# Patient Record
Sex: FEMALE | Race: WHITE | NOT HISPANIC OR LATINO | Employment: OTHER | URBAN - METROPOLITAN AREA
[De-identification: names, ages, dates, MRNs, and addresses within clinical notes are randomized per-mention and may not be internally consistent; named-entity substitution may affect disease eponyms.]

---

## 2017-01-12 ENCOUNTER — ALLSCRIPTS OFFICE VISIT (OUTPATIENT)
Dept: OTHER | Facility: OTHER | Age: 82
End: 2017-01-12

## 2017-01-26 ENCOUNTER — APPOINTMENT (OUTPATIENT)
Dept: PHYSICAL THERAPY | Facility: CLINIC | Age: 82
End: 2017-01-26
Payer: MEDICARE

## 2017-01-26 PROCEDURE — 97161 PT EVAL LOW COMPLEX 20 MIN: CPT

## 2017-01-26 PROCEDURE — G8979 MOBILITY GOAL STATUS: HCPCS

## 2017-01-26 PROCEDURE — G8978 MOBILITY CURRENT STATUS: HCPCS

## 2017-01-27 DIAGNOSIS — M25.562 PAIN IN LEFT KNEE: ICD-10-CM

## 2017-01-30 ENCOUNTER — APPOINTMENT (OUTPATIENT)
Dept: LAB | Facility: HOSPITAL | Age: 82
End: 2017-01-30
Attending: INTERNAL MEDICINE
Payer: MEDICARE

## 2017-01-30 ENCOUNTER — GENERIC CONVERSION - ENCOUNTER (OUTPATIENT)
Dept: OTHER | Facility: OTHER | Age: 82
End: 2017-01-30

## 2017-01-30 ENCOUNTER — TRANSCRIBE ORDERS (OUTPATIENT)
Dept: ADMINISTRATIVE | Facility: HOSPITAL | Age: 82
End: 2017-01-30

## 2017-01-30 ENCOUNTER — APPOINTMENT (OUTPATIENT)
Dept: PHYSICAL THERAPY | Facility: CLINIC | Age: 82
End: 2017-01-30
Payer: MEDICARE

## 2017-01-30 DIAGNOSIS — E03.9 HYPOTHYROIDISM: ICD-10-CM

## 2017-01-30 LAB — TSH SERPL DL<=0.05 MIU/L-ACNC: 3.04 UIU/ML (ref 0.36–3.74)

## 2017-01-30 PROCEDURE — 36415 COLL VENOUS BLD VENIPUNCTURE: CPT

## 2017-01-30 PROCEDURE — 84443 ASSAY THYROID STIM HORMONE: CPT

## 2017-02-01 ENCOUNTER — APPOINTMENT (OUTPATIENT)
Dept: PHYSICAL THERAPY | Facility: CLINIC | Age: 82
End: 2017-02-01
Payer: MEDICARE

## 2017-02-01 PROCEDURE — 97140 MANUAL THERAPY 1/> REGIONS: CPT

## 2017-02-01 PROCEDURE — 97110 THERAPEUTIC EXERCISES: CPT

## 2017-02-06 ENCOUNTER — APPOINTMENT (OUTPATIENT)
Dept: PHYSICAL THERAPY | Facility: CLINIC | Age: 82
End: 2017-02-06
Payer: MEDICARE

## 2017-02-06 PROCEDURE — 97110 THERAPEUTIC EXERCISES: CPT

## 2017-02-06 PROCEDURE — 97140 MANUAL THERAPY 1/> REGIONS: CPT

## 2017-02-08 ENCOUNTER — APPOINTMENT (OUTPATIENT)
Dept: PHYSICAL THERAPY | Facility: CLINIC | Age: 82
End: 2017-02-08
Payer: MEDICARE

## 2017-02-08 PROCEDURE — 97110 THERAPEUTIC EXERCISES: CPT

## 2017-02-13 ENCOUNTER — APPOINTMENT (OUTPATIENT)
Dept: PHYSICAL THERAPY | Facility: CLINIC | Age: 82
End: 2017-02-13
Payer: MEDICARE

## 2017-02-15 ENCOUNTER — APPOINTMENT (OUTPATIENT)
Dept: PHYSICAL THERAPY | Facility: CLINIC | Age: 82
End: 2017-02-15
Payer: MEDICARE

## 2017-02-15 PROCEDURE — 97110 THERAPEUTIC EXERCISES: CPT

## 2017-02-20 ENCOUNTER — APPOINTMENT (OUTPATIENT)
Dept: PHYSICAL THERAPY | Facility: CLINIC | Age: 82
End: 2017-02-20
Payer: MEDICARE

## 2017-02-20 PROCEDURE — 97110 THERAPEUTIC EXERCISES: CPT

## 2017-02-20 PROCEDURE — 97140 MANUAL THERAPY 1/> REGIONS: CPT

## 2017-02-22 ENCOUNTER — APPOINTMENT (OUTPATIENT)
Dept: PHYSICAL THERAPY | Facility: CLINIC | Age: 82
End: 2017-02-22
Payer: MEDICARE

## 2017-02-27 ENCOUNTER — APPOINTMENT (OUTPATIENT)
Dept: PHYSICAL THERAPY | Facility: CLINIC | Age: 82
End: 2017-02-27
Payer: MEDICARE

## 2017-03-01 ENCOUNTER — APPOINTMENT (OUTPATIENT)
Dept: PHYSICAL THERAPY | Facility: CLINIC | Age: 82
End: 2017-03-01
Payer: MEDICARE

## 2017-03-06 ENCOUNTER — GENERIC CONVERSION - ENCOUNTER (OUTPATIENT)
Dept: OTHER | Facility: OTHER | Age: 82
End: 2017-03-06

## 2017-03-06 ENCOUNTER — APPOINTMENT (OUTPATIENT)
Dept: PHYSICAL THERAPY | Facility: CLINIC | Age: 82
End: 2017-03-06
Payer: MEDICARE

## 2017-03-06 PROCEDURE — G8980 MOBILITY D/C STATUS: HCPCS

## 2017-03-06 PROCEDURE — 97110 THERAPEUTIC EXERCISES: CPT

## 2017-03-06 PROCEDURE — G8979 MOBILITY GOAL STATUS: HCPCS

## 2017-03-08 ENCOUNTER — APPOINTMENT (OUTPATIENT)
Dept: PHYSICAL THERAPY | Facility: CLINIC | Age: 82
End: 2017-03-08
Payer: MEDICARE

## 2017-04-08 ENCOUNTER — ANESTHESIA EVENT (OUTPATIENT)
Dept: PERIOP | Facility: AMBULARY SURGERY CENTER | Age: 82
End: 2017-04-08
Payer: MEDICARE

## 2017-04-08 RX ORDER — SODIUM CHLORIDE, SODIUM LACTATE, POTASSIUM CHLORIDE, CALCIUM CHLORIDE 600; 310; 30; 20 MG/100ML; MG/100ML; MG/100ML; MG/100ML
125 INJECTION, SOLUTION INTRAVENOUS CONTINUOUS
Status: CANCELLED | OUTPATIENT
Start: 2017-04-10

## 2017-04-10 ENCOUNTER — HOSPITAL ENCOUNTER (OUTPATIENT)
Facility: AMBULARY SURGERY CENTER | Age: 82
Setting detail: OUTPATIENT SURGERY
Discharge: HOME | End: 2017-04-10
Attending: OPHTHALMOLOGY | Admitting: OPHTHALMOLOGY
Payer: MEDICARE

## 2017-04-10 ENCOUNTER — ANESTHESIA (OUTPATIENT)
Dept: PERIOP | Facility: AMBULARY SURGERY CENTER | Age: 82
End: 2017-04-10
Payer: MEDICARE

## 2017-04-10 VITALS
HEIGHT: 63 IN | TEMPERATURE: 97 F | BODY MASS INDEX: 28.35 KG/M2 | OXYGEN SATURATION: 95 % | RESPIRATION RATE: 20 BRPM | HEART RATE: 80 BPM | SYSTOLIC BLOOD PRESSURE: 172 MMHG | WEIGHT: 160 LBS | DIASTOLIC BLOOD PRESSURE: 77 MMHG

## 2017-04-10 PROCEDURE — V2632 POST CHMBR INTRAOCULAR LENS: HCPCS | Performed by: OPHTHALMOLOGY

## 2017-04-10 DEVICE — IMPLANTABLE DEVICE: Type: IMPLANTABLE DEVICE | Site: EYE | Status: FUNCTIONAL

## 2017-04-10 RX ORDER — SODIUM CHLORIDE, SODIUM LACTATE, POTASSIUM CHLORIDE, CALCIUM CHLORIDE 600; 310; 30; 20 MG/100ML; MG/100ML; MG/100ML; MG/100ML
INJECTION, SOLUTION INTRAVENOUS CONTINUOUS PRN
Status: DISCONTINUED | OUTPATIENT
Start: 2017-04-10 | End: 2017-04-10

## 2017-04-10 RX ORDER — TOBRAMYCIN 3 MG/ML
1 SOLUTION/ DROPS OPHTHALMIC
Qty: 7.5 ML | Refills: 0
Start: 2017-04-10 | End: 2017-05-10

## 2017-04-10 RX ORDER — PHENYLEPHRINE HCL 2.5 %
1 DROPS OPHTHALMIC (EYE)
Status: COMPLETED | OUTPATIENT
Start: 2017-04-10 | End: 2017-04-10

## 2017-04-10 RX ORDER — TOBRAMYCIN 3 MG/ML
SOLUTION/ DROPS OPHTHALMIC AS NEEDED
Status: DISCONTINUED | OUTPATIENT
Start: 2017-04-10 | End: 2017-04-10 | Stop reason: HOSPADM

## 2017-04-10 RX ORDER — PROPOFOL 10 MG/ML
INJECTION, EMULSION INTRAVENOUS AS NEEDED
Status: DISCONTINUED | OUTPATIENT
Start: 2017-04-10 | End: 2017-04-10 | Stop reason: SURG

## 2017-04-10 RX ORDER — LIDOCAINE HYDROCHLORIDE 20 MG/ML
1 JELLY TOPICAL
Status: COMPLETED | OUTPATIENT
Start: 2017-04-10 | End: 2017-04-10

## 2017-04-10 RX ORDER — LIDOCAINE HYDROCHLORIDE 10 MG/ML
INJECTION, SOLUTION EPIDURAL; INFILTRATION; INTRACAUDAL; PERINEURAL AS NEEDED
Status: DISCONTINUED | OUTPATIENT
Start: 2017-04-10 | End: 2017-04-10 | Stop reason: HOSPADM

## 2017-04-10 RX ORDER — TETRACAINE HYDROCHLORIDE 5 MG/ML
SOLUTION OPHTHALMIC AS NEEDED
Status: DISCONTINUED | OUTPATIENT
Start: 2017-04-10 | End: 2017-04-10 | Stop reason: HOSPADM

## 2017-04-10 RX ORDER — KETOROLAC TROMETHAMINE 5 MG/ML
1 SOLUTION OPHTHALMIC 4 TIMES DAILY
Qty: 6 ML | Refills: 0
Start: 2017-04-10 | End: 2018-02-24

## 2017-04-10 RX ORDER — KETOROLAC TROMETHAMINE 5 MG/ML
1 SOLUTION OPHTHALMIC
Status: DISCONTINUED | OUTPATIENT
Start: 2017-04-10 | End: 2017-04-10 | Stop reason: HOSPADM

## 2017-04-10 RX ORDER — CYCLOPENTOLATE HYDROCHLORIDE 10 MG/ML
1 SOLUTION/ DROPS OPHTHALMIC
Status: COMPLETED | OUTPATIENT
Start: 2017-04-10 | End: 2017-04-10

## 2017-04-10 RX ORDER — TETRACAINE HYDROCHLORIDE 5 MG/ML
1 SOLUTION OPHTHALMIC ONCE
Status: COMPLETED | OUTPATIENT
Start: 2017-04-10 | End: 2017-04-10

## 2017-04-10 RX ADMIN — TETRACAINE HYDROCHLORIDE 1 DROP: 5 SOLUTION OPHTHALMIC at 07:48

## 2017-04-10 RX ADMIN — KETOROLAC TROMETHAMINE 1 DROP: 5 SOLUTION OPHTHALMIC at 08:18

## 2017-04-10 RX ADMIN — PHENYLEPHRINE HYDROCHLORIDE 1 DROP: 25 SOLUTION/ DROPS OPHTHALMIC at 08:18

## 2017-04-10 RX ADMIN — CYCLOPENTOLATE HYDROCHLORIDE 1 DROP: 10 SOLUTION/ DROPS OPHTHALMIC at 08:32

## 2017-04-10 RX ADMIN — PHENYLEPHRINE HYDROCHLORIDE 1 DROP: 25 SOLUTION/ DROPS OPHTHALMIC at 07:48

## 2017-04-10 RX ADMIN — KETOROLAC TROMETHAMINE 1 DROP: 5 SOLUTION OPHTHALMIC at 08:32

## 2017-04-10 RX ADMIN — CYCLOPENTOLATE HYDROCHLORIDE 1 DROP: 10 SOLUTION/ DROPS OPHTHALMIC at 08:18

## 2017-04-10 RX ADMIN — PHENYLEPHRINE HYDROCHLORIDE 1 DROP: 25 SOLUTION/ DROPS OPHTHALMIC at 08:03

## 2017-04-10 RX ADMIN — CYCLOPENTOLATE HYDROCHLORIDE 1 DROP: 10 SOLUTION/ DROPS OPHTHALMIC at 07:48

## 2017-04-10 RX ADMIN — LIDOCAINE HYDROCHLORIDE 1 APPLICATION: 20 JELLY TOPICAL at 08:32

## 2017-04-10 RX ADMIN — LIDOCAINE HYDROCHLORIDE 1 APPLICATION: 20 JELLY TOPICAL at 08:18

## 2017-04-10 RX ADMIN — KETOROLAC TROMETHAMINE 1 DROP: 5 SOLUTION OPHTHALMIC at 08:03

## 2017-04-10 RX ADMIN — LIDOCAINE HYDROCHLORIDE 1 APPLICATION: 20 JELLY TOPICAL at 07:49

## 2017-04-10 RX ADMIN — KETOROLAC TROMETHAMINE 1 DROP: 5 SOLUTION OPHTHALMIC at 07:49

## 2017-04-10 RX ADMIN — LIDOCAINE HYDROCHLORIDE 20 MG: 20 INJECTION, SOLUTION INTRAVENOUS at 08:53

## 2017-04-10 RX ADMIN — CYCLOPENTOLATE HYDROCHLORIDE 1 DROP: 10 SOLUTION/ DROPS OPHTHALMIC at 08:03

## 2017-04-10 RX ADMIN — PROPOFOL 30 MG: 10 INJECTION, EMULSION INTRAVENOUS at 08:53

## 2017-04-10 RX ADMIN — LIDOCAINE HYDROCHLORIDE 1 APPLICATION: 20 JELLY TOPICAL at 08:03

## 2017-04-10 RX ADMIN — PHENYLEPHRINE HYDROCHLORIDE 1 DROP: 25 SOLUTION/ DROPS OPHTHALMIC at 08:32

## 2017-05-26 ENCOUNTER — GENERIC CONVERSION - ENCOUNTER (OUTPATIENT)
Dept: OTHER | Facility: OTHER | Age: 82
End: 2017-05-26

## 2017-05-30 ENCOUNTER — LAB REQUISITION (OUTPATIENT)
Dept: LAB | Facility: HOSPITAL | Age: 82
End: 2017-05-30
Payer: MEDICARE

## 2017-05-30 ENCOUNTER — ALLSCRIPTS OFFICE VISIT (OUTPATIENT)
Dept: OTHER | Facility: OTHER | Age: 82
End: 2017-05-30

## 2017-05-30 DIAGNOSIS — R30.0 DYSURIA: ICD-10-CM

## 2017-05-30 LAB
BILIRUB UR QL STRIP: NEGATIVE
CLARITY UR: NORMAL
COLOR UR: YELLOW
GLUCOSE (HISTORICAL): NORMAL
HGB UR QL STRIP.AUTO: NEGATIVE
KETONES UR STRIP-MCNC: NEGATIVE MG/DL
LEUKOCYTE ESTERASE UR QL STRIP: NEGATIVE
NITRITE UR QL STRIP: NEGATIVE
PH UR STRIP.AUTO: 6 [PH]
PROT UR STRIP-MCNC: NEGATIVE MG/DL
SP GR UR STRIP.AUTO: 1.02
UROBILINOGEN UR QL STRIP.AUTO: NORMAL

## 2017-05-30 PROCEDURE — 87086 URINE CULTURE/COLONY COUNT: CPT | Performed by: NURSE PRACTITIONER

## 2017-05-31 LAB — BACTERIA UR CULT: NORMAL

## 2017-06-01 ENCOUNTER — GENERIC CONVERSION - ENCOUNTER (OUTPATIENT)
Dept: OTHER | Facility: OTHER | Age: 82
End: 2017-06-01

## 2017-06-26 ENCOUNTER — TRANSCRIBE ORDERS (OUTPATIENT)
Dept: LAB | Facility: CLINIC | Age: 82
End: 2017-06-26

## 2017-06-26 ENCOUNTER — APPOINTMENT (OUTPATIENT)
Dept: LAB | Facility: CLINIC | Age: 82
End: 2017-06-26
Payer: MEDICARE

## 2017-06-26 DIAGNOSIS — Z92.3 PERSONAL HISTORY OF IRRADIATION, PRESENTING HAZARDS TO HEALTH: ICD-10-CM

## 2017-06-26 DIAGNOSIS — Z79.811 USE OF AROMATASE INHIBITORS: ICD-10-CM

## 2017-06-26 DIAGNOSIS — C50.919 MALIGNANT NEOPLASM OF FEMALE BREAST, UNSPECIFIED LATERALITY, UNSPECIFIED SITE OF BREAST: ICD-10-CM

## 2017-06-26 DIAGNOSIS — Z79.899 ENCOUNTER FOR LONG-TERM (CURRENT) USE OF OTHER MEDICATIONS: Primary | ICD-10-CM

## 2017-06-26 LAB
ALBUMIN SERPL BCP-MCNC: 3.9 G/DL (ref 3.5–5)
ALP SERPL-CCNC: 90 U/L (ref 46–116)
ALT SERPL W P-5'-P-CCNC: 29 U/L (ref 12–78)
ANION GAP SERPL CALCULATED.3IONS-SCNC: 7 MMOL/L (ref 4–13)
AST SERPL W P-5'-P-CCNC: 16 U/L (ref 5–45)
BASOPHILS # BLD AUTO: 0.04 THOUSANDS/ΜL (ref 0–0.1)
BASOPHILS NFR BLD AUTO: 1 % (ref 0–1)
BILIRUB SERPL-MCNC: 0.3 MG/DL (ref 0.2–1)
BUN SERPL-MCNC: 18 MG/DL (ref 5–25)
CALCIUM SERPL-MCNC: 9.2 MG/DL (ref 8.3–10.1)
CHLORIDE SERPL-SCNC: 104 MMOL/L (ref 100–108)
CO2 SERPL-SCNC: 29 MMOL/L (ref 21–32)
CREAT SERPL-MCNC: 1.05 MG/DL (ref 0.6–1.3)
EOSINOPHIL # BLD AUTO: 0.33 THOUSAND/ΜL (ref 0–0.61)
EOSINOPHIL NFR BLD AUTO: 4 % (ref 0–6)
ERYTHROCYTE [DISTWIDTH] IN BLOOD BY AUTOMATED COUNT: 16.2 % (ref 11.6–15.1)
GFR SERPL CREATININE-BSD FRML MDRD: 49.3 ML/MIN/1.73SQ M
GGT SERPL-CCNC: 50 U/L (ref 5–85)
GLUCOSE SERPL-MCNC: 134 MG/DL (ref 65–140)
HCT VFR BLD AUTO: 42 % (ref 34.8–46.1)
HGB BLD-MCNC: 14 G/DL (ref 11.5–15.4)
LYMPHOCYTES # BLD AUTO: 2.14 THOUSANDS/ΜL (ref 0.6–4.47)
LYMPHOCYTES NFR BLD AUTO: 27 % (ref 14–44)
MCH RBC QN AUTO: 29.3 PG (ref 26.8–34.3)
MCHC RBC AUTO-ENTMCNC: 33.3 G/DL (ref 31.4–37.4)
MCV RBC AUTO: 88 FL (ref 82–98)
MONOCYTES # BLD AUTO: 0.44 THOUSAND/ΜL (ref 0.17–1.22)
MONOCYTES NFR BLD AUTO: 6 % (ref 4–12)
NEUTROPHILS # BLD AUTO: 4.93 THOUSANDS/ΜL (ref 1.85–7.62)
NEUTS SEG NFR BLD AUTO: 62 % (ref 43–75)
NRBC BLD AUTO-RTO: 0 /100 WBCS
PLATELET # BLD AUTO: 388 THOUSANDS/UL (ref 149–390)
PMV BLD AUTO: 11.1 FL (ref 8.9–12.7)
POTASSIUM SERPL-SCNC: 4.5 MMOL/L (ref 3.5–5.3)
PROT SERPL-MCNC: 7 G/DL (ref 6.4–8.2)
RBC # BLD AUTO: 4.78 MILLION/UL (ref 3.81–5.12)
SODIUM SERPL-SCNC: 140 MMOL/L (ref 136–145)
WBC # BLD AUTO: 7.9 THOUSAND/UL (ref 4.31–10.16)

## 2017-06-26 PROCEDURE — 85025 COMPLETE CBC W/AUTO DIFF WBC: CPT

## 2017-06-26 PROCEDURE — 82977 ASSAY OF GGT: CPT

## 2017-06-26 PROCEDURE — 86300 IMMUNOASSAY TUMOR CA 15-3: CPT

## 2017-06-26 PROCEDURE — 36415 COLL VENOUS BLD VENIPUNCTURE: CPT

## 2017-06-26 PROCEDURE — 80053 COMPREHEN METABOLIC PANEL: CPT

## 2017-06-27 LAB — CANCER AG27-29 SERPL-ACNC: 49.2 U/ML (ref 0–42.3)

## 2017-07-26 ENCOUNTER — ALLSCRIPTS OFFICE VISIT (OUTPATIENT)
Dept: OTHER | Facility: OTHER | Age: 82
End: 2017-07-26

## 2017-08-26 ENCOUNTER — ALLSCRIPTS OFFICE VISIT (OUTPATIENT)
Dept: OTHER | Facility: OTHER | Age: 82
End: 2017-08-26

## 2017-08-26 LAB
BILIRUB UR QL STRIP: NORMAL
CLARITY UR: NORMAL
COLOR UR: YELLOW
GLUCOSE (HISTORICAL): NORMAL
HGB UR QL STRIP.AUTO: NORMAL
KETONES UR STRIP-MCNC: NORMAL MG/DL
LEUKOCYTE ESTERASE UR QL STRIP: NORMAL
NITRITE UR QL STRIP: NORMAL
PH UR STRIP.AUTO: 5 [PH]
PROT UR STRIP-MCNC: NORMAL MG/DL
SP GR UR STRIP.AUTO: 1.02
UROBILINOGEN UR QL STRIP.AUTO: NORMAL

## 2017-08-30 ENCOUNTER — GENERIC CONVERSION - ENCOUNTER (OUTPATIENT)
Dept: OTHER | Facility: OTHER | Age: 82
End: 2017-08-30

## 2017-08-30 LAB
CULTURE RESULT (HISTORICAL): ABNORMAL
MISCELLANEOUS LAB TEST RESULT (HISTORICAL): ABNORMAL
SUSCEP. REFLEX (HISTORICAL): ABNORMAL

## 2017-12-29 ENCOUNTER — GENERIC CONVERSION - ENCOUNTER (OUTPATIENT)
Dept: FAMILY MEDICINE CLINIC | Facility: CLINIC | Age: 82
End: 2017-12-29

## 2018-01-10 NOTE — MISCELLANEOUS
Message  Talked to patient and asymptomatic and stated that never picked up the cipro last night and advised not to take any and order voided  Signatures   Electronically signed by : GLORIA Encarnacion;  Aug 31 2017  8:59AM EST                       (Author)

## 2018-01-10 NOTE — RESULT NOTES
Verified Results  (1) LIPID PANEL, FASTING [id number] 07:04AM [facility] Order Number: [id number]     Test Name Result Flag Reference   CHOLESTEROL 132 mg/dL     HDL,DIRECT 88 mg/dL H 40-60   Specimen collection should occur prior to Metamizole administration due to the potential for falsely depressed results  LDL CHOLESTEROL CALCULATED 23 mg/dL  0-100   - Patient Instructions: This is a fasting blood test  Water,black tea or black  coffee only after 9:00pm the night before test   Drink 2 glasses of water the morning of test     - Patient Instructions: This is a fasting blood test  Water,black tea or black  coffee only after 9:00pm the night before test Drink 2 glasses of water the morning of test - Patient Instructions: This bloodwork is non-fasting  Please drink two glasses of   water morning of bloodwork  Triglyceride:         Normal              <150 mg/dl       Borderline High    150-199 mg/dl       High               200-499 mg/dl       Very High          >499 mg/dl  Cholesterol:         Desirable        <200 mg/dl      Borderline High  200-239 mg/dl      High             >239 mg/dl  HDL Cholesterol:        High    >59 mg/dL      Low     <41 mg/dL  LDL CALCULATED:    This screening LDL is a calculated result  It does not have the accuracy of the Direct Measured LDL in the monitoring of patients with hyperlipidemia and/or statin therapy  Direct Measure LDL (AHM667) must be ordered separately in these patients  TRIGLYCERIDES 106 mg/dL  <=150   Specimen collection should occur prior to N-Acetylcysteine or Metamizole administration due to the potential for falsely depressed results  (1) [facility] [id number] 07:04[facility] Number: [id number]     Test Name Result Flag Reference   TSH 4 690 uIU/mL H 0 358-3 740   - Patient Instructions: This bloodwork is non-fasting  Please drink two glasses of water morning of bloodwork  - Patient Instructions:  This is a fasting blood test  Water,black tea or black  coffee only after 9:00pm the night before test Drink 2 glasses of water the morning of test - Patient Instructions: This bloodwork is non-fasting  Please drink two glasses of   water morning of bloodwork  Patients undergoing fluorescein dye angiography may retain small amounts of fluorescein in the body for 48-72 hours post procedure  Samples containing fluorescein can produce falsely depressed TSH values  If the patient had this procedure,a specimen should be resubmitted post fluorescein clearance  The recommended reference ranges for TSH during pregnancy are as follows:  First trimester 0 1 to 2 5 uIU/mL  Second trimester  0 2 to 3 0 uIU/mL  Third trimester 0 3 to 3 0 uIU/m       Plan  Hypothyroidism    · From  Levothyroxine Sodium 50 MCG Oral Tablet Take 1 tablet daily To  Levothyroxine Sodium 75 MCG Oral Tablet (Synthroid) TAKE 1 TABLET DAILY AS  DIRECTED   · (1) TSH WITH FT4 REFLEX; Status:Active;  Requested for:53Cef2386;

## 2018-01-12 NOTE — RESULT NOTES
Verified Results  (1) URINE CULTURE [id number] 02:21PM [facility] Order Number: [id number]     Test Name Result Flag Reference   CLINICAL REPORT (Report)     Test:        Urine culture  Specimen Type:   Urine  Specimen Date:   5/30/2017 2:21 PM  Result Date:    5/31/2017 7:43 PM  Result Status:   Final result  Resulting Lab:   [place] [address]            Tel: [phone]      CULTURE                                       ------------------                                   10,000-19,000 cfu/ml Mixed Contaminants X4

## 2018-01-12 NOTE — RESULT NOTES
Verified Results  (1) URINE CULTURE [id number] 12:00AM [facility]     Test Name Result Flag Reference   Urine Culture,Comprehensive Final report A    Result 1 Enterococcus species A    10,000-25,000 colony forming units per mL   Antimicrobial Susceptibility Comment     ** S = Susceptible; I = Intermediate; R = Resistant **                     P = Positive; N = Negative              MICS are expressed in micrograms per mL     Antibiotic                 RSLT#1    RSLT#2    RSLT#3    RSLT#4  Ciprofloxacin                  S  Levofloxacin                   S  Nitrofurantoin                 S  Penicillin                     S  Tetracycline                   R  Vancomycin                     S

## 2018-01-13 VITALS
WEIGHT: 175 LBS | HEART RATE: 68 BPM | HEIGHT: 64 IN | DIASTOLIC BLOOD PRESSURE: 74 MMHG | TEMPERATURE: 97.3 F | SYSTOLIC BLOOD PRESSURE: 136 MMHG | RESPIRATION RATE: 16 BRPM | BODY MASS INDEX: 29.88 KG/M2

## 2018-01-13 NOTE — RESULT NOTES
Verified Results  ECHO COMPLETE WITH CONTRAST IF INDICATED [id number] 01:19PM Priyanka Gan     Test Name Result Flag Reference   ECHO COMPLETE WITH CONTRAST IF INDICATED (Report)     [address]   [address] [facility]   [place] [address]   [phone]     Transthoracic Echocardiogram   2D, M-mode, Doppler, and Color Doppler     Study date: 21-Dec-2016     Patient: Carmel Shane   MR number: [id number]   Account number: [de-identified]   : 07-Mar-1928   Age: 80 years   Gender: Female   Status: Routine   Location: Echo lab   Height: 63 in   Weight: 159 7 lb   BP: 140/ 90 mmHg     Indications: Dizziness     Diagnoses: 780 4 - DIZZINESS AND GIDDINESS     Sonographer: London Alcantar   Primary Physician: Keren Perez MD   Referring Physician: Keren Perez MD   Group: David Thornton   Interpreting Physician: Michele Yen MD     SUMMARY     LEFT VENTRICLE:   Systolic function was vigorous by visual assessment  Ejection fraction was   estimated in the range of 60 % to 65 %  Although no diagnostic regional wall   motion abnormality was identified, this possibility cannot be completely   excluded on the basis of this study  There was mild concentric hypertrophy  Doppler parameters were consistent with abnormal left ventricular relaxation   (grade 1 diastolic dysfunction)  LEFT ATRIUM:   The atrium was moderately dilated  MITRAL VALVE:   There was mild regurgitation  TRICUSPID VALVE:   There was mild regurgitation  Estimated peak PA pressure was 40 to 45 mmHg  PULMONIC VALVE:   There was trace regurgitation  HISTORY: PRIOR HISTORY: HTN, GERD, HLD, Breast Cancer, Hypothyroidism     PROCEDURE: The procedure was performed in the echo lab  This was a routine   study  The transthoracic approach was used  The study included complete 2D   imaging, M-mode, complete spectral Doppler, and color Doppler  The heart rate   was 84 bpm, at the start of the study   Image quality was good  LEFT VENTRICLE: Size was normal  Systolic function was vigorous by visual   assessment  Ejection fraction was estimated in the range of 60 % to 65 %  Although no diagnostic regional wall motion abnormality was identified, this   possibility cannot be completely excluded on the basis of this study  There was   mild concentric hypertrophy  DOPPLER: Doppler parameters were consistent with   abnormal left ventricular relaxation (grade 1 diastolic dysfunction)  RIGHT VENTRICLE: The size was normal  Systolic function was normal      LEFT ATRIUM: The atrium was moderately dilated  RIGHT ATRIUM: Size was normal      MITRAL VALVE: Valve structure was normal  There was normal leaflet separation  DOPPLER: The transmitral velocity was within the normal range  There was no   evidence for stenosis  There was mild regurgitation  AORTIC VALVE: The valve was trileaflet  Leaflets exhibited normal thickness  DOPPLER: There was no evidence for stenosis  There was no regurgitation  TRICUSPID VALVE: DOPPLER: There was mild regurgitation  Estimated peak PA   pressure was 40 to 45 mmHg  PULMONIC VALVE: DOPPLER: There was trace regurgitation  PERICARDIUM: There was no thickening or calcification  There was no pericardial   effusion  AORTA: The root exhibited normal size  SYSTEMIC VEINS: IVC: The inferior vena cava was normal in size  Respirophasic   changes were normal      SYSTEM MEASUREMENT TABLES     2D mode   AoR Diam 2D: 3 3 cm   LA Diam (2D): 4 6 cm   LA/Ao (2D): 1 39   FS (2D Teich): 33 7 %   IVSd (2D): 1 3 cm   LVDEV: 111 cm³   LVESV: 41 6 cm³   LVIDd(2D): 4 86 cm   LVISd (2D): 3 22 cm   LVPWd (2D): 1 23 cm   SV (Teich): 69 4 cm³     Apical four chamber   LVEF A4C: 67 %     Unspecified Scan Mode   MV Peak A Luis: 1160 mm/s   MV Peak E Luis   Mean: 644 mm/s   MVA (PHT): 5 64 cm squared   PHT: 39 ms   Max P mm[Hg]   V Max: 3040 mm/s   Vmax: 2920 mm/s   RA Area: 15 5 cm squared   RA Volume: 39 4 cm³   TAPSE: 2 1 cm     [facility] Accredited [facility]ch[facility]ography [facility]     Prepared and electronically signed by     Ronnie Velez MD   Signed [id number] 09:11:02

## 2018-01-14 VITALS
DIASTOLIC BLOOD PRESSURE: 72 MMHG | HEART RATE: 76 BPM | BODY MASS INDEX: 30.22 KG/M2 | TEMPERATURE: 96.4 F | HEIGHT: 64 IN | SYSTOLIC BLOOD PRESSURE: 132 MMHG | RESPIRATION RATE: 16 BRPM | WEIGHT: 177 LBS

## 2018-01-14 VITALS
HEIGHT: 64 IN | BODY MASS INDEX: 29.53 KG/M2 | SYSTOLIC BLOOD PRESSURE: 150 MMHG | WEIGHT: 173 LBS | HEART RATE: 96 BPM | DIASTOLIC BLOOD PRESSURE: 87 MMHG

## 2018-01-14 VITALS
TEMPERATURE: 98.2 F | HEART RATE: 62 BPM | WEIGHT: 175 LBS | DIASTOLIC BLOOD PRESSURE: 80 MMHG | HEIGHT: 64 IN | SYSTOLIC BLOOD PRESSURE: 126 MMHG | BODY MASS INDEX: 29.88 KG/M2 | RESPIRATION RATE: 18 BRPM

## 2018-01-14 NOTE — PROGRESS NOTES
Assessment    1  Asymptomatic varicose veins of bilateral lower extremities (454 9) (I83 93)   2  Chronic pain of left knee (780 80,254 22) (M25 562,G92 29)    Plan    1  Follow-up PRN Evaluation and Treatment  Follow-up  Status: Complete  Done:   57HQZ9007   2  Apply moisturizing cream or lotion several times a day ; Status:Complete;   Done:   36XWY4744    Discussion/Summary  Discussion Summary:   Mrs Wilmer Lebron has bilateral asymptomatic varicose veins  History of bilateral vein stripping in 2004  She denies any edema, pain, heaviness or fatigue  Her pain is confined to the left knee  I have encouraged her to follow up with her orthopaedist  Her varicose veins pose no contraindication to injection therapy of the left knee  If in the future she becomes symptomatic due to her varicose veins she should notify the office, otherwise we will be happy to see her on an ass needed basis  Chief Complaint  Chief Complaint Free Text Note Form: "I am here to get my legs checked "         History of Present Illness  Varicose Veins [facility] Vascular: The patient is being seen for a consultation regarding varicose veins  Symptoms:  bilateral bulging veins, bilateral dilated veins, bilateral spider veins, bilateral hyperpigmentation and left leg pain, but no leg swelling, no muscle cramps, no stasis dermatitis, no cellulitis, no venous ulcers, no dry skin, no itching and no bleeding  The patient describes the pain as Left knee arthritic pain  This patient has no history of DVT, pulmonary embolism, superficial venous thrombosis, or a hypercoagulable state  Evaluation and Treatment History: This patient has had previous surgical treatment which has included bilateral vein stripping and ligation  This patient is not currently utilizing any conservative management strategies to manage the current symptoms  Free Text HPI: Ms Wilmer Lebron is new to our practice  She is here for evaluation of her varicose veins   She reports history of bilateral vein stripping and ligation by Dr Liya Brown in 2004  She complains of pain confined to the left knee  She states that she has arthritis and was told she could possibly have steroid injections to the need, but she should have her varicose veins evaluated first  Patient denies any aching, heaviness, pain or fatigue  She denies edema  Pain is confined only to the left knee  It is constantly present and made worse with weight bearing/walking  Pain is relieved with Advil  She does not wear compression stockings  She does not elevate  She reports that she has never had pain or been symptomatic, stating that she had stripping done in 2004 because of cosmetic reasons  Review of Systems  Complete Female - Vasc:   Constitutional: No fever or chills, feels well, no tiredness, no recent weight gain or weight loss  Eyes: no sudden vision loss, no blurred vision and no double vision, but no eye pain, no eyesight problems, no dryness of the eyes, eyes not red, no purulent discharge from the eyes, no itching of the eyes and wears glasses  ENT: nasal discharge, but no earache, no nosebleeds, no sore throat, no hearing loss and no hoarseness  Cardiovascular: irregular heart rate, no painful veins and no bleeding veins, but no chest pain, no intermittent leg claudication, no palpitations and leg pain with walking  Respiratory: No sob, no wheezing, no cough, no sob with exertion, no orthopnea  Gastrointestinal: No nausea, No vomiting, no diarrhea, no blood in stool  Genitourinary: no dysuria, no Hematuria,no urinary incontinence  Musculoskeletal: no limb pain, no limb swelling  Integumentary: dry skin and no ulcers, but no rashes, no itching, no skin lesions and no skin wound  Neurological: numbness, no dementia and difficulty walking, but no headache, no confusion, no dizziness, no limb weakness, no convulsions and no fainting  Psychiatric: no depression, no mood disorders, no anxiety  Hematologic/Lymphatic: a tendency for easy bleeding and a tendency for easy bruising, but no swollen glands and no swollen glands in the neck  ROS Reviewed:   ROS reviewed  Active Problems    1  Abnormal neutrophil count (790 99) (R79 9)   2  Adenocarcinoma of breast, unspecified laterality (174 9) (C50 919)   3  Benign colon polyp (211 3) (K63 5)   4  Benign essential hypertension (401 1) (I10)   5  BMI 29 0-29 9,adult (V85 25) (Z68 29)   6  Chronic rhinitis (472 0) (J31 0)   7  Complete uterovaginal prolapse (618 3) (N81 3)   8  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)   9  Coronary arteriosclerosis (414 00) (I25 10)   10  Encounter for pessary maintenance (V53 99) (Z46 89)   11  Encounter for special screening examination for genitourinary disorder (V81 6) (Z13 89)   12  Esophageal reflux (530 81) (K21 9)   13  Flu vaccine need (V04 81) (Z23)   14  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)   15  Hyperlipidemia (272 4) (E78 5)   16  Hypothyroidism (244 9) (E03 9)   17  Irritable bowel syndrome (564 1) (K58 9)   18  Need for pneumococcal vaccination (V03 82) (Z23)   19  Nephrolithiasis (592 0) (N20 0)   20  Peripheral vertigo (386 10) (H81 399)   21  Pre-op evaluation (V72 84) (Z01 818)   22  Venous insufficiency (chronic) (peripheral) (459 81) (I87 2)   23  Visit for screening mammogram (V76 12) (Z12 31)    Past Medical History    1  History of Acute Cystitis (595 0)   2  History of Acute upper respiratory infection (465 9) (J06 9)   3  History of Acute UTI (599 0) (N39 0)   4  History of Allergic Reaction (995 3)   5  History of Atypical chest pain (786 59) (R07 89)   6  Cellulitis of toe, unspecified laterality (681 10) (L03 039)   7  History of Chest pain (786 50) (R07 9)   8  Cough (786 2) (R05)   9  History of Dysuria (788 1) (R30 0)   10  History of Fainting (780 2) (R55)   11  History of  1 para 1 (V49 89) (Z78 9)   12  History of Herpes zoster (053 9) (B02 9)   13   History of abdominal pain (V13 89) (Z87 898)   14  History of acute bronchitis (V12 69) (Z87 09)   15  History of acute bronchitis (V12 69) (Z87 09)   16  History of acute sinusitis (V12 69) (Z87 09)   17  History of acute sinusitis (V12 69) (Z87 09)   18  History of diarrhea (V12 79) (Z87 898)   19  History of neoplasm of uncertain behavior of skin (V13 3) (Z87 2)   20  Impacted cerumen, unspecified laterality (380 4) (H61 20)   21  Personal history of urinary tract infection (V13 02) (Z87 440)   22  Personal history of urinary tract infection (V13 02) (Z87 440)   23  History of Pityriasis rosea (696 3) (L42)   24  History of Squamous cell carcinoma of skin of left lower extremity (173 72) (C44 729)   25  History of Tinnitus, unspecified laterality (388 30) (H93 19)  Active Problems And Past Medical History Reviewed: The active problems and past medical history were reviewed and updated today  Surgical History  Surgical History Reviewed: The surgical history was reviewed and updated today  Family History  Mother    1  Family history of    2  Family history of hypertension (V17 49) (Z82 49)   3  Family history of myocardial infarction (V17 3) (Z82 49)  Father    4  Family history of    5  Family history of hypertension (V17 49) (Z82 49)   6  Family history of myocardial infarction (V17 3) (Z82 49)  Sister    7  Family history of cardiac disorder (V17 49) (Z82 49)  Family History Reviewed: The family history was reviewed and updated today  Social History    · Cultural background   · Former smoker (V15 82) (P46 264)   · No drug use   · Primary spoken language English   · Racial background   · Retired   · Social alcohol use (Z78 9)  Social History Reviewed: The social history was reviewed and updated today  Current Meds   1  Aspir-81 81 MG Oral Tablet Delayed Release; 1 Every Day; Therapy: 29OTN5339 to  Requested for: 80HWX5103 Recorded   2   Diltiazem HCl ER Coated Beads 180 MG Oral Capsule Extended Release 24 Hour;   TAKE 1 CAPSULE DAILY (NEED APPOINTMENT PRIOR TO FURTHER REFILLS); Therapy: 90DCO6812 to (Last Rx:53Yap3239)  Requested for: 57Kmz3589 Ordered   3  Esomeprazole Magnesium 40 MG Oral Capsule Delayed Release; take 1 capsule daily; Therapy: 31Rdf1799 to (Last Rx:77Xil9989)  Requested for: 82Zbd4257 Ordered   4  Exemestane 25 MG Oral Tablet; Take 1 tablet daily; Therapy: 87BZN2283 to (Last Rx:68Zvd0176)  Requested for: 39MVC0740 Ordered   5  Levothyroxine Sodium 50 MCG Oral Tablet; Take 1 tablet daily; Therapy: 50OAQ0553 to (Last Rx:04Pql8751)  Requested for: 87Duu3493 Ordered   6  Nasonex 50 MCG/ACT Nasal Suspension; USE 2 SPRAYS IN EACH NOSTRIL DAILY; Therapy: 86Zwr0318 to (Last Rx:28Dac5718)  Requested for: 96Ums6731 Ordered   7  Vytorin 10-40 MG Oral Tablet; take half tablet nightly  Requested for: 91Qvq5876; Last   Rx:04Jan2016 Ordered  Medication List Reviewed: The medication list was reviewed and updated today  Allergies    1  Nitrofurantoin Monohyd Macro CAPS   2  Cipro TABS   3  Clindamycin   4  Levaquin TABS   5  Penicillins   6  Sulfa Drugs    7  IV Dye    Vitals  Vital Signs    Recorded: 55KEQ8861 81:89OE   Systolic 997, LUE, Sitting   Diastolic 78, LUE, Sitting   Heart Rate 76   Respiration 18   Height 5 ft 3 5 in   Weight 172 lb    BMI Calculated 29 99   BSA Calculated 1 82     Physical Exam    Dorsalis pedis: right 2+ and left 2+  Distal Pulse Exam: Normal Capillary Refill  Extremities: No upper or lower extremity edema  LE Varicose Veins: right leg truncal veins, left leg truncal veins, right leg reticular veins, left leg reticular veins, right leg spider veins and left leg spider veins  The heart rate was normal  The rhythm was regular  Heart sounds: normal S1 and normal S2    Murmurs: No murmurs were heard  Pulmonary   Respiratory effort: No increased work of breathing or signs of respiratory distress     Psychiatric   Orientation to person, place and time: Normal    Eyes   Conjunctiva and lids: No swelling, erythema, or discharge  Ears, Nose, Mouth, and Throat   Hearing: Normal    Neck   Neck: No jugular venous distention, normal ROM and extension  No cervical adenopathy, trachea midline, neck supple  Neurologic Sensory exam normal   Motor skills intact  Musculoskeletal   Gait and station: Normal    Muscle strength/tone: Normal    Skin   Skin and subcutaneous tissue: Normal without rashes or lesions        Signatures   Electronically signed by : Bernardino Cartagena; Oct 14 2016 11:42AM EST                       (Author)    Electronically signed by : Oxana Faria MD; Oct 18 2016  9:54AM EST

## 2018-01-15 NOTE — RESULT NOTES
Verified Results  (1) CBC/PLT/DIFF 07CRI2397 11:10AM [facility] Order Number: [id number]     Test Name Result Flag Reference   WBC COUNT 12 20 Thousand/uL H 4 80-10 80   WBC ADJUSTED 12 20 Thousand/uL H 4 80-10 80   RBC COUNT 5 12 Million/uL  4 20-5 40   HEMOGLOBIN 14 7 g/dL  12 0-16 0   HEMATOCRIT 44 8 %  37 0-47 0   MCV 88 fL  82-98   MCH 28 8 pg  27 0-31 0   MCHC 32 9 g/dL  31 4-37 4   RDW 16 0 % H 11 6-15 1   MPV 8 5 fL L 8 9-12 7   PLATELET COUNT 728 Thousands/uL  130-400   nRBC AUTOMATED 0 /100 WBCs     NEUTROPHILS RELATIVE PERCENT 70 %  43-75   LYMPHOCYTES RELATIVE PERCENT 21 %  14-44   MONOCYTES RELATIVE PERCENT 5 %  4-12   EOSINOPHILS RELATIVE PERCENT 4 %  0-6   BASOPHILS RELATIVE PERCENT 0 %  0-1   NEUTROPHILS ABSOLUTE COUNT 8 60 Thousands/µL H 1 85-7 62   LYMPHOCYTES ABSOLUTE COUNT 2 60 Thousands/µL  0 60-4 47   MONOCYTES ABSOLUTE COUNT 0 60 Thousand/µL  0 17-1 22   EOSINOPHILS ABSOLUTE COUNT 0 50 Thousand/µL  0 00-0 61   BASOPHILS ABSOLUTE COUNT 0 00 Thousands/µL  0 00-0 10       Discussion/Summary   Your white count is improved from the hospital, the rest of the blood count is normal  - Dr Stewart Labor

## 2018-01-18 NOTE — RESULT NOTES
Verified Results  (1) TSH WITH FT4 REFLEX 30Jan2017 01:08PM [facility] Order Number: [id number]     Test Name Result Flag Reference   TSH 3 045 uIU/mL  0 358-3 740   Patients undergoing fluorescein dye angiography may retain small amounts of fluorescein in the body for 48-72 hours post procedure  Samples containing fluorescein can produce falsely depressed TSH values  If the patient had this procedure,a specimen should be resubmitted post fluorescein clearance            The recommended reference ranges for TSH during pregnancy are as follows:  First trimester 0 1 to 2 5 uIU/mL  Second trimester  0 2 to 3 0 uIU/mL  Third trimester 0 3 to 3 0 uIU/m       Discussion/Summary   Your thyroid testing is normal - Dr Gordon Sanabria

## 2018-01-23 NOTE — MISCELLANEOUS
Assessment   1  Acute UTI (599 0) (N39 0)1   2  Nephrolithiasis (592 0) (N20 0)1   3  Abnormal neutrophil count (790 99) (R79 9)1      1 Amended By: Clementine Valadez ; Mar 09 2016 10:41 AM EST    Discussion/Summary  Discussion Summary:   Will recheck CBC due to elevated WBC while in the hospital, although this was likely due to infection  She is feeling much better from her UTI and is done with antibiotics  No further allergic type symptoms  Follow up with urogynecology tomorrow as scheduled  1        1 Amended By: Clementine Valadez ; Mar 09 2016 10:43 AM EST    Chief Complaint  I spoke with Dudley Kim on 2/29 16 after her hospital discharge to home on 2/27/16  She had an allergic reaction to the Cipro that Dr Vega Haddad had put her on so she stopped that yesterday  She had gone to the ER and they advised Benadryl PRN and put her on Prednisone 20 mg twice daily  Dr Vega Haddad now put her on Cephalexin which she plans to start today as long as Dr Derinda Cushing reassures her that it is ok for her to take this  (see separate phone message about this) I reviewed her medications and updated her chart  She is still having some lower abdominal discomfort since she has a "dropped bladder" and she is pushing fluids now due to her infection  (Pyelonephritis) and ureteral calculus with stent placement for which she will be following up with Dr Vega Haddad for  She will start her antibiotic as soon as she gets the approval from Dr Derinda Cushing  She denies fever  She is aware to call back at any time with any questions or concerns and she is also aware that if she develops any worsening pain or any chest pain, dizziness, weakness, dyspnea, fevers, etc she should go back to the ER [facility]   Chief Complaint Free Text Note Form: [address] [facility] UTI [email or web address]        1 Amended By: Zachary Maurice; Mar 09 2016 10:22 AM EST    History of Present Illness  TCM Communication [facility]: The patient is being contacted for follow-up after hospitalization  Hospital records were reviewed  She was hospitalized at [place] [address]  The date of discharge: 2/29/16  Diagnosis: Left Hip Pain/ Pyelonephritis, Left Ureteral Calculus with stent insertion  She was discharged to home  Medications reviewed and updated today  She scheduled a follow up appointment  Follow-up appointments with other specialists: Dr Priya Pritchard  Counseling was provided to the patient  Topics counseled included instructions for management, risk factor reductions, patient and family education and importance of compliance with treatment  Communication performed and completed by 2/29/16 Shannen   HPI: As per TCM note  She was in the hospital for stone and UTI, was initially put on cipro but had an allergic reaction  Was changed to keflex and has been taking this with prednisone  No further allergic symptoms  Has appointment with urogynecologist in [place] for her stones tomorrow  1  Denies fever, dysuria, rash, or discomfort  Her WBC was high in the hospital but came down prior to discharge  2        1 Amended By: Cee Keating ; Mar 09 2016 10:26 AM EST   2 Amended By: Cee Keating ; Mar 09 2016 10:40 AM EST    Review of Systems  Complete-Female:   Constitutional:1  No fever, no chills, feels well, no tiredness, no recent weight gain or weight loss1   Cardiovascular:1  No complaints of slow heart rate, no fast heart rate, no chest pain, no palpitations, no leg claudication, no lower extremity edema1   Respiratory:1  No complaints of shortness of breath, no wheezing, no cough, no SOB on exertion, no orthopnea, no PND1   Genitourinary:1  as noted in [facility]   1 Amended By: Cee Keating ; Mar 09 2016 10:26 AM EST    Active Problems   1  Abnormal blood sugar (790 29) (R73 09)  2  Acute bronchitis (466 0) (J20 9)  3  Adenocarcinoma of breast, unspecified laterality (174 9) (C50 919)  4  Benign colon polyp (211 3) (K63 5)  5  Benign essential hypertension (401 1) (I10)  6  Cervicalgia (723 1) (M54 2)  7  Chronic rhinitis (472 0) (J31 0)  8  Complete uterovaginal prolapse (618 3) (N81 3)  9  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)  10  Coronary arteriosclerosis (414 00) (I25 10)  11  Dermatitis (692 9) (L30 9)  12  Diarrhea (787 91) (R19 7)  13  Dry mouth (527 7) (R68 2)  14  Encounter for pessary maintenance (V53 99) (Z46 89)  15  Esophageal reflux (530 81) (K21 9)  16  Gastritis (535 50) (K29 70)  17  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)  18  Hyperlipidemia (272 4) (E78 5)  19  Hypothyroidism (244 9) (E03 9)  20  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)  21  Irritable bowel syndrome (564 1) (K58 9)  22  Need for pneumococcal vaccination (V03 82) (Z23)  23  Peripheral vertigo (386 10) (H81 399)  24  Tinea cruris (110 3) (B35 6)  25  Venous insufficiency (chronic) (peripheral) (459 81) (I87 2)  26  Visit for screening mammogram (V76 12) (Z12 31)    Past Medical History   1  History of Acute Cystitis (595 0)  2  History of Acute upper respiratory infection (465 9) (J06 9)  3  History of Allergic Reaction (995 3)  4  History of Atypical chest pain (786 59) (R07 89)  5  Cellulitis of toe, unspecified laterality (681 10) (L03 039)  6  History of Chest pain (786 50) (R07 9)  7  Cough (786 2) (R05)  8  History of Dysuria (788 1) (R30 0)  9  History of Fainting (780 2) (R55)  10  History of  1 para 1 (V49 89) (Z78 9)  11  History of Herpes zoster (053 9) (B02 9)  12  History of abdominal pain (V13 89) (Z87 898)  13  History of acute bronchitis (V12 69) (Z87 09)  14  History of acute sinusitis (V12 69) (Z87 09)  15  History of acute sinusitis (V12 69) (Z87 09)  16  History of neoplasm of uncertain behavior of skin (V13 3) (Z87 2)  17  Impacted cerumen, unspecified laterality (380 4) (H61 20)  18  Personal history of urinary tract infection (V13 02) (Z87 440)  19  Personal history of urinary tract infection (V13 02) (Z87 440)  20  History of Pityriasis rosea (696 3) (L42)  21   History of Squamous cell carcinoma of skin of left lower extremity (173 72) (C41 729)  22  History of Tinnitus, unspecified laterality (388 30) (H93 19)    Surgical History   1  History of Biopsy Skin  2  History of Breast Surgery Partial Mastectomy  3  History of Knee Arthroscopy  4  History of Total Abdominal Hysterectomy With Removal Of Both Ovaries  Surgical History Reviewed: The surgical history was reviewed and updated today  1        1 Amended By: Mariea Cogan ; Mar 09 2016 10:26 AM EST    Family History   1  Family history of   2  Family history of hypertension (V17 49) (Z82 49)  3  Family history of myocardial infarction (V17 3) (Z82 49)   4  Family history of   5  Family history of hypertension (V17 49) (Z82 49)  6  Family history of myocardial infarction (V17 3) (Z82 49)   7  Family history of cardiac disorder (V17 49) (Z82 49)  Family History Reviewed: The family history was reviewed and updated today  1        1 Amended By: Mariea Cogan ; Mar 09 2016 10:26 AM EST    Social History    · Cultural background   · Former smoker (V15 82) (W81 996)   · No drug use   · Primary spoken language English   · Racial background   · Retired   · Social alcohol use (Z78 9)    Social History Reviewed: The social history was reviewed and updated today  1  The social history was reviewed and is unchanged  1        1 Amended By: Mariea Cogan ; Mar 09 2016 10:26 AM EST    Current Meds  1  Aspir-81 81 MG Oral Tablet Delayed Release; 1 Every Day; Therapy: 80GQD9136 to  Requested for: 80GDA4715 Recorded  2  Clobetasol Propionate 0 05 % External Cream; APPLY SPARINGLY TO AFFECTED   AREA(S) TWICE DAILY; Therapy: 66SXM8835 to (Last Rx:48Pug5388)  Requested for: 09MFA0939 Ordered  3  Diltiazem HCl ER Coated Beads 180 MG Oral Capsule Extended Release 24 Hour;   TAKE 1 CAPSULE DAILY (NEED APPOINTMENT PRIOR TO FURTHER REFILLS); Therapy: 36JXO8146 to (Last Rx:10Qik3739)  Requested for: 72Vrh1690 Ordered  4  Esomeprazole Magnesium 40 MG Oral Capsule Delayed Release; take 1 capsule daily; Therapy: 12Are9650 to (Last Rx:25Oct2015)  Requested for: 09Uoo2496 Ordered  5  Exemestane 25 MG Oral Tablet; Take 1 tablet daily; Therapy: 47CMI8715 to (Last Rx:11Jan2016)  Requested for: 71VFR8987 Ordered  6  Levothyroxine Sodium 50 MCG Oral Tablet; Take 1 tablet daily; Therapy: 97EHM4888 to (Last Rx:03Nov2015)  Requested for: 71SUU2709 Ordered  7  Nasonex 50 MCG/ACT Nasal Suspension; USE 2 SPRAYS IN EACH NOSTRIL DAILY; Therapy: 83Hoi4871 to (Last Rx:71Iqs2061)  Requested for: 72Zyg0480 Ordered  8  Vytorin 10-40 MG Oral Tablet; take half tablet nightly  Requested for: 65JAN9256; Last   Rx:04Jan2016 Ordered  Medication List Reviewed: The medication list was reviewed and updated today  Allergies   1  Nitrofurantoin Monohyd Macro CAPS  2  Cipro TABS  3  Clindamycin  4  Levaquin TABS  5  Penicillins  6  Sulfa Drugs    Physical Exam    Constitutional1    General appearance: No acute distress, well appearing and well nourished  1    Ears, Nose, Mouth, and Throat1          1,2      1,2  2  2   2  2          1,2  2  2  2   Oropharynx: Normal with no erythema, edema, exudate or lesions  1    Pulmonary1    Respiratory effort: No increased work of breathing or signs of respiratory distress  2    Auscultation of lungs: Clear to auscultation  1    Cardiovascular1    Auscultation of heart: Normal rate and rhythm, normal S1 and S2, without murmurs  1    Examination of extremities for edema and/or varicosities: Normal 2    Abdomen2    Abdomen: Non-tender, no masses  2  No suprapubic tenderness2   Liver and spleen: No hepatomegaly or splenomegaly  2    Lymphatic1    Palpation of lymph nodes in neck: No lymphadenopathy  1          1 Amended By: Raford Habermann ; Mar 09 2016 10:27 AM EST   2 Amended By: Raford Habermann ; Mar 09 2016 10:40 AM EST    Future Appointments    Date/Time Provider Specialty Site   03/09/2016 10:00 AM Mahsa LIZABETH Cid   [id number] [facility]     Signatures   Electronically signed by : Lucretia Prater, ; Feb 29 2016  2:21PM EST                       (Co-author)    Electronically signed by : LIZABETH Rodriguez ; Feb 29 2016  4:09PM EST                       (Author)    Electronically signed by : LIZABETH Rodriguez ; Mar  9 2016 10:43AM EST                       (Author)

## 2018-02-24 ENCOUNTER — OFFICE VISIT (OUTPATIENT)
Dept: FAMILY MEDICINE CLINIC | Facility: CLINIC | Age: 83
End: 2018-02-24
Payer: MEDICARE

## 2018-02-24 VITALS
RESPIRATION RATE: 16 BRPM | HEIGHT: 63 IN | TEMPERATURE: 97.6 F | WEIGHT: 176 LBS | HEART RATE: 76 BPM | SYSTOLIC BLOOD PRESSURE: 154 MMHG | BODY MASS INDEX: 31.18 KG/M2 | DIASTOLIC BLOOD PRESSURE: 78 MMHG

## 2018-02-24 DIAGNOSIS — J01.00 ACUTE NON-RECURRENT MAXILLARY SINUSITIS: Primary | ICD-10-CM

## 2018-02-24 PROBLEM — S80.02XA CONTUSION OF LEFT KNEE AND LOWER LEG: Status: ACTIVE | Noted: 2017-01-12

## 2018-02-24 PROBLEM — S80.12XA CONTUSION OF LEFT KNEE AND LOWER LEG: Status: ACTIVE | Noted: 2017-01-12

## 2018-02-24 PROBLEM — M25.562 LEFT KNEE PAIN: Status: ACTIVE | Noted: 2017-01-12

## 2018-02-24 PROCEDURE — 99213 OFFICE O/P EST LOW 20 MIN: CPT | Performed by: NURSE PRACTITIONER

## 2018-02-24 RX ORDER — CEPHALEXIN 500 MG/1
500 CAPSULE ORAL EVERY 12 HOURS SCHEDULED
Qty: 14 CAPSULE | Refills: 0 | Status: SHIPPED | OUTPATIENT
Start: 2018-02-24 | End: 2018-03-03

## 2018-02-24 RX ORDER — DEXTROMETHORPHAN HYDROBROMIDE AND PROMETHAZINE HYDROCHLORIDE 15; 6.25 MG/5ML; MG/5ML
5 SYRUP ORAL 4 TIMES DAILY PRN
Qty: 240 ML | Refills: 0 | Status: SHIPPED | OUTPATIENT
Start: 2018-02-24 | End: 2018-03-03

## 2018-02-24 RX ORDER — OMEPRAZOLE 40 MG/1
1 CAPSULE, DELAYED RELEASE ORAL DAILY
COMMUNITY
Start: 2017-11-07 | End: 2018-02-24

## 2018-02-24 RX ORDER — DILTIAZEM HYDROCHLORIDE 180 MG/1
CAPSULE, COATED, EXTENDED RELEASE ORAL
COMMUNITY
Start: 2013-09-30 | End: 2018-06-28 | Stop reason: SDUPTHER

## 2018-02-24 NOTE — PROGRESS NOTES
Assessment/Plan:       Diagnoses and all orders for this visit:    Acute non-recurrent maxillary sinusitis  -     cephalexin (KEFLEX) 500 mg capsule; Take 1 capsule (500 mg total) by mouth every 12 (twelve) hours for 7 days  -     promethazine-dextromethorphan (PHENERGAN-DM) 6 25-15 mg/5 mL oral syrup; Take 5 mL by mouth 4 (four) times a day as needed for cough for up to 7 days    Other orders  -     Discontinue: omeprazole (PriLOSEC) 40 MG capsule; Take 1 capsule by mouth daily  -     diltiazem (CARDIZEM CD) 180 mg 24 hr capsule; Take by mouth          Patient Instructions   Drinking plenty of fluids, saline nasal rinses or nasal spray, and steam or cool air humidification are all effective ways of managing symptoms  May take Mucinex D for sinus congestion, or Coricidin HBP if you have a heart condition or high blood pressure  Mucinex or Robitussin DM are used to control cough symptoms and include an expectorant  May take Ibuprofen or Tylenol as needed for pain or fevers  Recommend recheck if symptoms do not resolve or improve within 1 week  Take antibiotics with food  It is important that you take the entire course of antibiotics prescribed  May also take a probiotic of your choice to maintain healthy GI miles  Return if symptoms worsen or fail to improve  Subjective:      Patient ID: Vidhi Dunn is a 80 y o  female  Chief Complaint   Patient presents with    Nasal Congestion     2/21/18    Fever       She has had sinus congestion and pressure for the past 4-5 days  She has a runny nose, sore throat, and a dry cough  Fever of 101 at onset of symptoms  Denies abdominal discomfort, n/v/d  She is using saline nasal rinses  Not taking anything OTC for symptoms  She did have a flu shot this season           The following portions of the patient's history were reviewed and updated as appropriate: allergies, current medications, past family history, past medical history, past social history, past surgical history and problem list     Review of Systems   Constitutional: Positive for fatigue and fever  Negative for chills  HENT: Positive for congestion and rhinorrhea  Negative for ear pain, postnasal drip, sinus pressure and sore throat  Respiratory: Positive for cough and shortness of breath  Negative for wheezing  Cardiovascular: Negative for chest pain  Gastrointestinal: Negative for abdominal pain, diarrhea, nausea and vomiting  Musculoskeletal: Negative for arthralgias  Skin: Negative for rash  Neurological: Negative for headaches  Current Outpatient Prescriptions   Medication Sig Dispense Refill    diltiazem (CARDIZEM CD) 180 mg 24 hr capsule Take by mouth      aspirin 81 MG tablet Take 81 mg by mouth every morning        cephalexin (KEFLEX) 500 mg capsule Take 1 capsule (500 mg total) by mouth every 12 (twelve) hours for 7 days 14 capsule 0    esomeprazole (NexIUM) 40 MG capsule Take 40 mg by mouth every morning before breakfast       exemestane (AROMASIN) 25 MG tablet Take 25 mg by mouth daily   ezetimibe-simvastatin (VYTORIN) 10-40 mg per tablet Take 1 tablet by mouth daily at bedtime   levothyroxine 50 mcg tablet Take 50 mcg by mouth every morning        promethazine-dextromethorphan (PHENERGAN-DM) 6 25-15 mg/5 mL oral syrup Take 5 mL by mouth 4 (four) times a day as needed for cough for up to 7 days 240 mL 0     No current facility-administered medications for this visit  Objective:    /78   Pulse 76   Temp 97 6 °F (36 4 °C)   Resp 16   Ht 5' 3" (1 6 m)   Wt 79 8 kg (176 lb)   BMI 31 18 kg/m²        Physical Exam   Constitutional: She appears well-developed and well-nourished  HENT:   Right Ear: External ear and ear canal normal  Tympanic membrane is retracted  Left Ear: External ear and ear canal normal  Tympanic membrane is retracted  Nose: Mucosal edema and rhinorrhea (mucoid) present     Mouth/Throat: Oropharynx is clear and moist and mucous membranes are normal    Eyes: Conjunctivae are normal    Cardiovascular: Normal rate, regular rhythm and normal heart sounds  Pulmonary/Chest: Effort normal and breath sounds normal    Abdominal: Bowel sounds are normal  She exhibits no distension  There is no splenomegaly or hepatomegaly  There is no tenderness  Lymphadenopathy:        Right cervical: No superficial cervical adenopathy present  Left cervical: No superficial cervical adenopathy present  Skin: No rash noted  Psychiatric: She has a normal mood and affect                [facility]

## 2018-02-24 NOTE — PATIENT INSTRUCTIONS
Drinking plenty of fluids, saline nasal rinses or nasal spray, and steam or cool air humidification are all effective ways of managing symptoms  May take Mucinex D for sinus congestion, or Coricidin HBP if you have a heart condition or high blood pressure  Mucinex or Robitussin DM are used to control cough symptoms and include an expectorant  May take Ibuprofen or Tylenol as needed for pain or fevers  Recommend recheck if symptoms do not resolve or improve within 1 week  Take antibiotics with food  It is important that you take the entire course of antibiotics prescribed  May also take a probiotic of your choice to maintain healthy GI miles

## 2018-02-28 ENCOUNTER — OFFICE VISIT (OUTPATIENT)
Dept: FAMILY MEDICINE CLINIC | Facility: CLINIC | Age: 83
End: 2018-02-28
Payer: MEDICARE

## 2018-02-28 VITALS
HEART RATE: 84 BPM | SYSTOLIC BLOOD PRESSURE: 130 MMHG | TEMPERATURE: 97.5 F | RESPIRATION RATE: 16 BRPM | BODY MASS INDEX: 31.01 KG/M2 | HEIGHT: 63 IN | DIASTOLIC BLOOD PRESSURE: 76 MMHG | WEIGHT: 175 LBS

## 2018-02-28 DIAGNOSIS — F41.9 ANXIETY: Primary | ICD-10-CM

## 2018-02-28 PROCEDURE — 99213 OFFICE O/P EST LOW 20 MIN: CPT | Performed by: INTERNAL MEDICINE

## 2018-02-28 RX ORDER — SERTRALINE HYDROCHLORIDE 25 MG/1
25 TABLET, FILM COATED ORAL DAILY
Qty: 30 TABLET | Refills: 5 | Status: SHIPPED | OUTPATIENT
Start: 2018-02-28 | End: 2019-06-12 | Stop reason: ALTCHOICE

## 2018-02-28 NOTE — PROGRESS NOTES
Subjective:      Patient ID: Sunny Diaz is a 80 y o  female  Chief Complaint   Patient presents with    Follow-up     meication use    Hypertension       She is here with her daughter to talk about anxiety and some ruminating thoughts  Her daughter had told me at her own appointment that patient was repeating herself a lot lately, and is having problems with the woman who lives above her in the apartment building  Patient believes she is running a brothel out of the apartment and has 2 men there a night with increased noise in the apartment  Has called the police several times and they have told her that the woman can do what she wants in her apartment as she does not appear to be committing a crime  Patient has called the rental agency several times as well  She will not move her apartment  She is nervous and anxious at nighttime because she cannot take the noise and what she believes is going on upstairs  She is having trouble sleeping and is out in her living room most of the time instead of in the bed at night  Her daughter thinks she is having delusions  The following portions of the patient's history were reviewed and updated as appropriate: allergies, current medications, past family history, past medical history, past social history, past surgical history and problem list     Review of Systems   Constitutional: Negative  Respiratory: Negative  Cardiovascular: Negative  Current Outpatient Prescriptions   Medication Sig Dispense Refill    aspirin 81 MG tablet Take 81 mg by mouth every morning        cephalexin (KEFLEX) 500 mg capsule Take 1 capsule (500 mg total) by mouth every 12 (twelve) hours for 7 days 14 capsule 0    diltiazem (CARDIZEM CD) 180 mg 24 hr capsule Take by mouth      esomeprazole (NexIUM) 40 MG capsule Take 40 mg by mouth every morning before breakfast       exemestane (AROMASIN) 25 MG tablet Take 25 mg by mouth daily        ezetimibe-simvastatin (VYTORIN) 10-40 mg per tablet Take 1 tablet by mouth daily at bedtime   levothyroxine 50 mcg tablet Take 50 mcg by mouth every morning        promethazine-dextromethorphan (PHENERGAN-DM) 6 25-15 mg/5 mL oral syrup Take 5 mL by mouth 4 (four) times a day as needed for cough for up to 7 days 240 mL 0    sertraline (ZOLOFT) 25 mg tablet Take 1 tablet (25 mg total) by mouth daily 30 tablet 5     No current facility-administered medications for this visit  Objective:    /76   Pulse 84   Temp 97 5 °F (36 4 °C)   Resp 16   Ht 5' 3" (1 6 m)   Wt 79 4 kg (175 lb)   BMI 31 00 kg/m²        Physical Exam   Constitutional: She is oriented to person, place, and time  She appears well-developed and well-nourished  HENT:   Head: Normocephalic and atraumatic  Eyes: Conjunctivae are normal    Neck: Neck supple  No JVD present  No thyromegaly present  Cardiovascular: Normal rate, regular rhythm and intact distal pulses  Musculoskeletal: She exhibits no edema  Neurological: She is alert and oriented to person, place, and time  MMSE was done and patient scored 27/30, losing points on recall of 3 objects and following commands  Psychiatric: Her mood appears anxious  Assessment/Plan:    Anxiety  I had a lengthy discussion with patient and her daughter about the anxiety this is causing her  She refused counselling or other intervention, but did agree to a small dose of SSRI to help with the anxiety associated with her ruminating thoughts  Advised on possible side effects  She will return in one month to reassess  Diagnoses and all orders for this visit:    Anxiety  -     sertraline (ZOLOFT) 25 mg tablet; Take 1 tablet (25 mg total) by mouth daily          Return in about 1 month (around 3/28/2018)         Phillip Handy MD

## 2018-02-28 NOTE — ASSESSMENT & PLAN NOTE
I had a lengthy discussion with patient and her daughter about the anxiety this is causing her  She refused counselling or other intervention, but did agree to a small dose of SSRI to help with the anxiety associated with her ruminating thoughts  Advised on possible side effects  She will return in one month to reassess

## 2018-05-22 ENCOUNTER — OFFICE VISIT (OUTPATIENT)
Dept: VASCULAR SURGERY | Facility: CLINIC | Age: 83
End: 2018-05-22
Payer: MEDICARE

## 2018-05-22 VITALS
HEART RATE: 80 BPM | HEIGHT: 63 IN | RESPIRATION RATE: 18 BRPM | DIASTOLIC BLOOD PRESSURE: 78 MMHG | WEIGHT: 174.2 LBS | BODY MASS INDEX: 30.87 KG/M2 | SYSTOLIC BLOOD PRESSURE: 132 MMHG

## 2018-05-22 DIAGNOSIS — I87.2 CHRONIC VENOUS INSUFFICIENCY: Primary | ICD-10-CM

## 2018-05-22 PROCEDURE — 99203 OFFICE O/P NEW LOW 30 MIN: CPT | Performed by: SURGERY

## 2018-05-22 NOTE — ASSESSMENT & PLAN NOTE
Chronic venous insufficiency longstanding, hyperpigmentation changes with stasis dermatitis which is mild  Skin lesions possibly indicative of local skin cancer  No intervention is either indicator necessary from a vascular standpoint, I am recommending evaluation by primary care physician or dermatology consultation  She has a perfectly normal peripheral arterial exam but has obvious chronic venous changes

## 2018-05-22 NOTE — PROGRESS NOTES
Assessment/Plan:    Chronic venous insufficiency  Chronic venous insufficiency longstanding, hyperpigmentation changes with stasis dermatitis which is mild  Skin lesions possibly indicative of local skin cancer  No intervention is either indicator necessary from a vascular standpoint, I am recommending evaluation by primary care physician or dermatology consultation  She has a perfectly normal peripheral arterial exam but has obvious chronic venous changes  Diagnoses and all orders for this visit:    Chronic venous insufficiency        Subjective:      Patient ID: Curtis Montilla is a 80 y o  female  HPI concerned about changes in her skin tone and color bilateral lower extremities, appearance of moderate chronic venous insufficiency with a possible dish in of skin lesion from a dermatological standpoint  The following portions of the patient's history were reviewed and updated as appropriate: allergies, current medications, past family history, past medical history, past social history, past surgical history and problem list     Review of Systems  bilateral knee discomfort, bilateral leg hyperpigmentation otherwise all systems negative    Objective:      /78 (BP Location: Right arm, Patient Position: Sitting, Cuff Size: Standard)   Pulse 80   Resp 18   Ht 5' 3" (1 6 m)   Wt 79 kg (174 lb 3 2 oz)   BMI 30 86 kg/m²          Physical Exam       Physical Exam      Oriented x3 no evidence of clinical depression  Neck is supple carotid pulses equal bilaterally no bruits heard    Chest lungs clear, heart regular rhythm  Abdomen soft nontender no evidence of pulsatile masses  Pulses are palpable bilateral femoral, popliteal, dorsalis pedis bilaterally        Neurological exam intact cranial nerves 2-12 grossly intact no gross motor sensory deficits detected      Vitals:    05/22/18 0909   BP: 132/78   BP Location: Right arm   Patient Position: Sitting   Cuff Size: Standard   Pulse: 80 Resp: 18   Weight: 79 kg (174 lb 3 2 oz)   Height: 5' 3" (1 6 m)       Patient Active Problem List   Diagnosis    Nephrolithiasis    Abnormal neutrophil count    Adenocarcinoma of breast ([address] )    Asymptomatic varicose veins of bilateral lower extremities    Atrophic vaginitis    Benign colon polyp    Benign essential hypertension    Pain of left calf    Chronic pain of left knee    Chronic rhinitis    Chronic ulcer of left leg, limited to breakdown of skin ([address] )    Complete uterovaginal prolapse    Congenital anomaly of coronary artery    Contusion of left knee and lower leg    Coronary arteriosclerosis    Dizziness    Difficult or painful urination    Esophageal reflux    Gait disturbance    GERD (gastroesophageal reflux disease)    Healed myocardial infarct    Hyperlipidemia    Hypothyroidism    Irritable bowel syndrome    Left knee pain    Osteoarthritis of left knee    Peripheral vertigo    Psoriasis    Rectocele    Stress incontinence in female    Venous insufficiency (chronic) (peripheral)    Anxiety    Chronic venous insufficiency       Past Surgical History:   Procedure Laterality Date    APPENDECTOMY      during BREN    BLADDER SURGERY      suspension surgery no sling, used fiberglass suspension technique    BREAST SURGERY Right     lumpectomy    CHEST WALL TUMOR EXCISION  2005    COLONOSCOPY      CYSTOSCOPY      HYSTERECTOMY Bilateral     brne w/removal of both ovaries    KNEE ARTHROSCOPY      Last assessed: 7/30/15    MASTECTOMY, PARTIAL  07/1998    UT CYSTOURETHROSCOPY,URETER CATHETER Left 2/26/2016    Procedure: CYSTOSCOPY RETROGRADE PYELOGRAM WITH INSERTION STENT URETERAL;  Surgeon: Mona Friday, MD;  Location: [facility] MAIN [facility];  Service: Urology    [address] Left 4/10/2017    Procedure: EXTRACTION EXTRACAPSULAR CATARACT PHACO INTRAOCULAR LENS (IOL);   Surgeon: Saud Castillo MD;  Location: [facility] MAIN OR;  Service: Ophthalmology   [facility] SKIN BIOPSY Left     Leg for SCC       Family History   Problem Relation Age of Onset    Angina Mother     Hypertension Mother     Heart attack Mother      Myocardial Infarction    Early death Father      age 36 MI    Heart disease Father     Hypertension Father     Heart attack Father      Myocardial Infarction    Heart disease Sister     Heart disease Sister      Cardiac Disorder       Social History     Social History    Marital status:      Spouse name: N/A    Number of children: N/A    Years of education: N/A     Occupational History    Not on file  Social History Main Topics    Smoking status: Former Smoker     Packs/day: 0 10     Quit date: 1970    Smokeless tobacco: Never Used    Alcohol use No      Comment: Per [employer]: Social alcohol use    Drug use: No    Sexual activity: No     Other Topics Concern    Not on file     Social History Narrative    Cultural background: Non-    Primary spoken-language English    Racial background: white    Retired           Allergies   Allergen Reactions    Other Anaphylaxis     Blue Dye during ultrasound  Blue Dye during ultrasound    Ciprofloxacin Hives and Itching     Redness  Annotation - [id number]: rash and itching    Clindamycin Hives and Itching     Redness  Reaction Date: 06Aug2012;     Clindamycin/Lincomycin     Iv Contrast  [Iodinated Diagnostic Agents]      Other reaction(s): Anaphylaxis    Levaquin [Levofloxacin] Hives and Itching     Reaction Date: [id number]; Annotation - [id number]: "FACIAL ITCHING"  Redness    Lincomycin     Nitrofuran Derivatives     Penicillins Hives and Itching     Reaction Date: 07Dec2005; Redness    Sulfa Antibiotics Hives and Itching     Redness  Reaction Date: 07Dec2005;     Sulfacetamide     Sulfasalazine     Ceftriaxone Rash     Tolerates Cephalexin     Linezolid Rash     Rash,flushed face after 3rd day of taking this ABX, started benadryl subsided by the end of the nite      Nitrofurantoin Hives, Itching, Nausea Only and Rash     Reaction Date: [id number];   Redness         Current Outpatient Prescriptions:     aspirin 81 MG tablet, Take 81 mg by mouth every morning  , Disp: , Rfl:     diltiazem (CARDIZEM CD) 180 mg 24 hr capsule, Take by mouth, Disp: , Rfl:     esomeprazole (NexIUM) 40 MG capsule, Take 40 mg by mouth every morning before breakfast , Disp: , Rfl:     exemestane (AROMASIN) 25 MG tablet, Take 25 mg by mouth daily  , Disp: , Rfl:     ezetimibe-simvastatin (VYTORIN) 10-40 mg per tablet, Take 1 tablet by mouth daily at bedtime  , Disp: , Rfl:     levothyroxine 50 mcg tablet, Take 50 mcg by mouth every morning  , Disp: , Rfl:     sertraline (ZOLOFT) 25 mg tablet, Take 1 tablet (25 mg total) by mouth daily, Disp: 30 tablet, Rfl: 5

## 2018-05-22 NOTE — PATIENT INSTRUCTIONS
Bilateral skin changes bilateral lower extremities acute due to a combination of venous insufficiency and heavy sun exposure for many years  There is no indication for any vascular intervention at this time, am recommending evaluation by her primary care physician or a dermatology consult to review the possibility that there may be a need for a biopsy of a skin lesion  She can be seen in the office on an as-needed basis

## 2018-06-28 DIAGNOSIS — I10 BENIGN ESSENTIAL HYPERTENSION: Primary | ICD-10-CM

## 2018-06-28 RX ORDER — DILTIAZEM HYDROCHLORIDE 180 MG/1
180 CAPSULE, COATED, EXTENDED RELEASE ORAL DAILY
Qty: 90 CAPSULE | Refills: 3 | Status: SHIPPED | OUTPATIENT
Start: 2018-06-28 | End: 2019-06-25 | Stop reason: SDUPTHER

## 2018-07-23 ENCOUNTER — OFFICE VISIT (OUTPATIENT)
Dept: FAMILY MEDICINE CLINIC | Facility: CLINIC | Age: 83
End: 2018-07-23
Payer: MEDICARE

## 2018-07-23 VITALS
BODY MASS INDEX: 31.05 KG/M2 | DIASTOLIC BLOOD PRESSURE: 66 MMHG | SYSTOLIC BLOOD PRESSURE: 130 MMHG | WEIGHT: 175.22 LBS | HEART RATE: 82 BPM | HEIGHT: 63 IN | RESPIRATION RATE: 18 BRPM | TEMPERATURE: 96.7 F
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
## 2018-07-23 PROCEDURE — 99213 OFFICE O/P EST LOW 20 MIN: CPT | Performed by: NURSE PRACTITIONER

## 2018-07-23 RX ORDER — OMEPRAZOLE 40 MG/1
CAPSULE, DELAYED RELEASE ORAL
COMMUNITY
Start: 2018-07-18 | End: 2018-10-16 | Stop reason: SDUPTHER

## 2018-07-23 RX ORDER — FLUTICASONE PROPIONATE 50 MCG
SPRAY, SUSPENSION (ML) NASAL
COMMUNITY
Start: 2018-06-30 | End: 2020-03-04 | Stop reason: SDUPTHER

## 2018-07-23 NOTE — PROGRESS NOTES
Assessment/Plan:    Instructed to apply cortisone cream topically  Problem List Items Addressed This Visit     None      Visit Diagnoses     Bug bite, initial encounter    -  Primary          Patient Instructions   Cortisone cream over the counter and cold compresses  Return for Next scheduled follow up  Subjective:      Patient ID: Derrick Grey is a 80 y o  female  Chief Complaint   Patient presents with    insect bite      face and right arm   af/rma        Yesterday she noticed a bug bite on her right cheek  She did not feel getting bit  This morning she noticed another bite on her right arm  She has been applying antibiotic ointment topically  Denies pain, but has mild itching  No other skin rashes or lesions  The following portions of the patient's history were reviewed and updated as appropriate: allergies, current medications, past family history, past medical history, past social history, past surgical history and problem list     Review of Systems   Constitutional: Negative  Skin: Positive for wound (see HPI)  All other systems reviewed and are negative  Current Outpatient Prescriptions   Medication Sig Dispense Refill    aspirin 81 MG tablet Take 81 mg by mouth every morning        diltiazem (CARDIZEM CD) 180 mg 24 hr capsule Take 1 capsule (180 mg total) by mouth daily 90 capsule 3    esomeprazole (NexIUM) 40 MG capsule Take 40 mg by mouth every morning before breakfast       exemestane (AROMASIN) 25 MG tablet Take 25 mg by mouth daily   ezetimibe-simvastatin (VYTORIN) 10-40 mg per tablet Take 1 tablet by mouth daily at bedtime   fluticasone (FLONASE) 50 mcg/act nasal spray       levothyroxine 50 mcg tablet Take 50 mcg by mouth every morning        omeprazole (PriLOSEC) 40 MG capsule       sertraline (ZOLOFT) 25 mg tablet Take 1 tablet (25 mg total) by mouth daily 30 tablet 5     No current facility-administered medications for this visit  Objective:    /66   Pulse 82   Temp (!) 96 7 °F (35 9 °C)   Resp 18   Ht 5' 3" (1 6 m)   Wt 79 5 kg (175 lb 3 5 oz)   BMI 31 04 kg/m²        Physical Exam   Constitutional: She appears well-developed and well-nourished  Cardiovascular: Normal rate, regular rhythm and normal heart sounds  No murmur heard  Pulmonary/Chest: Effort normal and breath sounds normal    Neurological: She is alert  Skin: Skin is warm and dry  But bite over right zygomatic arch and right arm antecubital fossa area   Psychiatric: She has a normal mood and affect  Nursing note and vitals reviewed               Abigail Guerra

## 2018-08-23 ENCOUNTER — TELEPHONE (OUTPATIENT)
Dept: FAMILY MEDICINE CLINIC | Facility: CLINIC | Age: 83
End: 2018-08-23

## 2018-08-23 NOTE — TELEPHONE ENCOUNTER
I spoke with patient's daughter  She has been hallucinating for several months that the apartment above her has a homeless man that will bang on the floor with a pipe  She has called the police several times  This is not a new or acute problem  Has been having memory issues as well  Daughter is asking if this could be a UTI  I told her that if this was an acute problem I would be more suspicious of infection  But as this is nothing new it seems more psychiatric  Daughter was referred to Dr Jason Barnes and will try to make appointment for her mother  Was offered appointment here as well but will see Dr Jason Barnes and call if she needs anything

## 2018-10-16 DIAGNOSIS — K21.9 GASTROESOPHAGEAL REFLUX DISEASE WITHOUT ESOPHAGITIS: Primary | ICD-10-CM

## 2018-10-16 RX ORDER — OMEPRAZOLE 40 MG/1
40 CAPSULE, DELAYED RELEASE ORAL DAILY
Qty: 90 CAPSULE | Refills: 3 | Status: SHIPPED | OUTPATIENT
Start: 2018-10-16 | End: 2019-10-11 | Stop reason: SDUPTHER

## 2018-10-21 NOTE — PROGRESS NOTES
Subjective:      Patient ID: Elaine Lopez is a 80 y o  female  No chief complaint on file  Here for follow up of hypertension and other chronic conditions  Feels well  Now taking sertraline and doing much better  Denies complaints today  Would like shingles shot  The following portions of the patient's history were reviewed and updated as appropriate: allergies, current medications, past family history, past medical history, past social history, past surgical history and problem list     Review of Systems   Constitutional: Negative  Respiratory: Negative  Cardiovascular: Negative  Current Outpatient Prescriptions   Medication Sig Dispense Refill    aspirin 81 MG tablet Take 81 mg by mouth every morning        diltiazem (CARDIZEM CD) 180 mg 24 hr capsule Take 1 capsule (180 mg total) by mouth daily 90 capsule 3    esomeprazole (NexIUM) 40 MG capsule Take 40 mg by mouth every morning before breakfast       exemestane (AROMASIN) 25 MG tablet Take 25 mg by mouth daily   ezetimibe-simvastatin (VYTORIN) 10-40 mg per tablet Take 1 tablet by mouth daily at bedtime   fluticasone (FLONASE) 50 mcg/act nasal spray       levothyroxine 50 mcg tablet Take 50 mcg by mouth every morning        omeprazole (PriLOSEC) 40 MG capsule Take 1 capsule (40 mg total) by mouth daily 90 capsule 3    sertraline (ZOLOFT) 25 mg tablet Take 1 tablet (25 mg total) by mouth daily 30 tablet 5     No current facility-administered medications for this visit  Objective: There were no vitals taken for this visit  Physical Exam   Constitutional: She appears well-developed and well-nourished  Eyes: Conjunctivae are normal    Neck: Neck supple  No JVD present  No thyromegaly present  Cardiovascular: Normal rate, regular rhythm, normal heart sounds and intact distal pulses  Exam reveals no gallop and no friction rub  No murmur heard    Pulmonary/Chest: Effort normal and breath sounds normal  She has no wheezes  She has no rales  Abdominal: Soft  Bowel sounds are normal  She exhibits no distension  There is no tenderness  Musculoskeletal: She exhibits no edema  Assessment/Plan:    No problem-specific Assessment & Plan notes found for this encounter  There are no diagnoses linked to this encounter  No Follow-up on file         Lavina Gosselin, MD

## 2018-10-24 ENCOUNTER — OFFICE VISIT (OUTPATIENT)
Dept: FAMILY MEDICINE CLINIC | Facility: CLINIC | Age: 83
End: 2018-10-24
Payer: MEDICARE

## 2018-10-24 VITALS
HEIGHT: 63 IN | DIASTOLIC BLOOD PRESSURE: 84 MMHG | TEMPERATURE: 96.7 F | WEIGHT: 174 LBS | HEART RATE: 80 BPM | BODY MASS INDEX: 30.83 KG/M2 | SYSTOLIC BLOOD PRESSURE: 122 MMHG | RESPIRATION RATE: 16 BRPM

## 2018-10-24 DIAGNOSIS — E78.5 HYPERLIPIDEMIA, UNSPECIFIED HYPERLIPIDEMIA TYPE: ICD-10-CM

## 2018-10-24 DIAGNOSIS — E03.9 HYPOTHYROIDISM, UNSPECIFIED TYPE: ICD-10-CM

## 2018-10-24 DIAGNOSIS — F41.9 ANXIETY: ICD-10-CM

## 2018-10-24 DIAGNOSIS — Z23 NEED FOR SHINGLES VACCINE: ICD-10-CM

## 2018-10-24 DIAGNOSIS — I10 BENIGN ESSENTIAL HYPERTENSION: ICD-10-CM

## 2018-10-24 DIAGNOSIS — Z00.00 MEDICARE ANNUAL WELLNESS VISIT, SUBSEQUENT: Primary | ICD-10-CM

## 2018-10-24 PROCEDURE — G0439 PPPS, SUBSEQ VISIT: HCPCS | Performed by: INTERNAL MEDICINE

## 2018-10-24 PROCEDURE — 99214 OFFICE O/P EST MOD 30 MIN: CPT | Performed by: INTERNAL MEDICINE

## 2018-10-24 NOTE — PROGRESS NOTES
Assessment and Plan:    Problem List Items Addressed This Visit        Endocrine    Hypothyroidism     Clinically euthyroid and stable  Continue levothyroxine and will check TSH  Relevant Orders    CBC and differential    Comprehensive metabolic panel    Lipid panel    TSH, 3rd generation with Free T4 reflex       Cardiovascular and Mediastinum    Benign essential hypertension     Stable on diltiazem and will continue  Relevant Orders    CBC and differential    Comprehensive metabolic panel    Lipid panel    TSH, 3rd generation with Free T4 reflex       Other    Hyperlipidemia     Continue generic zetia  Will check labs  No side effects noted  Relevant Orders    CBC and differential    Comprehensive metabolic panel    Lipid panel    TSH, 3rd generation with Free T4 reflex    Anxiety     Doing much better on sertraline per daughter  Patient denies symptoms  Other Visit Diagnoses     Medicare annual wellness visit, subsequent    -  Primary    Need for shingles vaccine        Relevant Medications    Zoster Vac Recomb Adjuvanted 50 [address] Maintenance Due   Topic Date Due    INFLUENZA VACCINE  07/01/2018         HPI:  Bogdan Gilliland is a 80 y o  female here for her Subsequent Wellness Visit      Patient Active Problem List   Diagnosis    Nephrolithiasis    Abnormal neutrophil count    Adenocarcinoma of breast ([address] )    Asymptomatic varicose veins of bilateral lower extremities    Atrophic vaginitis    Benign colon polyp    Benign essential hypertension    Pain of left calf    Chronic pain of left knee    Chronic rhinitis    Chronic ulcer of left leg, limited to breakdown of skin ([address] )    Complete uterovaginal prolapse    Congenital anomaly of coronary artery    Contusion of left knee and lower leg    Coronary arteriosclerosis    Dizziness    Difficult or painful urination    Esophageal reflux    Gait disturbance    GERD (gastroesophageal reflux disease)    Healed myocardial infarct    Hyperlipidemia    Hypothyroidism    Irritable bowel syndrome    Left knee pain    Osteoarthritis of left knee    Peripheral vertigo    Psoriasis    Rectocele    Stress incontinence in female    Venous insufficiency (chronic) (peripheral)    Anxiety    Chronic venous insufficiency     Past Medical History:   Diagnosis Date    Arthritis     joints    Cancer [address]     breast ca, skin ca, currently in remission, tumors in chest wall    Cardiac disease     hx MI x 2    Chronic pain disorder     knees    Coronary artery disease     Disease of thyroid gland     hypothyroidism    GERD (gastroesophageal reflux disease)     History of prolapse of bladder     Hypertension     Kidney stone     2016    Myocardial infarction ([facility] )     1990's x2     Past Surgical History:   Procedure Laterality Date    APPENDECTOMY      during BREN    BLADDER SURGERY      suspension surgery no sling, used fiberglass suspension technique    BREAST SURGERY Right     lumpectomy    CHEST WALL TUMOR EXCISION  2005    COLONOSCOPY      CYSTOSCOPY      HYSTERECTOMY Bilateral     bren w/removal of both ovaries    KNEE ARTHROSCOPY      Last assessed: 7/30/15    MASTECTOMY, PARTIAL  07/1998    UT CYSTOURETHROSCOPY,URETER CATHETER Left 2/26/2016    Procedure: CYSTOSCOPY RETROGRADE PYELOGRAM WITH INSERTION STENT URETERAL;  Surgeon: Kin Gongora MD;  Location: [facility] MAIN [facility];  Service: Urology     [facility] CATARACT EXTRACAP,INSERT LENS Left 4/10/2017    Procedure: EXTRACTION EXTRACAPSULAR CATARACT PHACO INTRAOCULAR LENS (IOL);   Surgeon: Koffi Clemens MD;  Location[facility] MAIN OR;  Service: Ophthalmology    SKIN BIOPSY Left     Leg for [facility]     Family History   Problem Relation Age of Onset    Angina Mother     Hypertension Mother     Heart attack Mother         Myocardial Infarction    Early death Father         age 36 MI    Heart disease Father     Hypertension Father     Heart attack Father         Myocardial Infarction    Heart disease Sister     Heart disease Sister         Cardiac Disorder     History   Smoking Status    Former Smoker    Packs/day: 0 10    Quit date: 1970   Smokeless Tobacco    Never Used     History   Alcohol Use No     Comment: Per Allscripts: Social alcohol use      History   Drug Use No       Current Outpatient Prescriptions   Medication Sig Dispense Refill    aspirin 81 MG tablet Take 81 mg by mouth every morning        diltiazem (CARDIZEM CD) 180 mg 24 hr capsule Take 1 capsule (180 mg total) by mouth daily 90 capsule 3    esomeprazole (NexIUM) 40 MG capsule Take 40 mg by mouth every morning before breakfast       exemestane (AROMASIN) 25 MG tablet Take 25 mg by mouth daily   ezetimibe-simvastatin (VYTORIN) 10-40 mg per tablet Take 1 tablet by mouth daily at bedtime   fluticasone (FLONASE) 50 mcg/act nasal spray       levothyroxine 50 mcg tablet Take 50 mcg by mouth every morning        omeprazole (PriLOSEC) 40 MG capsule Take 1 capsule (40 mg total) by mouth daily 90 capsule 3    sertraline (ZOLOFT) 25 mg tablet Take 1 tablet (25 mg total) by mouth daily 30 tablet 5    Zoster Vac Recomb Adjuvanted 50 MCG SUSR Inject 0 5 mL into a muscle once for 1 dose 1 each 1     No current facility-administered medications for this visit  Allergies   Allergen Reactions    Other Anaphylaxis     Blue Dye during ultrasound  Blue Dye during ultrasound  Blue Dye during ultrasound    Ciprofloxacin Hives and Itching     Redness  Redness  Annotation - [id number]: rash and itching    Clindamycin Hives and Itching     Redness  Redness  Reaction Date: 06Aug2012;     Clindamycin/Lincomycin     Levaquin [Levofloxacin] Hives and Itching     Redness  Reaction Date: [id number]; Annotation - [id number]: "FACIAL ITCHING"  Redness    Lincomycin     Nitrofuran Derivatives     Penicillins Hives and Itching     Redness  Reaction Date: 07Dec2005;    Redness    Sulfa Antibiotics Hives and Itching     Redness  Redness  Reaction Date: 07Dec2005;     Sulfacetamide     Sulfasalazine     Ceftriaxone Rash     Tolerates Cephalexin     Iv Contrast  [Iodinated Diagnostic Agents] Itching and Rash     Other reaction(s): Anaphylaxis    Linezolid Rash     Rash,flushed face after 3rd day of taking this ABX, started benadryl subsided by the end of the nite   Nitrofurantoin Hives, Itching, Nausea Only and Rash     Redness  Reaction Date: [id number];   Redness     Immunization History   Administered Date(s) Administered    Influenza 10/17/2018    Influenza Split High Dose Preservative Free IM 10/19/2012, 10/09/2013, 09/15/2016, 10/20/2017    Influenza TIV (IM) 10/19/2006, 10/25/2007, 10/20/2008, 09/25/2009, 11/18/2010, 10/03/2011, 10/12/2015    Pneumococcal Conjugate 13-Valent 01/04/2015    Pneumococcal Polysaccharide PPV23 10/21/2013    Td (adult), adsorbed 04/19/2006    Tdap 12/08/2016, 12/08/2016    Zoster 08/06/2012       Patient Care Team:  Adriana Feliciano MD as PCP - MD Anamika Rodríguez MD Theda Nab, MD    Medicare Screening Tests and Risk Assessments:      Health Risk Assessment:  Patient rates overall health as good  Patient feels that their physical health rating is Same  Eyesight was rated as Same  Hearing was rated as Same  Patient feels that their emotional and mental health rating is Same  Pain experienced by patient in the last 7 days has been Some  Patient's pain rating has been 7/10  Patient states that she has experienced no weight loss or gain in last 6 months  (Additional comments: Knee pain)    Emotional/Mental Health:  Patient has been feeling nervous/anxious  PHQ-9 Depression Screening:    Frequency of the following problems over the past two weeks:      1  Little interest or pleasure in doing things: 0 - not at all      2   Feeling down, depressed, or hopeless: 0 - not at all  PHQ-2 Score: 0          Broken Bones/Falls: Fall Risk Assessment:    In the past year, patient has experienced: No history of falling in past year          Bladder/Bowel:  Patient has not leaked urine accidently in the last six months  Patient reports no loss of bowel control  Immunizations:  Patient has had a flu vaccination within the last year  Patient has received a pneumonia shot  Patient has received a shingles shot  Patient has received tetanus/diphtheria shot  Home Safety:  Patient does not have trouble with stairs inside or outside of their home  Patient currently reports that there are no safety hazards present in home, working smoke alarms, working carbon monoxide detectors  Preventative Screenings:   Breast cancer screening performed, no colon cancer screen completed, no cholesterol screen completed, no glaucoma eye exam completed    Nutrition:  Current diet: Regular and Limited junk food with servings of the following:    Medications:  Patient is currently taking over-the-counter supplements  List of OTC medications includes: for arthritis  Patient is able to manage medications  Lifestyle Choices:  Patient reports no tobacco use  Patient has not smoked or used tobacco in the past   Patient reports no alcohol use  Patient drives a vehicle  Patient wears seat belt  Current level of exercise of physical activity described by patient as: Limited walking  Activities of Daily Living:  Can get out of bed by his or her self, able to dress self, able to make own meals, able to do own shopping, able to bathe self, can do own laundry/housekeeping, can manage own money, pay bills and track expenses    Previous Hospitalizations:  No hospitalization or ED visit in past 12 months        Advanced Directives:  Patient has decided on a power of   Patient has spoken to designated power of   Patient has completed advanced directive          Preventative Screening/Counseling: Cardiovascular:      General: Screening Current and Risks and Benefits Discussed      Counseling: Healthy Diet, Healthy Weight and Improve Exercise Tolerance     Due for Labs/Analytes/Optional EKG: Lipid Panel          Diabetes:      General: Screening Current and Risks and Benefits Discussed      Due for labs: Blood Glucose          Colorectal Cancer:      General: Screening Not Indicated          Breast Cancer:      General: Screening Not Indicated          Cervical Cancer:      General: Screening Not Indicated          Osteoporosis:      General: Patient Declines          AAA:      General: Screening Not Indicated          Glaucoma:      General: Screening Current          HIV:      General: Screening Not Indicated          Hepatitis C:      General: Patient Declines        Advanced Directives:   Patient has living will for healthcare, has durable POA for healthcare,     Immunizations:      Influenza: Influenza UTD This Year      Pneumococcal: Lifetime Vaccine Completed      Shingrix: Risks & Benefits Discussed      Hepatitis B (Low risk patients): Series Not Indicated      Zostavax: Zostavax Vaccine UTD      TD: Td Vaccine UTD      Other Preventative Counseling (Non-Medicare):   Fall Prevention, Increase physical activity and Nutrition Counseling

## 2018-10-24 NOTE — PATIENT INSTRUCTIONS

## 2018-11-02 ENCOUNTER — APPOINTMENT (OUTPATIENT)
Dept: LAB | Facility: CLINIC | Age: 83
End: 2018-11-02
Payer: MEDICARE

## 2018-11-02 ENCOUNTER — TRANSCRIBE ORDERS (OUTPATIENT)
Dept: LAB | Facility: CLINIC | Age: 83
End: 2018-11-02

## 2018-11-02 DIAGNOSIS — E03.9 HYPOTHYROIDISM, UNSPECIFIED TYPE: ICD-10-CM

## 2018-11-02 DIAGNOSIS — I10 BENIGN ESSENTIAL HYPERTENSION: ICD-10-CM

## 2018-11-02 DIAGNOSIS — E78.5 HYPERLIPIDEMIA, UNSPECIFIED HYPERLIPIDEMIA TYPE: ICD-10-CM

## 2018-11-02 LAB
ALBUMIN SERPL BCP-MCNC: 3.5 G/DL (ref 3.5–5)
ALP SERPL-CCNC: 86 U/L (ref 46–116)
ALT SERPL W P-5'-P-CCNC: 25 U/L (ref 12–78)
ANION GAP SERPL CALCULATED.3IONS-SCNC: 3 MMOL/L (ref 4–13)
AST SERPL W P-5'-P-CCNC: 19 U/L (ref 5–45)
BASOPHILS # BLD AUTO: 0.1 THOUSANDS/ΜL (ref 0–0.1)
BASOPHILS NFR BLD AUTO: 1 % (ref 0–1)
BILIRUB SERPL-MCNC: 0.47 MG/DL (ref 0.2–1)
BUN SERPL-MCNC: 16 MG/DL (ref 5–25)
CALCIUM SERPL-MCNC: 8.8 MG/DL (ref 8.3–10.1)
CHLORIDE SERPL-SCNC: 102 MMOL/L (ref 100–108)
CHOLEST SERPL-MCNC: 219 MG/DL (ref 50–200)
CO2 SERPL-SCNC: 29 MMOL/L (ref 21–32)
CREAT SERPL-MCNC: 1.16 MG/DL (ref 0.6–1.3)
EOSINOPHIL # BLD AUTO: 0.38 THOUSAND/ΜL (ref 0–0.61)
EOSINOPHIL NFR BLD AUTO: 4 % (ref 0–6)
ERYTHROCYTE [DISTWIDTH] IN BLOOD BY AUTOMATED COUNT: 17 % (ref 11.6–15.1)
GFR SERPL CREATININE-BSD FRML MDRD: 42 ML/MIN/1.73SQ M
GLUCOSE P FAST SERPL-MCNC: 98 MG/DL (ref 65–99)
HCT VFR BLD AUTO: 46.8 % (ref 34.8–46.1)
HDLC SERPL-MCNC: 52 MG/DL (ref 40–60)
HGB BLD-MCNC: 15.4 G/DL (ref 11.5–15.4)
IMM GRANULOCYTES # BLD AUTO: 0.02 THOUSAND/UL (ref 0–0.2)
IMM GRANULOCYTES NFR BLD AUTO: 0 % (ref 0–2)
LDLC SERPL CALC-MCNC: 133 MG/DL (ref 0–100)
LYMPHOCYTES # BLD AUTO: 2.15 THOUSANDS/ΜL (ref 0.6–4.47)
LYMPHOCYTES NFR BLD AUTO: 21 % (ref 14–44)
MCH RBC QN AUTO: 27.9 PG (ref 26.8–34.3)
MCHC RBC AUTO-ENTMCNC: 32.9 G/DL (ref 31.4–37.4)
MCV RBC AUTO: 85 FL (ref 82–98)
MONOCYTES # BLD AUTO: 0.49 THOUSAND/ΜL (ref 0.17–1.22)
MONOCYTES NFR BLD AUTO: 5 % (ref 4–12)
NEUTROPHILS # BLD AUTO: 7.07 THOUSANDS/ΜL (ref 1.85–7.62)
NEUTS SEG NFR BLD AUTO: 69 % (ref 43–75)
NONHDLC SERPL-MCNC: 167 MG/DL
NRBC BLD AUTO-RTO: 0 /100 WBCS
PLATELET # BLD AUTO: 540 THOUSANDS/UL (ref 149–390)
PMV BLD AUTO: 11.1 FL (ref 8.9–12.7)
POTASSIUM SERPL-SCNC: 4.2 MMOL/L (ref 3.5–5.3)
PROT SERPL-MCNC: 6.7 G/DL (ref 6.4–8.2)
RBC # BLD AUTO: 5.51 MILLION/UL (ref 3.81–5.12)
SODIUM SERPL-SCNC: 134 MMOL/L (ref 136–145)
T4 FREE SERPL-MCNC: 1.21 NG/DL (ref 0.76–1.46)
TRIGL SERPL-MCNC: 172 MG/DL
TSH SERPL DL<=0.05 MIU/L-ACNC: 6.01 UIU/ML (ref 0.36–3.74)
WBC # BLD AUTO: 10.21 THOUSAND/UL (ref 4.31–10.16)

## 2018-11-02 PROCEDURE — 84439 ASSAY OF FREE THYROXINE: CPT

## 2018-11-02 PROCEDURE — 85025 COMPLETE CBC W/AUTO DIFF WBC: CPT

## 2018-11-02 PROCEDURE — 80061 LIPID PANEL: CPT

## 2018-11-02 PROCEDURE — 80053 COMPREHEN METABOLIC PANEL: CPT

## 2018-11-02 PROCEDURE — 84443 ASSAY THYROID STIM HORMONE: CPT

## 2018-11-02 PROCEDURE — 36415 COLL VENOUS BLD VENIPUNCTURE: CPT

## 2018-11-13 ENCOUNTER — OFFICE VISIT (OUTPATIENT)
Dept: FAMILY MEDICINE CLINIC | Facility: CLINIC | Age: 83
End: 2018-11-13
Payer: MEDICARE

## 2018-11-13 VITALS
HEIGHT: 63 IN | WEIGHT: 175 LBS | TEMPERATURE: 97.1 F | BODY MASS INDEX: 31.01 KG/M2 | SYSTOLIC BLOOD PRESSURE: 130 MMHG | HEART RATE: 82 BPM | RESPIRATION RATE: 16 BRPM | DIASTOLIC BLOOD PRESSURE: 70 MMHG

## 2018-11-13 DIAGNOSIS — N39.0 ACUTE UTI: Primary | ICD-10-CM

## 2018-11-13 LAB
SL AMB  POCT GLUCOSE, UA: ABNORMAL
SL AMB LEUKOCYTE ESTERASE,UA: ABNORMAL
SL AMB POCT BILIRUBIN,UA: ABNORMAL
SL AMB POCT BLOOD,UA: ABNORMAL
SL AMB POCT CLARITY,UA: CLEAR
SL AMB POCT COLOR,UA: ABNORMAL
SL AMB POCT KETONES,UA: ABNORMAL
SL AMB POCT NITRITE,UA: ABNORMAL
SL AMB POCT PH,UA: 5
SL AMB POCT SPECIFIC GRAVITY,UA: 1.01
SL AMB POCT URINE PROTEIN: + 30
SL AMB POCT UROBILINOGEN: ABNORMAL

## 2018-11-13 PROCEDURE — 99213 OFFICE O/P EST LOW 20 MIN: CPT | Performed by: NURSE PRACTITIONER

## 2018-11-13 PROCEDURE — 81003 URINALYSIS AUTO W/O SCOPE: CPT | Performed by: NURSE PRACTITIONER

## 2018-11-13 PROCEDURE — 87086 URINE CULTURE/COLONY COUNT: CPT | Performed by: NURSE PRACTITIONER

## 2018-11-13 RX ORDER — CEPHALEXIN 500 MG/1
500 CAPSULE ORAL EVERY 12 HOURS SCHEDULED
Qty: 14 CAPSULE | Refills: 0 | Status: SHIPPED | OUTPATIENT
Start: 2018-11-13 | End: 2018-11-20

## 2018-11-13 NOTE — PATIENT INSTRUCTIONS
Take antibiotics until finished  Drink plenty of fluids  Follow up advised for persistent urinary symptoms or if you develop flank pain, fevers, nausea, or vomiting  Please call the office if symptoms do not improve after completion of the antibiotics

## 2018-11-13 NOTE — PROGRESS NOTES
Assessment/Plan:    Problem List Items Addressed This Visit     None      Visit Diagnoses     Acute UTI    -  Primary    Relevant Medications    cephalexin (KEFLEX) 500 mg capsule    Other Relevant Orders    POCT urine dip auto non-scope (Completed)    Urine culture          Patient Instructions   Take antibiotics until finished  Drink plenty of fluids  Follow up advised for persistent urinary symptoms or if you develop flank pain, fevers, nausea, or vomiting  Please call the office if symptoms do not improve after completion of the antibiotics  Return if symptoms worsen or fail to improve  Subjective:      Patient ID: Britt Danielson is a 80 y o  female  Chief Complaint   Patient presents with    Possible UTI     prcma       Here today with UTI symptoms  She has dysuria and urinary frequency  Symptoms started this morning  Denies fevers, nausea, flank pain, or hematuria  She is trying to increase her fluid intake  The following portions of the patient's history were reviewed and updated as appropriate: allergies, current medications, past family history, past medical history, past social history, past surgical history and problem list     Review of Systems   Constitutional: Negative for chills, fatigue and fever  Respiratory: Negative for cough and shortness of breath  Cardiovascular: Negative for chest pain  Gastrointestinal: Negative for abdominal pain, diarrhea, nausea and vomiting  Genitourinary: Positive for dysuria and urgency  Negative for flank pain, frequency, hematuria, pelvic pain and vaginal discharge  Musculoskeletal: Negative for arthralgias and back pain           Current Outpatient Prescriptions   Medication Sig Dispense Refill    aspirin 81 MG tablet Take 81 mg by mouth every morning        diltiazem (CARDIZEM CD) 180 mg 24 hr capsule Take 1 capsule (180 mg total) by mouth daily 90 capsule 3    esomeprazole (NexIUM) 40 MG capsule Take 40 mg by mouth every morning before breakfast       exemestane (AROMASIN) 25 MG tablet Take 25 mg by mouth daily   ezetimibe-simvastatin (VYTORIN) 10-40 mg per tablet Take 1 tablet by mouth daily at bedtime   fluticasone (FLONASE) 50 mcg/act nasal spray       levothyroxine 50 mcg tablet Take 50 mcg by mouth every morning        omeprazole (PriLOSEC) 40 MG capsule Take 1 capsule (40 mg total) by mouth daily 90 capsule 3    sertraline (ZOLOFT) 25 mg tablet Take 1 tablet (25 mg total) by mouth daily 30 tablet 5    cephalexin (KEFLEX) 500 mg capsule Take 1 capsule (500 mg total) by mouth every 12 (twelve) hours for 7 days 14 capsule 0     No current facility-administered medications for this visit  Objective:    /70   Pulse 82   Temp (!) 97 1 °F (36 2 °C)   Resp 16   Ht 5' 3" (1 6 m)   Wt 79 4 kg (175 lb)   BMI 31 00 kg/m²        Physical Exam   Constitutional: She appears well-developed and well-nourished  Cardiovascular: Normal rate, regular rhythm, S1 normal and S2 normal     Pulmonary/Chest: Effort normal and breath sounds normal    Abdominal: Bowel sounds are normal  She exhibits no distension  There is no hepatosplenomegaly  There is no tenderness  There is no CVA tenderness  Skin: Skin is warm, dry and intact  Psychiatric: She has a normal mood and affect  Nursing note and vitals reviewed               Brandie Mittal

## 2018-11-14 LAB — BACTERIA UR CULT: NORMAL

## 2018-11-15 ENCOUNTER — TRANSCRIBE ORDERS (OUTPATIENT)
Dept: ADMINISTRATIVE | Facility: HOSPITAL | Age: 83
End: 2018-11-15

## 2018-11-15 DIAGNOSIS — M85.80 OSTEOPENIA, UNSPECIFIED LOCATION: ICD-10-CM

## 2018-11-15 DIAGNOSIS — Z12.39 SCREENING BREAST EXAMINATION: Primary | ICD-10-CM

## 2018-11-16 ENCOUNTER — TELEPHONE (OUTPATIENT)
Dept: FAMILY MEDICINE CLINIC | Facility: CLINIC | Age: 83
End: 2018-11-16

## 2018-11-16 NOTE — TELEPHONE ENCOUNTER
Spoke w/ pt, urine culture was negative  She is feeling well with no residual symptoms  Advised to DC antibiotics  RTO prn if symptoms recur    Brandie Mittal

## 2018-12-05 ENCOUNTER — HOSPITAL ENCOUNTER (OUTPATIENT)
Dept: RADIOLOGY | Facility: HOSPITAL | Age: 83
Discharge: HOME | End: 2018-12-05
Attending: INTERNAL MEDICINE
Payer: MEDICARE

## 2018-12-05 VITALS — BODY MASS INDEX: 30.36 KG/M2 | HEIGHT: 62 IN | WEIGHT: 165 LBS

## 2018-12-05 DIAGNOSIS — M85.80 OSTEOPENIA, UNSPECIFIED LOCATION: ICD-10-CM

## 2018-12-05 DIAGNOSIS — Z12.39 SCREENING BREAST EXAMINATION: ICD-10-CM

## 2018-12-05 PROCEDURE — 77080 DXA BONE DENSITY AXIAL: CPT

## 2018-12-05 PROCEDURE — 77066 DX MAMMO INCL CAD BI: CPT

## 2019-01-25 DIAGNOSIS — E03.9 HYPOTHYROIDISM, UNSPECIFIED TYPE: Primary | ICD-10-CM

## 2019-01-26 RX ORDER — LEVOTHYROXINE SODIUM 0.05 MG/1
50 TABLET ORAL EVERY MORNING
Qty: 90 TABLET | Refills: 3 | Status: SHIPPED | OUTPATIENT
Start: 2019-01-26 | End: 2019-11-25 | Stop reason: SDUPTHER

## 2019-03-19 ENCOUNTER — APPOINTMENT (OUTPATIENT)
Dept: LAB | Facility: HOSPITAL | Age: 84
End: 2019-03-19
Attending: INTERNAL MEDICINE
Payer: MEDICARE

## 2019-03-19 ENCOUNTER — TRANSCRIBE ORDERS (OUTPATIENT)
Dept: ADMINISTRATIVE | Facility: HOSPITAL | Age: 84
End: 2019-03-19

## 2019-03-19 DIAGNOSIS — Z92.3 PERSONAL HISTORY OF IRRADIATION, PRESENTING HAZARDS TO HEALTH: ICD-10-CM

## 2019-03-19 DIAGNOSIS — C50.512 BILATERAL MALIGNANT NEOPLASM OF LOWER OUTER QUADRANT OF BREAST IN FEMALE, UNSPECIFIED ESTROGEN RECEPTOR STATUS (HCC): ICD-10-CM

## 2019-03-19 DIAGNOSIS — C50.512 BILATERAL MALIGNANT NEOPLASM OF LOWER OUTER QUADRANT OF BREAST IN FEMALE, UNSPECIFIED ESTROGEN RECEPTOR STATUS (HCC): Primary | ICD-10-CM

## 2019-03-19 DIAGNOSIS — D69.1 THROMBOCYTASTHENIA (HCC): ICD-10-CM

## 2019-03-19 DIAGNOSIS — C50.511 BILATERAL MALIGNANT NEOPLASM OF LOWER OUTER QUADRANT OF BREAST IN FEMALE, UNSPECIFIED ESTROGEN RECEPTOR STATUS (HCC): ICD-10-CM

## 2019-03-19 DIAGNOSIS — C50.511 BILATERAL MALIGNANT NEOPLASM OF LOWER OUTER QUADRANT OF BREAST IN FEMALE, UNSPECIFIED ESTROGEN RECEPTOR STATUS (HCC): Primary | ICD-10-CM

## 2019-03-19 DIAGNOSIS — Z79.811 USE OF AROMATASE INHIBITORS: ICD-10-CM

## 2019-03-19 LAB
ERYTHROCYTE [DISTWIDTH] IN BLOOD BY AUTOMATED COUNT: 18.6 % (ref 11.6–15.1)
HCT VFR BLD AUTO: 50.3 % (ref 34.8–46.1)
HGB BLD-MCNC: 15.9 G/DL (ref 11.5–15.4)
MCH RBC QN AUTO: 25.9 PG (ref 26.8–34.3)
MCHC RBC AUTO-ENTMCNC: 31.6 G/DL (ref 31.4–37.4)
MCV RBC AUTO: 82 FL (ref 82–98)
PLATELET # BLD AUTO: 650 THOUSANDS/UL (ref 149–390)
PMV BLD AUTO: 11 FL (ref 8.9–12.7)
RBC # BLD AUTO: 6.13 MILLION/UL (ref 3.81–5.12)
WBC # BLD AUTO: 10.38 THOUSAND/UL (ref 4.31–10.16)

## 2019-03-19 PROCEDURE — 85027 COMPLETE CBC AUTOMATED: CPT

## 2019-03-19 PROCEDURE — 36415 COLL VENOUS BLD VENIPUNCTURE: CPT

## 2019-04-08 ENCOUNTER — APPOINTMENT (OUTPATIENT)
Dept: LAB | Facility: HOSPITAL | Age: 84
End: 2019-04-08
Attending: INTERNAL MEDICINE
Payer: MEDICARE

## 2019-04-08 LAB
ALBUMIN SERPL BCP-MCNC: 3.7 G/DL (ref 3.5–5)
ALP SERPL-CCNC: 103 U/L (ref 46–116)
ALT SERPL W P-5'-P-CCNC: 17 U/L (ref 12–78)
ANION GAP SERPL CALCULATED.3IONS-SCNC: 11 MMOL/L (ref 4–13)
AST SERPL W P-5'-P-CCNC: 17 U/L (ref 5–45)
BASOPHILS # BLD AUTO: 0.09 THOUSANDS/ΜL (ref 0–0.1)
BASOPHILS NFR BLD AUTO: 1 % (ref 0–1)
BILIRUB SERPL-MCNC: 0.3 MG/DL (ref 0.2–1)
BUN SERPL-MCNC: 30 MG/DL (ref 5–25)
CALCIUM SERPL-MCNC: 9.1 MG/DL (ref 8.3–10.1)
CHLORIDE SERPL-SCNC: 106 MMOL/L (ref 100–108)
CO2 SERPL-SCNC: 26 MMOL/L (ref 21–32)
CREAT SERPL-MCNC: 1.03 MG/DL (ref 0.6–1.3)
EOSINOPHIL # BLD AUTO: 0.26 THOUSAND/ΜL (ref 0–0.61)
EOSINOPHIL NFR BLD AUTO: 3 % (ref 0–6)
ERYTHROCYTE [DISTWIDTH] IN BLOOD BY AUTOMATED COUNT: 18.9 % (ref 11.6–15.1)
GFR SERPL CREATININE-BSD FRML MDRD: 48 ML/MIN/1.73SQ M
GLUCOSE P FAST SERPL-MCNC: 132 MG/DL (ref 65–99)
HCT VFR BLD AUTO: 46.7 % (ref 34.8–46.1)
HGB BLD-MCNC: 14.9 G/DL (ref 11.5–15.4)
IMM GRANULOCYTES # BLD AUTO: 0.03 THOUSAND/UL (ref 0–0.2)
IMM GRANULOCYTES NFR BLD AUTO: 0 % (ref 0–2)
LYMPHOCYTES # BLD AUTO: 2.36 THOUSANDS/ΜL (ref 0.6–4.47)
LYMPHOCYTES NFR BLD AUTO: 27 % (ref 14–44)
MCH RBC QN AUTO: 25.8 PG (ref 26.8–34.3)
MCHC RBC AUTO-ENTMCNC: 31.9 G/DL (ref 31.4–37.4)
MCV RBC AUTO: 81 FL (ref 82–98)
MONOCYTES # BLD AUTO: 0.39 THOUSAND/ΜL (ref 0.17–1.22)
MONOCYTES NFR BLD AUTO: 5 % (ref 4–12)
NEUTROPHILS # BLD AUTO: 5.6 THOUSANDS/ΜL (ref 1.85–7.62)
NEUTS SEG NFR BLD AUTO: 64 % (ref 43–75)
NRBC BLD AUTO-RTO: 0 /100 WBCS
PLATELET # BLD AUTO: 632 THOUSANDS/UL (ref 149–390)
PMV BLD AUTO: 11.1 FL (ref 8.9–12.7)
POTASSIUM SERPL-SCNC: 4.8 MMOL/L (ref 3.5–5.3)
PROT SERPL-MCNC: 6.9 G/DL (ref 6.4–8.2)
RBC # BLD AUTO: 5.78 MILLION/UL (ref 3.81–5.12)
SODIUM SERPL-SCNC: 143 MMOL/L (ref 136–145)
WBC # BLD AUTO: 8.73 THOUSAND/UL (ref 4.31–10.16)

## 2019-04-08 PROCEDURE — 36415 COLL VENOUS BLD VENIPUNCTURE: CPT | Performed by: INTERNAL MEDICINE

## 2019-04-08 PROCEDURE — 80053 COMPREHEN METABOLIC PANEL: CPT | Performed by: INTERNAL MEDICINE

## 2019-04-08 PROCEDURE — 85025 COMPLETE CBC W/AUTO DIFF WBC: CPT | Performed by: INTERNAL MEDICINE

## 2019-04-15 ENCOUNTER — TRANSCRIBE ORDERS (OUTPATIENT)
Dept: ADMINISTRATIVE | Facility: HOSPITAL | Age: 84
End: 2019-04-15

## 2019-04-15 ENCOUNTER — APPOINTMENT (OUTPATIENT)
Dept: LAB | Facility: HOSPITAL | Age: 84
End: 2019-04-15
Attending: INTERNAL MEDICINE
Payer: MEDICARE

## 2019-04-15 DIAGNOSIS — D75.839 THROMBOCYTHEMIA: Primary | ICD-10-CM

## 2019-04-15 LAB
ALBUMIN SERPL BCP-MCNC: 4 G/DL (ref 3.5–5)
ALP SERPL-CCNC: 93 U/L (ref 46–116)
ALT SERPL W P-5'-P-CCNC: 17 U/L (ref 12–78)
ANION GAP SERPL CALCULATED.3IONS-SCNC: 8 MMOL/L (ref 4–13)
AST SERPL W P-5'-P-CCNC: 13 U/L (ref 5–45)
BILIRUB SERPL-MCNC: 0.5 MG/DL (ref 0.2–1)
BUN SERPL-MCNC: 26 MG/DL (ref 5–25)
CALCIUM SERPL-MCNC: 9.8 MG/DL (ref 8.3–10.1)
CHLORIDE SERPL-SCNC: 101 MMOL/L (ref 100–108)
CO2 SERPL-SCNC: 30 MMOL/L (ref 21–32)
CREAT SERPL-MCNC: 1.16 MG/DL (ref 0.6–1.3)
ERYTHROCYTE [DISTWIDTH] IN BLOOD BY AUTOMATED COUNT: 20.9 % (ref 11.6–15.1)
GFR SERPL CREATININE-BSD FRML MDRD: 41 ML/MIN/1.73SQ M
GLUCOSE SERPL-MCNC: 87 MG/DL (ref 65–140)
HCT VFR BLD AUTO: 49.4 % (ref 34.8–46.1)
HGB BLD-MCNC: 15.7 G/DL (ref 11.5–15.4)
MCH RBC QN AUTO: 26.3 PG (ref 26.8–34.3)
MCHC RBC AUTO-ENTMCNC: 31.8 G/DL (ref 31.4–37.4)
MCV RBC AUTO: 83 FL (ref 82–98)
PLATELET # BLD AUTO: 483 THOUSANDS/UL (ref 149–390)
PMV BLD AUTO: 10.9 FL (ref 8.9–12.7)
POTASSIUM SERPL-SCNC: 4.7 MMOL/L (ref 3.5–5.3)
PROT SERPL-MCNC: 7.2 G/DL (ref 6.4–8.2)
RBC # BLD AUTO: 5.96 MILLION/UL (ref 3.81–5.12)
SODIUM SERPL-SCNC: 139 MMOL/L (ref 136–145)
WBC # BLD AUTO: 8.06 THOUSAND/UL (ref 4.31–10.16)

## 2019-04-15 PROCEDURE — 80053 COMPREHEN METABOLIC PANEL: CPT | Performed by: INTERNAL MEDICINE

## 2019-04-15 PROCEDURE — 36415 COLL VENOUS BLD VENIPUNCTURE: CPT | Performed by: INTERNAL MEDICINE

## 2019-04-15 PROCEDURE — 85027 COMPLETE CBC AUTOMATED: CPT | Performed by: INTERNAL MEDICINE

## 2019-04-22 ENCOUNTER — APPOINTMENT (OUTPATIENT)
Dept: LAB | Facility: HOSPITAL | Age: 84
End: 2019-04-22
Attending: INTERNAL MEDICINE
Payer: MEDICARE

## 2019-04-22 ENCOUNTER — TRANSCRIBE ORDERS (OUTPATIENT)
Dept: ADMINISTRATIVE | Facility: HOSPITAL | Age: 84
End: 2019-04-22

## 2019-04-22 DIAGNOSIS — D69.3 ESSENTIAL THROMBOCYTOPENIA (HCC): ICD-10-CM

## 2019-04-22 DIAGNOSIS — D47.3 THROMBOCYTHEMIA, ESSENTIAL (HCC): Primary | ICD-10-CM

## 2019-04-22 LAB
ALBUMIN SERPL BCP-MCNC: 3.9 G/DL (ref 3.5–5)
ALP SERPL-CCNC: 93 U/L (ref 46–116)
ALT SERPL W P-5'-P-CCNC: 16 U/L (ref 12–78)
ANION GAP SERPL CALCULATED.3IONS-SCNC: 6 MMOL/L (ref 4–13)
AST SERPL W P-5'-P-CCNC: 11 U/L (ref 5–45)
BASOPHILS # BLD AUTO: 0.1 THOUSANDS/ΜL (ref 0–0.1)
BASOPHILS NFR BLD AUTO: 1 % (ref 0–1)
BILIRUB SERPL-MCNC: 0.7 MG/DL (ref 0.2–1)
BUN SERPL-MCNC: 28 MG/DL (ref 5–25)
CALCIUM SERPL-MCNC: 9.7 MG/DL (ref 8.3–10.1)
CHLORIDE SERPL-SCNC: 101 MMOL/L (ref 100–108)
CO2 SERPL-SCNC: 30 MMOL/L (ref 21–32)
CREAT SERPL-MCNC: 1.23 MG/DL (ref 0.6–1.3)
EOSINOPHIL # BLD AUTO: 0.21 THOUSAND/ΜL (ref 0–0.61)
EOSINOPHIL NFR BLD AUTO: 3 % (ref 0–6)
ERYTHROCYTE [DISTWIDTH] IN BLOOD BY AUTOMATED COUNT: 21.9 % (ref 11.6–15.1)
GFR SERPL CREATININE-BSD FRML MDRD: 38 ML/MIN/1.73SQ M
GLUCOSE P FAST SERPL-MCNC: 103 MG/DL (ref 65–99)
HCT VFR BLD AUTO: 49 % (ref 34.8–46.1)
HGB BLD-MCNC: 15.3 G/DL (ref 11.5–15.4)
IMM GRANULOCYTES # BLD AUTO: 0.02 THOUSAND/UL (ref 0–0.2)
IMM GRANULOCYTES NFR BLD AUTO: 0 % (ref 0–2)
LYMPHOCYTES # BLD AUTO: 2.14 THOUSANDS/ΜL (ref 0.6–4.47)
LYMPHOCYTES NFR BLD AUTO: 30 % (ref 14–44)
MCH RBC QN AUTO: 26.3 PG (ref 26.8–34.3)
MCHC RBC AUTO-ENTMCNC: 31.2 G/DL (ref 31.4–37.4)
MCV RBC AUTO: 84 FL (ref 82–98)
MONOCYTES # BLD AUTO: 0.4 THOUSAND/ΜL (ref 0.17–1.22)
MONOCYTES NFR BLD AUTO: 6 % (ref 4–12)
NEUTROPHILS # BLD AUTO: 4.17 THOUSANDS/ΜL (ref 1.85–7.62)
NEUTS SEG NFR BLD AUTO: 60 % (ref 43–75)
NRBC BLD AUTO-RTO: 0 /100 WBCS
PLATELET # BLD AUTO: 356 THOUSANDS/UL (ref 149–390)
PMV BLD AUTO: 11.1 FL (ref 8.9–12.7)
POTASSIUM SERPL-SCNC: 5.1 MMOL/L (ref 3.5–5.3)
PROT SERPL-MCNC: 6.9 G/DL (ref 6.4–8.2)
RBC # BLD AUTO: 5.82 MILLION/UL (ref 3.81–5.12)
SODIUM SERPL-SCNC: 137 MMOL/L (ref 136–145)
WBC # BLD AUTO: 7.04 THOUSAND/UL (ref 4.31–10.16)

## 2019-04-22 PROCEDURE — 36415 COLL VENOUS BLD VENIPUNCTURE: CPT | Performed by: INTERNAL MEDICINE

## 2019-04-22 PROCEDURE — 80053 COMPREHEN METABOLIC PANEL: CPT | Performed by: INTERNAL MEDICINE

## 2019-04-22 PROCEDURE — 85025 COMPLETE CBC W/AUTO DIFF WBC: CPT | Performed by: INTERNAL MEDICINE

## 2019-04-29 ENCOUNTER — APPOINTMENT (OUTPATIENT)
Dept: LAB | Facility: HOSPITAL | Age: 84
End: 2019-04-29
Attending: INTERNAL MEDICINE
Payer: MEDICARE

## 2019-04-29 LAB
ALBUMIN SERPL BCP-MCNC: 3.8 G/DL (ref 3.5–5)
ALP SERPL-CCNC: 89 U/L (ref 46–116)
ALT SERPL W P-5'-P-CCNC: 17 U/L (ref 12–78)
ANION GAP SERPL CALCULATED.3IONS-SCNC: 6 MMOL/L (ref 4–13)
AST SERPL W P-5'-P-CCNC: 12 U/L (ref 5–45)
BASOPHILS # BLD AUTO: 0.08 THOUSANDS/ΜL (ref 0–0.1)
BASOPHILS NFR BLD AUTO: 1 % (ref 0–1)
BILIRUB SERPL-MCNC: 0.4 MG/DL (ref 0.2–1)
BUN SERPL-MCNC: 23 MG/DL (ref 5–25)
CALCIUM SERPL-MCNC: 9.4 MG/DL (ref 8.3–10.1)
CHLORIDE SERPL-SCNC: 103 MMOL/L (ref 100–108)
CO2 SERPL-SCNC: 31 MMOL/L (ref 21–32)
CREAT SERPL-MCNC: 1.1 MG/DL (ref 0.6–1.3)
EOSINOPHIL # BLD AUTO: 0.21 THOUSAND/ΜL (ref 0–0.61)
EOSINOPHIL NFR BLD AUTO: 3 % (ref 0–6)
ERYTHROCYTE [DISTWIDTH] IN BLOOD BY AUTOMATED COUNT: 22.6 % (ref 11.6–15.1)
GFR SERPL CREATININE-BSD FRML MDRD: 44 ML/MIN/1.73SQ M
GLUCOSE SERPL-MCNC: 89 MG/DL (ref 65–140)
HCT VFR BLD AUTO: 46 % (ref 34.8–46.1)
HGB BLD-MCNC: 14.8 G/DL (ref 11.5–15.4)
IMM GRANULOCYTES # BLD AUTO: 0.01 THOUSAND/UL (ref 0–0.2)
IMM GRANULOCYTES NFR BLD AUTO: 0 % (ref 0–2)
LYMPHOCYTES # BLD AUTO: 1.94 THOUSANDS/ΜL (ref 0.6–4.47)
LYMPHOCYTES NFR BLD AUTO: 32 % (ref 14–44)
MCH RBC QN AUTO: 27.5 PG (ref 26.8–34.3)
MCHC RBC AUTO-ENTMCNC: 32.2 G/DL (ref 31.4–37.4)
MCV RBC AUTO: 85 FL (ref 82–98)
MONOCYTES # BLD AUTO: 0.34 THOUSAND/ΜL (ref 0.17–1.22)
MONOCYTES NFR BLD AUTO: 6 % (ref 4–12)
NEUTROPHILS # BLD AUTO: 3.58 THOUSANDS/ΜL (ref 1.85–7.62)
NEUTS SEG NFR BLD AUTO: 58 % (ref 43–75)
NRBC BLD AUTO-RTO: 0 /100 WBCS
PLATELET # BLD AUTO: 427 THOUSANDS/UL (ref 149–390)
PMV BLD AUTO: 10.4 FL (ref 8.9–12.7)
POTASSIUM SERPL-SCNC: 4.7 MMOL/L (ref 3.5–5.3)
PROT SERPL-MCNC: 7.1 G/DL (ref 6.4–8.2)
RBC # BLD AUTO: 5.39 MILLION/UL (ref 3.81–5.12)
SODIUM SERPL-SCNC: 140 MMOL/L (ref 136–145)
WBC # BLD AUTO: 6.16 THOUSAND/UL (ref 4.31–10.16)

## 2019-04-29 PROCEDURE — 36415 COLL VENOUS BLD VENIPUNCTURE: CPT | Performed by: INTERNAL MEDICINE

## 2019-04-29 PROCEDURE — 80053 COMPREHEN METABOLIC PANEL: CPT | Performed by: INTERNAL MEDICINE

## 2019-04-29 PROCEDURE — 85025 COMPLETE CBC W/AUTO DIFF WBC: CPT | Performed by: INTERNAL MEDICINE

## 2019-05-06 ENCOUNTER — APPOINTMENT (OUTPATIENT)
Dept: LAB | Facility: HOSPITAL | Age: 84
DRG: 329 | End: 2019-05-06
Attending: INTERNAL MEDICINE
Payer: MEDICARE

## 2019-05-06 DIAGNOSIS — D47.3 THROMBOCYTHEMIA, ESSENTIAL (HCC): ICD-10-CM

## 2019-05-06 DIAGNOSIS — D69.3 ESSENTIAL THROMBOCYTOPENIA (HCC): ICD-10-CM

## 2019-05-06 LAB
ALBUMIN SERPL BCP-MCNC: 3.8 G/DL (ref 3.5–5)
ALP SERPL-CCNC: 83 U/L (ref 46–116)
ALT SERPL W P-5'-P-CCNC: 19 U/L (ref 12–78)
ANION GAP SERPL CALCULATED.3IONS-SCNC: 6 MMOL/L (ref 4–13)
AST SERPL W P-5'-P-CCNC: 14 U/L (ref 5–45)
BILIRUB SERPL-MCNC: 0.6 MG/DL (ref 0.2–1)
BUN SERPL-MCNC: 19 MG/DL (ref 5–25)
CALCIUM SERPL-MCNC: 9.6 MG/DL (ref 8.3–10.1)
CHLORIDE SERPL-SCNC: 102 MMOL/L (ref 100–108)
CO2 SERPL-SCNC: 29 MMOL/L (ref 21–32)
CREAT SERPL-MCNC: 1.27 MG/DL (ref 0.6–1.3)
ERYTHROCYTE [DISTWIDTH] IN BLOOD BY AUTOMATED COUNT: 22.8 % (ref 11.6–15.1)
GFR SERPL CREATININE-BSD FRML MDRD: 37 ML/MIN/1.73SQ M
GLUCOSE SERPL-MCNC: 166 MG/DL (ref 65–140)
HCT VFR BLD AUTO: 45 % (ref 34.8–46.1)
HGB BLD-MCNC: 14.5 G/DL (ref 11.5–15.4)
MCH RBC QN AUTO: 27.4 PG (ref 26.8–34.3)
MCHC RBC AUTO-ENTMCNC: 32.2 G/DL (ref 31.4–37.4)
MCV RBC AUTO: 85 FL (ref 82–98)
PLATELET # BLD AUTO: 376 THOUSANDS/UL (ref 149–390)
PMV BLD AUTO: 10.5 FL (ref 8.9–12.7)
POTASSIUM SERPL-SCNC: 4.2 MMOL/L (ref 3.5–5.3)
PROT SERPL-MCNC: 6.8 G/DL (ref 6.4–8.2)
RBC # BLD AUTO: 5.3 MILLION/UL (ref 3.81–5.12)
SODIUM SERPL-SCNC: 137 MMOL/L (ref 136–145)
WBC # BLD AUTO: 7.72 THOUSAND/UL (ref 4.31–10.16)

## 2019-05-06 PROCEDURE — 85027 COMPLETE CBC AUTOMATED: CPT

## 2019-05-06 PROCEDURE — 36415 COLL VENOUS BLD VENIPUNCTURE: CPT

## 2019-05-06 PROCEDURE — 80053 COMPREHEN METABOLIC PANEL: CPT

## 2019-05-09 ENCOUNTER — ANESTHESIA EVENT (INPATIENT)
Dept: PERIOP | Facility: HOSPITAL | Age: 84
DRG: 329 | End: 2019-05-09
Payer: MEDICARE

## 2019-05-09 ENCOUNTER — APPOINTMENT (EMERGENCY)
Dept: RADIOLOGY | Facility: HOSPITAL | Age: 84
DRG: 329 | End: 2019-05-09
Payer: MEDICARE

## 2019-05-09 ENCOUNTER — HOSPITAL ENCOUNTER (INPATIENT)
Facility: HOSPITAL | Age: 84
LOS: 6 days | Discharge: NURSING, REHAB OR LONG-TERM CARE | DRG: 329 | End: 2019-05-15
Attending: EMERGENCY MEDICINE | Admitting: FAMILY MEDICINE
Payer: MEDICARE

## 2019-05-09 ENCOUNTER — ANESTHESIA (INPATIENT)
Dept: PERIOP | Facility: HOSPITAL | Age: 84
DRG: 329 | End: 2019-05-09
Payer: MEDICARE

## 2019-05-09 DIAGNOSIS — K63.1 PERFORATED BOWEL (HCC): Primary | ICD-10-CM

## 2019-05-09 DIAGNOSIS — K57.20 PERFORATION OF SIGMOID COLON DUE TO DIVERTICULITIS: ICD-10-CM

## 2019-05-09 PROBLEM — N17.9 AKI (ACUTE KIDNEY INJURY) (HCC): Status: ACTIVE | Noted: 2019-05-09

## 2019-05-09 LAB
ANION GAP SERPL CALCULATED.3IONS-SCNC: 6 MMOL/L (ref 4–13)
ANION GAP SERPL CALCULATED.3IONS-SCNC: 7 MMOL/L (ref 4–13)
ANION GAP SERPL CALCULATED.3IONS-SCNC: 9 MMOL/L (ref 4–13)
ATRIAL RATE: 97 BPM
BACTERIA UR QL AUTO: ABNORMAL /HPF
BASOPHILS # BLD AUTO: 0.05 THOUSANDS/ΜL (ref 0–0.1)
BASOPHILS NFR BLD AUTO: 1 % (ref 0–1)
BILIRUB UR QL STRIP: NEGATIVE
BUN SERPL-MCNC: 23 MG/DL (ref 5–25)
BUN SERPL-MCNC: 25 MG/DL (ref 5–25)
BUN SERPL-MCNC: 25 MG/DL (ref 5–25)
CALCIUM SERPL-MCNC: 9.4 MG/DL (ref 8.3–10.1)
CALCIUM SERPL-MCNC: 9.5 MG/DL (ref 8.3–10.1)
CALCIUM SERPL-MCNC: 9.7 MG/DL (ref 8.3–10.1)
CHLORIDE SERPL-SCNC: 103 MMOL/L (ref 100–108)
CHLORIDE SERPL-SCNC: 106 MMOL/L (ref 100–108)
CHLORIDE SERPL-SCNC: 107 MMOL/L (ref 100–108)
CLARITY UR: CLEAR
CO2 SERPL-SCNC: 25 MMOL/L (ref 21–32)
CO2 SERPL-SCNC: 31 MMOL/L (ref 21–32)
CO2 SERPL-SCNC: 31 MMOL/L (ref 21–32)
COLOR UR: YELLOW
CREAT SERPL-MCNC: 1.63 MG/DL (ref 0.6–1.3)
CREAT SERPL-MCNC: 2.01 MG/DL (ref 0.6–1.3)
CREAT SERPL-MCNC: 2.02 MG/DL (ref 0.6–1.3)
EOSINOPHIL # BLD AUTO: 0.23 THOUSAND/ΜL (ref 0–0.61)
EOSINOPHIL NFR BLD AUTO: 2 % (ref 0–6)
ERYTHROCYTE [DISTWIDTH] IN BLOOD BY AUTOMATED COUNT: 23.9 % (ref 11.6–15.1)
GFR SERPL CREATININE-BSD FRML MDRD: 21 ML/MIN/1.73SQ M
GFR SERPL CREATININE-BSD FRML MDRD: 21 ML/MIN/1.73SQ M
GFR SERPL CREATININE-BSD FRML MDRD: 27 ML/MIN/1.73SQ M
GLUCOSE SERPL-MCNC: 113 MG/DL (ref 65–140)
GLUCOSE SERPL-MCNC: 120 MG/DL (ref 65–140)
GLUCOSE SERPL-MCNC: 165 MG/DL (ref 65–140)
GLUCOSE SERPL-MCNC: 168 MG/DL (ref 65–140)
GLUCOSE UR STRIP-MCNC: NEGATIVE MG/DL
HCT VFR BLD AUTO: 49.2 % (ref 34.8–46.1)
HGB BLD-MCNC: 15.9 G/DL (ref 11.5–15.4)
HGB UR QL STRIP.AUTO: NEGATIVE
IMM GRANULOCYTES # BLD AUTO: 0.03 THOUSAND/UL (ref 0–0.2)
IMM GRANULOCYTES NFR BLD AUTO: 0 % (ref 0–2)
KETONES UR STRIP-MCNC: ABNORMAL MG/DL
LEUKOCYTE ESTERASE UR QL STRIP: ABNORMAL
LIPASE SERPL-CCNC: 97 U/L (ref 73–393)
LYMPHOCYTES # BLD AUTO: 1.86 THOUSANDS/ΜL (ref 0.6–4.47)
LYMPHOCYTES NFR BLD AUTO: 18 % (ref 14–44)
MCH RBC QN AUTO: 28.1 PG (ref 26.8–34.3)
MCHC RBC AUTO-ENTMCNC: 32.3 G/DL (ref 31.4–37.4)
MCV RBC AUTO: 87 FL (ref 82–98)
MONOCYTES # BLD AUTO: 0.52 THOUSAND/ΜL (ref 0.17–1.22)
MONOCYTES NFR BLD AUTO: 5 % (ref 4–12)
NEUTROPHILS # BLD AUTO: 7.65 THOUSANDS/ΜL (ref 1.85–7.62)
NEUTS SEG NFR BLD AUTO: 74 % (ref 43–75)
NITRITE UR QL STRIP: NEGATIVE
NON-SQ EPI CELLS URNS QL MICRO: ABNORMAL /HPF
NRBC BLD AUTO-RTO: 0 /100 WBCS
P AXIS: 31 DEGREES
PH UR STRIP.AUTO: 5.5 [PH]
PLATELET # BLD AUTO: 311 THOUSANDS/UL (ref 149–390)
PLATELET # BLD AUTO: 388 THOUSANDS/UL (ref 149–390)
PMV BLD AUTO: 10.6 FL (ref 8.9–12.7)
PMV BLD AUTO: 11 FL (ref 8.9–12.7)
POTASSIUM SERPL-SCNC: 4.8 MMOL/L (ref 3.5–5.3)
POTASSIUM SERPL-SCNC: 4.9 MMOL/L (ref 3.5–5.3)
POTASSIUM SERPL-SCNC: 6.7 MMOL/L (ref 3.5–5.3)
PR INTERVAL: 220 MS
PROT UR STRIP-MCNC: NEGATIVE MG/DL
QRS AXIS: 39 DEGREES
QRSD INTERVAL: 76 MS
QT INTERVAL: 310 MS
QTC INTERVAL: 393 MS
RBC # BLD AUTO: 5.66 MILLION/UL (ref 3.81–5.12)
RBC #/AREA URNS AUTO: ABNORMAL /HPF
SODIUM SERPL-SCNC: 140 MMOL/L (ref 136–145)
SODIUM SERPL-SCNC: 141 MMOL/L (ref 136–145)
SODIUM SERPL-SCNC: 144 MMOL/L (ref 136–145)
SP GR UR STRIP.AUTO: 1.02 (ref 1–1.03)
T WAVE AXIS: 30 DEGREES
UROBILINOGEN UR QL STRIP.AUTO: 0.2 E.U./DL
VENTRICULAR RATE: 97 BPM
WBC # BLD AUTO: 10.34 THOUSAND/UL (ref 4.31–10.16)
WBC #/AREA URNS AUTO: ABNORMAL /HPF

## 2019-05-09 PROCEDURE — 74176 CT ABD & PELVIS W/O CONTRAST: CPT

## 2019-05-09 PROCEDURE — 88307 TISSUE EXAM BY PATHOLOGIST: CPT | Performed by: PATHOLOGY

## 2019-05-09 PROCEDURE — 99285 EMERGENCY DEPT VISIT HI MDM: CPT

## 2019-05-09 PROCEDURE — 93010 ELECTROCARDIOGRAM REPORT: CPT | Performed by: INTERNAL MEDICINE

## 2019-05-09 PROCEDURE — 96361 HYDRATE IV INFUSION ADD-ON: CPT

## 2019-05-09 PROCEDURE — 80048 BASIC METABOLIC PNL TOTAL CA: CPT | Performed by: PHYSICIAN ASSISTANT

## 2019-05-09 PROCEDURE — ND001 PR NO DOCUMENTATION: Performed by: SPECIALIST

## 2019-05-09 PROCEDURE — 1123F ACP DISCUSS/DSCN MKR DOCD: CPT | Performed by: PHYSICIAN ASSISTANT

## 2019-05-09 PROCEDURE — 82948 REAGENT STRIP/BLOOD GLUCOSE: CPT

## 2019-05-09 PROCEDURE — 44143 PARTIAL REMOVAL OF COLON: CPT | Performed by: PHYSICIAN ASSISTANT

## 2019-05-09 PROCEDURE — 0D1M0Z4 BYPASS DESCENDING COLON TO CUTANEOUS, OPEN APPROACH: ICD-10-PCS | Performed by: SPECIALIST

## 2019-05-09 PROCEDURE — 0DTN0ZZ RESECTION OF SIGMOID COLON, OPEN APPROACH: ICD-10-PCS | Performed by: SPECIALIST

## 2019-05-09 PROCEDURE — 99222 1ST HOSP IP/OBS MODERATE 55: CPT | Performed by: SPECIALIST

## 2019-05-09 PROCEDURE — 93005 ELECTROCARDIOGRAM TRACING: CPT

## 2019-05-09 PROCEDURE — 36415 COLL VENOUS BLD VENIPUNCTURE: CPT | Performed by: PHYSICIAN ASSISTANT

## 2019-05-09 PROCEDURE — 85049 AUTOMATED PLATELET COUNT: CPT | Performed by: PHYSICIAN ASSISTANT

## 2019-05-09 PROCEDURE — 81001 URINALYSIS AUTO W/SCOPE: CPT | Performed by: PHYSICIAN ASSISTANT

## 2019-05-09 PROCEDURE — 44143 PARTIAL REMOVAL OF COLON: CPT | Performed by: SPECIALIST

## 2019-05-09 PROCEDURE — 96374 THER/PROPH/DIAG INJ IV PUSH: CPT

## 2019-05-09 PROCEDURE — 83690 ASSAY OF LIPASE: CPT | Performed by: PHYSICIAN ASSISTANT

## 2019-05-09 PROCEDURE — 99223 1ST HOSP IP/OBS HIGH 75: CPT | Performed by: FAMILY MEDICINE

## 2019-05-09 PROCEDURE — 85025 COMPLETE CBC W/AUTO DIFF WBC: CPT | Performed by: PHYSICIAN ASSISTANT

## 2019-05-09 RX ORDER — LIDOCAINE HYDROCHLORIDE 10 MG/ML
INJECTION, SOLUTION INFILTRATION; PERINEURAL AS NEEDED
Status: DISCONTINUED | OUTPATIENT
Start: 2019-05-09 | End: 2019-05-09 | Stop reason: SURG

## 2019-05-09 RX ORDER — PROMETHAZINE HYDROCHLORIDE 25 MG/ML
12.5 INJECTION, SOLUTION INTRAMUSCULAR; INTRAVENOUS ONCE AS NEEDED
Status: DISCONTINUED | OUTPATIENT
Start: 2019-05-09 | End: 2019-05-09 | Stop reason: HOSPADM

## 2019-05-09 RX ORDER — FENTANYL CITRATE/PF 50 MCG/ML
50 SYRINGE (ML) INJECTION
Status: DISCONTINUED | OUTPATIENT
Start: 2019-05-09 | End: 2019-05-09 | Stop reason: HOSPADM

## 2019-05-09 RX ORDER — SODIUM CHLORIDE 9 MG/ML
100 INJECTION, SOLUTION INTRAVENOUS CONTINUOUS
Status: DISCONTINUED | OUTPATIENT
Start: 2019-05-10 | End: 2019-05-09 | Stop reason: SDUPTHER

## 2019-05-09 RX ORDER — FLUTICASONE PROPIONATE 50 MCG
1 SPRAY, SUSPENSION (ML) NASAL DAILY
Status: DISCONTINUED | OUTPATIENT
Start: 2019-05-10 | End: 2019-05-15 | Stop reason: HOSPADM

## 2019-05-09 RX ORDER — PANTOPRAZOLE SODIUM 40 MG/1
40 INJECTION, POWDER, FOR SOLUTION INTRAVENOUS
Status: DISCONTINUED | OUTPATIENT
Start: 2019-05-09 | End: 2019-05-15 | Stop reason: HOSPADM

## 2019-05-09 RX ORDER — ONDANSETRON 2 MG/ML
4 INJECTION INTRAMUSCULAR; INTRAVENOUS EVERY 6 HOURS PRN
Status: DISCONTINUED | OUTPATIENT
Start: 2019-05-09 | End: 2019-05-09 | Stop reason: SDUPTHER

## 2019-05-09 RX ORDER — MEPERIDINE HYDROCHLORIDE 25 MG/ML
12.5 INJECTION INTRAMUSCULAR; INTRAVENOUS; SUBCUTANEOUS
Status: DISCONTINUED | OUTPATIENT
Start: 2019-05-09 | End: 2019-05-09 | Stop reason: HOSPADM

## 2019-05-09 RX ORDER — HEPARIN SODIUM 5000 [USP'U]/ML
5000 INJECTION, SOLUTION INTRAVENOUS; SUBCUTANEOUS EVERY 8 HOURS SCHEDULED
Status: DISCONTINUED | OUTPATIENT
Start: 2019-05-10 | End: 2019-05-15 | Stop reason: HOSPADM

## 2019-05-09 RX ORDER — HYDROMORPHONE HCL/PF 1 MG/ML
0.5 SYRINGE (ML) INJECTION EVERY 4 HOURS PRN
Status: DISCONTINUED | OUTPATIENT
Start: 2019-05-09 | End: 2019-05-09 | Stop reason: SDUPTHER

## 2019-05-09 RX ORDER — MAGNESIUM HYDROXIDE 1200 MG/15ML
LIQUID ORAL AS NEEDED
Status: DISCONTINUED | OUTPATIENT
Start: 2019-05-09 | End: 2019-05-09 | Stop reason: HOSPADM

## 2019-05-09 RX ORDER — ONDANSETRON 2 MG/ML
4 INJECTION INTRAMUSCULAR; INTRAVENOUS EVERY 6 HOURS PRN
Status: DISCONTINUED | OUTPATIENT
Start: 2019-05-09 | End: 2019-05-15 | Stop reason: HOSPADM

## 2019-05-09 RX ORDER — LEVOTHYROXINE SODIUM 0.05 MG/1
50 TABLET ORAL
Status: DISCONTINUED | OUTPATIENT
Start: 2019-05-10 | End: 2019-05-15 | Stop reason: HOSPADM

## 2019-05-09 RX ORDER — SERTRALINE HYDROCHLORIDE 25 MG/1
25 TABLET, FILM COATED ORAL DAILY
Status: DISCONTINUED | OUTPATIENT
Start: 2019-05-10 | End: 2019-05-15 | Stop reason: HOSPADM

## 2019-05-09 RX ORDER — HYDROMORPHONE HCL/PF 1 MG/ML
0.2 SYRINGE (ML) INJECTION
Status: DISCONTINUED | OUTPATIENT
Start: 2019-05-09 | End: 2019-05-09

## 2019-05-09 RX ORDER — CHLORHEXIDINE GLUCONATE 4 G/100ML
SOLUTION TOPICAL DAILY PRN
Status: DISCONTINUED | OUTPATIENT
Start: 2019-05-09 | End: 2019-05-15 | Stop reason: HOSPADM

## 2019-05-09 RX ORDER — LABETALOL 20 MG/4 ML (5 MG/ML) INTRAVENOUS SYRINGE
10 AS NEEDED
Status: DISCONTINUED | OUTPATIENT
Start: 2019-05-09 | End: 2019-05-09 | Stop reason: HOSPADM

## 2019-05-09 RX ORDER — HYDROMORPHONE HCL/PF 1 MG/ML
0.4 SYRINGE (ML) INJECTION
Status: DISCONTINUED | OUTPATIENT
Start: 2019-05-09 | End: 2019-05-09 | Stop reason: HOSPADM

## 2019-05-09 RX ORDER — ONDANSETRON 2 MG/ML
4 INJECTION INTRAMUSCULAR; INTRAVENOUS ONCE AS NEEDED
Status: DISCONTINUED | OUTPATIENT
Start: 2019-05-09 | End: 2019-05-09 | Stop reason: HOSPADM

## 2019-05-09 RX ORDER — FENTANYL CITRATE 50 UG/ML
INJECTION, SOLUTION INTRAMUSCULAR; INTRAVENOUS AS NEEDED
Status: DISCONTINUED | OUTPATIENT
Start: 2019-05-09 | End: 2019-05-09 | Stop reason: SURG

## 2019-05-09 RX ORDER — SODIUM CHLORIDE, SODIUM LACTATE, POTASSIUM CHLORIDE, CALCIUM CHLORIDE 600; 310; 30; 20 MG/100ML; MG/100ML; MG/100ML; MG/100ML
75 INJECTION, SOLUTION INTRAVENOUS CONTINUOUS
Status: DISPENSED | OUTPATIENT
Start: 2019-05-09 | End: 2019-05-14

## 2019-05-09 RX ORDER — HYDROMORPHONE HYDROCHLORIDE 2 MG/ML
INJECTION, SOLUTION INTRAMUSCULAR; INTRAVENOUS; SUBCUTANEOUS AS NEEDED
Status: DISCONTINUED | OUTPATIENT
Start: 2019-05-09 | End: 2019-05-09 | Stop reason: SURG

## 2019-05-09 RX ORDER — ONDANSETRON 2 MG/ML
INJECTION INTRAMUSCULAR; INTRAVENOUS AS NEEDED
Status: DISCONTINUED | OUTPATIENT
Start: 2019-05-09 | End: 2019-05-09 | Stop reason: SURG

## 2019-05-09 RX ORDER — PROPOFOL 10 MG/ML
INJECTION, EMULSION INTRAVENOUS AS NEEDED
Status: DISCONTINUED | OUTPATIENT
Start: 2019-05-09 | End: 2019-05-09 | Stop reason: SURG

## 2019-05-09 RX ORDER — SODIUM CHLORIDE, SODIUM LACTATE, POTASSIUM CHLORIDE, CALCIUM CHLORIDE 600; 310; 30; 20 MG/100ML; MG/100ML; MG/100ML; MG/100ML
INJECTION, SOLUTION INTRAVENOUS CONTINUOUS PRN
Status: DISCONTINUED | OUTPATIENT
Start: 2019-05-09 | End: 2019-05-09 | Stop reason: SURG

## 2019-05-09 RX ORDER — ROCURONIUM BROMIDE 10 MG/ML
INJECTION, SOLUTION INTRAVENOUS AS NEEDED
Status: DISCONTINUED | OUTPATIENT
Start: 2019-05-09 | End: 2019-05-09 | Stop reason: SURG

## 2019-05-09 RX ORDER — ONDANSETRON 2 MG/ML
4 INJECTION INTRAMUSCULAR; INTRAVENOUS ONCE
Status: COMPLETED | OUTPATIENT
Start: 2019-05-09 | End: 2019-05-09

## 2019-05-09 RX ORDER — HYDROMORPHONE HCL/PF 1 MG/ML
0.2 SYRINGE (ML) INJECTION
Status: DISCONTINUED | OUTPATIENT
Start: 2019-05-09 | End: 2019-05-15 | Stop reason: HOSPADM

## 2019-05-09 RX ADMIN — ONDANSETRON 4 MG: 2 INJECTION INTRAMUSCULAR; INTRAVENOUS at 22:22

## 2019-05-09 RX ADMIN — PROPOFOL 100 MG: 10 INJECTION, EMULSION INTRAVENOUS at 20:45

## 2019-05-09 RX ADMIN — FENTANYL CITRATE 50 MCG: 50 INJECTION, SOLUTION INTRAMUSCULAR; INTRAVENOUS at 23:24

## 2019-05-09 RX ADMIN — ROCURONIUM BROMIDE 50 MG: 10 INJECTION, SOLUTION INTRAVENOUS at 20:45

## 2019-05-09 RX ADMIN — SODIUM CHLORIDE 500 ML: 0.9 INJECTION, SOLUTION INTRAVENOUS at 17:16

## 2019-05-09 RX ADMIN — ROCURONIUM BROMIDE 10 MG: 10 INJECTION, SOLUTION INTRAVENOUS at 21:38

## 2019-05-09 RX ADMIN — HYDROMORPHONE HYDROCHLORIDE 0.4 MG: 2 INJECTION, SOLUTION INTRAMUSCULAR; INTRAVENOUS; SUBCUTANEOUS at 21:06

## 2019-05-09 RX ADMIN — HYDROMORPHONE HYDROCHLORIDE 0.4 MG: 2 INJECTION, SOLUTION INTRAMUSCULAR; INTRAVENOUS; SUBCUTANEOUS at 21:38

## 2019-05-09 RX ADMIN — PIPERACILLIN SODIUM,TAZOBACTAM SODIUM 3.38 G: 3; .375 INJECTION, POWDER, FOR SOLUTION INTRAVENOUS at 18:40

## 2019-05-09 RX ADMIN — FENTANYL CITRATE 50 MCG: 50 INJECTION, SOLUTION INTRAMUSCULAR; INTRAVENOUS at 20:45

## 2019-05-09 RX ADMIN — FENTANYL CITRATE 50 MCG: 50 INJECTION, SOLUTION INTRAMUSCULAR; INTRAVENOUS at 21:21

## 2019-05-09 RX ADMIN — LIDOCAINE HYDROCHLORIDE 50 MG: 10 INJECTION, SOLUTION INFILTRATION; PERINEURAL at 20:45

## 2019-05-09 RX ADMIN — HYDROMORPHONE HYDROCHLORIDE 0.4 MG: 2 INJECTION, SOLUTION INTRAMUSCULAR; INTRAVENOUS; SUBCUTANEOUS at 21:15

## 2019-05-09 RX ADMIN — SODIUM CHLORIDE, SODIUM LACTATE, POTASSIUM CHLORIDE, AND CALCIUM CHLORIDE: .6; .31; .03; .02 INJECTION, SOLUTION INTRAVENOUS at 20:44

## 2019-05-09 RX ADMIN — ONDANSETRON 4 MG: 2 INJECTION INTRAMUSCULAR; INTRAVENOUS at 17:16

## 2019-05-09 RX ADMIN — SUGAMMADEX 200 MG: 100 INJECTION, SOLUTION INTRAVENOUS at 22:22

## 2019-05-10 PROBLEM — K57.20 PERFORATION OF SIGMOID COLON DUE TO DIVERTICULITIS: Status: ACTIVE | Noted: 2019-05-09

## 2019-05-10 PROBLEM — K57.80 DIVERTICULITIS OF INTESTINE WITH PERFORATION: Status: ACTIVE | Noted: 2019-05-09

## 2019-05-10 LAB
ANION GAP SERPL CALCULATED.3IONS-SCNC: 7 MMOL/L (ref 4–13)
BUN SERPL-MCNC: 22 MG/DL (ref 5–25)
CALCIUM SERPL-MCNC: 9 MG/DL (ref 8.3–10.1)
CHLORIDE SERPL-SCNC: 107 MMOL/L (ref 100–108)
CO2 SERPL-SCNC: 29 MMOL/L (ref 21–32)
CREAT SERPL-MCNC: 1.22 MG/DL (ref 0.6–1.3)
ERYTHROCYTE [DISTWIDTH] IN BLOOD BY AUTOMATED COUNT: 23.6 % (ref 11.6–15.1)
GFR SERPL CREATININE-BSD FRML MDRD: 39 ML/MIN/1.73SQ M
GLUCOSE SERPL-MCNC: 142 MG/DL (ref 65–140)
HCT VFR BLD AUTO: 42.7 % (ref 34.8–46.1)
HGB BLD-MCNC: 13.9 G/DL (ref 11.5–15.4)
MAGNESIUM SERPL-MCNC: 1.6 MG/DL (ref 1.6–2.6)
MCH RBC QN AUTO: 28 PG (ref 26.8–34.3)
MCHC RBC AUTO-ENTMCNC: 32.6 G/DL (ref 31.4–37.4)
MCV RBC AUTO: 86 FL (ref 82–98)
PHOSPHATE SERPL-MCNC: 3.1 MG/DL (ref 2.3–4.1)
PLATELET # BLD AUTO: 288 THOUSANDS/UL (ref 149–390)
PMV BLD AUTO: 10.8 FL (ref 8.9–12.7)
POTASSIUM SERPL-SCNC: 4.7 MMOL/L (ref 3.5–5.3)
RBC # BLD AUTO: 4.97 MILLION/UL (ref 3.81–5.12)
SODIUM SERPL-SCNC: 143 MMOL/L (ref 136–145)
WBC # BLD AUTO: 12.42 THOUSAND/UL (ref 4.31–10.16)

## 2019-05-10 PROCEDURE — G8979 MOBILITY GOAL STATUS: HCPCS

## 2019-05-10 PROCEDURE — 99024 POSTOP FOLLOW-UP VISIT: CPT | Performed by: SPECIALIST

## 2019-05-10 PROCEDURE — 97163 PT EVAL HIGH COMPLEX 45 MIN: CPT

## 2019-05-10 PROCEDURE — G8978 MOBILITY CURRENT STATUS: HCPCS

## 2019-05-10 PROCEDURE — 80048 BASIC METABOLIC PNL TOTAL CA: CPT | Performed by: PHYSICIAN ASSISTANT

## 2019-05-10 PROCEDURE — 83735 ASSAY OF MAGNESIUM: CPT | Performed by: PHYSICIAN ASSISTANT

## 2019-05-10 PROCEDURE — 97530 THERAPEUTIC ACTIVITIES: CPT

## 2019-05-10 PROCEDURE — G8987 SELF CARE CURRENT STATUS: HCPCS

## 2019-05-10 PROCEDURE — G8988 SELF CARE GOAL STATUS: HCPCS

## 2019-05-10 PROCEDURE — 97110 THERAPEUTIC EXERCISES: CPT

## 2019-05-10 PROCEDURE — 84100 ASSAY OF PHOSPHORUS: CPT | Performed by: PHYSICIAN ASSISTANT

## 2019-05-10 PROCEDURE — 99232 SBSQ HOSP IP/OBS MODERATE 35: CPT | Performed by: FAMILY MEDICINE

## 2019-05-10 PROCEDURE — 85027 COMPLETE CBC AUTOMATED: CPT | Performed by: PHYSICIAN ASSISTANT

## 2019-05-10 PROCEDURE — C9113 INJ PANTOPRAZOLE SODIUM, VIA: HCPCS | Performed by: PHYSICIAN ASSISTANT

## 2019-05-10 PROCEDURE — 97167 OT EVAL HIGH COMPLEX 60 MIN: CPT

## 2019-05-10 RX ORDER — HYDROMORPHONE HCL/PF 1 MG/ML
0.2 SYRINGE (ML) INJECTION
Status: DISCONTINUED | OUTPATIENT
Start: 2019-05-10 | End: 2019-05-15 | Stop reason: HOSPADM

## 2019-05-10 RX ORDER — MAGNESIUM SULFATE HEPTAHYDRATE 40 MG/ML
2 INJECTION, SOLUTION INTRAVENOUS ONCE
Status: COMPLETED | OUTPATIENT
Start: 2019-05-10 | End: 2019-05-10

## 2019-05-10 RX ORDER — LANOLIN ALCOHOL/MO/W.PET/CERES
6 CREAM (GRAM) TOPICAL
Status: DISCONTINUED | OUTPATIENT
Start: 2019-05-10 | End: 2019-05-15 | Stop reason: HOSPADM

## 2019-05-10 RX ORDER — HYDROMORPHONE HCL/PF 1 MG/ML
0.2 SYRINGE (ML) INJECTION ONCE
Status: COMPLETED | OUTPATIENT
Start: 2019-05-10 | End: 2019-05-10

## 2019-05-10 RX ORDER — HYDROXYUREA 500 MG/1
500 CAPSULE ORAL DAILY
COMMUNITY

## 2019-05-10 RX ORDER — DILTIAZEM HYDROCHLORIDE 180 MG/1
180 CAPSULE, COATED, EXTENDED RELEASE ORAL DAILY
Status: DISCONTINUED | OUTPATIENT
Start: 2019-05-10 | End: 2019-05-15 | Stop reason: HOSPADM

## 2019-05-10 RX ORDER — HYDROXYUREA 500 MG/1
500 CAPSULE ORAL 3 TIMES WEEKLY
Status: DISCONTINUED | OUTPATIENT
Start: 2019-05-10 | End: 2019-05-15 | Stop reason: HOSPADM

## 2019-05-10 RX ADMIN — PIPERACILLIN SODIUM,TAZOBACTAM SODIUM 2.25 G: 2; .25 INJECTION, POWDER, FOR SOLUTION INTRAVENOUS at 18:35

## 2019-05-10 RX ADMIN — SODIUM CHLORIDE, SODIUM LACTATE, POTASSIUM CHLORIDE, AND CALCIUM CHLORIDE 75 ML/HR: .6; .31; .03; .02 INJECTION, SOLUTION INTRAVENOUS at 13:16

## 2019-05-10 RX ADMIN — PIPERACILLIN SODIUM,TAZOBACTAM SODIUM 2.25 G: 2; .25 INJECTION, POWDER, FOR SOLUTION INTRAVENOUS at 13:19

## 2019-05-10 RX ADMIN — PIPERACILLIN SODIUM,TAZOBACTAM SODIUM 2.25 G: 2; .25 INJECTION, POWDER, FOR SOLUTION INTRAVENOUS at 05:47

## 2019-05-10 RX ADMIN — MAGNESIUM SULFATE HEPTAHYDRATE 2 G: 40 INJECTION, SOLUTION INTRAVENOUS at 13:10

## 2019-05-10 RX ADMIN — HEPARIN SODIUM 5000 UNITS: 5000 INJECTION INTRAVENOUS; SUBCUTANEOUS at 05:47

## 2019-05-10 RX ADMIN — HYDROXYUREA 500 MG: 500 CAPSULE ORAL at 15:29

## 2019-05-10 RX ADMIN — ONDANSETRON 4 MG: 2 INJECTION INTRAMUSCULAR; INTRAVENOUS at 22:35

## 2019-05-10 RX ADMIN — FLUTICASONE PROPIONATE 1 SPRAY: 50 SPRAY, METERED NASAL at 08:55

## 2019-05-10 RX ADMIN — PANTOPRAZOLE SODIUM 40 MG: 40 INJECTION, POWDER, FOR SOLUTION INTRAVENOUS at 08:57

## 2019-05-10 RX ADMIN — HYDROMORPHONE HYDROCHLORIDE 0.2 MG: 1 INJECTION, SOLUTION INTRAMUSCULAR; INTRAVENOUS; SUBCUTANEOUS at 02:45

## 2019-05-10 RX ADMIN — HYDROMORPHONE HYDROCHLORIDE 0.2 MG: 1 INJECTION, SOLUTION INTRAMUSCULAR; INTRAVENOUS; SUBCUTANEOUS at 08:52

## 2019-05-10 RX ADMIN — HYDROMORPHONE HYDROCHLORIDE 0.2 MG: 1 INJECTION, SOLUTION INTRAMUSCULAR; INTRAVENOUS; SUBCUTANEOUS at 22:35

## 2019-05-10 RX ADMIN — DILTIAZEM HYDROCHLORIDE 180 MG: 180 CAPSULE, COATED, EXTENDED RELEASE ORAL at 15:29

## 2019-05-10 RX ADMIN — PIPERACILLIN SODIUM,TAZOBACTAM SODIUM 2.25 G: 2; .25 INJECTION, POWDER, FOR SOLUTION INTRAVENOUS at 00:23

## 2019-05-10 RX ADMIN — HYDROMORPHONE HYDROCHLORIDE 0.2 MG: 1 INJECTION, SOLUTION INTRAMUSCULAR; INTRAVENOUS; SUBCUTANEOUS at 05:44

## 2019-05-10 RX ADMIN — LEVOTHYROXINE SODIUM 50 MCG: 50 TABLET ORAL at 05:47

## 2019-05-10 RX ADMIN — MELATONIN 6 MG: at 21:04

## 2019-05-10 RX ADMIN — HEPARIN SODIUM 5000 UNITS: 5000 INJECTION INTRAVENOUS; SUBCUTANEOUS at 15:28

## 2019-05-10 RX ADMIN — HYDROMORPHONE HYDROCHLORIDE 0.2 MG: 1 INJECTION, SOLUTION INTRAMUSCULAR; INTRAVENOUS; SUBCUTANEOUS at 12:50

## 2019-05-10 RX ADMIN — SODIUM CHLORIDE, SODIUM LACTATE, POTASSIUM CHLORIDE, AND CALCIUM CHLORIDE 100 ML/HR: .6; .31; .03; .02 INJECTION, SOLUTION INTRAVENOUS at 00:23

## 2019-05-10 RX ADMIN — SERTRALINE 25 MG: 25 TABLET, FILM COATED ORAL at 09:04

## 2019-05-10 RX ADMIN — PIPERACILLIN SODIUM,TAZOBACTAM SODIUM 2.25 G: 2; .25 INJECTION, POWDER, FOR SOLUTION INTRAVENOUS at 23:55

## 2019-05-10 RX ADMIN — HYDROMORPHONE HYDROCHLORIDE 0.2 MG: 1 INJECTION, SOLUTION INTRAMUSCULAR; INTRAVENOUS; SUBCUTANEOUS at 04:35

## 2019-05-10 RX ADMIN — HEPARIN SODIUM 5000 UNITS: 5000 INJECTION INTRAVENOUS; SUBCUTANEOUS at 21:05

## 2019-05-10 RX ADMIN — SODIUM CHLORIDE, SODIUM LACTATE, POTASSIUM CHLORIDE, AND CALCIUM CHLORIDE 75 ML/HR: .6; .31; .03; .02 INJECTION, SOLUTION INTRAVENOUS at 23:55

## 2019-05-11 PROBLEM — R09.02 HYPOXIA: Status: ACTIVE | Noted: 2019-05-11

## 2019-05-11 LAB
ANION GAP SERPL CALCULATED.3IONS-SCNC: 8 MMOL/L (ref 4–13)
BASOPHILS # BLD AUTO: 0.04 THOUSANDS/ΜL (ref 0–0.1)
BASOPHILS NFR BLD AUTO: 0 % (ref 0–1)
BUN SERPL-MCNC: 17 MG/DL (ref 5–25)
CALCIUM SERPL-MCNC: 9.1 MG/DL (ref 8.3–10.1)
CHLORIDE SERPL-SCNC: 105 MMOL/L (ref 100–108)
CO2 SERPL-SCNC: 27 MMOL/L (ref 21–32)
CREAT SERPL-MCNC: 0.88 MG/DL (ref 0.6–1.3)
EOSINOPHIL # BLD AUTO: 0.08 THOUSAND/ΜL (ref 0–0.61)
EOSINOPHIL NFR BLD AUTO: 1 % (ref 0–6)
ERYTHROCYTE [DISTWIDTH] IN BLOOD BY AUTOMATED COUNT: 23.3 % (ref 11.6–15.1)
GFR SERPL CREATININE-BSD FRML MDRD: 58 ML/MIN/1.73SQ M
GLUCOSE SERPL-MCNC: 111 MG/DL (ref 65–140)
HCT VFR BLD AUTO: 39.3 % (ref 34.8–46.1)
HGB BLD-MCNC: 12.8 G/DL (ref 11.5–15.4)
IMM GRANULOCYTES # BLD AUTO: 0.05 THOUSAND/UL (ref 0–0.2)
IMM GRANULOCYTES NFR BLD AUTO: 1 % (ref 0–2)
LYMPHOCYTES # BLD AUTO: 1.22 THOUSANDS/ΜL (ref 0.6–4.47)
LYMPHOCYTES NFR BLD AUTO: 13 % (ref 14–44)
MAGNESIUM SERPL-MCNC: 1.9 MG/DL (ref 1.6–2.6)
MCH RBC QN AUTO: 28 PG (ref 26.8–34.3)
MCHC RBC AUTO-ENTMCNC: 32.6 G/DL (ref 31.4–37.4)
MCV RBC AUTO: 86 FL (ref 82–98)
MONOCYTES # BLD AUTO: 0.49 THOUSAND/ΜL (ref 0.17–1.22)
MONOCYTES NFR BLD AUTO: 5 % (ref 4–12)
NEUTROPHILS # BLD AUTO: 7.61 THOUSANDS/ΜL (ref 1.85–7.62)
NEUTS SEG NFR BLD AUTO: 80 % (ref 43–75)
NRBC BLD AUTO-RTO: 0 /100 WBCS
PHOSPHATE SERPL-MCNC: 2.5 MG/DL (ref 2.3–4.1)
PLATELET # BLD AUTO: 250 THOUSANDS/UL (ref 149–390)
PMV BLD AUTO: 10.3 FL (ref 8.9–12.7)
POTASSIUM SERPL-SCNC: 4.4 MMOL/L (ref 3.5–5.3)
RBC # BLD AUTO: 4.57 MILLION/UL (ref 3.81–5.12)
SODIUM SERPL-SCNC: 140 MMOL/L (ref 136–145)
WBC # BLD AUTO: 9.49 THOUSAND/UL (ref 4.31–10.16)

## 2019-05-11 PROCEDURE — 84100 ASSAY OF PHOSPHORUS: CPT | Performed by: PHYSICIAN ASSISTANT

## 2019-05-11 PROCEDURE — C9113 INJ PANTOPRAZOLE SODIUM, VIA: HCPCS | Performed by: PHYSICIAN ASSISTANT

## 2019-05-11 PROCEDURE — 83735 ASSAY OF MAGNESIUM: CPT | Performed by: PHYSICIAN ASSISTANT

## 2019-05-11 PROCEDURE — 99024 POSTOP FOLLOW-UP VISIT: CPT | Performed by: SPECIALIST

## 2019-05-11 PROCEDURE — 85025 COMPLETE CBC W/AUTO DIFF WBC: CPT | Performed by: PHYSICIAN ASSISTANT

## 2019-05-11 PROCEDURE — 97535 SELF CARE MNGMENT TRAINING: CPT

## 2019-05-11 PROCEDURE — 99232 SBSQ HOSP IP/OBS MODERATE 35: CPT | Performed by: FAMILY MEDICINE

## 2019-05-11 PROCEDURE — 80048 BASIC METABOLIC PNL TOTAL CA: CPT | Performed by: PHYSICIAN ASSISTANT

## 2019-05-11 RX ADMIN — MELATONIN 6 MG: at 21:45

## 2019-05-11 RX ADMIN — SODIUM CHLORIDE, SODIUM LACTATE, POTASSIUM CHLORIDE, AND CALCIUM CHLORIDE 75 ML/HR: .6; .31; .03; .02 INJECTION, SOLUTION INTRAVENOUS at 18:12

## 2019-05-11 RX ADMIN — PIPERACILLIN SODIUM,TAZOBACTAM SODIUM 2.25 G: 2; .25 INJECTION, POWDER, FOR SOLUTION INTRAVENOUS at 18:11

## 2019-05-11 RX ADMIN — PIPERACILLIN SODIUM,TAZOBACTAM SODIUM 2.25 G: 2; .25 INJECTION, POWDER, FOR SOLUTION INTRAVENOUS at 05:40

## 2019-05-11 RX ADMIN — HEPARIN SODIUM 5000 UNITS: 5000 INJECTION INTRAVENOUS; SUBCUTANEOUS at 21:45

## 2019-05-11 RX ADMIN — PIPERACILLIN SODIUM,TAZOBACTAM SODIUM 2.25 G: 2; .25 INJECTION, POWDER, FOR SOLUTION INTRAVENOUS at 12:49

## 2019-05-11 RX ADMIN — LEVOTHYROXINE SODIUM 50 MCG: 50 TABLET ORAL at 05:40

## 2019-05-11 RX ADMIN — FLUTICASONE PROPIONATE 1 SPRAY: 50 SPRAY, METERED NASAL at 08:40

## 2019-05-11 RX ADMIN — PANTOPRAZOLE SODIUM 40 MG: 40 INJECTION, POWDER, FOR SOLUTION INTRAVENOUS at 08:40

## 2019-05-11 RX ADMIN — HEPARIN SODIUM 5000 UNITS: 5000 INJECTION INTRAVENOUS; SUBCUTANEOUS at 15:53

## 2019-05-11 RX ADMIN — SERTRALINE 25 MG: 25 TABLET, FILM COATED ORAL at 08:44

## 2019-05-11 RX ADMIN — HEPARIN SODIUM 5000 UNITS: 5000 INJECTION INTRAVENOUS; SUBCUTANEOUS at 05:39

## 2019-05-12 PROBLEM — N17.9 AKI (ACUTE KIDNEY INJURY) (HCC): Status: RESOLVED | Noted: 2019-05-09 | Resolved: 2019-05-12

## 2019-05-12 LAB
ANION GAP SERPL CALCULATED.3IONS-SCNC: 9 MMOL/L (ref 4–13)
BUN SERPL-MCNC: 13 MG/DL (ref 5–25)
CALCIUM SERPL-MCNC: 9 MG/DL (ref 8.3–10.1)
CHLORIDE SERPL-SCNC: 103 MMOL/L (ref 100–108)
CO2 SERPL-SCNC: 28 MMOL/L (ref 21–32)
CREAT SERPL-MCNC: 0.74 MG/DL (ref 0.6–1.3)
ERYTHROCYTE [DISTWIDTH] IN BLOOD BY AUTOMATED COUNT: 23 % (ref 11.6–15.1)
GFR SERPL CREATININE-BSD FRML MDRD: 71 ML/MIN/1.73SQ M
GLUCOSE SERPL-MCNC: 85 MG/DL (ref 65–140)
HCT VFR BLD AUTO: 41.3 % (ref 34.8–46.1)
HGB BLD-MCNC: 13.5 G/DL (ref 11.5–15.4)
MCH RBC QN AUTO: 28.2 PG (ref 26.8–34.3)
MCHC RBC AUTO-ENTMCNC: 32.7 G/DL (ref 31.4–37.4)
MCV RBC AUTO: 86 FL (ref 82–98)
PLATELET # BLD AUTO: 284 THOUSANDS/UL (ref 149–390)
PMV BLD AUTO: 11.8 FL (ref 8.9–12.7)
POTASSIUM SERPL-SCNC: 4.1 MMOL/L (ref 3.5–5.3)
RBC # BLD AUTO: 4.78 MILLION/UL (ref 3.81–5.12)
SODIUM SERPL-SCNC: 140 MMOL/L (ref 136–145)
WBC # BLD AUTO: 8.57 THOUSAND/UL (ref 4.31–10.16)

## 2019-05-12 PROCEDURE — 99232 SBSQ HOSP IP/OBS MODERATE 35: CPT | Performed by: FAMILY MEDICINE

## 2019-05-12 PROCEDURE — C9113 INJ PANTOPRAZOLE SODIUM, VIA: HCPCS | Performed by: PHYSICIAN ASSISTANT

## 2019-05-12 PROCEDURE — 99024 POSTOP FOLLOW-UP VISIT: CPT | Performed by: SPECIALIST

## 2019-05-12 PROCEDURE — 80048 BASIC METABOLIC PNL TOTAL CA: CPT | Performed by: FAMILY MEDICINE

## 2019-05-12 PROCEDURE — 85027 COMPLETE CBC AUTOMATED: CPT | Performed by: FAMILY MEDICINE

## 2019-05-12 RX ADMIN — PIPERACILLIN SODIUM,TAZOBACTAM SODIUM 2.25 G: 2; .25 INJECTION, POWDER, FOR SOLUTION INTRAVENOUS at 00:40

## 2019-05-12 RX ADMIN — MELATONIN 6 MG: at 21:22

## 2019-05-12 RX ADMIN — SODIUM CHLORIDE, SODIUM LACTATE, POTASSIUM CHLORIDE, AND CALCIUM CHLORIDE 75 ML/HR: .6; .31; .03; .02 INJECTION, SOLUTION INTRAVENOUS at 12:16

## 2019-05-12 RX ADMIN — HEPARIN SODIUM 5000 UNITS: 5000 INJECTION INTRAVENOUS; SUBCUTANEOUS at 21:22

## 2019-05-12 RX ADMIN — DILTIAZEM HYDROCHLORIDE 180 MG: 180 CAPSULE, COATED, EXTENDED RELEASE ORAL at 09:00

## 2019-05-12 RX ADMIN — PIPERACILLIN SODIUM,TAZOBACTAM SODIUM 2.25 G: 2; .25 INJECTION, POWDER, FOR SOLUTION INTRAVENOUS at 17:21

## 2019-05-12 RX ADMIN — PANTOPRAZOLE SODIUM 40 MG: 40 INJECTION, POWDER, FOR SOLUTION INTRAVENOUS at 09:01

## 2019-05-12 RX ADMIN — PIPERACILLIN SODIUM,TAZOBACTAM SODIUM 2.25 G: 2; .25 INJECTION, POWDER, FOR SOLUTION INTRAVENOUS at 05:07

## 2019-05-12 RX ADMIN — HEPARIN SODIUM 5000 UNITS: 5000 INJECTION INTRAVENOUS; SUBCUTANEOUS at 14:29

## 2019-05-12 RX ADMIN — SERTRALINE 25 MG: 25 TABLET, FILM COATED ORAL at 09:01

## 2019-05-12 RX ADMIN — LEVOTHYROXINE SODIUM 50 MCG: 50 TABLET ORAL at 05:07

## 2019-05-12 RX ADMIN — FLUTICASONE PROPIONATE 1 SPRAY: 50 SPRAY, METERED NASAL at 09:01

## 2019-05-12 RX ADMIN — PIPERACILLIN SODIUM,TAZOBACTAM SODIUM 2.25 G: 2; .25 INJECTION, POWDER, FOR SOLUTION INTRAVENOUS at 11:31

## 2019-05-12 RX ADMIN — HEPARIN SODIUM 5000 UNITS: 5000 INJECTION INTRAVENOUS; SUBCUTANEOUS at 05:07

## 2019-05-13 PROBLEM — J95.2 ACUTE POSTOPERATIVE PULMONARY INSUFFICIENCY (HCC): Status: ACTIVE | Noted: 2019-05-13

## 2019-05-13 LAB
ANION GAP SERPL CALCULATED.3IONS-SCNC: 8 MMOL/L (ref 4–13)
BUN SERPL-MCNC: 18 MG/DL (ref 5–25)
CALCIUM SERPL-MCNC: 9.4 MG/DL (ref 8.3–10.1)
CHLORIDE SERPL-SCNC: 101 MMOL/L (ref 100–108)
CO2 SERPL-SCNC: 27 MMOL/L (ref 21–32)
CREAT SERPL-MCNC: 0.73 MG/DL (ref 0.6–1.3)
ERYTHROCYTE [DISTWIDTH] IN BLOOD BY AUTOMATED COUNT: 22.5 % (ref 11.6–15.1)
GFR SERPL CREATININE-BSD FRML MDRD: 72 ML/MIN/1.73SQ M
GLUCOSE SERPL-MCNC: 73 MG/DL (ref 65–140)
HCT VFR BLD AUTO: 42.7 % (ref 34.8–46.1)
HGB BLD-MCNC: 13.6 G/DL (ref 11.5–15.4)
MAGNESIUM SERPL-MCNC: 2 MG/DL (ref 1.6–2.6)
MCH RBC QN AUTO: 27.9 PG (ref 26.8–34.3)
MCHC RBC AUTO-ENTMCNC: 31.9 G/DL (ref 31.4–37.4)
MCV RBC AUTO: 88 FL (ref 82–98)
PHOSPHATE SERPL-MCNC: 3.1 MG/DL (ref 2.3–4.1)
PLATELET # BLD AUTO: 283 THOUSANDS/UL (ref 149–390)
PMV BLD AUTO: 11.5 FL (ref 8.9–12.7)
POTASSIUM SERPL-SCNC: 4.8 MMOL/L (ref 3.5–5.3)
RBC # BLD AUTO: 4.87 MILLION/UL (ref 3.81–5.12)
SODIUM SERPL-SCNC: 136 MMOL/L (ref 136–145)
WBC # BLD AUTO: 8.12 THOUSAND/UL (ref 4.31–10.16)

## 2019-05-13 PROCEDURE — 99024 POSTOP FOLLOW-UP VISIT: CPT | Performed by: SURGERY

## 2019-05-13 PROCEDURE — 99232 SBSQ HOSP IP/OBS MODERATE 35: CPT | Performed by: FAMILY MEDICINE

## 2019-05-13 PROCEDURE — 97116 GAIT TRAINING THERAPY: CPT

## 2019-05-13 PROCEDURE — 83735 ASSAY OF MAGNESIUM: CPT | Performed by: SPECIALIST

## 2019-05-13 PROCEDURE — C9113 INJ PANTOPRAZOLE SODIUM, VIA: HCPCS | Performed by: PHYSICIAN ASSISTANT

## 2019-05-13 PROCEDURE — 97110 THERAPEUTIC EXERCISES: CPT

## 2019-05-13 PROCEDURE — 85027 COMPLETE CBC AUTOMATED: CPT | Performed by: SPECIALIST

## 2019-05-13 PROCEDURE — 80048 BASIC METABOLIC PNL TOTAL CA: CPT | Performed by: SPECIALIST

## 2019-05-13 PROCEDURE — 84100 ASSAY OF PHOSPHORUS: CPT | Performed by: SPECIALIST

## 2019-05-13 RX ORDER — ACETAMINOPHEN 325 MG/1
650 TABLET ORAL EVERY 6 HOURS PRN
Status: DISCONTINUED | OUTPATIENT
Start: 2019-05-13 | End: 2019-05-15 | Stop reason: HOSPADM

## 2019-05-13 RX ADMIN — PIPERACILLIN SODIUM,TAZOBACTAM SODIUM 2.25 G: 2; .25 INJECTION, POWDER, FOR SOLUTION INTRAVENOUS at 12:13

## 2019-05-13 RX ADMIN — PIPERACILLIN SODIUM,TAZOBACTAM SODIUM 2.25 G: 2; .25 INJECTION, POWDER, FOR SOLUTION INTRAVENOUS at 17:38

## 2019-05-13 RX ADMIN — EZETIMIBE: 10 TABLET ORAL at 21:03

## 2019-05-13 RX ADMIN — ACETAMINOPHEN 650 MG: 325 TABLET, FILM COATED ORAL at 20:59

## 2019-05-13 RX ADMIN — DILTIAZEM HYDROCHLORIDE 180 MG: 180 CAPSULE, COATED, EXTENDED RELEASE ORAL at 08:04

## 2019-05-13 RX ADMIN — MELATONIN 6 MG: at 21:00

## 2019-05-13 RX ADMIN — PIPERACILLIN SODIUM,TAZOBACTAM SODIUM 2.25 G: 2; .25 INJECTION, POWDER, FOR SOLUTION INTRAVENOUS at 05:14

## 2019-05-13 RX ADMIN — HEPARIN SODIUM 5000 UNITS: 5000 INJECTION INTRAVENOUS; SUBCUTANEOUS at 21:00

## 2019-05-13 RX ADMIN — ACETAMINOPHEN 650 MG: 325 TABLET, FILM COATED ORAL at 08:03

## 2019-05-13 RX ADMIN — HEPARIN SODIUM 5000 UNITS: 5000 INJECTION INTRAVENOUS; SUBCUTANEOUS at 15:21

## 2019-05-13 RX ADMIN — PIPERACILLIN SODIUM,TAZOBACTAM SODIUM 2.25 G: 2; .25 INJECTION, POWDER, FOR SOLUTION INTRAVENOUS at 00:50

## 2019-05-13 RX ADMIN — HEPARIN SODIUM 5000 UNITS: 5000 INJECTION INTRAVENOUS; SUBCUTANEOUS at 05:14

## 2019-05-13 RX ADMIN — HYDROXYUREA 500 MG: 500 CAPSULE ORAL at 08:13

## 2019-05-13 RX ADMIN — PANTOPRAZOLE SODIUM 40 MG: 40 INJECTION, POWDER, FOR SOLUTION INTRAVENOUS at 08:04

## 2019-05-13 RX ADMIN — LEVOTHYROXINE SODIUM 50 MCG: 50 TABLET ORAL at 05:15

## 2019-05-13 RX ADMIN — FLUTICASONE PROPIONATE 1 SPRAY: 50 SPRAY, METERED NASAL at 08:03

## 2019-05-13 RX ADMIN — SERTRALINE 25 MG: 25 TABLET, FILM COATED ORAL at 08:04

## 2019-05-14 LAB
ANION GAP SERPL CALCULATED.3IONS-SCNC: 10 MMOL/L (ref 4–13)
BUN SERPL-MCNC: 17 MG/DL (ref 5–25)
CALCIUM SERPL-MCNC: 9.2 MG/DL (ref 8.3–10.1)
CHLORIDE SERPL-SCNC: 101 MMOL/L (ref 100–108)
CO2 SERPL-SCNC: 28 MMOL/L (ref 21–32)
CREAT SERPL-MCNC: 0.8 MG/DL (ref 0.6–1.3)
GFR SERPL CREATININE-BSD FRML MDRD: 65 ML/MIN/1.73SQ M
GLUCOSE SERPL-MCNC: 81 MG/DL (ref 65–140)
MAGNESIUM SERPL-MCNC: 1.7 MG/DL (ref 1.6–2.6)
PHOSPHATE SERPL-MCNC: 2.9 MG/DL (ref 2.3–4.1)
POTASSIUM SERPL-SCNC: 3.4 MMOL/L (ref 3.5–5.3)
SODIUM SERPL-SCNC: 139 MMOL/L (ref 136–145)

## 2019-05-14 PROCEDURE — 97110 THERAPEUTIC EXERCISES: CPT

## 2019-05-14 PROCEDURE — 99232 SBSQ HOSP IP/OBS MODERATE 35: CPT | Performed by: INTERNAL MEDICINE

## 2019-05-14 PROCEDURE — 84100 ASSAY OF PHOSPHORUS: CPT | Performed by: PHYSICIAN ASSISTANT

## 2019-05-14 PROCEDURE — 99024 POSTOP FOLLOW-UP VISIT: CPT | Performed by: PHYSICIAN ASSISTANT

## 2019-05-14 PROCEDURE — C9113 INJ PANTOPRAZOLE SODIUM, VIA: HCPCS | Performed by: PHYSICIAN ASSISTANT

## 2019-05-14 PROCEDURE — 83735 ASSAY OF MAGNESIUM: CPT | Performed by: PHYSICIAN ASSISTANT

## 2019-05-14 PROCEDURE — 80048 BASIC METABOLIC PNL TOTAL CA: CPT | Performed by: PHYSICIAN ASSISTANT

## 2019-05-14 RX ORDER — MAGNESIUM SULFATE HEPTAHYDRATE 40 MG/ML
2 INJECTION, SOLUTION INTRAVENOUS ONCE
Status: COMPLETED | OUTPATIENT
Start: 2019-05-14 | End: 2019-05-14

## 2019-05-14 RX ORDER — POTASSIUM CHLORIDE 29.8 MG/ML
40 INJECTION INTRAVENOUS ONCE
Status: DISCONTINUED | OUTPATIENT
Start: 2019-05-14 | End: 2019-05-14 | Stop reason: CLARIF

## 2019-05-14 RX ORDER — POTASSIUM CHLORIDE 14.9 MG/ML
20 INJECTION INTRAVENOUS ONCE
Status: COMPLETED | OUTPATIENT
Start: 2019-05-14 | End: 2019-05-14

## 2019-05-14 RX ORDER — POLYETHYLENE GLYCOL 3350 17 G/17G
17 POWDER, FOR SOLUTION ORAL DAILY
Status: DISCONTINUED | OUTPATIENT
Start: 2019-05-14 | End: 2019-05-15 | Stop reason: HOSPADM

## 2019-05-14 RX ORDER — DOCUSATE SODIUM 100 MG/1
100 CAPSULE, LIQUID FILLED ORAL 2 TIMES DAILY
Status: DISCONTINUED | OUTPATIENT
Start: 2019-05-14 | End: 2019-05-15 | Stop reason: HOSPADM

## 2019-05-14 RX ADMIN — DILTIAZEM HYDROCHLORIDE 180 MG: 180 CAPSULE, COATED, EXTENDED RELEASE ORAL at 08:53

## 2019-05-14 RX ADMIN — HEPARIN SODIUM 5000 UNITS: 5000 INJECTION INTRAVENOUS; SUBCUTANEOUS at 05:48

## 2019-05-14 RX ADMIN — PIPERACILLIN SODIUM,TAZOBACTAM SODIUM 2.25 G: 2; .25 INJECTION, POWDER, FOR SOLUTION INTRAVENOUS at 12:44

## 2019-05-14 RX ADMIN — PIPERACILLIN SODIUM,TAZOBACTAM SODIUM 2.25 G: 2; .25 INJECTION, POWDER, FOR SOLUTION INTRAVENOUS at 05:48

## 2019-05-14 RX ADMIN — POTASSIUM CHLORIDE 20 MEQ: 200 INJECTION, SOLUTION INTRAVENOUS at 18:48

## 2019-05-14 RX ADMIN — PIPERACILLIN SODIUM,TAZOBACTAM SODIUM 2.25 G: 2; .25 INJECTION, POWDER, FOR SOLUTION INTRAVENOUS at 00:25

## 2019-05-14 RX ADMIN — MELATONIN 6 MG: at 22:04

## 2019-05-14 RX ADMIN — MAGNESIUM SULFATE HEPTAHYDRATE 2 G: 40 INJECTION, SOLUTION INTRAVENOUS at 15:05

## 2019-05-14 RX ADMIN — HEPARIN SODIUM 5000 UNITS: 5000 INJECTION INTRAVENOUS; SUBCUTANEOUS at 13:42

## 2019-05-14 RX ADMIN — SERTRALINE 25 MG: 25 TABLET, FILM COATED ORAL at 08:53

## 2019-05-14 RX ADMIN — LEVOTHYROXINE SODIUM 50 MCG: 50 TABLET ORAL at 05:48

## 2019-05-14 RX ADMIN — HEPARIN SODIUM 5000 UNITS: 5000 INJECTION INTRAVENOUS; SUBCUTANEOUS at 22:04

## 2019-05-14 RX ADMIN — DOCUSATE SODIUM 100 MG: 100 CAPSULE, LIQUID FILLED ORAL at 22:04

## 2019-05-14 RX ADMIN — POLYETHYLENE GLYCOL 3350 17 G: 17 POWDER, FOR SOLUTION ORAL at 12:44

## 2019-05-14 RX ADMIN — FLUTICASONE PROPIONATE 1 SPRAY: 50 SPRAY, METERED NASAL at 08:52

## 2019-05-14 RX ADMIN — EZETIMIBE: 10 TABLET ORAL at 22:04

## 2019-05-14 RX ADMIN — HYDROMORPHONE HYDROCHLORIDE 0.2 MG: 1 INJECTION, SOLUTION INTRAMUSCULAR; INTRAVENOUS; SUBCUTANEOUS at 20:19

## 2019-05-14 RX ADMIN — POTASSIUM CHLORIDE 20 MEQ: 200 INJECTION, SOLUTION INTRAVENOUS at 10:39

## 2019-05-14 RX ADMIN — PANTOPRAZOLE SODIUM 40 MG: 40 INJECTION, POWDER, FOR SOLUTION INTRAVENOUS at 08:53

## 2019-05-14 RX ADMIN — PIPERACILLIN SODIUM,TAZOBACTAM SODIUM 2.25 G: 2; .25 INJECTION, POWDER, FOR SOLUTION INTRAVENOUS at 17:38

## 2019-05-15 VITALS
RESPIRATION RATE: 18 BRPM | SYSTOLIC BLOOD PRESSURE: 163 MMHG | BODY MASS INDEX: 34.41 KG/M2 | DIASTOLIC BLOOD PRESSURE: 76 MMHG | OXYGEN SATURATION: 92 % | HEIGHT: 62 IN | TEMPERATURE: 98 F | HEART RATE: 67 BPM | WEIGHT: 187 LBS

## 2019-05-15 PROBLEM — J95.2 ACUTE POSTOPERATIVE PULMONARY INSUFFICIENCY (HCC): Status: RESOLVED | Noted: 2019-05-13 | Resolved: 2019-05-15

## 2019-05-15 LAB
ANION GAP SERPL CALCULATED.3IONS-SCNC: 6 MMOL/L (ref 4–13)
BASOPHILS # BLD AUTO: 0.06 THOUSANDS/ΜL (ref 0–0.1)
BASOPHILS NFR BLD AUTO: 1 % (ref 0–1)
BUN SERPL-MCNC: 13 MG/DL (ref 5–25)
CALCIUM SERPL-MCNC: 9.4 MG/DL (ref 8.3–10.1)
CHLORIDE SERPL-SCNC: 103 MMOL/L (ref 100–108)
CO2 SERPL-SCNC: 30 MMOL/L (ref 21–32)
CREAT SERPL-MCNC: 0.85 MG/DL (ref 0.6–1.3)
EOSINOPHIL # BLD AUTO: 0.37 THOUSAND/ΜL (ref 0–0.61)
EOSINOPHIL NFR BLD AUTO: 5 % (ref 0–6)
ERYTHROCYTE [DISTWIDTH] IN BLOOD BY AUTOMATED COUNT: 22.1 % (ref 11.6–15.1)
GFR SERPL CREATININE-BSD FRML MDRD: 60 ML/MIN/1.73SQ M
GLUCOSE SERPL-MCNC: 106 MG/DL (ref 65–140)
HCT VFR BLD AUTO: 40 % (ref 34.8–46.1)
HGB BLD-MCNC: 13.1 G/DL (ref 11.5–15.4)
IMM GRANULOCYTES # BLD AUTO: 0.04 THOUSAND/UL (ref 0–0.2)
IMM GRANULOCYTES NFR BLD AUTO: 1 % (ref 0–2)
LYMPHOCYTES # BLD AUTO: 1.76 THOUSANDS/ΜL (ref 0.6–4.47)
LYMPHOCYTES NFR BLD AUTO: 22 % (ref 14–44)
MCH RBC QN AUTO: 28.2 PG (ref 26.8–34.3)
MCHC RBC AUTO-ENTMCNC: 32.8 G/DL (ref 31.4–37.4)
MCV RBC AUTO: 86 FL (ref 82–98)
MONOCYTES # BLD AUTO: 0.48 THOUSAND/ΜL (ref 0.17–1.22)
MONOCYTES NFR BLD AUTO: 6 % (ref 4–12)
NEUTROPHILS # BLD AUTO: 5.31 THOUSANDS/ΜL (ref 1.85–7.62)
NEUTS SEG NFR BLD AUTO: 65 % (ref 43–75)
NRBC BLD AUTO-RTO: 0 /100 WBCS
PLATELET # BLD AUTO: 287 THOUSANDS/UL (ref 149–390)
PMV BLD AUTO: 11.5 FL (ref 8.9–12.7)
POTASSIUM SERPL-SCNC: 3.9 MMOL/L (ref 3.5–5.3)
RBC # BLD AUTO: 4.64 MILLION/UL (ref 3.81–5.12)
SODIUM SERPL-SCNC: 139 MMOL/L (ref 136–145)
WBC # BLD AUTO: 8.02 THOUSAND/UL (ref 4.31–10.16)

## 2019-05-15 PROCEDURE — 99239 HOSP IP/OBS DSCHRG MGMT >30: CPT | Performed by: INTERNAL MEDICINE

## 2019-05-15 PROCEDURE — C9113 INJ PANTOPRAZOLE SODIUM, VIA: HCPCS | Performed by: PHYSICIAN ASSISTANT

## 2019-05-15 PROCEDURE — 80048 BASIC METABOLIC PNL TOTAL CA: CPT | Performed by: INTERNAL MEDICINE

## 2019-05-15 PROCEDURE — 99024 POSTOP FOLLOW-UP VISIT: CPT | Performed by: SURGERY

## 2019-05-15 PROCEDURE — 85025 COMPLETE CBC W/AUTO DIFF WBC: CPT | Performed by: INTERNAL MEDICINE

## 2019-05-15 RX ORDER — AMOXICILLIN AND CLAVULANATE POTASSIUM 875; 125 MG/1; MG/1
1 TABLET, FILM COATED ORAL 2 TIMES DAILY
Refills: 0
Start: 2019-05-15 | End: 2019-05-19

## 2019-05-15 RX ORDER — DOCUSATE SODIUM 100 MG/1
100 CAPSULE, LIQUID FILLED ORAL 2 TIMES DAILY
Qty: 10 CAPSULE | Refills: 0 | Status: SHIPPED | OUTPATIENT
Start: 2019-05-15 | End: 2019-06-12 | Stop reason: ALTCHOICE

## 2019-05-15 RX ORDER — POLYETHYLENE GLYCOL 3350 17 G/17G
17 POWDER, FOR SOLUTION ORAL DAILY
Qty: 14 EACH | Refills: 0
Start: 2019-05-16 | End: 2019-11-25 | Stop reason: ALTCHOICE

## 2019-05-15 RX ORDER — LANOLIN ALCOHOL/MO/W.PET/CERES
6 CREAM (GRAM) TOPICAL
Refills: 0
Start: 2019-05-15 | End: 2019-11-25 | Stop reason: ALTCHOICE

## 2019-05-15 RX ORDER — ACETAMINOPHEN 325 MG/1
650 TABLET ORAL EVERY 6 HOURS PRN
Qty: 30 TABLET | Refills: 0 | Status: SHIPPED | OUTPATIENT
Start: 2019-05-15

## 2019-05-15 RX ADMIN — DOCUSATE SODIUM 100 MG: 100 CAPSULE, LIQUID FILLED ORAL at 08:54

## 2019-05-15 RX ADMIN — SERTRALINE 25 MG: 25 TABLET, FILM COATED ORAL at 08:54

## 2019-05-15 RX ADMIN — PIPERACILLIN SODIUM,TAZOBACTAM SODIUM 2.25 G: 2; .25 INJECTION, POWDER, FOR SOLUTION INTRAVENOUS at 00:19

## 2019-05-15 RX ADMIN — PANTOPRAZOLE SODIUM 40 MG: 40 INJECTION, POWDER, FOR SOLUTION INTRAVENOUS at 08:54

## 2019-05-15 RX ADMIN — PIPERACILLIN SODIUM,TAZOBACTAM SODIUM 2.25 G: 2; .25 INJECTION, POWDER, FOR SOLUTION INTRAVENOUS at 11:20

## 2019-05-15 RX ADMIN — HEPARIN SODIUM 5000 UNITS: 5000 INJECTION INTRAVENOUS; SUBCUTANEOUS at 06:04

## 2019-05-15 RX ADMIN — POLYETHYLENE GLYCOL 3350 17 G: 17 POWDER, FOR SOLUTION ORAL at 08:54

## 2019-05-15 RX ADMIN — PIPERACILLIN SODIUM,TAZOBACTAM SODIUM 2.25 G: 2; .25 INJECTION, POWDER, FOR SOLUTION INTRAVENOUS at 06:04

## 2019-05-15 RX ADMIN — LEVOTHYROXINE SODIUM 50 MCG: 50 TABLET ORAL at 06:04

## 2019-05-15 RX ADMIN — FLUTICASONE PROPIONATE 1 SPRAY: 50 SPRAY, METERED NASAL at 08:54

## 2019-05-15 RX ADMIN — HYDROXYUREA 500 MG: 500 CAPSULE ORAL at 08:55

## 2019-05-15 RX ADMIN — DILTIAZEM HYDROCHLORIDE 180 MG: 180 CAPSULE, COATED, EXTENDED RELEASE ORAL at 08:54

## 2019-05-31 ENCOUNTER — TELEPHONE (OUTPATIENT)
Dept: OTHER | Facility: OTHER | Age: 84
End: 2019-05-31

## 2019-06-03 ENCOUNTER — TELEPHONE (OUTPATIENT)
Dept: FAMILY MEDICINE CLINIC | Facility: CLINIC | Age: 84
End: 2019-06-03

## 2019-06-03 DIAGNOSIS — R53.81 PHYSICAL DECONDITIONING: ICD-10-CM

## 2019-06-03 DIAGNOSIS — K57.20 PERFORATION OF SIGMOID COLON DUE TO DIVERTICULITIS: Primary | ICD-10-CM

## 2019-06-03 DIAGNOSIS — Z90.49 S/P SMALL BOWEL RESECTION: ICD-10-CM

## 2019-06-04 ENCOUNTER — OFFICE VISIT (OUTPATIENT)
Dept: SURGERY | Facility: CLINIC | Age: 84
End: 2019-06-04

## 2019-06-04 VITALS
TEMPERATURE: 97.2 F | SYSTOLIC BLOOD PRESSURE: 142 MMHG | BODY MASS INDEX: 33.49 KG/M2 | WEIGHT: 182 LBS | DIASTOLIC BLOOD PRESSURE: 78 MMHG | HEIGHT: 62 IN | HEART RATE: 88 BPM

## 2019-06-04 DIAGNOSIS — K57.31 DIVERTICULOSIS LARGE INTESTINE W/O PERFORATION OR ABSCESS W/BLEEDING: ICD-10-CM

## 2019-06-04 DIAGNOSIS — K57.20 PERFORATION OF SIGMOID COLON DUE TO DIVERTICULITIS: Primary | ICD-10-CM

## 2019-06-04 PROBLEM — Z09 SURGICAL FOLLOW-UP CARE: Status: ACTIVE | Noted: 2019-06-04

## 2019-06-04 PROCEDURE — 99024 POSTOP FOLLOW-UP VISIT: CPT | Performed by: SPECIALIST

## 2019-06-12 ENCOUNTER — APPOINTMENT (OUTPATIENT)
Dept: LAB | Facility: HOSPITAL | Age: 84
End: 2019-06-12
Attending: INTERNAL MEDICINE
Payer: MEDICARE

## 2019-06-12 ENCOUNTER — OFFICE VISIT (OUTPATIENT)
Dept: FAMILY MEDICINE CLINIC | Facility: CLINIC | Age: 84
End: 2019-06-12
Payer: MEDICARE

## 2019-06-12 ENCOUNTER — TRANSCRIBE ORDERS (OUTPATIENT)
Dept: ADMINISTRATIVE | Facility: HOSPITAL | Age: 84
End: 2019-06-12

## 2019-06-12 VITALS
HEIGHT: 62 IN | HEART RATE: 80 BPM | DIASTOLIC BLOOD PRESSURE: 70 MMHG | RESPIRATION RATE: 16 BRPM | SYSTOLIC BLOOD PRESSURE: 120 MMHG | TEMPERATURE: 98.2 F | WEIGHT: 161 LBS | BODY MASS INDEX: 29.63 KG/M2

## 2019-06-12 DIAGNOSIS — I10 BENIGN ESSENTIAL HYPERTENSION: ICD-10-CM

## 2019-06-12 DIAGNOSIS — K57.31 DIVERTICULOSIS LARGE INTESTINE W/O PERFORATION OR ABSCESS W/BLEEDING: ICD-10-CM

## 2019-06-12 DIAGNOSIS — F41.9 ANXIETY: ICD-10-CM

## 2019-06-12 DIAGNOSIS — Z93.3 COLOSTOMY STATUS (HCC): ICD-10-CM

## 2019-06-12 DIAGNOSIS — C50.919 ADENOCARCINOMA OF BREAST, UNSPECIFIED LATERALITY (HCC): ICD-10-CM

## 2019-06-12 DIAGNOSIS — K57.20 PERFORATION OF SIGMOID COLON DUE TO DIVERTICULITIS: Primary | ICD-10-CM

## 2019-06-12 DIAGNOSIS — D47.3 ESSENTIAL THROMBOCYTHEMIA (HCC): Primary | ICD-10-CM

## 2019-06-12 LAB
ALBUMIN SERPL BCP-MCNC: 3.5 G/DL (ref 3.5–5)
ALP SERPL-CCNC: 96 U/L (ref 46–116)
ALT SERPL W P-5'-P-CCNC: 17 U/L (ref 12–78)
ANION GAP SERPL CALCULATED.3IONS-SCNC: 7 MMOL/L (ref 4–13)
AST SERPL W P-5'-P-CCNC: 11 U/L (ref 5–45)
BASOPHILS # BLD AUTO: 0.06 THOUSANDS/ΜL (ref 0–0.1)
BASOPHILS NFR BLD AUTO: 1 % (ref 0–1)
BILIRUB SERPL-MCNC: 0.4 MG/DL (ref 0.2–1)
BUN SERPL-MCNC: 20 MG/DL (ref 5–25)
CALCIUM SERPL-MCNC: 8.7 MG/DL (ref 8.3–10.1)
CHLORIDE SERPL-SCNC: 104 MMOL/L (ref 100–108)
CO2 SERPL-SCNC: 30 MMOL/L (ref 21–32)
CREAT SERPL-MCNC: 0.89 MG/DL (ref 0.6–1.3)
EOSINOPHIL # BLD AUTO: 0.47 THOUSAND/ΜL (ref 0–0.61)
EOSINOPHIL NFR BLD AUTO: 7 % (ref 0–6)
ERYTHROCYTE [DISTWIDTH] IN BLOOD BY AUTOMATED COUNT: 23 % (ref 11.6–15.1)
GFR SERPL CREATININE-BSD FRML MDRD: 57 ML/MIN/1.73SQ M
GLUCOSE SERPL-MCNC: 101 MG/DL (ref 65–140)
HCT VFR BLD AUTO: 42.7 % (ref 34.8–46.1)
HGB BLD-MCNC: 14 G/DL (ref 11.5–15.4)
IMM GRANULOCYTES # BLD AUTO: 0.03 THOUSAND/UL (ref 0–0.2)
IMM GRANULOCYTES NFR BLD AUTO: 0 % (ref 0–2)
LYMPHOCYTES # BLD AUTO: 1.91 THOUSANDS/ΜL (ref 0.6–4.47)
LYMPHOCYTES NFR BLD AUTO: 28 % (ref 14–44)
MCH RBC QN AUTO: 30.3 PG (ref 26.8–34.3)
MCHC RBC AUTO-ENTMCNC: 32.8 G/DL (ref 31.4–37.4)
MCV RBC AUTO: 92 FL (ref 82–98)
MONOCYTES # BLD AUTO: 0.43 THOUSAND/ΜL (ref 0.17–1.22)
MONOCYTES NFR BLD AUTO: 6 % (ref 4–12)
NEUTROPHILS # BLD AUTO: 3.89 THOUSANDS/ΜL (ref 1.85–7.62)
NEUTS SEG NFR BLD AUTO: 58 % (ref 43–75)
NRBC BLD AUTO-RTO: 0 /100 WBCS
PLATELET # BLD AUTO: 330 THOUSANDS/UL (ref 149–390)
PMV BLD AUTO: 10.8 FL (ref 8.9–12.7)
POTASSIUM SERPL-SCNC: 4.4 MMOL/L (ref 3.5–5.3)
PROT SERPL-MCNC: 6.6 G/DL (ref 6.4–8.2)
RBC # BLD AUTO: 4.62 MILLION/UL (ref 3.81–5.12)
SODIUM SERPL-SCNC: 141 MMOL/L (ref 136–145)
WBC # BLD AUTO: 6.79 THOUSAND/UL (ref 4.31–10.16)

## 2019-06-12 PROCEDURE — 36415 COLL VENOUS BLD VENIPUNCTURE: CPT | Performed by: INTERNAL MEDICINE

## 2019-06-12 PROCEDURE — 99214 OFFICE O/P EST MOD 30 MIN: CPT | Performed by: INTERNAL MEDICINE

## 2019-06-12 PROCEDURE — 85025 COMPLETE CBC W/AUTO DIFF WBC: CPT | Performed by: INTERNAL MEDICINE

## 2019-06-12 PROCEDURE — 80053 COMPREHEN METABOLIC PANEL: CPT | Performed by: INTERNAL MEDICINE

## 2019-06-25 DIAGNOSIS — I10 BENIGN ESSENTIAL HYPERTENSION: ICD-10-CM

## 2019-06-25 RX ORDER — DILTIAZEM HYDROCHLORIDE 180 MG/1
CAPSULE, COATED, EXTENDED RELEASE ORAL
Qty: 90 CAPSULE | Refills: 3 | Status: SHIPPED | OUTPATIENT
Start: 2019-06-25 | End: 2020-06-19 | Stop reason: SDUPTHER

## 2019-07-01 ENCOUNTER — APPOINTMENT (OUTPATIENT)
Dept: LAB | Facility: HOSPITAL | Age: 84
End: 2019-07-01
Attending: INTERNAL MEDICINE
Payer: MEDICARE

## 2019-07-01 LAB
ALBUMIN SERPL BCP-MCNC: 3.8 G/DL (ref 3.5–5)
ALP SERPL-CCNC: 94 U/L (ref 46–116)
ALT SERPL W P-5'-P-CCNC: 18 U/L (ref 12–78)
ANION GAP SERPL CALCULATED.3IONS-SCNC: 7 MMOL/L (ref 4–13)
AST SERPL W P-5'-P-CCNC: 10 U/L (ref 5–45)
BASOPHILS # BLD AUTO: 0.05 THOUSANDS/ΜL (ref 0–0.1)
BASOPHILS NFR BLD AUTO: 1 % (ref 0–1)
BILIRUB SERPL-MCNC: 0.4 MG/DL (ref 0.2–1)
BUN SERPL-MCNC: 23 MG/DL (ref 5–25)
CALCIUM SERPL-MCNC: 9 MG/DL (ref 8.3–10.1)
CHLORIDE SERPL-SCNC: 106 MMOL/L (ref 100–108)
CO2 SERPL-SCNC: 28 MMOL/L (ref 21–32)
CREAT SERPL-MCNC: 1.12 MG/DL (ref 0.6–1.3)
EOSINOPHIL # BLD AUTO: 0.31 THOUSAND/ΜL (ref 0–0.61)
EOSINOPHIL NFR BLD AUTO: 5 % (ref 0–6)
ERYTHROCYTE [DISTWIDTH] IN BLOOD BY AUTOMATED COUNT: 21.3 % (ref 11.6–15.1)
GFR SERPL CREATININE-BSD FRML MDRD: 43 ML/MIN/1.73SQ M
GLUCOSE SERPL-MCNC: 152 MG/DL (ref 65–140)
HCT VFR BLD AUTO: 43.3 % (ref 34.8–46.1)
HGB BLD-MCNC: 14 G/DL (ref 11.5–15.4)
IMM GRANULOCYTES # BLD AUTO: 0.03 THOUSAND/UL (ref 0–0.2)
IMM GRANULOCYTES NFR BLD AUTO: 1 % (ref 0–2)
LYMPHOCYTES # BLD AUTO: 1.55 THOUSANDS/ΜL (ref 0.6–4.47)
LYMPHOCYTES NFR BLD AUTO: 23 % (ref 14–44)
MCH RBC QN AUTO: 31 PG (ref 26.8–34.3)
MCHC RBC AUTO-ENTMCNC: 32.3 G/DL (ref 31.4–37.4)
MCV RBC AUTO: 96 FL (ref 82–98)
MONOCYTES # BLD AUTO: 0.43 THOUSAND/ΜL (ref 0.17–1.22)
MONOCYTES NFR BLD AUTO: 7 % (ref 4–12)
NEUTROPHILS # BLD AUTO: 4.24 THOUSANDS/ΜL (ref 1.85–7.62)
NEUTS SEG NFR BLD AUTO: 63 % (ref 43–75)
NRBC BLD AUTO-RTO: 0 /100 WBCS
PLATELET # BLD AUTO: 327 THOUSANDS/UL (ref 149–390)
PMV BLD AUTO: 11 FL (ref 8.9–12.7)
POTASSIUM SERPL-SCNC: 4.3 MMOL/L (ref 3.5–5.3)
PROT SERPL-MCNC: 6.8 G/DL (ref 6.4–8.2)
RBC # BLD AUTO: 4.52 MILLION/UL (ref 3.81–5.12)
SODIUM SERPL-SCNC: 141 MMOL/L (ref 136–145)
WBC # BLD AUTO: 6.61 THOUSAND/UL (ref 4.31–10.16)

## 2019-07-01 PROCEDURE — 80053 COMPREHEN METABOLIC PANEL: CPT | Performed by: INTERNAL MEDICINE

## 2019-07-01 PROCEDURE — 85025 COMPLETE CBC W/AUTO DIFF WBC: CPT | Performed by: INTERNAL MEDICINE

## 2019-07-01 PROCEDURE — 36415 COLL VENOUS BLD VENIPUNCTURE: CPT | Performed by: INTERNAL MEDICINE

## 2019-07-22 ENCOUNTER — APPOINTMENT (OUTPATIENT)
Dept: LAB | Facility: HOSPITAL | Age: 84
End: 2019-07-22
Attending: INTERNAL MEDICINE
Payer: MEDICARE

## 2019-07-22 ENCOUNTER — TRANSCRIBE ORDERS (OUTPATIENT)
Dept: ADMINISTRATIVE | Facility: HOSPITAL | Age: 84
End: 2019-07-22

## 2019-07-22 DIAGNOSIS — D75.839 THROMBOCYTHEMIA: Primary | ICD-10-CM

## 2019-07-22 LAB
ALBUMIN SERPL BCP-MCNC: 3.7 G/DL (ref 3.5–5)
ALP SERPL-CCNC: 97 U/L (ref 46–116)
ALT SERPL W P-5'-P-CCNC: 17 U/L (ref 12–78)
ANION GAP SERPL CALCULATED.3IONS-SCNC: 10 MMOL/L (ref 4–13)
AST SERPL W P-5'-P-CCNC: 10 U/L (ref 5–45)
BILIRUB SERPL-MCNC: 0.4 MG/DL (ref 0.2–1)
BUN SERPL-MCNC: 24 MG/DL (ref 5–25)
CALCIUM SERPL-MCNC: 8.9 MG/DL (ref 8.3–10.1)
CHLORIDE SERPL-SCNC: 101 MMOL/L (ref 100–108)
CO2 SERPL-SCNC: 30 MMOL/L (ref 21–32)
CREAT SERPL-MCNC: 1.1 MG/DL (ref 0.6–1.3)
ERYTHROCYTE [DISTWIDTH] IN BLOOD BY AUTOMATED COUNT: 17.5 % (ref 11.6–15.1)
GFR SERPL CREATININE-BSD FRML MDRD: 44 ML/MIN/1.73SQ M
GLUCOSE SERPL-MCNC: 142 MG/DL (ref 65–140)
HCT VFR BLD AUTO: 41.5 % (ref 34.8–46.1)
HGB BLD-MCNC: 13.6 G/DL (ref 11.5–15.4)
MCH RBC QN AUTO: 32.4 PG (ref 26.8–34.3)
MCHC RBC AUTO-ENTMCNC: 32.8 G/DL (ref 31.4–37.4)
MCV RBC AUTO: 99 FL (ref 82–98)
PLATELET # BLD AUTO: 281 THOUSANDS/UL (ref 149–390)
PMV BLD AUTO: 10.6 FL (ref 8.9–12.7)
POTASSIUM SERPL-SCNC: 4 MMOL/L (ref 3.5–5.3)
PROT SERPL-MCNC: 6.9 G/DL (ref 6.4–8.2)
RBC # BLD AUTO: 4.2 MILLION/UL (ref 3.81–5.12)
SODIUM SERPL-SCNC: 141 MMOL/L (ref 136–145)
WBC # BLD AUTO: 6.55 THOUSAND/UL (ref 4.31–10.16)

## 2019-07-22 PROCEDURE — 85027 COMPLETE CBC AUTOMATED: CPT | Performed by: INTERNAL MEDICINE

## 2019-07-22 PROCEDURE — 80053 COMPREHEN METABOLIC PANEL: CPT | Performed by: INTERNAL MEDICINE

## 2019-07-22 PROCEDURE — 36415 COLL VENOUS BLD VENIPUNCTURE: CPT | Performed by: INTERNAL MEDICINE

## 2019-08-19 ENCOUNTER — TRANSCRIBE ORDERS (OUTPATIENT)
Dept: ADMINISTRATIVE | Facility: HOSPITAL | Age: 84
End: 2019-08-19

## 2019-08-19 ENCOUNTER — APPOINTMENT (OUTPATIENT)
Dept: LAB | Facility: HOSPITAL | Age: 84
End: 2019-08-19
Attending: INTERNAL MEDICINE
Payer: MEDICARE

## 2019-08-19 DIAGNOSIS — D47.3 ESSENTIAL THROMBOCYTHEMIA (HCC): ICD-10-CM

## 2019-08-19 DIAGNOSIS — D47.3 ESSENTIAL THROMBOCYTHEMIA (HCC): Primary | ICD-10-CM

## 2019-08-19 LAB
ALBUMIN SERPL BCP-MCNC: 3.6 G/DL (ref 3.5–5)
ALP SERPL-CCNC: 104 U/L (ref 46–116)
ALT SERPL W P-5'-P-CCNC: 20 U/L (ref 12–78)
ANION GAP SERPL CALCULATED.3IONS-SCNC: 4 MMOL/L (ref 4–13)
AST SERPL W P-5'-P-CCNC: 12 U/L (ref 5–45)
BILIRUB SERPL-MCNC: 0.3 MG/DL (ref 0.2–1)
BUN SERPL-MCNC: 19 MG/DL (ref 5–25)
CALCIUM SERPL-MCNC: 8.9 MG/DL (ref 8.3–10.1)
CHLORIDE SERPL-SCNC: 106 MMOL/L (ref 100–108)
CO2 SERPL-SCNC: 32 MMOL/L (ref 21–32)
CREAT SERPL-MCNC: 1 MG/DL (ref 0.6–1.3)
ERYTHROCYTE [DISTWIDTH] IN BLOOD BY AUTOMATED COUNT: 15.9 % (ref 11.6–15.1)
GFR SERPL CREATININE-BSD FRML MDRD: 49 ML/MIN/1.73SQ M
GLUCOSE P FAST SERPL-MCNC: 125 MG/DL (ref 65–99)
HCT VFR BLD AUTO: 41.8 % (ref 34.8–46.1)
HGB BLD-MCNC: 13.9 G/DL (ref 11.5–15.4)
MCH RBC QN AUTO: 33.1 PG (ref 26.8–34.3)
MCHC RBC AUTO-ENTMCNC: 33.3 G/DL (ref 31.4–37.4)
MCV RBC AUTO: 100 FL (ref 82–98)
PLATELET # BLD AUTO: 299 THOUSANDS/UL (ref 149–390)
PMV BLD AUTO: 11.1 FL (ref 8.9–12.7)
POTASSIUM SERPL-SCNC: 4.4 MMOL/L (ref 3.5–5.3)
PROT SERPL-MCNC: 6.9 G/DL (ref 6.4–8.2)
RBC # BLD AUTO: 4.2 MILLION/UL (ref 3.81–5.12)
SODIUM SERPL-SCNC: 142 MMOL/L (ref 136–145)
WBC # BLD AUTO: 6.82 THOUSAND/UL (ref 4.31–10.16)

## 2019-08-19 PROCEDURE — 36415 COLL VENOUS BLD VENIPUNCTURE: CPT | Performed by: INTERNAL MEDICINE

## 2019-08-19 PROCEDURE — 85027 COMPLETE CBC AUTOMATED: CPT | Performed by: INTERNAL MEDICINE

## 2019-08-19 PROCEDURE — 80053 COMPREHEN METABOLIC PANEL: CPT | Performed by: INTERNAL MEDICINE

## 2019-09-10 ENCOUNTER — OFFICE VISIT (OUTPATIENT)
Dept: SURGERY | Facility: CLINIC | Age: 84
End: 2019-09-10
Payer: MEDICARE

## 2019-09-10 VITALS
BODY MASS INDEX: 29.44 KG/M2 | DIASTOLIC BLOOD PRESSURE: 70 MMHG | TEMPERATURE: 98 F | HEIGHT: 62 IN | WEIGHT: 160 LBS | SYSTOLIC BLOOD PRESSURE: 120 MMHG

## 2019-09-10 DIAGNOSIS — I10 BENIGN ESSENTIAL HYPERTENSION: ICD-10-CM

## 2019-09-10 DIAGNOSIS — I25.10 CAD (CORONARY ARTERY DISEASE): ICD-10-CM

## 2019-09-10 DIAGNOSIS — I83.93 ASYMPTOMATIC VARICOSE VEINS OF BILATERAL LOWER EXTREMITIES: ICD-10-CM

## 2019-09-10 DIAGNOSIS — Z09 SURGICAL FOLLOW-UP CARE: ICD-10-CM

## 2019-09-10 DIAGNOSIS — K57.31 DIVERTICULOSIS LARGE INTESTINE W/O PERFORATION OR ABSCESS W/BLEEDING: Primary | ICD-10-CM

## 2019-09-10 DIAGNOSIS — Z93.3 COLOSTOMY IN PLACE (HCC): ICD-10-CM

## 2019-09-10 DIAGNOSIS — I87.2 CHRONIC VENOUS INSUFFICIENCY: ICD-10-CM

## 2019-09-10 DIAGNOSIS — K57.20 PERFORATION OF SIGMOID COLON DUE TO DIVERTICULITIS: ICD-10-CM

## 2019-09-10 DIAGNOSIS — E03.9 HYPOTHYROIDISM: ICD-10-CM

## 2019-09-10 PROCEDURE — 99213 OFFICE O/P EST LOW 20 MIN: CPT | Performed by: SPECIALIST

## 2019-09-10 PROCEDURE — 1123F ACP DISCUSS/DSCN MKR DOCD: CPT | Performed by: SPECIALIST

## 2019-09-10 NOTE — PROGRESS NOTES
[facility] office follow-up Note:    Mona Betancourt 80 y o  female MRN: [id number]  Encounter: [id number] PCP: Helena Puga MD      Assessment/Plan:    No problem-specific Assessment & Plan notes found for this encounter  Diagnoses and all orders for this visit:    Diverticulosis large intestine w/o perforation or abscess w/bleeding    Perforation of sigmoid colon due to diverticulitis    Hypothyroidism    Asymptomatic varicose veins of bilateral lower extremities    Benign essential hypertension    CAD (coronary artery disease)    Chronic venous insufficiency    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[facility])    Surgical follow-up care        PLan :  Has a lengthy discussion with patient and the her daughter at bedside  Given option for possible colonoscopy cardiac clearance and possible reversal of Mindy's  Of patient the is a well used to of the new system with colostomy and functioning very well with colostomy  Infected or bowel symptoms are much better than before operation  She does not want any reversal of the Mindy's procedure    Will follow-up in 3 months  Subjective:      Patient ID: Mona Betancourt is a 80 y o  female      Patient is 80-years old female patient is here for follow-up  Patient has a perforated diverticulitis the and had the colostomy and sigmoid colectomy in May 2019    Doing very well and denies any fever chills rigors denies any colostomy problem denies any parastomal hernia bowels are regular appetite is good    And able to manage her colostomy very well    Her last colonoscopy many years ago by Dr Kelin Turner MD  Her cardiologist is [facility] a she follows Dr Doroteo Phan      The following portions of the patient's history were reviewed and updated as appropriate: She  has a past medical history of Arthritis, Breast cancer ([facility] [address] ) (2005), Cancer [facility]), Cardiac disease, Chronic pain disorder, Coronary artery disease, Disease of thyroid gland, GERD (gastroesophageal reflux disease), History of prolapse of bladder, History of radiation therapy (2005), Hypertension, Kidney stone, and Myocardial infarction ([facility] )  She   Patient Active Problem List    Diagnosis Date Noted    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[facility]) 09/10/2019    Colostomy status ([facility] ) 06/12/2019    Diverticulosis large intestine w/o perforation or abscess w/bleeding 06/04/2019    Surgical follow-up care 06/04/2019    Perforation of sigmoid colon due to diverticulitis 05/09/2019    Chronic venous insufficiency 05/22/2018    Anxiety 02/28/2018    Left knee pain 01/12/2017    Dizziness 12/19/2016    Gait disturbance 12/19/2016    Psoriasis 12/19/2016    Osteoarthritis of left knee 10/19/2016    Asymptomatic varicose veins of bilateral lower extremities 10/14/2016    Chronic pain of left knee 10/14/2016    GERD (gastroesophageal reflux disease) 07/06/2016    Stress incontinence in female 07/06/2016    Atrophic vaginitis 03/10/2016    Rectocele 03/10/2016    Abnormal neutrophil count 03/09/2016    Nephrolithiasis 02/26/2016    Complete uterovaginal prolapse 11/19/2015    Benign colon polyp 02/17/2014    Chronic rhinitis 09/30/2013    Adenocarcinoma of breast ([facility] ) 09/04/2012    Benign essential hypertension 09/04/2012    Congenital anomaly of coronary artery 09/04/2012    CAD (coronary artery disease) 09/04/2012    Esophageal reflux 09/04/2012    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    Hyperlipidemia 09/04/2012    Hypothyroidism 09/04/2012    Irritable bowel syndrome 09/04/2012    Peripheral vertigo 09/04/2012    Venous insufficiency (chronic) (peripheral) 09/04/2012     She  has a past surgical history that includes Skin biopsy (Left); pr cystourethroscopy,ureter catheter (Left, 2/26/2016); Chest wall tumor excision (2005); Appendectomy; Colonoscopy; Cystoscopy; Bladder surgery; pr remv cataract extracap,insert lens (Left, 4/10/2017); Breast surgery (Right); Knee arthroscopy;  Hysterectomy (Bilateral); Breast lumpectomy (Right, 2005); and HARTMANS PROCEDURE (N/A, 5/9/2019)  Her family history includes Angina in her mother; Early death in her father; Heart attack in her father and mother; Heart disease in her father, sister, and sister; Hypertension in her father and mother  She  reports that she quit smoking about 49 years ago  She smoked 0 10 packs per day  She has never used smokeless tobacco  She reports that she drank alcohol  She reports that she does not use drugs  Current Outpatient Medications   Medication Sig Dispense Refill    aspirin 81 MG tablet Take 81 mg by mouth every morning        diltiazem (CARDIZEM CD) 180 mg 24 hr capsule TAKE 1 CAPSULE DAILY 90 capsule 3    exemestane (AROMASIN) 25 MG tablet Take 25 mg by mouth daily   ezetimibe-simvastatin (VYTORIN) 10-40 mg per tablet Take 1 tablet by mouth daily at bedtime   hydroxyurea (HYDREA) 500 mg capsule Take 500 mg by mouth daily m w f      levothyroxine 50 mcg tablet Take 1 tablet (50 mcg total) by mouth every morning 90 tablet 3    acetaminophen (TYLENOL) 325 mg tablet Take 2 tablets (650 mg total) by mouth every 6 (six) hours as needed for mild pain, headaches or fever (Patient not taking: Reported on 9/10/2019) 30 tablet 0    esomeprazole (NexIUM) 40 MG capsule Take 40 mg by mouth every morning before breakfast       fluticasone (FLONASE) 50 mcg/act nasal spray       melatonin 3 mg Take 2 tablets (6 mg total) by mouth daily at bedtime (Patient not taking: Reported on 9/10/2019)  0    omeprazole (PriLOSEC) 40 MG capsule Take 1 capsule (40 mg total) by mouth daily (Patient not taking: Reported on 9/10/2019) 90 capsule 3    polyethylene glycol (MIRALAX) 17 g packet Take 17 g by mouth daily (Patient not taking: Reported on 9/10/2019) 14 each 0     No current facility-administered medications for this visit        Current Outpatient Medications on File Prior to Visit   Medication Sig    aspirin 81 MG tablet Take 81 mg by mouth every morning      diltiazem (CARDIZEM CD) 180 mg 24 hr capsule TAKE 1 CAPSULE DAILY    exemestane (AROMASIN) 25 MG tablet Take 25 mg by mouth daily   ezetimibe-simvastatin (VYTORIN) 10-40 mg per tablet Take 1 tablet by mouth daily at bedtime   hydroxyurea (HYDREA) 500 mg capsule Take 500 mg by mouth daily m w f    levothyroxine 50 mcg tablet Take 1 tablet (50 mcg total) by mouth every morning    acetaminophen (TYLENOL) 325 mg tablet Take 2 tablets (650 mg total) by mouth every 6 (six) hours as needed for mild pain, headaches or fever (Patient not taking: Reported on 9/10/2019)    esomeprazole (NexIUM) 40 MG capsule Take 40 mg by mouth every morning before breakfast     fluticasone (FLONASE) 50 mcg/act nasal spray     melatonin 3 mg Take 2 tablets (6 mg total) by mouth daily at bedtime (Patient not taking: Reported on 9/10/2019)    omeprazole (PriLOSEC) 40 MG capsule Take 1 capsule (40 mg total) by mouth daily (Patient not taking: Reported on 9/10/2019)    polyethylene glycol (MIRALAX) 17 g packet Take 17 g by mouth daily (Patient not taking: Reported on 9/10/2019)     No current facility-administered medications on file prior to visit  She is allergic to other; ciprofloxacin; clindamycin; clindamycin/lincomycin; levaquin [levofloxacin]; lincomycin; nitrofuran derivatives; penicillins; sulfa antibiotics; sulfacetamide; sulfasalazine; ceftriaxone; iv contrast  [iodinated diagnostic agents]; linezolid; and nitrofurantoin       Review of Systems   Constitutional: Negative for activity change and appetite change  HENT: Negative for congestion and dental problem  Eyes: Negative for discharge  Respiratory: Negative for apnea and chest tightness  Cardiovascular: Negative for chest pain and leg swelling  Gastrointestinal: Negative for abdominal distention and abdominal pain          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functioning well no problem related to colostomy   Endocrine: Negative for cold intolerance and heat intolerance  Genitourinary: Negative for difficulty urinating and dyspareunia  Musculoskeletal: Negative for arthralgias, back pain and gait problem  Skin: Negative for color change and pallor  Neurological: Negative for dizziness and facial asymmetry  Hematological: Negative for adenopathy  Does not bruise/bleed easily  Psychiatric/Behavioral: Negative for agitation  Objective:    /70   Temp 98 °F (36 7 °C)   Ht 5' 2" (1 575 m)   Wt 72 6 kg (160 lb)   BMI 29 26 kg/m²      Physical Exam   Constitutional: She is oriented to person, place, and time  She appears well-developed and well-nourished  HENT:   Head: Normocephalic and atraumatic  Eyes: Pupils are equal, round, and reactive to light  Conjunctivae and EOM are normal    Neck: Normal range of motion  Neck supple  No JVD present  No tracheal deviation present  No thyromegaly present  Cardiovascular: Normal rate and regular rhythm  Pulmonary/Chest: Effort normal and breath sounds normal    Abdominal: Soft  Bowel sounds are normal    Incision from previous surgery is healing well no hernia parastomal or midline incision colostomy is in good color functioning well without any complication   Musculoskeletal: Normal range of motion  Lymphadenopathy:     She has no cervical adenopathy  Neurological: She is alert and oriented to person, place, and time  Skin: Skin is warm and dry  Nursing note and vitals reviewed  Pertinent labs reviewed  Most Recent Labs:   No visits with results within 2 Week(s) from this visit     Latest known visit with results is:   Transcribe Orders on 08/19/2019   Component Date Value Ref Range Status    Sodium 08/19/2019 142  136 - 145 mmol/L Final    Potassium 08/19/2019 4 4  3 5 - 5 3 mmol/L Final    Chloride 08/19/2019 106  100 - 108 mmol/L Final    CO2 08/19/2019 32  21 - 32 mmol/L Final    ANION GAP 08/19/2019 4  4 - 13 mmol/L Final    BUN 08/19/2019 19  5 - 25 mg/dL Final  Creatinine 08/19/2019 1 00  0 60 - 1 30 mg/dL Final    Standardized to IDMS reference method    Glucose, Fasting 08/19/2019 125* 65 - 99 mg/dL Final      Specimen collection should occur prior to Sulfasalazine administration due to the potential for falsely depressed results  Specimen collection should occur prior to Sulfapyridine administration due to the potential for falsely elevated results   Calcium 08/19/2019 8 9  8 3 - 10 1 mg/dL Final    AST 08/19/2019 12  5 - 45 U/L Final      Specimen collection should occur prior to Sulfasalazine administration due to the potential for falsely depressed results   ALT 08/19/2019 20  12 - 78 U/L Final      Specimen collection should occur prior to Sulfasalazine administration due to the potential for falsely depressed results   Alkaline Phosphatase 08/19/2019 104  46 - 116 U/L Final    Total Protein 08/19/2019 6 9  6 4 - 8 2 g/dL Final    Albumin 08/19/2019 3 6  3 5 - 5 0 g/dL Final    Total Bilirubin 08/19/2019 0 30  0 20 - 1 00 mg/dL Final    eGFR 08/19/2019 49  ml/min/1 73sq m Final       Pertinent images and available reads personally reviewed  No results found  Pertinent notes reviewed      ?   Dr Nan Petersen MD  [address] [place] [address] [facility]  [phone]

## 2019-09-16 ENCOUNTER — APPOINTMENT (OUTPATIENT)
Dept: LAB | Facility: HOSPITAL | Age: 84
End: 2019-09-16
Attending: INTERNAL MEDICINE
Payer: MEDICARE

## 2019-09-16 LAB
ALBUMIN SERPL BCP-MCNC: 4.1 G/DL (ref 3.5–5)
ALP SERPL-CCNC: 88 U/L (ref 46–116)
ALT SERPL W P-5'-P-CCNC: 18 U/L (ref 12–78)
ANION GAP SERPL CALCULATED.3IONS-SCNC: 6 MMOL/L (ref 4–13)
AST SERPL W P-5'-P-CCNC: 15 U/L (ref 5–45)
BASOPHILS # BLD AUTO: 0.04 THOUSANDS/ΜL (ref 0–0.1)
BASOPHILS NFR BLD AUTO: 1 % (ref 0–1)
BILIRUB SERPL-MCNC: 0.4 MG/DL (ref 0.2–1)
BUN SERPL-MCNC: 24 MG/DL (ref 5–25)
CALCIUM SERPL-MCNC: 9.4 MG/DL (ref 8.3–10.1)
CHLORIDE SERPL-SCNC: 104 MMOL/L (ref 100–108)
CO2 SERPL-SCNC: 31 MMOL/L (ref 21–32)
CREAT SERPL-MCNC: 0.97 MG/DL (ref 0.6–1.3)
EOSINOPHIL # BLD AUTO: 0.16 THOUSAND/ΜL (ref 0–0.61)
EOSINOPHIL NFR BLD AUTO: 3 % (ref 0–6)
ERYTHROCYTE [DISTWIDTH] IN BLOOD BY AUTOMATED COUNT: 16.7 % (ref 11.6–15.1)
GFR SERPL CREATININE-BSD FRML MDRD: 51 ML/MIN/1.73SQ M
GLUCOSE SERPL-MCNC: 102 MG/DL (ref 65–140)
HCT VFR BLD AUTO: 43 % (ref 34.8–46.1)
HGB BLD-MCNC: 14.1 G/DL (ref 11.5–15.4)
IMM GRANULOCYTES # BLD AUTO: 0.03 THOUSAND/UL (ref 0–0.2)
IMM GRANULOCYTES NFR BLD AUTO: 1 % (ref 0–2)
LYMPHOCYTES # BLD AUTO: 1.75 THOUSANDS/ΜL (ref 0.6–4.47)
LYMPHOCYTES NFR BLD AUTO: 28 % (ref 14–44)
MCH RBC QN AUTO: 32.9 PG (ref 26.8–34.3)
MCHC RBC AUTO-ENTMCNC: 32.8 G/DL (ref 31.4–37.4)
MCV RBC AUTO: 101 FL (ref 82–98)
MONOCYTES # BLD AUTO: 0.35 THOUSAND/ΜL (ref 0.17–1.22)
MONOCYTES NFR BLD AUTO: 6 % (ref 4–12)
NEUTROPHILS # BLD AUTO: 3.98 THOUSANDS/ΜL (ref 1.85–7.62)
NEUTS SEG NFR BLD AUTO: 61 % (ref 43–75)
NRBC BLD AUTO-RTO: 0 /100 WBCS
PLATELET # BLD AUTO: 242 THOUSANDS/UL (ref 149–390)
PMV BLD AUTO: 10.3 FL (ref 8.9–12.7)
POTASSIUM SERPL-SCNC: 4.6 MMOL/L (ref 3.5–5.3)
PROT SERPL-MCNC: 7.5 G/DL (ref 6.4–8.2)
RBC # BLD AUTO: 4.28 MILLION/UL (ref 3.81–5.12)
SODIUM SERPL-SCNC: 141 MMOL/L (ref 136–145)
WBC # BLD AUTO: 6.31 THOUSAND/UL (ref 4.31–10.16)

## 2019-09-16 PROCEDURE — 80053 COMPREHEN METABOLIC PANEL: CPT | Performed by: INTERNAL MEDICINE

## 2019-09-16 PROCEDURE — 85025 COMPLETE CBC W/AUTO DIFF WBC: CPT | Performed by: INTERNAL MEDICINE

## 2019-09-16 PROCEDURE — 36415 COLL VENOUS BLD VENIPUNCTURE: CPT | Performed by: INTERNAL MEDICINE

## 2019-09-27 ENCOUNTER — HOSPITAL ENCOUNTER (EMERGENCY)
Facility: HOSPITAL | Age: 84
Discharge: HOME | End: 2019-09-27
Attending: EMERGENCY MEDICINE
Payer: MEDICARE

## 2019-09-27 VITALS
BODY MASS INDEX: 29.27 KG/M2 | OXYGEN SATURATION: 96 % | SYSTOLIC BLOOD PRESSURE: 166 MMHG | TEMPERATURE: 97.8 F | RESPIRATION RATE: 18 BRPM | HEIGHT: 61 IN | HEART RATE: 96 BPM | WEIGHT: 155 LBS | DIASTOLIC BLOOD PRESSURE: 84 MMHG

## 2019-09-27 DIAGNOSIS — R58 BLEEDING: Primary | ICD-10-CM

## 2019-09-27 PROCEDURE — 99283 EMERGENCY DEPT VISIT LOW MDM: CPT

## 2019-09-27 NOTE — ED PROVIDER NOTES
History  Chief Complaint   Patient presents with    Post-op Problem     Patient had colostomy performed May 2019  Went to change bag today and  ostomy area was bleeding  Patient denies any injury or trauma to area  68-year-old female with complaints of states that she took a shower who early this morning and wiped with a towel afterwards and since that time she has had a little bit of bleeding out of her colostomy bag  No fevers no chills no abdominal pain no other complaints no active bleeding at this point that she can see but she does have a little bit of blood collected in the bag not enough to cause the level it can be measured just enough to discolored the bag  She states this has never happened before  Her colostomy surgery was done earlier in the year approximately May of 2019  History provided by:  Patient   used: No        Prior to Admission Medications   Prescriptions Last Dose Informant Patient Reported? Taking?   acetaminophen (TYLENOL) 325 mg tablet Past Week at Unknown time  No Yes   Sig: Take 2 tablets (650 mg total) by mouth every 6 (six) hours as needed for mild pain, headaches or fever   aspirin 81 MG tablet 9/27/2019 at Unknown time  Yes Yes   Sig: Take 81 mg by mouth every morning     diltiazem (CARDIZEM CD) 180 mg 24 hr capsule 9/27/2019 at Unknown time  No Yes   Sig: TAKE 1 CAPSULE DAILY   esomeprazole (NexIUM) 40 MG capsule 9/27/2019 at Unknown time  Yes Yes   Sig: Take 40 mg by mouth every morning before breakfast    exemestane (AROMASIN) 25 MG tablet 9/27/2019 at Unknown time  Yes Yes   Sig: Take 25 mg by mouth daily  ezetimibe-simvastatin (VYTORIN) 10-40 mg per tablet 9/27/2019 at Unknown time  Yes Yes   Sig: Take 1 tablet by mouth daily at bedtime     fluticasone (FLONASE) 50 mcg/act nasal spray 9/27/2019 at Unknown time  Yes Yes   hydroxyurea (HYDREA) 500 mg capsule 9/27/2019 at Unknown time  Yes Yes   Sig: Take 500 mg by mouth daily m w f levothyroxine 50 mcg tablet 9/27/2019 at Unknown time  No Yes   Sig: Take 1 tablet (50 mcg total) by mouth every morning   melatonin 3 mg 9/26/2019 at Unknown time  No Yes   Sig: Take 2 tablets (6 mg total) by mouth daily at bedtime   omeprazole (PriLOSEC) 40 MG capsule 9/27/2019 at Unknown time  No Yes   Sig: Take 1 capsule (40 mg total) by mouth daily   polyethylene glycol (MIRALAX) 17 g packet 9/27/2019 at Unknown time  No Yes   Sig: Take 17 g by mouth daily      Facility-Administered Medications: None       Past Medical History:   Diagnosis Date    Arthritis     joints    Breast cancer ([facility] [address] ) 2005    right    Cancer ([facility] [address] )     breast ca, skin ca, currently in remission, tumors in chest wall    Cardiac disease     hx MI x 2    Chronic pain disorder     knees    Coronary artery disease     Disease of thyroid gland     hypothyroidism    GERD (gastroesophageal reflux disease)     History of prolapse of bladder     History of radiation therapy 2005    to breast right    Hypertension     Kidney stone     2016    Myocardial infarction ([facility] [address] )     1990's x2       Past Surgical History:   Procedure Laterality Date    APPENDECTOMY      during HAMLET    BLADDER SURGERY      suspension surgery no sling, used fiberglass suspension technique    BREAST LUMPECTOMY Right 2005    BREAST SURGERY Right     lumpectomy    CHEST WALL TUMOR EXCISION  2005    COLONOSCOPY      CYSTOSCOPY      HARTMANS PROCEDURE N/A 5/9/2019    Procedure: Sonja Haagensen PROCEDURE;  Surgeon: Johnathan Gilliam MD;  Location: [facility];  Service: General    HYSTERECTOMY Bilateral     hamlet w/removal of both ovaries    KNEE ARTHROSCOPY      Last assessed: 7/30/15    [place] CYSTOURETHROSCOPY,URETER CATHETER Left 2/26/2016    Procedure: CYSTOSCOPY RETROGRADE PYELOGRAM WITH INSERTION STENT URETERAL;  Surgeon: Selvin Brooke MD;  Location: [address] [place] [address];  Service: Urology     [facility] CATARACT EXTRACAP,INSERT LENS Left 4/10/2017    Procedure: EXTRACTION EXTRACAPSULAR CATARACT PHACO INTRAOCULAR LENS (IOL); Surgeon: Daquan Doll MD;  Location: [facility] MAIN OR;  Service: Ophthalmology    SKIN BIOPSY Left     Leg for [facility]       Family History   Problem Relation Age of Onset    Angina Mother     Hypertension Mother     Heart attack Mother         Myocardial Infarction    Early death Father         age 36 MI    Heart disease Father     Hypertension Father     Heart attack Father         Myocardial Infarction    Heart disease Sister     Heart disease Sister         Cardiac Disorder     I have reviewed and agree with the history as documented  Social History     Tobacco Use    Smoking status: Former Smoker     Packs/day: 0 10     Last attempt to quit: 1970     Years since quittin 7    Smokeless tobacco: Never Used   Substance Use Topics    Alcohol use: Not Currently     Frequency: Never    Drug use: No        Review of Systems   Constitutional: Negative for activity change, chills, diaphoresis and fever  HENT: Negative for congestion, ear pain, nosebleeds, sore throat, trouble swallowing and voice change  Eyes: Negative for pain, discharge and redness  Respiratory: Negative for apnea, cough, choking, shortness of breath, wheezing and stridor  Cardiovascular: Negative for chest pain and palpitations  Gastrointestinal: Negative for abdominal distention, abdominal pain, constipation, diarrhea, nausea and vomiting  Endocrine: Negative for polydipsia  Genitourinary: Negative for difficulty urinating, dysuria, flank pain, frequency, hematuria and urgency  Musculoskeletal: Negative for back pain, gait problem, joint swelling, myalgias, neck pain and neck stiffness  Skin: Negative for pallor and rash  Neurological: Negative for dizziness, tremors, syncope, speech difficulty, weakness, numbness and headaches  Hematological: Negative for adenopathy     Psychiatric/Behavioral: Negative for confusion, hallucinations, self-injury and suicidal ideas  The patient is not nervous/anxious  Physical Exam  Physical Exam   Constitutional: She is oriented to person, place, and time  She appears well-developed and well-nourished  No distress  HENT:   Head: Normocephalic and atraumatic  Right Ear: External ear normal    Left Ear: External ear normal    Nose: Nose normal    Mouth/Throat: Oropharynx is clear and moist    Eyes: Pupils are equal, round, and reactive to light  Conjunctivae are normal    Neck: Normal range of motion  Neck supple  Cardiovascular: Normal rate, regular rhythm, normal heart sounds and intact distal pulses  Pulmonary/Chest: Effort normal and breath sounds normal    Abdominal: Soft  Bowel sounds are normal    Abdomen soft nontender colostomy bag as described in history no active bleeding seen at this time  Musculoskeletal: Normal range of motion  Neurological: She is alert and oriented to person, place, and time  She has normal reflexes  Skin: Skin is warm and dry  She is not diaphoretic  Psychiatric: She has a normal mood and affect  Nursing note and vitals reviewed        Vital Signs  ED Triage Vitals [09/27/19 1539]   Temperature Pulse Respirations Blood Pressure SpO2   97 8 °F (36 6 °C) 96 18 166/84 96 %      Temp Source Heart Rate Source Patient Position - Orthostatic VS BP Location FiO2 (%)   Tympanic Monitor Sitting Left arm --      Pain Score       No Pain           Vitals:    09/27/19 1539   BP: 166/84   Pulse: 96   Patient Position - Orthostatic VS: Sitting         Visual Acuity      ED Medications  Medications - No data to display    Diagnostic Studies  Results Reviewed     None                 No orders to display              Procedures  Procedures       ED Course                               MDM  Number of Diagnoses or Management Options  Bleeding:   Diagnosis management comments: I discussed the case with Dr Angel Beckford  who advises to follow up in the office if worsens to keep a close eye and if she starts bleeding actively to return to the ED  Disposition  Final diagnoses:   Bleeding     Time reflects when diagnosis was documented in both MDM as applicable and the Disposition within this note     Time User Action Codes Description Comment    9/27/2019  4:14 PM Breezytamirnurys Pendleton Add [R58] Bleeding       ED Disposition     ED Disposition Condition Date/Time Comment    Discharge Stable Fri Sep 27, 2019  4:14 PM Elver Lucas discharge to home/self care  Follow-up Information     Follow up With Specialties Details Why Contact Info    Zoe Toussaint MD Internal Medicine, Family Medicine Schedule an appointment as soon as possible for a visit  As needed [address] [address]  [phone]            Patient's Medications   Discharge Prescriptions    No medications on file     No discharge procedures on file      ED Provider  Electronically Signed by           Michelle Shen DO  09/27/19 9758

## 2019-10-08 ENCOUNTER — APPOINTMENT (OUTPATIENT)
Dept: LAB | Facility: HOSPITAL | Age: 84
End: 2019-10-08
Attending: INTERNAL MEDICINE
Payer: MEDICARE

## 2019-10-08 ENCOUNTER — TRANSCRIBE ORDERS (OUTPATIENT)
Dept: ADMINISTRATIVE | Facility: HOSPITAL | Age: 84
End: 2019-10-08

## 2019-10-08 DIAGNOSIS — C50.919 MALIGNANT NEOPLASM OF BREAST, STAGE 1, ESTROGEN RECEPTOR NEGATIVE, UNSPECIFIED LATERALITY (HCC): ICD-10-CM

## 2019-10-08 DIAGNOSIS — Z17.0 ESTROGEN RECEPTOR POSITIVE: ICD-10-CM

## 2019-10-08 DIAGNOSIS — Z17.0 ESTROGEN RECEPTOR POSITIVE: Primary | ICD-10-CM

## 2019-10-08 DIAGNOSIS — Z17.1 MALIGNANT NEOPLASM OF BREAST, STAGE 1, ESTROGEN RECEPTOR NEGATIVE, UNSPECIFIED LATERALITY (HCC): ICD-10-CM

## 2019-10-08 LAB
ALBUMIN SERPL BCP-MCNC: 3.9 G/DL (ref 3.5–5)
ALP SERPL-CCNC: 93 U/L (ref 46–116)
ALT SERPL W P-5'-P-CCNC: 17 U/L (ref 12–78)
ANION GAP SERPL CALCULATED.3IONS-SCNC: 8 MMOL/L (ref 4–13)
AST SERPL W P-5'-P-CCNC: 16 U/L (ref 5–45)
BASOPHILS # BLD AUTO: 0.04 THOUSANDS/ΜL (ref 0–0.1)
BASOPHILS NFR BLD AUTO: 1 % (ref 0–1)
BILIRUB SERPL-MCNC: 0.4 MG/DL (ref 0.2–1)
BUN SERPL-MCNC: 23 MG/DL (ref 5–25)
CALCIUM SERPL-MCNC: 9.7 MG/DL (ref 8.3–10.1)
CANCER AG27-29 SERPL-ACNC: 54.3 U/ML (ref 0–42.3)
CHLORIDE SERPL-SCNC: 104 MMOL/L (ref 100–108)
CO2 SERPL-SCNC: 31 MMOL/L (ref 21–32)
CREAT SERPL-MCNC: 1 MG/DL (ref 0.6–1.3)
EOSINOPHIL # BLD AUTO: 0.13 THOUSAND/ΜL (ref 0–0.61)
EOSINOPHIL NFR BLD AUTO: 2 % (ref 0–6)
ERYTHROCYTE [DISTWIDTH] IN BLOOD BY AUTOMATED COUNT: 16 % (ref 11.6–15.1)
GFR SERPL CREATININE-BSD FRML MDRD: 49 ML/MIN/1.73SQ M
GGT SERPL-CCNC: 28 U/L (ref 5–85)
GLUCOSE SERPL-MCNC: 111 MG/DL (ref 65–140)
HCT VFR BLD AUTO: 42.1 % (ref 34.8–46.1)
HGB BLD-MCNC: 13.5 G/DL (ref 11.5–15.4)
IMM GRANULOCYTES # BLD AUTO: 0.02 THOUSAND/UL (ref 0–0.2)
IMM GRANULOCYTES NFR BLD AUTO: 0 % (ref 0–2)
LYMPHOCYTES # BLD AUTO: 1.66 THOUSANDS/ΜL (ref 0.6–4.47)
LYMPHOCYTES NFR BLD AUTO: 29 % (ref 14–44)
MCH RBC QN AUTO: 33.1 PG (ref 26.8–34.3)
MCHC RBC AUTO-ENTMCNC: 32.1 G/DL (ref 31.4–37.4)
MCV RBC AUTO: 103 FL (ref 82–98)
MONOCYTES # BLD AUTO: 0.29 THOUSAND/ΜL (ref 0.17–1.22)
MONOCYTES NFR BLD AUTO: 5 % (ref 4–12)
NEUTROPHILS # BLD AUTO: 3.56 THOUSANDS/ΜL (ref 1.85–7.62)
NEUTS SEG NFR BLD AUTO: 63 % (ref 43–75)
NRBC BLD AUTO-RTO: 0 /100 WBCS
PLATELET # BLD AUTO: 261 THOUSANDS/UL (ref 149–390)
PMV BLD AUTO: 10.5 FL (ref 8.9–12.7)
POTASSIUM SERPL-SCNC: 4.8 MMOL/L (ref 3.5–5.3)
PROT SERPL-MCNC: 7 G/DL (ref 6.4–8.2)
RBC # BLD AUTO: 4.08 MILLION/UL (ref 3.81–5.12)
SODIUM SERPL-SCNC: 143 MMOL/L (ref 136–145)
WBC # BLD AUTO: 5.7 THOUSAND/UL (ref 4.31–10.16)

## 2019-10-08 PROCEDURE — 86300 IMMUNOASSAY TUMOR CA 15-3: CPT

## 2019-10-08 PROCEDURE — 36415 COLL VENOUS BLD VENIPUNCTURE: CPT | Performed by: INTERNAL MEDICINE

## 2019-10-08 PROCEDURE — 82977 ASSAY OF GGT: CPT

## 2019-10-08 PROCEDURE — 85025 COMPLETE CBC W/AUTO DIFF WBC: CPT | Performed by: INTERNAL MEDICINE

## 2019-10-08 PROCEDURE — 80053 COMPREHEN METABOLIC PANEL: CPT

## 2019-10-11 DIAGNOSIS — K21.9 GASTROESOPHAGEAL REFLUX DISEASE WITHOUT ESOPHAGITIS: ICD-10-CM

## 2019-10-12 RX ORDER — OMEPRAZOLE 40 MG/1
CAPSULE, DELAYED RELEASE ORAL
Qty: 90 CAPSULE | Refills: 4 | Status: SHIPPED | OUTPATIENT
Start: 2019-10-12 | End: 2019-11-25 | Stop reason: SDUPTHER

## 2019-10-28 ENCOUNTER — APPOINTMENT (OUTPATIENT)
Dept: LAB | Facility: HOSPITAL | Age: 84
End: 2019-10-28
Attending: INTERNAL MEDICINE
Payer: MEDICARE

## 2019-10-28 LAB
ALBUMIN SERPL BCP-MCNC: 3.9 G/DL (ref 3.5–5)
ALP SERPL-CCNC: 97 U/L (ref 46–116)
ALT SERPL W P-5'-P-CCNC: 18 U/L (ref 12–78)
ANION GAP SERPL CALCULATED.3IONS-SCNC: 5 MMOL/L (ref 4–13)
AST SERPL W P-5'-P-CCNC: 12 U/L (ref 5–45)
BASOPHILS # BLD AUTO: 0.04 THOUSANDS/ΜL (ref 0–0.1)
BASOPHILS NFR BLD AUTO: 1 % (ref 0–1)
BILIRUB SERPL-MCNC: 0.6 MG/DL (ref 0.2–1)
BUN SERPL-MCNC: 19 MG/DL (ref 5–25)
CALCIUM SERPL-MCNC: 9.3 MG/DL (ref 8.3–10.1)
CHLORIDE SERPL-SCNC: 104 MMOL/L (ref 100–108)
CO2 SERPL-SCNC: 31 MMOL/L (ref 21–32)
CREAT SERPL-MCNC: 1.15 MG/DL (ref 0.6–1.3)
EOSINOPHIL # BLD AUTO: 0.14 THOUSAND/ΜL (ref 0–0.61)
EOSINOPHIL NFR BLD AUTO: 2 % (ref 0–6)
ERYTHROCYTE [DISTWIDTH] IN BLOOD BY AUTOMATED COUNT: 15.5 % (ref 11.6–15.1)
GFR SERPL CREATININE-BSD FRML MDRD: 42 ML/MIN/1.73SQ M
GLUCOSE SERPL-MCNC: 98 MG/DL (ref 65–140)
HCT VFR BLD AUTO: 41.9 % (ref 34.8–46.1)
HGB BLD-MCNC: 13.5 G/DL (ref 11.5–15.4)
IMM GRANULOCYTES # BLD AUTO: 0.01 THOUSAND/UL (ref 0–0.2)
IMM GRANULOCYTES NFR BLD AUTO: 0 % (ref 0–2)
LYMPHOCYTES # BLD AUTO: 1.79 THOUSANDS/ΜL (ref 0.6–4.47)
LYMPHOCYTES NFR BLD AUTO: 27 % (ref 14–44)
MCH RBC QN AUTO: 33.2 PG (ref 26.8–34.3)
MCHC RBC AUTO-ENTMCNC: 32.2 G/DL (ref 31.4–37.4)
MCV RBC AUTO: 103 FL (ref 82–98)
MONOCYTES # BLD AUTO: 0.39 THOUSAND/ΜL (ref 0.17–1.22)
MONOCYTES NFR BLD AUTO: 6 % (ref 4–12)
NEUTROPHILS # BLD AUTO: 4.25 THOUSANDS/ΜL (ref 1.85–7.62)
NEUTS SEG NFR BLD AUTO: 64 % (ref 43–75)
NRBC BLD AUTO-RTO: 0 /100 WBCS
PLATELET # BLD AUTO: 265 THOUSANDS/UL (ref 149–390)
PMV BLD AUTO: 11.1 FL (ref 8.9–12.7)
POTASSIUM SERPL-SCNC: 4.7 MMOL/L (ref 3.5–5.3)
PROT SERPL-MCNC: 7 G/DL (ref 6.4–8.2)
RBC # BLD AUTO: 4.07 MILLION/UL (ref 3.81–5.12)
SODIUM SERPL-SCNC: 140 MMOL/L (ref 136–145)
WBC # BLD AUTO: 6.62 THOUSAND/UL (ref 4.31–10.16)

## 2019-10-28 PROCEDURE — 80053 COMPREHEN METABOLIC PANEL: CPT | Performed by: INTERNAL MEDICINE

## 2019-10-28 PROCEDURE — 36415 COLL VENOUS BLD VENIPUNCTURE: CPT | Performed by: INTERNAL MEDICINE

## 2019-10-28 PROCEDURE — 85025 COMPLETE CBC W/AUTO DIFF WBC: CPT | Performed by: INTERNAL MEDICINE

## 2019-11-20 NOTE — PROGRESS NOTES
Assessment/Plan:    1  Benign essential hypertension  Assessment & Plan:  Well controlled on diltiazem and will continue  Has no side effects with medications  2  Hypothyroidism, unspecified type  Assessment & Plan:  Continue levothyroxine and will check TSH  Has had some purposeful weight gain but is otherwise euthyroid  Orders:  -     TSH, 3rd generation with Free T4 reflex; Future  -     levothyroxine 50 mcg tablet; Take 1 tablet (50 mcg total) by mouth every morning    3  Hyperlipidemia, unspecified hyperlipidemia type  Assessment & Plan:  Has been well controlled and will check lipid profile, continue statin and low fat diet  Orders:  -     Lipid panel; Future    4  Gastroesophageal reflux disease without esophagitis  Assessment & Plan:  She continues on omeprazole and has no symptoms  We discussed cutting back but she is not willing to do this and will try to take this PRN  Orders:  -     omeprazole (PriLOSEC) 40 MG capsule; Take 1 capsule (40 mg total) by mouth daily    5  BMI 31 0-31 9,adult      BMI Counseling: Body mass index is 31 55 kg/m²  Discussed the patient's BMI with her  The BMI is above normal  Nutrition recommendations include reducing portion sizes and decreasing overall calorie intake  Exercise recommendations include moderate aerobic physical activity for 150 minutes/week  Patient Instructions       Medicare Preventive Visit Patient Instructions  Thank you for completing your Welcome to Medicare Visit or Medicare Annual Wellness Visit today  Your next wellness visit will be due in one year (11/25/2020)  The screening/preventive services that you may require over the next 5-10 years are detailed below  Some tests may not apply to you based off risk factors and/or age  Screening tests ordered at today's visit but not completed yet may show as past due  Also, please note that scanned in results may not display below    Preventive Screenings:  Service Recommendations Previous Testing/Comments   Colorectal Cancer Screening  * Colonoscopy    * Fecal Occult Blood Test (FOBT)/Fecal Immunochemical Test (FIT)  * Fecal DNA/Cologuard Test  * Flexible Sigmoidoscopy Age: 54-65 years old   Colonoscopy: every 10 years (may be performed more frequently if at higher risk)  OR  FOBT/FIT: every 1 year  OR  Cologuard: every 3 years  OR  Sigmoidoscopy: every 5 years  Screening may be recommended earlier than age 48 if at higher risk for colorectal cancer  Also, an individualized decision between you and your healthcare provider will decide whether screening between the ages of 74-80 would be appropriate  Colonoscopy: 12/12/2013  FOBT/FIT: Not on file  Cologuard: Not on file  Sigmoidoscopy: Not on file    Screening Not Indicated     Breast Cancer Screening Age: 36 years old  Frequency: every 1-2 years  Not required if history of left and right mastectomy Mammogram: 12/05/2018    History Breast Cancer   Cervical Cancer Screening Between the ages of 21-29, pap smear recommended once every 3 years  Between the ages of 33-67, can perform pap smear with HPV co-testing every 5 years  Recommendations may differ for women with a history of total hysterectomy, cervical cancer, or abnormal pap smears in past  Pap Smear: Not on file    Screening Not Indicated   Hepatitis C Screening Once for adults born between 1945 and 1965  More frequently in patients at high risk for Hepatitis C Hep C Antibody: Not on file       Diabetes Screening 1-2 times per year if you're at risk for diabetes or have pre-diabetes Fasting glucose: 125 mg/dL   A1C: No results in last 5 years    Screening Current   Cholesterol Screening Once every 5 years if you don't have a lipid disorder  May order more often based on risk factors  Lipid panel: 11/02/2018    Screening Not Indicated  History Lipid Disorder     Other Preventive Screenings Covered by Medicare:  1   Abdominal Aortic Aneurysm (AAA) Screening: covered once if your at risk  You're considered to be at risk if you have a family history of AAA  2  Lung Cancer Screening: covers low dose CT scan once per year if you meet all of the following conditions: (1) Age 50-69; (2) No signs or symptoms of lung cancer; (3) Current smoker or have quit smoking within the last 15 years; (4) You have a tobacco smoking history of at least 30 pack years (packs per day multiplied by number of years you smoked); (5) You get a written order from a healthcare provider  3  Glaucoma Screening: covered annually if you're considered high risk: (1) You have diabetes OR (2) Family history of glaucoma OR (3)  aged 48 and older OR (3)  American aged 72 and older  3  Osteoporosis Screening: covered every 2 years if you meet one of the following conditions: (1) You're estrogen deficient and at risk for osteoporosis based off medical history and other findings; (2) Have a vertebral abnormality; (3) On glucocorticoid therapy for more than 3 months; (4) Have primary hyperparathyroidism; (5) On osteoporosis medications and need to assess response to drug therapy  · Last bone density test (DXA Scan): 12/05/2018  5  HIV Screening: covered annually if you're between the age of 12-76  Also covered annually if you are younger than 13 and older than 72 with risk factors for HIV infection  For pregnant patients, it is covered up to 3 times per pregnancy  Immunizations:  Immunization Recommendations   Influenza Vaccine Annual influenza vaccination during flu season is recommended for all persons aged >= 6 months who do not have contraindications   Pneumococcal Vaccine (Prevnar and Pneumovax)  * Prevnar = PCV13  * Pneumovax = PPSV23   Adults 25-60 years old: 1-3 doses may be recommended based on certain risk factors  Adults 72 years old: Prevnar (PCV13) vaccine recommended followed by Pneumovax (PPSV23) vaccine   If already received PPSV23 since turning 65, then PCV13 recommended at least one year after PPSV23 dose  Hepatitis B Vaccine 3 dose series if at intermediate or high risk (ex: diabetes, end stage renal disease, liver disease)   Tetanus (Td) Vaccine - COST NOT COVERED BY MEDICARE PART B Following completion of primary series, a booster dose should be given every 10 years to maintain immunity against tetanus  Td may also be given as tetanus wound prophylaxis  Tdap Vaccine - COST NOT COVERED BY MEDICARE PART B Recommended at least once for all adults  For pregnant patients, recommended with each pregnancy  Shingles Vaccine (Shingrix) - COST NOT COVERED BY MEDICARE PART B  2 shot series recommended in those aged 48 and above     Health Maintenance Due:  There are no preventive care reminders to display for this patient  Immunizations Due:      Topic Date Due    Influenza Vaccine  07/01/2019     Advance Directives   What are advance directives? Advance directives are legal documents that state your wishes and plans for medical care  These plans are made ahead of time in case you lose your ability to make decisions for yourself  Advance directives can apply to any medical decision, such as the treatments you want, and if you want to donate organs  What are the types of advance directives? There are many types of advance directives, and each state has rules about how to use them  You may choose a combination of any of the following:  · Living will: This is a written record of the treatment you want  You can also choose which treatments you do not want, which to limit, and which to stop at a certain time  This includes surgery, medicine, IV fluid, and tube feedings  · Durable power of  for healthcare [facility]): This is a written record that states who you want to make healthcare choices for you when you are unable to make them for yourself  This person, called a proxy, is usually a family member or a friend  You may choose more than 1 proxy    · Do not resuscitate (DNR) order:  A DNR order is used in case your heart stops beating or you stop breathing  It is a request not to have certain forms of treatment, such as CPR  A DNR order may be included in other types of advance directives  · Medical directive: This covers the care that you want if you are in a coma, near death, or unable to make decisions for yourself  You can list the treatments you want for each condition  Treatment may include pain medicine, surgery, blood transfusions, dialysis, IV or tube feedings, and a ventilator (breathing machine)  · Values history: This document has questions about your views, beliefs, and how you feel and think about life  This information can help others choose the care that you would choose  Why are advance directives important? An advance directive helps you control your care  Although spoken wishes may be used, it is better to have your wishes written down  Spoken wishes can be misunderstood, or not followed  Treatments may be given even if you do not want them  An advance directive may make it easier for your family to make difficult choices about your care  Fall Prevention    Fall prevention  includes ways to make your home and other areas safer  It also includes ways you can move more carefully to prevent a fall  Health conditions that cause changes in your blood pressure, vision, or muscle strength and coordination may increase your risk for falls  Medicines may also increase your risk for falls if they make you dizzy, weak, or sleepy  Fall prevention tips:   · Stand or sit up slowly  · Use assistive devices as directed  · Wear shoes that fit well and have soles that   · Wear a personal alarm  · Stay active  · Manage your medical conditions  Home Safety Tips:  · Add items to prevent falls in the bathroom  · Keep paths clear  · Install bright lights in your home  · Keep items you use often on shelves within reach      · Paint or place reflective tape on the edges of your stairs  Weight Management   Why it is important to manage your weight:  Being overweight increases your risk of health conditions such as heart disease, high blood pressure, type 2 diabetes, and certain types of cancer  It can also increase your risk for osteoarthritis, sleep apnea, and other respiratory problems  Aim for a slow, steady weight loss  Even a small amount of weight loss can lower your risk of health problems  How to lose weight safely:  A safe and healthy way to lose weight is to eat fewer calories and get regular exercise  You can lose up about 1 pound a week by decreasing the number of calories you eat by 500 calories each day  Healthy meal plan for weight management:  A healthy meal plan includes a variety of foods, contains fewer calories, and helps you stay healthy  A healthy meal plan includes the following:  · Eat whole-grain foods more often  A healthy meal plan should contain fiber  Fiber is the part of grains, fruits, and vegetables that is not broken down by your body  Whole-grain foods are healthy and provide extra fiber in your diet  Some examples of whole-grain foods are whole-wheat breads and pastas, oatmeal, brown rice, and bulgur  · Eat a variety of vegetables every day  Include dark, leafy greens such as spinach, kale, mg greens, and mustard greens  Eat yellow and orange vegetables such as carrots, sweet potatoes, and winter squash  · Eat a variety of fruits every day  Choose fresh or canned fruit (canned in its own juice or light syrup) instead of juice  Fruit juice has very little or no fiber  · Eat low-fat dairy foods  Drink fat-free (skim) milk or 1% milk  Eat fat-free yogurt and low-fat cottage cheese  Try low-fat cheeses such as mozzarella and other reduced-fat cheeses  · Choose meat and other protein foods that are low in fat  Choose beans or other legumes such as split peas or lentils   Choose fish, skinless poultry (chicken or turkey), or lean cuts of red meat (beef or pork)  Before you cook meat or poultry, cut off any visible fat  · Use less fat and oil  Try baking foods instead of frying them  Add less fat, such as margarine, sour cream, regular salad dressing and mayonnaise to foods  Eat fewer high-fat foods  Some examples of high-fat foods include french fries, doughnuts, ice cream, and cakes  · Eat fewer sweets  Limit foods and drinks that are high in sugar  This includes candy, cookies, regular soda, and sweetened drinks  Exercise:  Exercise at least 30 minutes per day on most days of the week  Some examples of exercise include walking, biking, dancing, and swimming  You can also fit in more physical activity by taking the stairs instead of the elevator or parking farther away from stores  Ask your healthcare provider about the best exercise plan for you  © Copyright [employer] 2018 Information is for End User's use only and may not be sold, redistributed or otherwise used for commercial purposes  All illustrations and images included in CareNotes® are the copyrighted property of [employer]  or [employer]         Return in about 4 months (around 3/25/2020)  Subjective:      Patient ID: Kostas Hernández is a 80 y o  female  Chief Complaint   Patient presents with    Medication Problem     Check meds [facility]       Here for follow up of hypertension, hypothyroidism, hyperlipidemia  Feels well and has decided that she is not going to have her colostomy reversed; she does not want to go through the anesthesia  She denies chest pain or dyspnea  Getting around okay at home  Daughter got her a home medical alarm and she is using this consistently  Weight has gone up since her surgery and she is eating well          The following portions of the patient's history were reviewed and updated as appropriate: allergies, current medications, past family history, past medical history, past social history, past surgical history and problem list     Review of Systems   Constitutional: Negative  Respiratory: Negative  Cardiovascular: Negative  Current Outpatient Medications   Medication Sig Dispense Refill    acetaminophen (TYLENOL) 325 mg tablet Take 2 tablets (650 mg total) by mouth every 6 (six) hours as needed for mild pain, headaches or fever 30 tablet 0    aspirin 81 MG tablet Take 81 mg by mouth every morning        Cholecalciferol (VITAMIN D3) 125 MCG (5000 UT) CAPS Take by mouth      diltiazem (CARDIZEM CD) 180 mg 24 hr capsule TAKE 1 CAPSULE DAILY 90 capsule 3    exemestane (AROMASIN) 25 MG tablet Take 25 mg by mouth daily   fluticasone (FLONASE) 50 mcg/act nasal spray       hydroxyurea (HYDREA) 500 mg capsule Take 500 mg by mouth daily m w f      levothyroxine 50 mcg tablet Take 1 tablet (50 mcg total) by mouth every morning 90 tablet 3    Multiple Vitamins-Minerals (CENTRUM SILVER ULTRA WOMENS) TABS Take by mouth      omeprazole (PriLOSEC) 40 MG capsule Take 1 capsule (40 mg total) by mouth daily 90 capsule 4    esomeprazole (NexIUM) 40 MG capsule Take 40 mg by mouth every morning before breakfast       ezetimibe-simvastatin (VYTORIN) 10-40 mg per tablet Take 1 tablet by mouth daily at bedtime  No current facility-administered medications for this visit  Objective:    /78   Pulse 84   Temp 98 7 °F (37 1 °C)   Resp 16   Ht 5' 1" (1 549 m)   Wt 75 8 kg (167 lb)   BMI 31 55 kg/m²        Physical Exam   Constitutional: She appears well-developed and well-nourished  Eyes: Conjunctivae are normal    Neck: Neck supple  No JVD present  No thyromegaly present  Cardiovascular: Normal rate, regular rhythm, normal heart sounds and intact distal pulses  Exam reveals no gallop and no friction rub  No murmur heard  Pulmonary/Chest: Effort normal and breath sounds normal  She has no wheezes  She has no rales  Abdominal: Soft   Bowel sounds are normal  She exhibits no distension  There is no tenderness  Musculoskeletal: She exhibits no edema                Hoa Hernandez MD

## 2019-11-25 ENCOUNTER — OFFICE VISIT (OUTPATIENT)
Dept: FAMILY MEDICINE CLINIC | Facility: CLINIC | Age: 84
End: 2019-11-25
Payer: MEDICARE

## 2019-11-25 VITALS
SYSTOLIC BLOOD PRESSURE: 140 MMHG | BODY MASS INDEX: 31.53 KG/M2 | RESPIRATION RATE: 16 BRPM | DIASTOLIC BLOOD PRESSURE: 78 MMHG | TEMPERATURE: 98.7 F | HEIGHT: 61 IN | HEART RATE: 84 BPM | WEIGHT: 167 LBS

## 2019-11-25 DIAGNOSIS — I10 BENIGN ESSENTIAL HYPERTENSION: Primary | ICD-10-CM

## 2019-11-25 DIAGNOSIS — E78.5 HYPERLIPIDEMIA, UNSPECIFIED HYPERLIPIDEMIA TYPE: ICD-10-CM

## 2019-11-25 DIAGNOSIS — K21.9 GASTROESOPHAGEAL REFLUX DISEASE WITHOUT ESOPHAGITIS: ICD-10-CM

## 2019-11-25 DIAGNOSIS — E03.9 HYPOTHYROIDISM, UNSPECIFIED TYPE: ICD-10-CM

## 2019-11-25 PROCEDURE — G0439 PPPS, SUBSEQ VISIT: HCPCS | Performed by: INTERNAL MEDICINE

## 2019-11-25 PROCEDURE — 99214 OFFICE O/P EST MOD 30 MIN: CPT | Performed by: INTERNAL MEDICINE

## 2019-11-25 RX ORDER — MAG HYDROX/ALUMINUM HYD/SIMETH 400-400-40
SUSPENSION, ORAL (FINAL DOSE FORM) ORAL
COMMUNITY
End: 2021-01-28 | Stop reason: HOSPADM

## 2019-11-25 RX ORDER — LEVOTHYROXINE SODIUM 0.05 MG/1
50 TABLET ORAL EVERY MORNING
Qty: 90 TABLET | Refills: 3 | Status: SHIPPED | OUTPATIENT
Start: 2019-11-25 | End: 2019-12-15 | Stop reason: SDUPTHER

## 2019-11-25 RX ORDER — OMEPRAZOLE 40 MG/1
40 CAPSULE, DELAYED RELEASE ORAL DAILY
Qty: 90 CAPSULE | Refills: 4 | Status: SHIPPED | OUTPATIENT
Start: 2019-11-25 | End: 2019-11-25 | Stop reason: SDUPTHER

## 2019-11-25 RX ORDER — OMEPRAZOLE 40 MG/1
40 CAPSULE, DELAYED RELEASE ORAL DAILY
Qty: 90 CAPSULE | Refills: 4 | Status: SHIPPED | OUTPATIENT
Start: 2019-11-25 | End: 2020-02-18 | Stop reason: SDUPTHER

## 2019-11-25 RX ORDER — MULTIVIT,IRON,MINERALS/LUTEIN
TABLET ORAL
COMMUNITY

## 2019-11-25 RX ORDER — LEVOTHYROXINE SODIUM 0.05 MG/1
50 TABLET ORAL EVERY MORNING
Qty: 90 TABLET | Refills: 3 | Status: SHIPPED | OUTPATIENT
Start: 2019-11-25 | End: 2019-11-25 | Stop reason: SDUPTHER

## 2019-11-25 NOTE — ASSESSMENT & PLAN NOTE
Continue levothyroxine and will check TSH  Has had some purposeful weight gain but is otherwise euthyroid

## 2019-11-25 NOTE — PROGRESS NOTES
Assessment and Plan:     Problem List Items Addressed This Visit     None           Preventive health issues were discussed with patient, and age appropriate screening tests were ordered as noted in patient's After Visit Summary  Personalized health advice and appropriate referrals for health education or preventive services given if needed, as noted in patient's After Visit Summary       History of Present Illness:     Patient presents for Medicare Annual Wellness visit    Patient Care Team:  Radha Walton MD as PCP - MD Lisa Mitchell MD Sharion Rome, CRNP Allene Dials, MD     Problem List:     Patient Active Problem List   Diagnosis    Nephrolithiasis    Abnormal neutrophil count    Adenocarcinoma of breast ([place] [address] )    Asymptomatic varicose veins of bilateral lower extremities    Atrophic vaginitis    Benign colon polyp    Benign essential hypertension    Chronic pain of left knee    Chronic rhinitis    Complete uterovaginal prolapse    Congenital anomaly of coronary artery    CAD (coronary artery disease)    Dizziness    Esophageal reflux    Gait disturbance    GERD (gastroesophageal reflux disease)    Healed myocardial infarct    Hyperlipidemia    Hypothyroidism    Irritable bowel syndrome    Left knee pain    Osteoarthritis of left knee    Peripheral vertigo    Psoriasis    Rectocele    Stress incontinence in female    Venous insufficiency (chronic) (peripheral)    Anxiety    Chronic venous insufficiency    Perforation of sigmoid colon due to diverticulitis    Diverticulosis large intestine w/o perforation or abscess w/bleeding    Surgical follow-up care    Colostomy status ([place] [address] )    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[facility])      Past Medical and Surgical History:     Past Medical History:   Diagnosis Date    Arthritis     joints    Breast cancer ([place] [address] ) 2005    right    Cancer ([place] [address] )     breast ca, skin ca, currently in remission, tumors in chest wall    Cardiac disease     hx MI x 2    Chronic pain disorder     knees    Coronary artery disease     Disease of thyroid gland     hypothyroidism    GERD (gastroesophageal reflux disease)     History of prolapse of bladder     History of radiation therapy 2005    to breast right    Hypertension     Kidney stone     2016    Myocardial infarction [facility])     1990's x2     Past Surgical History:   Procedure Laterality Date    APPENDECTOMY      during HAMLET    BLADDER SURGERY      suspension surgery no sling, used fiberglass suspension technique    BREAST LUMPECTOMY Right 2005    BREAST SURGERY Right     lumpectomy    CHEST WALL TUMOR EXCISION  2005    COLONOSCOPY      CYSTOSCOPY      HARTMANS PROCEDURE N/A 5/9/2019    Procedure: Araceli Saenz PROCEDURE;  Surgeon: Kate Mcdaniel MD;  Location: [facility] MAIN OR;  Service: General    HYSTERECTOMY Bilateral     hamlet w/removal of both ovaries    KNEE ARTHROSCOPY      Last assessed: 7/30/15    [place] CYSTOURETHROSCOPY,URETER CATHETER Left 2/26/2016    Procedure: CYSTOSCOPY RETROGRADE PYELOGRAM WITH INSERTION STENT URETERAL;  Surgeon: Kevin Dunlap MD;  Location: [address] [place] [address];  Service: Urology     [facility] CATARACT EXTRACAP,INSERT LENS Left 4/10/2017    Procedure: EXTRACTION EXTRACAPSULAR CATARACT PHACO INTRAOCULAR LENS (IOL); Surgeon: Lulu Corona MD;  Location: [facility] MAIN OR;  Service: Ophthalmology    SKIN BIOPSY Left     Leg for [facility]      Family History:     Family History   Problem Relation Age of Onset    Angina Mother     Hypertension Mother     Heart attack Mother         Myocardial Infarction    Early death Father         age 36 MI    Heart disease Father     Hypertension Father     Heart attack Father         Myocardial Infarction    Heart disease Sister     Heart disease Sister         Cardiac Disorder      Social History:     Social History     Socioeconomic History    Marital status:       Spouse name: None    Number of children: None    Years of education: None    Highest education level: None   Occupational History    None   Social Needs    Financial resource strain: None    Food insecurity:     Worry: None     Inability: None    Transportation needs:     Medical: None     Non-medical: None   Tobacco Use    Smoking status: Former Smoker     Packs/day: 0 10     Last attempt to quit: 1970     Years since quittin 9    Smokeless tobacco: Never Used   Substance and Sexual Activity    Alcohol use: Not Currently     Frequency: Never    Drug use: No    Sexual activity: Not Currently   Lifestyle    Physical activity:     Days per week: None     Minutes per session: None    Stress: None   Relationships    Social connections:     Talks on phone: None     Gets together: None     Attends Sikh service: None     Active member of club or organization: None     Attends meetings of clubs or organizations: None     Relationship status: None    Intimate partner violence:     Fear of current or ex partner: None     Emotionally abused: None     Physically abused: None     Forced sexual activity: None   Other Topics Concern    None   Social History Narrative    Cultural background: Non-    Primary spoken-language English    Racial background: white    Retired       Medications and Allergies:     Current Outpatient Medications   Medication Sig Dispense Refill    acetaminophen (TYLENOL) 325 mg tablet Take 2 tablets (650 mg total) by mouth every 6 (six) hours as needed for mild pain, headaches or fever 30 tablet 0    aspirin 81 MG tablet Take 81 mg by mouth every morning        Cholecalciferol (VITAMIN D3) 125 MCG (5000 UT) CAPS Take by mouth      diltiazem (CARDIZEM CD) 180 mg 24 hr capsule TAKE 1 CAPSULE DAILY 90 capsule 3    exemestane (AROMASIN) 25 MG tablet Take 25 mg by mouth daily        fluticasone (FLONASE) 50 mcg/act nasal spray       hydroxyurea (HYDREA) 500 mg capsule Take 500 mg by mouth daily       levothyroxine 50 mcg tablet Take 1 tablet (50 mcg total) by mouth every morning 90 tablet 3    Multiple Vitamins-Minerals (CENTRUM SILVER ULTRA WOMENS) TABS Take by mouth      omeprazole (PriLOSEC) 40 MG capsule TAKE 1 CAPSULE DAILY 90 capsule 4    esomeprazole (NexIUM) 40 MG capsule Take 40 mg by mouth every morning before breakfast       ezetimibe-simvastatin (VYTORIN) 10-40 mg per tablet Take 1 tablet by mouth daily at bedtime   melatonin 3 mg Take 2 tablets (6 mg total) by mouth daily at bedtime (Patient not taking: Reported on 11/25/2019)  0    polyethylene glycol (MIRALAX) 17 g packet Take 17 g by mouth daily (Patient not taking: Reported on 11/25/2019) 14 each 0     No current facility-administered medications for this visit  Allergies   Allergen Reactions    Other Anaphylaxis     Blue Dye during ultrasound  Blue Dye during ultrasound  Blue Dye during ultrasound    Ciprofloxacin Hives and Itching     Redness  Redness  Annotation - [id number]: rash and itching    Clindamycin Hives and Itching     Redness  Redness  Reaction Date: 06Aug2012;     Clindamycin/Lincomycin     Levaquin [Levofloxacin] Hives and Itching     Redness  Reaction Date: [id number]; Annotation - [id number]: "FACIAL ITCHING"  Redness    Lincomycin     Nitrofuran Derivatives     Penicillins Hives and Itching     Redness  Reaction Date: 07Dec2005; Redness  Tolerated Pip/tazo 5/10/2019    Sulfa Antibiotics Hives and Itching     Redness  Redness  Reaction Date: 07Dec2005; Redness    Sulfacetamide     Sulfasalazine     Ceftriaxone Rash     Tolerates Cephalexin     Iv Contrast  [Iodinated Diagnostic Agents] Itching and Rash     Other reaction(s): Anaphylaxis    Linezolid Rash     Rash,flushed face after 3rd day of taking this ABX, started benadryl subsided by the end of the nite      Nitrofurantoin Hives, Itching, Nausea Only and Rash     Redness  Reaction Date: [id number];   Redness      Immunizations:     Immunization History Administered Date(s) Administered    INFLUENZA 10/17/2018    Influenza Split High Dose Preservative Free IM 10/19/2012, 10/09/2013, 09/15/2016, 10/20/2017    Influenza TIV (IM) 10/19/2006, 10/25/2007, 10/20/2008, 09/25/2009, 11/18/2010, 10/03/2011, 10/12/2015    Pneumococcal Conjugate 13-Valent 01/04/2015    Pneumococcal Polysaccharide PPV23 10/21/2013    Td (adult), adsorbed 04/19/2006    Tdap 12/08/2016, 12/08/2016    Zoster 08/06/2012      Health Maintenance: There are no preventive care reminders to display for this patient  Topic Date Due    Influenza Vaccine  07/01/2019      Medicare Health Risk Assessment:     /78   Pulse 84   Temp 98 7 °F (37 1 °C)   Resp 16   Ht 5' 1" (1 549 m)   Wt 75 8 kg (167 lb)   BMI 31 55 kg/m²      Olegario Marshall is here for her Subsequent Wellness visit  Health Risk Assessment:   Patient rates overall health as good  Patient feels that their physical health rating is same  Eyesight was rated as same  Hearing was rated as same  Patient feels that their emotional and mental health rating is same  Pain experienced in the last 7 days has been some  Patient's pain rating has been 5/10  Patient states that she has experienced no weight loss or gain in last 6 months  Depression Screening:   PHQ-2 Score: 0  PHQ-9 Score: 2      Fall Risk Screening: In the past year, patient has experienced: history of falling in past year    Number of falls: 1  Injured during fall?: Yes    Feels unsteady when standing or walking?: Yes    Worried about falling?: No      Urinary Incontinence Screening:   Patient has not leaked urine accidently in the last six months  Home Safety:  Patient has trouble with stairs inside or outside of their home  Patient has working smoke alarms and has working carbon monoxide detector  Home safety hazards include: none  Nutrition:   Current diet is Regular  Medications:   Patient is currently taking over-the-counter supplements   OTC medications include: see medication list  Patient is not able to manage medications  Daughter  helps    Activities of Daily Living (ADLs)/Instrumental Activities of Daily Living (IADLs):   Walk and transfer into and out of bed and chair?: Yes  Dress and groom yourself?: Yes    Bathe or shower yourself?: Yes    Feed yourself? Yes  Do your laundry/housekeeping?: No  Manage your money, pay your bills and track your expenses?: Yes  Make your own meals?: Yes    Do your own shopping?: No    ADL comments: Daughter helps      Previous Hospitalizations:   Any hospitalizations or ED visits within the last 12 months?: Yes    How many hospitalizations have you had in the last year?: 1-2    Hospitalization Comments: ER for colostomy  bag surgery May 9, 2019     Advance Care Planning:   Living will: Yes    Advanced directive: Yes    End of Life Decisions reviewed with patient: Yes      Cognitive Screening:   Provider or family/friend/caregiver concerned regarding cognition?: No    PREVENTIVE SCREENINGS      Cardiovascular Screening:    General: Screening Not Indicated and History Lipid Disorder      Diabetes Screening:     General: Screening Current      Colorectal Cancer Screening:     General: Screening Not Indicated      Breast Cancer Screening:     General: History Breast Cancer      Cervical Cancer Screening:    General: Screening Not Indicated      Osteoporosis Screening:    General: Patient Declines      Abdominal Aortic Aneurysm (AAA) Screening:        General: Screening Not Indicated      Lung Cancer Screening:     General: Screening Not Indicated      Hepatitis C Screening:    General: Screening Not Indicated    Other Counseling Topics:   Alcohol use counseling, car/seat belt/driving safety, skin self-exam, sunscreen and regular weightbearing exercise and calcium and vitamin D intake         Hoa Hernandez MD

## 2019-11-25 NOTE — ASSESSMENT & PLAN NOTE
She continues on omeprazole and has no symptoms  We discussed cutting back but she is not willing to do this and will try to take this PRN

## 2019-11-25 NOTE — PATIENT INSTRUCTIONS
Medicare Preventive Visit Patient Instructions  Thank you for completing your Welcome to Medicare Visit or Medicare Annual Wellness Visit today  Your next wellness visit will be due in one year (11/25/2020)  The screening/preventive services that you may require over the next 5-10 years are detailed below  Some tests may not apply to you based off risk factors and/or age  Screening tests ordered at today's visit but not completed yet may show as past due  Also, please note that scanned in results may not display below  Preventive Screenings:  Service Recommendations Previous Testing/Comments   Colorectal Cancer Screening  * Colonoscopy    * Fecal Occult Blood Test (FOBT)/Fecal Immunochemical Test (FIT)  * Fecal DNA/Cologuard Test  * Flexible Sigmoidoscopy Age: 54-65 years old   Colonoscopy: every 10 years (may be performed more frequently if at higher risk)  OR  FOBT/FIT: every 1 year  OR  Cologuard: every 3 years  OR  Sigmoidoscopy: every 5 years  Screening may be recommended earlier than age 48 if at higher risk for colorectal cancer  Also, an individualized decision between you and your healthcare provider will decide whether screening between the ages of 74-80 would be appropriate  Colonoscopy: 12/12/2013  FOBT/FIT: Not on file  Cologuard: Not on file  Sigmoidoscopy: Not on file    Screening Not Indicated     Breast Cancer Screening Age: 36 years old  Frequency: every 1-2 years  Not required if history of left and right mastectomy Mammogram: 12/05/2018    History Breast Cancer   Cervical Cancer Screening Between the ages of 21-29, pap smear recommended once every 3 years  Between the ages of 33-67, can perform pap smear with HPV co-testing every 5 years     Recommendations may differ for women with a history of total hysterectomy, cervical cancer, or abnormal pap smears in past  Pap Smear: Not on file    Screening Not Indicated   Hepatitis C Screening Once for adults born between [facility] frequently in patients at high risk for Hepatitis C Hep C Antibody: Not on file       Diabetes Screening 1-2 times per year if you're at risk for diabetes or have pre-diabetes Fasting glucose: 125 mg/dL   A1C: No results in last 5 years    Screening Current   Cholesterol Screening Once every 5 years if you don't have a lipid disorder  May order more often based on risk factors  Lipid panel: 11/02/2018    Screening Not Indicated  History Lipid Disorder     Other Preventive Screenings Covered by Medicare:  1  Abdominal Aortic Aneurysm (AAA) Screening: covered once if your at risk  You're considered to be at risk if you have a family history of AAA  2  Lung Cancer Screening: covers low dose CT scan once per year if you meet all of the following conditions: (1) Age 50-69; (2) No signs or symptoms of lung cancer; (3) Current smoker or have quit smoking within the last 15 years; (4) You have a tobacco smoking history of at least 30 pack years (packs per day multiplied by number of years you smoked); (5) You get a written order from a healthcare provider  3  Glaucoma Screening: covered annually if you're considered high risk: (1) You have diabetes OR (2) Family history of glaucoma OR (3)  aged 48 and older OR (3)  American aged 72 and older  3  Osteoporosis Screening: covered every 2 years if you meet one of the following conditions: (1) You're estrogen deficient and at risk for osteoporosis based off medical history and other findings; (2) Have a vertebral abnormality; (3) On glucocorticoid therapy for more than 3 months; (4) Have primary hyperparathyroidism; (5) On osteoporosis medications and need to assess response to drug therapy  · Last bone density test (DXA Scan): 12/05/2018  5  HIV Screening: covered annually if you're between the age of 12-76  Also covered annually if you are younger than 13 and older than 72 with risk factors for HIV infection   For pregnant patients, it is covered up to 3 times per pregnancy  Immunizations:  Immunization Recommendations   Influenza Vaccine Annual influenza vaccination during flu season is recommended for all persons aged >= 6 months who do not have contraindications   Pneumococcal Vaccine (Prevnar and Pneumovax)  * Prevnar = PCV13  * Pneumovax = PPSV23   Adults 25-60 years old: 1-3 doses may be recommended based on certain risk factors  Adults 72 years old: Prevnar (PCV13) vaccine recommended followed by Pneumovax (PPSV23) vaccine  If already received PPSV23 since turning 65, then PCV13 recommended at least one year after PPSV23 dose  Hepatitis B Vaccine 3 dose series if at intermediate or high risk (ex: diabetes, end stage renal disease, liver disease)   Tetanus (Td) Vaccine - COST NOT COVERED BY MEDICARE PART B Following completion of primary series, a booster dose should be given every 10 years to maintain immunity against tetanus  Td may also be given as tetanus wound prophylaxis  Tdap Vaccine - COST NOT COVERED BY MEDICARE PART B Recommended at least once for all adults  For pregnant patients, recommended with each pregnancy  Shingles Vaccine (Shingrix) - COST NOT COVERED BY MEDICARE PART B  2 shot series recommended in those aged 48 and above     Health Maintenance Due:  There are no preventive care reminders to display for this patient  Immunizations Due:      Topic Date Due    Influenza Vaccine  07/01/2019     Advance Directives   What are advance directives? Advance directives are legal documents that state your wishes and plans for medical care  These plans are made ahead of time in case you lose your ability to make decisions for yourself  Advance directives can apply to any medical decision, such as the treatments you want, and if you want to donate organs  What are the types of advance directives? There are many types of advance directives, and each state has rules about how to use them   You may choose a combination of any of the following:  · Living will: This is a written record of the treatment you want  You can also choose which treatments you do not want, which to limit, and which to stop at a certain time  This includes surgery, medicine, IV fluid, and tube feedings  · Durable power of  for healthcare [facility]): This is a written record that states who you want to make healthcare choices for you when you are unable to make them for yourself  This person, called a proxy, is usually a family member or a friend  You may choose more than 1 proxy  · Do not resuscitate (DNR) order:  A DNR order is used in case your heart stops beating or you stop breathing  It is a request not to have certain forms of treatment, such as CPR  A DNR order may be included in other types of advance directives  · Medical directive: This covers the care that you want if you are in a coma, near death, or unable to make decisions for yourself  You can list the treatments you want for each condition  Treatment may include pain medicine, surgery, blood transfusions, dialysis, IV or tube feedings, and a ventilator (breathing machine)  · Values history: This document has questions about your views, beliefs, and how you feel and think about life  This information can help others choose the care that you would choose  Why are advance directives important? An advance directive helps you control your care  Although spoken wishes may be used, it is better to have your wishes written down  Spoken wishes can be misunderstood, or not followed  Treatments may be given even if you do not want them  An advance directive may make it easier for your family to make difficult choices about your care  Fall Prevention    Fall prevention  includes ways to make your home and other areas safer  It also includes ways you can move more carefully to prevent a fall   Health conditions that cause changes in your blood pressure, vision, or muscle strength and coordination may increase your risk for falls  Medicines may also increase your risk for falls if they make you dizzy, weak, or sleepy  Fall prevention tips:   · Stand or sit up slowly  · Use assistive devices as directed  · Wear shoes that fit well and have soles that   · Wear a personal alarm  · Stay active  · Manage your medical conditions  Home Safety Tips:  · Add items to prevent falls in the bathroom  · Keep paths clear  · Install bright lights in your home  · Keep items you use often on shelves within reach  · Paint or place reflective tape on the edges of your stairs  Weight Management   Why it is important to manage your weight:  Being overweight increases your risk of health conditions such as heart disease, high blood pressure, type 2 diabetes, and certain types of cancer  It can also increase your risk for osteoarthritis, sleep apnea, and other respiratory problems  Aim for a slow, steady weight loss  Even a small amount of weight loss can lower your risk of health problems  How to lose weight safely:  A safe and healthy way to lose weight is to eat fewer calories and get regular exercise  You can lose up about 1 pound a week by decreasing the number of calories you eat by 500 calories each day  Healthy meal plan for weight management:  A healthy meal plan includes a variety of foods, contains fewer calories, and helps you stay healthy  A healthy meal plan includes the following:  · Eat whole-grain foods more often  A healthy meal plan should contain fiber  Fiber is the part of grains, fruits, and vegetables that is not broken down by your body  Whole-grain foods are healthy and provide extra fiber in your diet  Some examples of whole-grain foods are whole-wheat breads and pastas, oatmeal, brown rice, and bulgur  · Eat a variety of vegetables every day  Include dark, leafy greens such as spinach, kale, mg greens, and mustard greens   Eat yellow and orange vegetables such as carrots, sweet potatoes, and winter squash  · Eat a variety of fruits every day  Choose fresh or canned fruit (canned in its own juice or light syrup) instead of juice  Fruit juice has very little or no fiber  · Eat low-fat dairy foods  Drink fat-free (skim) milk or 1% milk  Eat fat-free yogurt and low-fat cottage cheese  Try low-fat cheeses such as mozzarella and other reduced-fat cheeses  · Choose meat and other protein foods that are low in fat  Choose beans or other legumes such as split peas or lentils  Choose fish, skinless poultry (chicken or turkey), or lean cuts of red meat (beef or pork)  Before you cook meat or poultry, cut off any visible fat  · Use less fat and oil  Try baking foods instead of frying them  Add less fat, such as margarine, sour cream, regular salad dressing and mayonnaise to foods  Eat fewer high-fat foods  Some examples of high-fat foods include french fries, doughnuts, ice cream, and cakes  · Eat fewer sweets  Limit foods and drinks that are high in sugar  This includes candy, cookies, regular soda, and sweetened drinks  Exercise:  Exercise at least 30 minutes per day on most days of the week  Some examples of exercise include walking, biking, dancing, and swimming  You can also fit in more physical activity by taking the stairs instead of the elevator or parking farther away from stores  Ask your healthcare provider about the best exercise plan for you  © Copyright [employer] 2018 Information is for End User's use only and may not be sold, redistributed or otherwise used for commercial purposes   All illustrations and images included in CareNotes® are the copyrighted property of A D A M , Inc  or [address] [place] [address] [employer]

## 2019-12-02 ENCOUNTER — TELEPHONE (OUTPATIENT)
Dept: FAMILY MEDICINE CLINIC | Facility: CLINIC | Age: 84
End: 2019-12-02

## 2019-12-02 NOTE — TELEPHONE ENCOUNTER
Pt will be going to [facility] tomorrow 12/3/2019 for shingles vaccine      Teodoro Wilkinson MA

## 2019-12-02 NOTE — TELEPHONE ENCOUNTER
Mariam Laguerre, daughter calling regarding shingles vaccine  When was Jeimy's last one and does she need another one? Please call Mariam Carlotta back and let her know  ,    Thank you

## 2019-12-05 RX ORDER — HYDROXYUREA 500 MG/1
500 CAPSULE ORAL DAILY
OUTPATIENT
Start: 2019-12-05

## 2019-12-05 NOTE — TELEPHONE ENCOUNTER
Refill hydroxyurea (HYDREA) 500 mg capsule     [facility]     Patient was it Delivered Please

## 2019-12-05 NOTE — TELEPHONE ENCOUNTER
She should be getting this from her hematologist at [address] [place] [address] so they can monitor

## 2019-12-11 ENCOUNTER — OFFICE VISIT (OUTPATIENT)
Dept: SURGERY | Facility: CLINIC | Age: 84
End: 2019-12-11
Payer: MEDICARE

## 2019-12-11 VITALS
TEMPERATURE: 96.7 F | SYSTOLIC BLOOD PRESSURE: 136 MMHG | DIASTOLIC BLOOD PRESSURE: 78 MMHG | BODY MASS INDEX: 30.66 KG/M2 | HEIGHT: 61 IN | WEIGHT: 162.4 LBS

## 2019-12-11 DIAGNOSIS — Z09 SURGICAL FOLLOW-UP CARE: ICD-10-CM

## 2019-12-11 DIAGNOSIS — N81.6 RECTOCELE: ICD-10-CM

## 2019-12-11 DIAGNOSIS — K58.9 IRRITABLE BOWEL SYNDROME: ICD-10-CM

## 2019-12-11 DIAGNOSIS — K57.20 PERFORATION OF SIGMOID COLON DUE TO DIVERTICULITIS: ICD-10-CM

## 2019-12-11 DIAGNOSIS — N39.3 STRESS INCONTINENCE IN FEMALE: ICD-10-CM

## 2019-12-11 DIAGNOSIS — Z93.3 COLOSTOMY IN PLACE (HCC): Primary | ICD-10-CM

## 2019-12-11 PROCEDURE — 99213 OFFICE O/P EST LOW 20 MIN: CPT | Performed by: SPECIALIST

## 2019-12-11 NOTE — PROGRESS NOTES
General Surgery Office Visit Follow up   [facility]  Patient: Dylan Garrido   : 3/7/1928 Sex: female MRN: [id number]   CSN: [id number] PCP: Bhanu Diggs MD    Assessment/ Plan:  yDlan Garrido is a 80 y o  female  patient is status post a sigmoid colostomy/Mindy's procedure  Perforated acute diverticulitis  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[facility]) [Z93 3]    Plan  Discharge home follow-up    SUBJECTIVE:   I am doing very well I am well used to of the colostomy I do not want my colostomy to be reversed my bowels are doing much better since operation  OBJECTIVE:  No complaints  No fever no chills no rigors  Tolerating p o  Diet well  Normal bowel movement no constipation or diarrhea  Ambulating well   Vitals:   /78 (BP Location: Right arm, Patient Position: Sitting, Cuff Size: Standard)   Temp (!) 96 7 °F (35 9 °C) (Tympanic)   Ht 5' 1" (1 549 m)   Wt 73 7 kg (162 lb 6 4 oz)   BMI 30 69 kg/m²     Active medications:    Current Outpatient Medications:     acetaminophen (TYLENOL) 325 mg tablet, Take 2 tablets (650 mg total) by mouth every 6 (six) hours as needed for mild pain, headaches or fever, Disp: 30 tablet, Rfl: 0    aspirin 81 MG tablet, Take 81 mg by mouth every morning  , Disp: , Rfl:     Cholecalciferol (VITAMIN D3) 125 MCG (5000 UT) CAPS, Take by mouth, Disp: , Rfl:     diltiazem (CARDIZEM CD) 180 mg 24 hr capsule, TAKE 1 CAPSULE DAILY, Disp: 90 capsule, Rfl: 3    esomeprazole (NexIUM) 40 MG capsule, Take 40 mg by mouth every morning before breakfast , Disp: , Rfl:     exemestane (AROMASIN) 25 MG tablet, Take 25 mg by mouth daily  , Disp: , Rfl:     ezetimibe-simvastatin (VYTORIN) 10-40 mg per tablet, Take 1 tablet by mouth daily at bedtime  , Disp: , Rfl:     hydroxyurea (HYDREA) 500 mg capsule, Take 500 mg by mouth daily m w , Disp: , Rfl:     levothyroxine 50 mcg tablet, Take 1 tablet (50 mcg total) by mouth every morning, Disp: 90 tablet, Rfl: 3    Multiple Vitamins-Minerals (CENTRUM SILVER ULTRA WOMENS) TABS, Take by mouth, Disp: , Rfl:     omeprazole (PriLOSEC) 40 MG capsule, Take 1 capsule (40 mg total) by mouth daily, Disp: 90 capsule, Rfl: 4    fluticasone (FLONASE) 50 mcg/act nasal spray, , Disp: , Rfl:     Physical Exam:   General Alert awake   Normocephalic atraumatic PERRLA   Lungs clear bilaterally  Cardiac normal S1 normal S2  Abdomen soft,non tender Bowel sounds present colostomy in place no hernia noted  Ext: No clubbing, cyanosis, edema  Surgical wound well healed    Visit Diagnosis:   Diagnoses and all orders for this visit:    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[facility])    Surgical follow-up care    Stress incontinence in female    Perforation of sigmoid colon due to diverticulitis    Rectocele    Irritable bowel syndrome       Plan of care was discussed with patient in detail    Pertinent labs reviewed  Most Recent Labs:   No visits with results within 2 Week(s) from this visit  Latest known visit with results is:   Appointment on 10/08/2019   Component Date Value Ref Range Status    GGT 10/08/2019 28  5 - 85 U/L Final    Sodium 10/08/2019 143  136 - 145 mmol/L Final    Potassium 10/08/2019 4 8  3 5 - 5 3 mmol/L Final    Chloride 10/08/2019 104  100 - 108 mmol/L Final    CO2 10/08/2019 31  21 - 32 mmol/L Final    ANION GAP 10/08/2019 8  4 - 13 mmol/L Final    BUN 10/08/2019 23  5 - 25 mg/dL Final    Creatinine 10/08/2019 1 00  0 60 - 1 30 mg/dL Final    Standardized to IDMS reference method    Glucose 10/08/2019 111  65 - 140 mg/dL Final      If the patient is fasting, the ADA then defines impaired fasting glucose as > 100 mg/dL and diabetes as > or equal to 123 mg/dL  Specimen collection should occur prior to Sulfasalazine administration due to the potential for falsely depressed results  Specimen collection should occur prior to Sulfapyridine administration due to the potential for falsely elevated results      Calcium 10/08/2019 9 7  8 3 - 10 1 mg/dL Final    AST 10/08/2019 16  5 - 45 U/L Final      Specimen collection should occur prior to Sulfasalazine administration due to the potential for falsely depressed results   ALT 10/08/2019 17  12 - 78 U/L Final      Specimen collection should occur prior to Sulfasalazine administration due to the potential for falsely depressed results   Alkaline Phosphatase 10/08/2019 93  46 - 116 U/L Final    Total Protein 10/08/2019 7 0  6 4 - 8 2 g/dL Final    Albumin 10/08/2019 3 9  3 5 - 5 0 g/dL Final    Total Bilirubin 10/08/2019 0 40  0 20 - 1 00 mg/dL Final    eGFR 10/08/2019 49  ml/min/1 73sq m Final    CA 27 29 10/08/2019 54 3* 0 0 - 42 3 U/mL Final       Pertinent images and available reads personally reviewed  No results found  Pertinent notes reviewed    Counseling / Coordination of Care  Total Office time /unit time spent today 15minutes  Greater than 50% of total time was spent with the patient and / or family counseling and / or coordination of care  A description of the counseling / coordination of care:  I performed an interim history, pertinent images and labs, performed a physical examination to arrive at the plan delineated above with associated thought processes  Family member/primary contact updated -  daughter at the bedside    Keron Eubanks MD MS [address]  12/11/19 11:06 AM        Portions of the record may have been created with voice recognition software  Occasional wrong word or "sound a like" substitutions may have occurred due to the inherent limitations of voice recognition software  Read the chart carefully and recognize, using context, where substitutions have occurred

## 2019-12-14 DIAGNOSIS — E03.9 HYPOTHYROIDISM, UNSPECIFIED TYPE: ICD-10-CM

## 2019-12-15 RX ORDER — LEVOTHYROXINE SODIUM 0.05 MG/1
50 TABLET ORAL EVERY MORNING
Qty: 90 TABLET | Refills: 0 | Status: SHIPPED | OUTPATIENT
Start: 2019-12-15 | End: 2020-12-06 | Stop reason: SDUPTHER

## 2019-12-31 ENCOUNTER — APPOINTMENT (OUTPATIENT)
Dept: LAB | Facility: HOSPITAL | Age: 84
End: 2019-12-31
Attending: INTERNAL MEDICINE
Payer: MEDICARE

## 2019-12-31 ENCOUNTER — TRANSCRIBE ORDERS (OUTPATIENT)
Dept: ADMINISTRATIVE | Facility: HOSPITAL | Age: 84
End: 2019-12-31

## 2019-12-31 DIAGNOSIS — Z90.10 POSTMASTECTOMY LYMPHANGIOSARCOMA SYNDROME, UNSPECIFIED LATERALITY (HCC): Primary | ICD-10-CM

## 2019-12-31 DIAGNOSIS — Z17.0 ESTROGEN RECEPTOR POSITIVE: ICD-10-CM

## 2019-12-31 DIAGNOSIS — C50.919 POSTMASTECTOMY LYMPHANGIOSARCOMA SYNDROME, UNSPECIFIED LATERALITY (HCC): Primary | ICD-10-CM

## 2019-12-31 LAB
ALBUMIN SERPL BCP-MCNC: 4.1 G/DL (ref 3.5–5)
ALP SERPL-CCNC: 92 U/L (ref 46–116)
ALT SERPL W P-5'-P-CCNC: 27 U/L (ref 12–78)
ANION GAP SERPL CALCULATED.3IONS-SCNC: 5 MMOL/L (ref 4–13)
AST SERPL W P-5'-P-CCNC: 15 U/L (ref 5–45)
BASOPHILS # BLD AUTO: 0.04 THOUSANDS/ΜL (ref 0–0.1)
BASOPHILS NFR BLD AUTO: 1 % (ref 0–1)
BILIRUB SERPL-MCNC: 0.4 MG/DL (ref 0.2–1)
BUN SERPL-MCNC: 24 MG/DL (ref 5–25)
CALCIUM SERPL-MCNC: 9.9 MG/DL (ref 8.3–10.1)
CHLORIDE SERPL-SCNC: 103 MMOL/L (ref 100–108)
CO2 SERPL-SCNC: 32 MMOL/L (ref 21–32)
CREAT SERPL-MCNC: 0.99 MG/DL (ref 0.6–1.3)
EOSINOPHIL # BLD AUTO: 0.14 THOUSAND/ΜL (ref 0–0.61)
EOSINOPHIL NFR BLD AUTO: 2 % (ref 0–6)
ERYTHROCYTE [DISTWIDTH] IN BLOOD BY AUTOMATED COUNT: 14.7 % (ref 11.6–15.1)
GFR SERPL CREATININE-BSD FRML MDRD: 50 ML/MIN/1.73SQ M
GLUCOSE SERPL-MCNC: 132 MG/DL (ref 65–140)
HCT VFR BLD AUTO: 44.1 % (ref 34.8–46.1)
HGB BLD-MCNC: 14.4 G/DL (ref 11.5–15.4)
IMM GRANULOCYTES # BLD AUTO: 0.02 THOUSAND/UL (ref 0–0.2)
IMM GRANULOCYTES NFR BLD AUTO: 0 % (ref 0–2)
LYMPHOCYTES # BLD AUTO: 1.71 THOUSANDS/ΜL (ref 0.6–4.47)
LYMPHOCYTES NFR BLD AUTO: 27 % (ref 14–44)
MCH RBC QN AUTO: 32.4 PG (ref 26.8–34.3)
MCHC RBC AUTO-ENTMCNC: 32.7 G/DL (ref 31.4–37.4)
MCV RBC AUTO: 99 FL (ref 82–98)
MONOCYTES # BLD AUTO: 0.33 THOUSAND/ΜL (ref 0.17–1.22)
MONOCYTES NFR BLD AUTO: 5 % (ref 4–12)
NEUTROPHILS # BLD AUTO: 4.16 THOUSANDS/ΜL (ref 1.85–7.62)
NEUTS SEG NFR BLD AUTO: 65 % (ref 43–75)
NRBC BLD AUTO-RTO: 0 /100 WBCS
PLATELET # BLD AUTO: 312 THOUSANDS/UL (ref 149–390)
PMV BLD AUTO: 10.5 FL (ref 8.9–12.7)
POTASSIUM SERPL-SCNC: 4.6 MMOL/L (ref 3.5–5.3)
PROT SERPL-MCNC: 7.5 G/DL (ref 6.4–8.2)
RBC # BLD AUTO: 4.44 MILLION/UL (ref 3.81–5.12)
SODIUM SERPL-SCNC: 140 MMOL/L (ref 136–145)
WBC # BLD AUTO: 6.4 THOUSAND/UL (ref 4.31–10.16)

## 2019-12-31 PROCEDURE — 36415 COLL VENOUS BLD VENIPUNCTURE: CPT | Performed by: INTERNAL MEDICINE

## 2019-12-31 PROCEDURE — 85025 COMPLETE CBC W/AUTO DIFF WBC: CPT | Performed by: INTERNAL MEDICINE

## 2019-12-31 PROCEDURE — 80053 COMPREHEN METABOLIC PANEL: CPT | Performed by: INTERNAL MEDICINE

## 2020-01-07 ENCOUNTER — HOSPITAL ENCOUNTER (INPATIENT)
Facility: HOSPITAL | Age: 85
LOS: 1 days | Discharge: HOME | DRG: 281 | End: 2020-01-09
Attending: EMERGENCY MEDICINE | Admitting: STUDENT IN AN ORGANIZED HEALTH CARE EDUCATION/TRAINING PROGRAM
Payer: MEDICARE

## 2020-01-07 ENCOUNTER — TELEPHONE (OUTPATIENT)
Dept: FAMILY MEDICINE CLINIC | Facility: CLINIC | Age: 85
End: 2020-01-07

## 2020-01-07 ENCOUNTER — APPOINTMENT (EMERGENCY)
Dept: RADIOLOGY | Facility: HOSPITAL | Age: 85
DRG: 281 | End: 2020-01-07
Payer: MEDICARE

## 2020-01-07 DIAGNOSIS — R07.9 CHEST PAIN: Primary | ICD-10-CM

## 2020-01-07 DIAGNOSIS — R06.00 DYSPNEA ON EXERTION: ICD-10-CM

## 2020-01-07 PROBLEM — Z09 SURGICAL FOLLOW-UP CARE: Status: RESOLVED | Noted: 2019-06-04 | Resolved: 2020-01-07

## 2020-01-07 LAB
ALBUMIN SERPL BCP-MCNC: 4.1 G/DL (ref 3.5–5)
ALP SERPL-CCNC: 108 U/L (ref 46–116)
ALT SERPL W P-5'-P-CCNC: 20 U/L (ref 12–78)
ANION GAP SERPL CALCULATED.3IONS-SCNC: 8 MMOL/L (ref 4–13)
APTT PPP: 27 SECONDS (ref 25–32)
AST SERPL W P-5'-P-CCNC: 14 U/L (ref 5–45)
BACTERIA UR QL AUTO: ABNORMAL /HPF
BASOPHILS # BLD AUTO: 0.04 THOUSANDS/ΜL (ref 0–0.1)
BASOPHILS NFR BLD AUTO: 1 % (ref 0–1)
BILIRUB SERPL-MCNC: 0.3 MG/DL (ref 0.2–1)
BILIRUB UR QL STRIP: NEGATIVE
BUN SERPL-MCNC: 25 MG/DL (ref 5–25)
CALCIUM SERPL-MCNC: 9.3 MG/DL (ref 8.3–10.1)
CHLORIDE SERPL-SCNC: 104 MMOL/L (ref 100–108)
CLARITY UR: ABNORMAL
CO2 SERPL-SCNC: 30 MMOL/L (ref 21–32)
COLOR UR: YELLOW
CREAT SERPL-MCNC: 1.24 MG/DL (ref 0.6–1.3)
EOSINOPHIL # BLD AUTO: 0.17 THOUSAND/ΜL (ref 0–0.61)
EOSINOPHIL NFR BLD AUTO: 3 % (ref 0–6)
ERYTHROCYTE [DISTWIDTH] IN BLOOD BY AUTOMATED COUNT: 14.5 % (ref 11.6–15.1)
GFR SERPL CREATININE-BSD FRML MDRD: 38 ML/MIN/1.73SQ M
GLUCOSE SERPL-MCNC: 212 MG/DL (ref 65–140)
GLUCOSE UR STRIP-MCNC: NEGATIVE MG/DL
HCT VFR BLD AUTO: 45.8 % (ref 34.8–46.1)
HGB BLD-MCNC: 15.1 G/DL (ref 11.5–15.4)
HGB UR QL STRIP.AUTO: NEGATIVE
IMM GRANULOCYTES # BLD AUTO: 0.01 THOUSAND/UL (ref 0–0.2)
IMM GRANULOCYTES NFR BLD AUTO: 0 % (ref 0–2)
INR PPP: 1.05 (ref 0.91–1.09)
KETONES UR STRIP-MCNC: NEGATIVE MG/DL
LEUKOCYTE ESTERASE UR QL STRIP: ABNORMAL
LYMPHOCYTES # BLD AUTO: 1.8 THOUSANDS/ΜL (ref 0.6–4.47)
LYMPHOCYTES NFR BLD AUTO: 29 % (ref 14–44)
MAGNESIUM SERPL-MCNC: 1.6 MG/DL (ref 1.6–2.6)
MCH RBC QN AUTO: 32.5 PG (ref 26.8–34.3)
MCHC RBC AUTO-ENTMCNC: 33 G/DL (ref 31.4–37.4)
MCV RBC AUTO: 99 FL (ref 82–98)
MONOCYTES # BLD AUTO: 0.29 THOUSAND/ΜL (ref 0.17–1.22)
MONOCYTES NFR BLD AUTO: 5 % (ref 4–12)
NEUTROPHILS # BLD AUTO: 3.88 THOUSANDS/ΜL (ref 1.85–7.62)
NEUTS SEG NFR BLD AUTO: 62 % (ref 43–75)
NITRITE UR QL STRIP: NEGATIVE
NON-SQ EPI CELLS URNS QL MICRO: ABNORMAL /HPF
NRBC BLD AUTO-RTO: 0 /100 WBCS
NT-PROBNP SERPL-MCNC: 425 PG/ML
PH UR STRIP.AUTO: 6 [PH]
PHOSPHATE SERPL-MCNC: 3.2 MG/DL (ref 2.3–4.1)
PLATELET # BLD AUTO: 320 THOUSANDS/UL (ref 149–390)
PMV BLD AUTO: 10.9 FL (ref 8.9–12.7)
POTASSIUM SERPL-SCNC: 4.9 MMOL/L (ref 3.5–5.3)
PROT SERPL-MCNC: 7.6 G/DL (ref 6.4–8.2)
PROT UR STRIP-MCNC: NEGATIVE MG/DL
PROTHROMBIN TIME: 11.2 SECONDS (ref 9.8–12)
RBC # BLD AUTO: 4.64 MILLION/UL (ref 3.81–5.12)
RBC #/AREA URNS AUTO: ABNORMAL /HPF
SODIUM SERPL-SCNC: 142 MMOL/L (ref 136–145)
SP GR UR STRIP.AUTO: 1.02 (ref 1–1.03)
T4 FREE SERPL-MCNC: 1.14 NG/DL (ref 0.76–1.46)
TROPONIN I SERPL-MCNC: 0.03 NG/ML
TROPONIN I SERPL-MCNC: <0.02 NG/ML
TSH SERPL DL<=0.05 MIU/L-ACNC: 4.33 UIU/ML (ref 0.36–3.74)
UROBILINOGEN UR QL STRIP.AUTO: 0.2 E.U./DL
WBC # BLD AUTO: 6.19 THOUSAND/UL (ref 4.31–10.16)
WBC #/AREA URNS AUTO: ABNORMAL /HPF

## 2020-01-07 PROCEDURE — 85610 PROTHROMBIN TIME: CPT | Performed by: EMERGENCY MEDICINE

## 2020-01-07 PROCEDURE — 83735 ASSAY OF MAGNESIUM: CPT | Performed by: EMERGENCY MEDICINE

## 2020-01-07 PROCEDURE — 85730 THROMBOPLASTIN TIME PARTIAL: CPT | Performed by: EMERGENCY MEDICINE

## 2020-01-07 PROCEDURE — 83880 ASSAY OF NATRIURETIC PEPTIDE: CPT | Performed by: EMERGENCY MEDICINE

## 2020-01-07 PROCEDURE — 85025 COMPLETE CBC W/AUTO DIFF WBC: CPT | Performed by: EMERGENCY MEDICINE

## 2020-01-07 PROCEDURE — 99220 PR INITIAL OBSERVATION CARE/DAY 70 MINUTES: CPT | Performed by: STUDENT IN AN ORGANIZED HEALTH CARE EDUCATION/TRAINING PROGRAM

## 2020-01-07 PROCEDURE — 84484 ASSAY OF TROPONIN QUANT: CPT | Performed by: EMERGENCY MEDICINE

## 2020-01-07 PROCEDURE — 81001 URINALYSIS AUTO W/SCOPE: CPT | Performed by: STUDENT IN AN ORGANIZED HEALTH CARE EDUCATION/TRAINING PROGRAM

## 2020-01-07 PROCEDURE — 99285 EMERGENCY DEPT VISIT HI MDM: CPT

## 2020-01-07 PROCEDURE — 84443 ASSAY THYROID STIM HORMONE: CPT | Performed by: EMERGENCY MEDICINE

## 2020-01-07 PROCEDURE — 71045 X-RAY EXAM CHEST 1 VIEW: CPT

## 2020-01-07 PROCEDURE — 36415 COLL VENOUS BLD VENIPUNCTURE: CPT | Performed by: EMERGENCY MEDICINE

## 2020-01-07 PROCEDURE — 93005 ELECTROCARDIOGRAM TRACING: CPT

## 2020-01-07 PROCEDURE — 80053 COMPREHEN METABOLIC PANEL: CPT | Performed by: EMERGENCY MEDICINE

## 2020-01-07 PROCEDURE — 96360 HYDRATION IV INFUSION INIT: CPT

## 2020-01-07 PROCEDURE — 84100 ASSAY OF PHOSPHORUS: CPT | Performed by: EMERGENCY MEDICINE

## 2020-01-07 PROCEDURE — 99285 EMERGENCY DEPT VISIT HI MDM: CPT | Performed by: EMERGENCY MEDICINE

## 2020-01-07 PROCEDURE — 84439 ASSAY OF FREE THYROXINE: CPT | Performed by: EMERGENCY MEDICINE

## 2020-01-07 PROCEDURE — 84484 ASSAY OF TROPONIN QUANT: CPT | Performed by: STUDENT IN AN ORGANIZED HEALTH CARE EDUCATION/TRAINING PROGRAM

## 2020-01-07 RX ORDER — HYDROXYUREA 500 MG/1
500 CAPSULE ORAL
Status: DISCONTINUED | OUTPATIENT
Start: 2020-01-08 | End: 2020-01-09 | Stop reason: HOSPADM

## 2020-01-07 RX ORDER — PANTOPRAZOLE SODIUM 40 MG/1
40 TABLET, DELAYED RELEASE ORAL
Status: DISCONTINUED | OUTPATIENT
Start: 2020-01-08 | End: 2020-01-09 | Stop reason: HOSPADM

## 2020-01-07 RX ORDER — ONDANSETRON 2 MG/ML
4 INJECTION INTRAMUSCULAR; INTRAVENOUS EVERY 6 HOURS PRN
Status: DISCONTINUED | OUTPATIENT
Start: 2020-01-07 | End: 2020-01-09 | Stop reason: HOSPADM

## 2020-01-07 RX ORDER — ACETAMINOPHEN 325 MG/1
650 TABLET ORAL EVERY 6 HOURS PRN
Status: DISCONTINUED | OUTPATIENT
Start: 2020-01-07 | End: 2020-01-09 | Stop reason: HOSPADM

## 2020-01-07 RX ORDER — FLUTICASONE PROPIONATE 50 MCG
1 SPRAY, SUSPENSION (ML) NASAL 2 TIMES DAILY
Status: DISCONTINUED | OUTPATIENT
Start: 2020-01-07 | End: 2020-01-09 | Stop reason: HOSPADM

## 2020-01-07 RX ORDER — DILTIAZEM HYDROCHLORIDE 180 MG/1
180 CAPSULE, COATED, EXTENDED RELEASE ORAL DAILY
Status: DISCONTINUED | OUTPATIENT
Start: 2020-01-08 | End: 2020-01-09 | Stop reason: HOSPADM

## 2020-01-07 RX ORDER — ASPIRIN 81 MG/1
81 TABLET, CHEWABLE ORAL EVERY MORNING
Status: DISCONTINUED | OUTPATIENT
Start: 2020-01-08 | End: 2020-01-09 | Stop reason: HOSPADM

## 2020-01-07 RX ORDER — NITROGLYCERIN 0.4 MG/1
0.4 TABLET SUBLINGUAL
Status: DISCONTINUED | OUTPATIENT
Start: 2020-01-07 | End: 2020-01-09 | Stop reason: HOSPADM

## 2020-01-07 RX ORDER — LEVOTHYROXINE SODIUM 0.05 MG/1
50 TABLET ORAL
Status: DISCONTINUED | OUTPATIENT
Start: 2020-01-08 | End: 2020-01-09 | Stop reason: HOSPADM

## 2020-01-07 RX ADMIN — FLUTICASONE PROPIONATE 1 SPRAY: 50 SPRAY, METERED NASAL at 23:08

## 2020-01-07 RX ADMIN — NITROGLYCERIN 0.4 MG: 0.4 TABLET SUBLINGUAL at 20:57

## 2020-01-07 RX ADMIN — EZETIMIBE: 10 TABLET ORAL at 23:08

## 2020-01-07 RX ADMIN — SODIUM CHLORIDE 500 ML: 0.9 INJECTION, SOLUTION INTRAVENOUS at 16:24

## 2020-01-07 NOTE — TELEPHONE ENCOUNTER
Spoke with pt and pt's daughter tone  Pt pressed life alert button , squad is on their way to pt's house  Pt stated had chest pain and sob  Stayed on phone with pt, pt stated was very nervous and scared about being SOB  while walking outside to take trash to dumpster  Pt stated dumpster was 2 blocks away  from house  Pt and pt;s daughter aware to go to ED         Guzman Virgie, MA

## 2020-01-07 NOTE — ED PROVIDER NOTES
History  Chief Complaint   Patient presents with    Shortness of Breath     Pt states that she went to take out the garbage and when she was walking back she could not catch her breath  Pt states that she was having CP before but now she just feels sore  60-year-old female with past medical history of MI x2 with no stents in place, breast cancer status post right lobectomy, hypothyroidism, GERD, kidney stones, arthritis, presents to the ED for evaluation of acute onset left-sided chest pain with shortness of breath that occur about 1 hour prior to arrival   Patient went outside to throw her trash away when her pain started  Patient came back into the house immediately and called her daughter  Daughter came soon to her house and gave her 4 baby aspirin  Patient also called 911 after calling her daughter  By the time EMS arrived patient was pain free  Patient was brought to the ED for further evaluation  On route to the ED patient had an episode of left-sided chest pain again and PVCs were noted on the monitor  Each time pain lasted for a few minutes and subsided  In the ED patient is currently chest pain-free  Patient states that lately she has had dyspnea on exertion and is requiring multiple pillows to sleep at night  History provided by:  Patient  Shortness of Breath   Associated symptoms: chest pain    Associated symptoms: no abdominal pain, no cough, no ear pain, no fever, no headaches, no rash and no wheezing        Prior to Admission Medications   Prescriptions Last Dose Informant Patient Reported? Taking?    Cholecalciferol (VITAMIN D3) 125 MCG (5000 UT) CAPS 1/6/2020 at Unknown time Self Yes Yes   Sig: Take by mouth   Multiple Vitamins-Minerals (CENTRUM SILVER ULTRA WOMENS) TABS 1/6/2020 at Unknown time Self Yes Yes   Sig: Take by mouth   acetaminophen (TYLENOL) 325 mg tablet   No No   Sig: Take 2 tablets (650 mg total) by mouth every 6 (six) hours as needed for mild pain, headaches or fever   aspirin 81 MG tablet 1/6/2020 at Unknown time  Yes Yes   Sig: Take 81 mg by mouth every morning     diltiazem (CARDIZEM CD) 180 mg 24 hr capsule 1/6/2020 at Unknown time  No Yes   Sig: TAKE 1 CAPSULE DAILY   esomeprazole (NexIUM) 40 MG capsule 1/6/2020 at Unknown time  Yes Yes   Sig: Take 40 mg by mouth every morning before breakfast    exemestane (AROMASIN) 25 MG tablet 1/6/2020 at Unknown time  Yes Yes   Sig: Take 25 mg by mouth daily  ezetimibe-simvastatin (VYTORIN) 10-40 mg per tablet 1/6/2020 at Unknown time  Yes Yes   Sig: Take 1 tablet by mouth daily at bedtime     fluticasone (FLONASE) 50 mcg/act nasal spray More than a month at Unknown time  Yes No   hydroxyurea (HYDREA) 500 mg capsule 1/6/2020 at Unknown time  Yes Yes   Sig: Take 500 mg by mouth daily m w f   levothyroxine 50 mcg tablet 1/6/2020 at Unknown time  No Yes   Sig: Take 1 tablet (50 mcg total) by mouth every morning   omeprazole (PriLOSEC) 40 MG capsule   No No   Sig: Take 1 capsule (40 mg total) by mouth daily      Facility-Administered Medications: None       Past Medical History:   Diagnosis Date    Adenocarcinoma of breast ([facility] ) 9/4/2012    Procedure/Onset: 12/07/2005    Anxiety 2/28/2018    Arthritis     joints    Asymptomatic varicose veins of bilateral lower extremities 10/14/2016    Benign colon polyp 2/17/2014    Breast cancer ([facility] ) 2005    right    Cancer ([facility] )     breast ca, skin ca, currently in remission, tumors in chest wall    Cardiac disease     hx MI x 2    Chronic pain disorder     knees    Chronic rhinitis 9/30/2013    Chronic venous insufficiency 5/22/2018    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[facility]) 9/10/2019    S/p Mindy's procedure with the end colostomy    Complete uterovaginal prolapse 11/19/2015    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    Procedure/Onset: 05/26/2006    Coronary artery disease     Disease of thyroid gland     hypothyroidism    Gait disturbance 12/19/2016    GERD (gastroesophageal reflux disease)     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    Procedure/Onset: 02/22/2007    History of prolapse of bladder     History of radiation therapy 2005    to breast right    Hypertension     Irritable bowel syndrome 9/4/2012    Procedure/Onset: 09/23/2010    Kidney stone     2016    Myocardial infarction ([address] )     1990's x2    Nephrolithiasis 2/26/2016    Osteoarthritis of left knee 10/19/2016    Perforation of sigmoid colon due to diverticulitis 5/9/2019    Added automatically from request for surgery [id number]    Peripheral vertigo 9/4/2012    Procedure/Onset: 04/04/2007    Psoriasis 12/19/2016    Rectocele 3/10/2016    Last Assessment & Plan:  As below   Stress incontinence in female 7/6/2016       Past Surgical History:   Procedure Laterality Date    APPENDECTOMY      during HAMLET    BLADDER SURGERY      suspension surgery no sling, used fiberglass suspension technique    BREAST LUMPECTOMY Right 2005    BREAST SURGERY Right     lumpectomy    CHEST WALL TUMOR EXCISION  2005    COLONOSCOPY      CYSTOSCOPY      HARTMANS PROCEDURE N/A 5/9/2019    Procedure: Re Fuentes;  Surgeon: Kierra Villalta MD;  Location: [facility] MAIN OR;  Service: General    HYSTERECTOMY Bilateral     hamlet w/removal of both ovaries    KNEE ARTHROSCOPY      Last assessed: 7/30/15    NH CYSTOURETHROSCOPY,URETER CATHETER Left 2/26/2016    Procedure: CYSTOSCOPY RETROGRADE PYELOGRAM WITH INSERTION STENT URETERAL;  Surgeon: Carter Barone MD;  Location: [address] [place] [address];  Service: Urology    NH XCAPSL CTRC RMVL INSJ IO LENS PROSTH W/O ECP Left 4/10/2017    Procedure: EXTRACTION EXTRACAPSULAR CATARACT PHACO INTRAOCULAR LENS (IOL);   Surgeon: Izaiah Clemons MD;  Location: [facility] MAIN OR;  Service: Ophthalmology    SKIN BIOPSY Left     Leg for [facility]       Family History   Problem Relation Age of Onset    Angina Mother     Hypertension Mother     Heart attack Mother         Myocardial Infarction    Early death Father age 36 MI    Heart disease Father     Hypertension Father     Heart attack Father         Myocardial Infarction    Heart disease Sister     Heart disease Sister         Cardiac Disorder     I have reviewed and agree with the history as documented  Social History     Tobacco Use    Smoking status: Former Smoker     Packs/day: 0 10     Last attempt to quit: 1970     Years since quittin 0    Smokeless tobacco: Never Used   Substance Use Topics    Alcohol use: Not Currently     Frequency: Never    Drug use: No        Review of Systems   Constitutional: Negative for activity change, appetite change, chills and fever  HENT: Negative for congestion and ear pain  Eyes: Negative for pain and discharge  Respiratory: Positive for shortness of breath  Negative for cough, chest tightness, wheezing and stridor  Cardiovascular: Positive for chest pain  Negative for palpitations  Gastrointestinal: Negative for abdominal distention, abdominal pain, constipation, diarrhea and nausea  Endocrine: Negative for cold intolerance  Genitourinary: Negative for dysuria, frequency and urgency  Musculoskeletal: Negative for arthralgias and back pain  Skin: Negative for color change and rash  Allergic/Immunologic: Negative for environmental allergies and food allergies  Neurological: Negative for dizziness, weakness, numbness and headaches  Hematological: Negative for adenopathy  Psychiatric/Behavioral: Negative for agitation, behavioral problems and confusion  The patient is not nervous/anxious  All other systems reviewed and are negative  Physical Exam  Physical Exam   Constitutional: She is oriented to person, place, and time  She appears well-developed and well-nourished  HENT:   Head: Normocephalic and atraumatic  Mouth/Throat: Oropharynx is clear and moist    Eyes: Conjunctivae and EOM are normal    Neck: Normal range of motion  Neck supple     Cardiovascular: Normal rate, regular rhythm, normal heart sounds and intact distal pulses  Pulmonary/Chest: Effort normal and breath sounds normal    Abdominal: Soft  Bowel sounds are normal  She exhibits no distension  There is no tenderness  Musculoskeletal: Normal range of motion  Neurological: She is alert and oriented to person, place, and time  Skin: Skin is warm and dry  Psychiatric: She has a normal mood and affect  Her behavior is normal  Judgment and thought content normal    Nursing note and vitals reviewed  Vital Signs  ED Triage Vitals [01/07/20 1554]   Temperature Pulse Respirations Blood Pressure SpO2   (!) 97 °F (36 1 °C) 98 22 (!) 194/81 95 %      Temp Source Heart Rate Source Patient Position - Orthostatic VS BP Location FiO2 (%)   Tympanic -- Lying Right arm --      Pain Score       --           Vitals:    01/07/20 1554   BP: (!) 194/81   Pulse: 98   Patient Position - Orthostatic VS: Lying         Visual Acuity      ED Medications  Medications   sodium chloride 0 9 % bolus 500 mL (500 mL Intravenous New Bag 1/7/20 1624)       Diagnostic Studies  Results Reviewed     Procedure Component Value Units Date/Time    TSH, 3rd generation with Free T4 reflex [[id number]]  (Abnormal) Collected:  01/07/20 1550    Lab Status:  Final result Specimen:  Blood from Arm, Left Updated:  01/07/20 1624     TSH 3RD GENERATON 4 332 uIU/mL     Narrative:       Patients undergoing fluorescein dye angiography may retain small amounts of fluorescein in the body for 48-72 hours post procedure  Samples containing fluorescein can produce falsely depressed TSH values  If the patient had this procedure,a specimen should be resubmitted post fluorescein clearance        Phosphorus [[id number]]  (Normal) Collected:  01/07/20 1550    Lab Status:  Final result Specimen:  Blood from Arm, Left Updated:  01/07/20 1624     Phosphorus 3 2 mg/dL     Magnesium [[id number]]  (Normal) Collected:  01/07/20 1550    Lab Status:  Final result Specimen:  Blood from Arm, Left Updated:  01/07/20 1624     Magnesium 1 6 mg/dL     NT-BNP PRO [[id number]]  (Normal) Collected:  01/07/20 1550    Lab Status:  Final result Specimen:  Blood from Arm, Left Updated:  01/07/20 1624     NT-proBNP 425 pg/mL     T4, free [[id number]] Collected:  01/07/20 1550    Lab Status:   In process Specimen:  Blood from Arm, Left Updated:  01/07/20 1624    Troponin I [[id number]]  (Normal) Collected:  01/07/20 1550    Lab Status:  Final result Specimen:  Blood from Arm, Left Updated:  01/07/20 1621     Troponin I <0 02 ng/mL     Comprehensive metabolic panel [[id number]]  (Abnormal) Collected:  01/07/20 1550    Lab Status:  Final result Specimen:  Blood from Arm, Left Updated:  01/07/20 1617     Sodium 142 mmol/L      Potassium 4 9 mmol/L      Chloride 104 mmol/L      CO2 30 mmol/L      ANION GAP 8 mmol/L      BUN 25 mg/dL      Creatinine 1 24 mg/dL      Glucose 212 mg/dL      Calcium 9 3 mg/dL      AST 14 U/L      ALT 20 U/L      Alkaline Phosphatase 108 U/L      Total Protein 7 6 g/dL      Albumin 4 1 g/dL      Total Bilirubin 0 30 mg/dL      eGFR 38 ml/min/1 73sq m     Narrative:       [place] guidelines for Chronic Kidney Disease (CKD):     Stage 1 with normal or high GFR (GFR > 90 mL/min/1 73 square meters)    Stage 2 Mild CKD (GFR = 60-89 mL/min/1 73 square meters)    Stage 3A Moderate CKD (GFR = 45-59 mL/min/1 73 square meters)    Stage 3B Moderate CKD (GFR = 30-44 mL/min/1 73 square meters)    Stage 4 Severe CKD (GFR = 15-29 mL/min/1 73 square meters)    Stage 5 End Stage CKD (GFR <15 mL/min/1 73 square meters)  Note: GFR calculation is accurate only with a steady state creatinine    APTT [[id number]]  (Normal) Collected:  01/07/20 1550    Lab Status:  Final result Specimen:  Blood from Arm, Left Updated:  01/07/20 1617     PTT 27 seconds     Protime-INR [[id number]]  (Normal) Collected:  01/07/20 1550    Lab Status:  Final result Specimen:  Blood from Arm, Left Updated: 01/07/20 1617     Protime 11 2 seconds      INR 1 05    CBC and differential [[id number]]  (Abnormal) Collected:  01/07/20 1550    Lab Status:  Final result Specimen:  Blood from Arm, Left Updated:  01/07/20 1557     WBC 6 19 Thousand/uL      RBC 4 64 Million/uL      Hemoglobin 15 1 g/dL      Hematocrit 45 8 %      MCV 99 fL      MCH 32 5 pg      MCHC 33 0 g/dL      RDW 14 5 %      MPV 10 9 fL      Platelets 479 Thousands/uL      nRBC 0 /100 WBCs      Neutrophils Relative 62 %      Immat GRANS % 0 %      Lymphocytes Relative 29 %      Monocytes Relative 5 %      Eosinophils Relative 3 %      Basophils Relative 1 %      Neutrophils Absolute 3 88 Thousands/µL      Immature Grans Absolute 0 01 Thousand/uL      Lymphocytes Absolute 1 80 Thousands/µL      Monocytes Absolute 0 29 Thousand/µL      Eosinophils Absolute 0 17 Thousand/µL      Basophils Absolute 0 04 Thousands/µL     UA w Reflex to Microscopic w Reflex to Culture [[id number]]     Lab Status:  No result Specimen:  Urine                  XR chest 1 view portable    (Results Pending)              Procedures  ECG 12 Lead Documentation Only  Date/Time: 1/7/2020 3:44 PM  Performed by: Filemon Altamirano DO  Authorized by: Filemon Altamirano DO     Indications / Diagnosis:  Chest pain  ECG reviewed by me, the ED Provider: yes    Patient location:  ED  Previous ECG:     Previous ECG:  Compared to current    Similarity:  Changes noted    Comparison to cardiac monitor: Yes    Interpretation:     Interpretation: abnormal    Comments:      Sinus rhythm, rate 108, first-degree AV block, no acute ST elevations noted, T-wave inversions noted to lead 3 that is nonspecific, sinus tachycardia is new compared to previous EKG               ED Course         HEART Risk Score      Most Recent Value   History  1 Filed at: 01/07/2020 1711   ECG  1 Filed at: 01/07/2020 1711   Age  2 Filed at: 01/07/2020 1711   Risk Factors  2 Filed at: 01/07/2020 1711   Troponin  0 Filed at: 01/07/2020 1711 Heart Score Risk Calculator   History  1 Filed at: 01/07/2020 1711   ECG  1 Filed at: 01/07/2020 1711   Age  2 Filed at: 01/07/2020 1711   Risk Factors  2 Filed at: 01/07/2020 1711   Troponin  0 Filed at: 01/07/2020 1711   HEART Score  6 Filed at: 01/07/2020 1711   HEART Score  6 Filed at: 01/07/2020 1711                    CINTHYA Risk Score      Most Recent Value   Age >= 72  1 Filed at: 01/07/2020 1715   Known CAD (stenosis >= 50%)  1 Filed at: 01/07/2020 1715   Recent (<=24 hrs) Service Angina     ST Deviation >= 0 5 mm  0 Filed at: 01/07/2020 1715   3+ CAD Risk Factors (FHx, HTN, HLP, DM, Smoker)  1 Filed at: 01/07/2020 1715   Aspirin Use Past 7 Days  1 Filed at: 01/07/2020 1715   Elevated Cardiac Markers  0 Filed at: 01/07/2020 1715   CINTHYA Risk Score (Calculated)                MDM  Number of Diagnoses or Management Options  Chest pain: new and requires workup  Dyspnea on exertion: new and requires workup  Diagnosis management comments: Obtain blood work, EKG, chest x-ray  Give gentle fluid hydration and continue to monitor patient for any worsening symptoms       Amount and/or Complexity of Data Reviewed  Clinical lab tests: ordered and reviewed  Tests in the radiology section of CPT®: reviewed and ordered  Tests in the medicine section of CPT®: ordered and reviewed  Review and summarize past medical records: yes  Independent visualization of images, tracings, or specimens: yes    Risk of Complications, Morbidity, and/or Mortality  General comments: Patient's EKG did not show any acute ST elevations  First troponin was normal   All of the lab and radiology results were unremarkable  At this point patient is admitted for chest pain rule out ACS  Patient and family agrees with admission plans      Patient Progress  Patient progress: stable        Disposition  Final diagnoses:   Chest pain   Dyspnea on exertion     Time reflects when diagnosis was documented in both MDM as applicable and the Disposition within this note     Time User Action Codes Description Comment    1/7/2020  5:21 PM Jordan Charlton [R07 9] Chest pain     1/7/2020  5:54 PM Jordan Charlton [R06 09] Dyspnea on exertion       ED Disposition     ED Disposition Condition Date/Time Comment    Admit Stable Tue Jan 7, 2020  5:18 PM Case was discussed with Dr Pauly Andersen and the patient's admission status was agreed to be Admission Status: observation status to the service of Dr Pauly Andersen  Follow-up Information    None         Patient's Medications   Discharge Prescriptions    No medications on file     No discharge procedures on file      ED Provider  Electronically Signed by           Fernando Abreu DO  01/07/20 0820

## 2020-01-07 NOTE — TELEPHONE ENCOUNTER
Patient called and stated she is short of breath and she just took out the garbage and can not breathe  Asked patient if she was sitting down and she stated she was, I told her to stay calm as I got the nurse      [facility] to [facility]

## 2020-01-07 NOTE — H&P
H&P- Robby Sommer 3/7/1928, 80 y o  female MRN: [id number]    Unit/Bed#: ED CT1 Encounter: [id number]    Primary Care Provider: Hoa Hernandez MD   Date and time admitted to hospital: 1/7/2020  3:40 PM        * Chest pain  Assessment & Plan  Rule out ACS  Patient with CP/SOB with exertion (was taking out the garbage) resolved with rest  CINTHYA score 4, with personal hx of CAD  · Admit to observation  · Tele monitor  · Serial troponins  · ASA/statin  · NTG PRN CP  · Cardiology consulted  · Will order nuclear stress test for tomorrow AM    Hypothyroidism  Assessment & Plan  TSH 4 332  Continue synthroid    Esophageal reflux  Assessment & Plan  Continue PPI    Coronary artery disease of native artery of native heart  Assessment & Plan  Hx of MI    Benign essential hypertension  Assessment & Plan  BP elevated on admission  Continue home cardizem  Monitor BP      VTE Prophylaxis: Enoxaparin (Lovenox)  / sequential compression device   Code Status: DNR/DNI    Anticipated Length of Stay:  Patient will be admitted on an Observation basis with an anticipated length of stay of  < 2 midnights  Justification for Hospital Stay: chest pain rule out ACS    Total Time for Visit, including Counseling / Coordination of Care: 45 minutes  Greater than 50% of this total time spent on direct patient counseling and coordination of care  Chief Complaint:   Shortness of Breath (Pt states that she went to take out the garbage and when she was walking back she could not catch her breath  Pt states that she was having CP before but now she just feels sore )      History of Present Illness:    Robby Sommer is a 80 y o  female with a PMH of CAD s/p MI x2, HTN, GERD, renal stones, breast ca s/p right partial mastectomy, OA knee with chronic pain who presents with chest pain  Patient was taking out the garbage and was walking back to the house, when she stated to have left sided chest pain, non radiating, associated with SOB   Her daughter gave her 325mg ASA and called EMS  CP resolved with resting, but per ED staff, en route patient had another episode of CP and tele monitor showed PVCs, which resolved once the CP resolved  Since arrival to ED she has had no further CP  She was feeling quite anxious and scared earlier but feels better now  Review of Systems:    Review of Systems   Constitutional: Negative for chills, diaphoresis, fatigue and fever  HENT: Negative  Respiratory: Positive for chest tightness and shortness of breath  Negative for cough and wheezing  Cardiovascular: Positive for chest pain  Negative for palpitations  Gastrointestinal: Negative  Genitourinary: Negative  Musculoskeletal: Negative  Neurological: Negative for dizziness, syncope, weakness, light-headedness and headaches  Psychiatric/Behavioral: The patient is nervous/anxious  All other systems reviewed and are negative        Past Medical and Surgical History:     Past Medical History:   Diagnosis Date    Adenocarcinoma of breast ([facility] ) 9/4/2012    Procedure/Onset: 12/07/2005    Anxiety 2/28/2018    Arthritis     joints    Asymptomatic varicose veins of bilateral lower extremities 10/14/2016    Benign colon polyp 2/17/2014    Breast cancer ([facility] ) 2005    right    Cancer ([facility] )     breast ca, skin ca, currently in remission, tumors in chest wall    Cardiac disease     hx MI x 2    Chronic pain disorder     knees    Chronic rhinitis 9/30/2013    Chronic venous insufficiency 5/22/2018    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    S/p Mindy's procedure with the end colostomy    Complete uterovaginal prolapse 11/19/2015    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    Procedure/Onset: 05/26/2006    Coronary artery disease     Disease of thyroid gland     hypothyroidism    Gait disturbance 12/19/2016    GERD (gastroesophageal reflux disease)     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    Procedure/Onset: 02/22/2007    History of prolapse of bladder     History of radiation therapy 2005    to breast right    Hypertension     Irritable bowel syndrome 9/4/2012    Procedure/Onset: 09/23/2010    Kidney stone     2016    Myocardial infarction [facility])     1990's x2    Nephrolithiasis 2/26/2016    Osteoarthritis of left knee 10/19/2016    Perforation of sigmoid colon due to diverticulitis 5/9/2019    Added automatically from request for surgery [id number]    Peripheral vertigo 9/4/2012    Procedure/Onset: 04/04/2007    Psoriasis 12/19/2016    Rectocele 3/10/2016    Last Assessment & Plan:  As below   Stress incontinence in female 7/6/2016       Past Surgical History:   Procedure Laterality Date    APPENDECTOMY      during HAMLET    BLADDER SURGERY      suspension surgery no sling, used fiberglass suspension technique    BREAST LUMPECTOMY Right 2005    BREAST SURGERY Right     lumpectomy    CHEST WALL TUMOR EXCISION  2005    COLONOSCOPY      CYSTOSCOPY      HARTMANS PROCEDURE N/A 5/9/2019    Procedure: Marlon Zhu;  Surgeon: Marisela Zavala MD;  Location: [facility] MAIN OR;  Service: General    HYSTERECTOMY Bilateral     hamlet w/removal of both ovaries    KNEE ARTHROSCOPY      Last assessed: 7/30/15    SC CYSTOURETHROSCOPY,URETER CATHETER Left 2/26/2016    Procedure: CYSTOSCOPY RETROGRADE PYELOGRAM WITH INSERTION STENT URETERAL;  Surgeon: Cecy Banks MD;  Location: [address] [place] [address];  Service: Urology    SC XCAPSL CTRC RMVL INSJ IO LENS PROSTH W/O ECP Left 4/10/2017    Procedure: EXTRACTION EXTRACAPSULAR CATARACT PHACO INTRAOCULAR LENS (IOL); Surgeon: Miranda Houston MD;  Location: [facility] MAIN OR;  Service: Ophthalmology    SKIN BIOPSY Left     Leg for [facility]       Meds/Allergies:    Prior to Admission medications    Medication Sig Start Date End Date Taking?  Authorizing Provider   aspirin 81 MG tablet Take 81 mg by mouth every morning     Yes Historical Provider, MD   Cholecalciferol (VITAMIN D3) 125 MCG (5000 UT) CAPS Take by mouth Yes Historical Provider, MD   diltiazem (CARDIZEM CD) 180 mg 24 hr capsule TAKE 1 CAPSULE DAILY 6/25/19  Yes Prateek Barrios MD   esomeprazole (NexIUM) 40 MG capsule Take 40 mg by mouth every morning before breakfast    Yes Historical Provider, MD   exemestane (AROMASIN) 25 MG tablet Take 25 mg by mouth daily  Yes Historical Provider, MD   ezetimibe-simvastatin (VYTORIN) 10-40 mg per tablet Take 1 tablet by mouth daily at bedtime  Yes Historical Provider, MD   hydroxyurea (HYDREA) 500 mg capsule Take 500 mg by mouth daily m w f   Yes Historical Provider, MD   levothyroxine 50 mcg tablet Take 1 tablet (50 mcg total) by mouth every morning 12/15/19  Yes Prateek Barrios MD   Multiple Vitamins-Minerals (CENTRUM SILVER ULTRA WOMENS) TABS Take by mouth   Yes Historical Provider, MD   acetaminophen (TYLENOL) 325 mg tablet Take 2 tablets (650 mg total) by mouth every 6 (six) hours as needed for mild pain, headaches or fever 5/15/19   Jagruti Cameron MD   fluticasone (FLONASE) 50 mcg/act nasal spray  6/30/18   Historical Provider, MD   omeprazole (PriLOSEC) 40 MG capsule Take 1 capsule (40 mg total) by mouth daily 11/25/19   Prateek Barrios MD       Allergies: Allergies   Allergen Reactions    Other Anaphylaxis     Blue Dye during ultrasound  Blue Dye during ultrasound  Blue Dye during ultrasound    Ciprofloxacin Hives and Itching     Redness  Redness  [place] - [id number]: rash and itching    Clindamycin Hives and Itching     Redness  Redness  Reaction Date: 06Aug2012;     Clindamycin/Lincomycin     Levaquin [Levofloxacin] Hives and Itching     Redness  Reaction Date: [id number]; Annotation - [id number]: "FACIAL ITCHING"  Redness    Lincomycin     Nitrofuran Derivatives     Penicillins Hives and Itching     Redness  Reaction Date: 07Dec2005; Redness  Tolerated Pip/tazo 5/10/2019    Sulfa Antibiotics Hives and Itching     Redness  Redness  Reaction Date: 07Dec2005;    Redness    Sulfacetamide     Sulfasalazine     Ceftriaxone Rash     Tolerates Cephalexin     Iv Contrast  [Iodinated Diagnostic Agents] Itching and Rash     Other reaction(s): Anaphylaxis    Linezolid Rash     Rash,flushed face after 3rd day of taking this ABX, started benadryl subsided by the end of the nite   Nitrofurantoin Hives, Itching, Nausea Only and Rash     Redness  Reaction Date: ;   Redness       Social History:     Marital Status:    Substance Use History:   Social History     Substance and Sexual Activity   Alcohol Use Not Currently    Frequency: Never     Social History     Tobacco Use   Smoking Status Former Smoker    Packs/day: 0 10    Last attempt to quit: Sharon Chain Years since quittin 0   Smokeless Tobacco Never Used     Social History     Substance and Sexual Activity   Drug Use No       Family History:    non-contributory    Physical Exam:     Vitals:   Blood Pressure: (!) 194/81 (20 1554)  Pulse: 98 (20 1554)  Temperature: (!) 97 °F (36 1 °C) (20 1554)  Temp Source: Tympanic (20 1554)  Respirations: 22 (20 1554)  SpO2: 95 % (20 1554)    Physical Exam   Constitutional: She is oriented to person, place, and time  She appears well-developed  No distress  HENT:   Head: Normocephalic and atraumatic  Cardiovascular: Normal rate and regular rhythm  Murmur heard  Pulmonary/Chest: Effort normal and breath sounds normal  No respiratory distress  She has no wheezes  She has no rales  She exhibits no tenderness  Abdominal: Soft  Bowel sounds are normal  She exhibits no distension  There is no tenderness  There is no rebound  Musculoskeletal: She exhibits no edema or deformity  Neurological: She is alert and oriented to person, place, and time  Skin: Skin is warm and dry  Psychiatric: She has a normal mood and affect  Her behavior is normal    Nursing note and vitals reviewed          Additional Data:     Lab Results: I have personally reviewed pertinent reports  Results from last 7 days   Lab Units 01/07/20  1550   WBC Thousand/uL 6 19   HEMOGLOBIN g/dL 15 1   HEMATOCRIT % 45 8   PLATELETS Thousands/uL 320   NEUTROS PCT % 62     Results from last 7 days   Lab Units 01/07/20  1550   SODIUM mmol/L 142   POTASSIUM mmol/L 4 9   CHLORIDE mmol/L 104   CO2 mmol/L 30   BUN mg/dL 25   CREATININE mg/dL 1 24   CALCIUM mg/dL 9 3   TOTAL BILIRUBIN mg/dL 0 30   ALK PHOS U/L 108   ALT U/L 20   AST U/L 14     Results from last 7 days   Lab Units 01/07/20  1550   INR  1 05     Results from last 7 days   Lab Units 01/07/20  1550   TROPONIN I ng/mL <0 02               Imaging: I have personally reviewed pertinent reports  XR chest 1 view portable    (Results Pending)       XR chest 1 view portable    (Results Pending)       EKG, Pathology, and Other Studies Reviewed on Admission:   · EKG: no dynamic changes    Allscripts / Epic Records Reviewed: Yes     ** Please Note: This note has been constructed using a voice recognition system   **

## 2020-01-08 ENCOUNTER — APPOINTMENT (OUTPATIENT)
Dept: NON INVASIVE DIAGNOSTICS | Facility: HOSPITAL | Age: 85
DRG: 281 | End: 2020-01-08
Payer: MEDICARE

## 2020-01-08 LAB
ANION GAP SERPL CALCULATED.3IONS-SCNC: 5 MMOL/L (ref 4–13)
ATRIAL RATE: 108 BPM
ATRIAL RATE: 91 BPM
BUN SERPL-MCNC: 21 MG/DL (ref 5–25)
CALCIUM SERPL-MCNC: 8.6 MG/DL (ref 8.3–10.1)
CHLORIDE SERPL-SCNC: 106 MMOL/L (ref 100–108)
CO2 SERPL-SCNC: 30 MMOL/L (ref 21–32)
CREAT SERPL-MCNC: 0.93 MG/DL (ref 0.6–1.3)
ERYTHROCYTE [DISTWIDTH] IN BLOOD BY AUTOMATED COUNT: 14.5 % (ref 11.6–15.1)
GFR SERPL CREATININE-BSD FRML MDRD: 54 ML/MIN/1.73SQ M
GLUCOSE P FAST SERPL-MCNC: 92 MG/DL (ref 65–99)
GLUCOSE SERPL-MCNC: 92 MG/DL (ref 65–140)
HCT VFR BLD AUTO: 40.5 % (ref 34.8–46.1)
HGB BLD-MCNC: 13.3 G/DL (ref 11.5–15.4)
MCH RBC QN AUTO: 32.4 PG (ref 26.8–34.3)
MCHC RBC AUTO-ENTMCNC: 32.8 G/DL (ref 31.4–37.4)
MCV RBC AUTO: 99 FL (ref 82–98)
P AXIS: 62 DEGREES
P AXIS: 73 DEGREES
PLATELET # BLD AUTO: 252 THOUSANDS/UL (ref 149–390)
PMV BLD AUTO: 10.6 FL (ref 8.9–12.7)
POTASSIUM SERPL-SCNC: 4.4 MMOL/L (ref 3.5–5.3)
PR INTERVAL: 218 MS
PR INTERVAL: 234 MS
QRS AXIS: 15 DEGREES
QRS AXIS: 27 DEGREES
QRSD INTERVAL: 90 MS
QRSD INTERVAL: 92 MS
QT INTERVAL: 316 MS
QT INTERVAL: 354 MS
QTC INTERVAL: 423 MS
QTC INTERVAL: 435 MS
RBC # BLD AUTO: 4.11 MILLION/UL (ref 3.81–5.12)
SODIUM SERPL-SCNC: 141 MMOL/L (ref 136–145)
T WAVE AXIS: 21 DEGREES
T WAVE AXIS: 31 DEGREES
TROPONIN I SERPL-MCNC: 0.15 NG/ML
TROPONIN I SERPL-MCNC: 0.23 NG/ML
TROPONIN I SERPL-MCNC: 0.25 NG/ML
VENTRICULAR RATE: 108 BPM
VENTRICULAR RATE: 91 BPM
WBC # BLD AUTO: 6.05 THOUSAND/UL (ref 4.31–10.16)

## 2020-01-08 PROCEDURE — 84484 ASSAY OF TROPONIN QUANT: CPT | Performed by: NURSE PRACTITIONER

## 2020-01-08 PROCEDURE — 99232 SBSQ HOSP IP/OBS MODERATE 35: CPT | Performed by: NURSE PRACTITIONER

## 2020-01-08 PROCEDURE — 85027 COMPLETE CBC AUTOMATED: CPT | Performed by: NURSE PRACTITIONER

## 2020-01-08 PROCEDURE — 93306 TTE W/DOPPLER COMPLETE: CPT

## 2020-01-08 PROCEDURE — 97162 PT EVAL MOD COMPLEX 30 MIN: CPT

## 2020-01-08 PROCEDURE — 97530 THERAPEUTIC ACTIVITIES: CPT

## 2020-01-08 PROCEDURE — 93010 ELECTROCARDIOGRAM REPORT: CPT | Performed by: INTERNAL MEDICINE

## 2020-01-08 PROCEDURE — 80048 BASIC METABOLIC PNL TOTAL CA: CPT | Performed by: NURSE PRACTITIONER

## 2020-01-08 PROCEDURE — 84484 ASSAY OF TROPONIN QUANT: CPT | Performed by: STUDENT IN AN ORGANIZED HEALTH CARE EDUCATION/TRAINING PROGRAM

## 2020-01-08 PROCEDURE — 99222 1ST HOSP IP/OBS MODERATE 55: CPT | Performed by: INTERNAL MEDICINE

## 2020-01-08 RX ADMIN — FLUTICASONE PROPIONATE 1 SPRAY: 50 SPRAY, METERED NASAL at 17:36

## 2020-01-08 RX ADMIN — ENOXAPARIN SODIUM 40 MG: 40 INJECTION SUBCUTANEOUS at 09:21

## 2020-01-08 RX ADMIN — DILTIAZEM HYDROCHLORIDE 180 MG: 180 CAPSULE, COATED, EXTENDED RELEASE ORAL at 09:21

## 2020-01-08 RX ADMIN — METOPROLOL TARTRATE 12.5 MG: 25 TABLET ORAL at 21:34

## 2020-01-08 RX ADMIN — METOPROLOL TARTRATE 12.5 MG: 25 TABLET ORAL at 14:40

## 2020-01-08 RX ADMIN — PANTOPRAZOLE SODIUM 40 MG: 40 TABLET, DELAYED RELEASE ORAL at 05:51

## 2020-01-08 RX ADMIN — LEVOTHYROXINE SODIUM 50 MCG: 50 TABLET ORAL at 05:51

## 2020-01-08 RX ADMIN — HYDROXYUREA 500 MG: 500 CAPSULE ORAL at 09:21

## 2020-01-08 RX ADMIN — ASPIRIN 81 MG 81 MG: 81 TABLET ORAL at 09:21

## 2020-01-08 RX ADMIN — EZETIMIBE: 10 TABLET ORAL at 21:35

## 2020-01-08 RX ADMIN — FLUTICASONE PROPIONATE 1 SPRAY: 50 SPRAY, METERED NASAL at 09:21

## 2020-01-08 NOTE — QUICK NOTE
Patient noted to have an elevated troponin with no further episodes of chest pain per nursing    At this point will hold off on stress test pending formal cardiology consult in AM

## 2020-01-08 NOTE — CONSULTS
Consultation - Cardiology   Stanley Kinsey 80 y o  female MRN: [id number]  Unit/Bed#: [address] [address] Encounter: [id number]  01/08/20  3:22 PM          Physician Requesting Consult: Noe Wade MD  Reason for Consult / Principal Problem: chest pain      Assessment:  1  Chest pain with  likely type 2 Myocardial infarction secondary to hypertension and likely underlying CAD  Troponin peak at 0 2  She is chest pain free  Discussed with patient in detail     - Will manage conservatively as she does not want sedation and has an IV contrast allergy   - Add metoprolol to diltiazem  - Continue statin   - ASA 81 mg daily   - If symptoms reoccur, will discontinue diltiazem and increase metoprolol      - Ambulate  - Will review echocardiogram   2  Hypertension - BP significantly elevated upon arrival   - Add metoprolol  - monitor on telemetry for bradycardia  3  Dyslipidemia  - on statin and zetia    Plan:  As above  Discussed with patient's daughter  History of Present Illness   HPI: Stanley Kinsey is a 80y o  year old female who presents with chest pain  The patient developed a heaviness in the center part of her chest associated with shortness of breath yesterday afternoon  She was walking carrying trash at the time  Symptoms improved with aspirin given in the emergency room  She has not had the pain since then  She does have history of similar discomfort which has been happening intermittently over the past several months  She denies any lower extremity edema, orthopnea or paroxysmal nocturnal dyspnea  She denies any history of coronary artery disease but in 1991, she had a cardiac catheterization in [place]      Review of Systems:    Review of Systems   Constitutional: Negative for chills, fatigue and fever  HENT: Negative for congestion, nosebleeds and postnasal drip  Respiratory: Positive for shortness of breath  Negative for cough and chest tightness      Cardiovascular: Positive for chest pain  Negative for palpitations and leg swelling  Gastrointestinal: Negative for abdominal distention, abdominal pain, diarrhea, nausea and vomiting  Endocrine: Negative for polydipsia, polyphagia and polyuria  Musculoskeletal: Negative for gait problem and myalgias  Skin: Negative for color change, pallor and rash  Allergic/Immunologic: Negative for environmental allergies, food allergies and immunocompromised state  Neurological: Negative for dizziness, seizures, syncope and light-headedness  Hematological: Negative for adenopathy  Does not bruise/bleed easily  Psychiatric/Behavioral: Negative for dysphoric mood  The patient is not nervous/anxious          Historical Information   Past Medical History:   Diagnosis Date    Adenocarcinoma of breast ([place] [id number] ) 9/4/2012    Procedure/Onset: 12/07/2005    Anxiety 2/28/2018    Arthritis     joints    Asymptomatic varicose veins of bilateral lower extremities 10/14/2016    Benign colon polyp 2/17/2014    Breast cancer ([place] [address] ) 2005    right    Cancer [facility])     breast ca, skin ca, currently in remission, tumors in chest wall    Cardiac disease     hx MI x 2    Chronic pain disorder     knees    Chronic rhinitis 9/30/2013    Chronic venous insufficiency 5/22/2018    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[facility]) 9/10/2019    S/p Mindy's procedure with the end colostomy    Complete uterovaginal prolapse 11/19/2015    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    Procedure/Onset: 05/26/2006    Coronary artery disease     Disease of thyroid gland     hypothyroidism    Gait disturbance 12/19/2016    GERD (gastroesophageal reflux disease)     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    Procedure/Onset: 02/22/2007    History of prolapse of bladder     History of radiation therapy 2005    to breast right    Hypertension     Irritable bowel syndrome 9/4/2012    Procedure/Onset: 09/23/2010    Kidney stone     2016    Myocardial infarction ([place] [address] )     1990's x2    Nephrolithiasis 2016    Osteoarthritis of left knee 10/19/2016    Perforation of sigmoid colon due to diverticulitis 2019    Added automatically from request for surgery [id number]    Peripheral vertigo 2012    Procedure/Onset: 2007    Psoriasis 2016    Rectocele 3/10/2016    Last Assessment & Plan:  As below   Stress incontinence in female 2016     Past Surgical History:   Procedure Laterality Date    APPENDECTOMY      during HAMLET    BLADDER SURGERY      suspension surgery no sling, used fiberglass suspension technique    BREAST LUMPECTOMY Right 2005    BREAST SURGERY Right     lumpectomy    CHEST WALL TUMOR EXCISION      COLONOSCOPY      CYSTOSCOPY      HARTMANS PROCEDURE N/A 2019    Procedure: Javier Pinto;  Surgeon: Kassandra Garrison MD;  Location: [facility];  Service: General    HYSTERECTOMY Bilateral     hamlet w/removal of both ovaries    KNEE ARTHROSCOPY      Last assessed: 7/30/15    CA CYSTOURETHROSCOPY,URETER CATHETER Left 2016    Procedure: CYSTOSCOPY RETROGRADE PYELOGRAM WITH INSERTION STENT URETERAL;  Surgeon: Chitra Acuña MD;  Location: [address] [place] [address];  Service: Urology    CA XCAPSL CTRC RMVL INSJ IO LENS PROSTH W/O ECP Left 4/10/2017    Procedure: EXTRACTION EXTRACAPSULAR CATARACT PHACO INTRAOCULAR LENS (IOL);   Surgeon: Daqaun Doll MD;  Location: [facility] MA[facility] OR;  Service: Ophthalmology    SKIN BIOPSY Left     Leg for [facility]     Social History     Substance and Sexual Activity   Alcohol Use Yes    Alcohol/week: 1 0 standard drinks    Types: 1 Glasses of wine per week    Frequency: 4 or more times a week    Drinks per session: 1 or 2    Comment: 1 glass of wine before bed     Social History     Substance and Sexual Activity   Drug Use No     Social History     Tobacco Use   Smoking Status Former Smoker    Packs/day: 0 10    Last attempt to quit: Jay Silva Years since quittin 0   Smokeless Tobacco Never Used       Family History: Family History   Problem Relation Age of Onset    Angina Mother     Hypertension Mother     Heart attack Mother         Myocardial Infarction    Early death Father         age 36 MI    Heart disease Father     Hypertension Father     Heart attack Father         Myocardial Infarction    Heart disease Sister     Heart disease Sister         Cardiac Disorder       Meds/Allergies   current meds:   Current Facility-Administered Medications   Medication Dose Route Frequency    acetaminophen (TYLENOL) tablet 650 mg  650 mg Oral Q6H PRN    aspirin chewable tablet 81 mg  81 mg Oral QAM    diltiazem (CARDIZEM CD) 24 hr capsule 180 mg  180 mg Oral Daily    enoxaparin (LOVENOX) subcutaneous injection 40 mg  40 mg Subcutaneous Daily    ezetimibe 10 mg-pravastatin 80 mg combo dose   Oral HS    fluticasone (FLONASE) 50 mcg/act nasal spray 1 spray  1 spray Each Nare BID    hydroxyurea (HYDREA) capsule 500 mg  500 mg Oral Once per day on Mon Wed Fri    levothyroxine tablet 50 mcg  50 mcg Oral Early Morning    metoprolol tartrate (LOPRESSOR) partial tablet 12 5 mg  12 5 mg Oral Q12H ELMO    nitroglycerin (NITROSTAT) SL tablet 0 4 mg  0 4 mg Sublingual Q5 Min PRN    ondansetron (ZOFRAN) injection 4 mg  4 mg Intravenous Q6H PRN    pantoprazole (PROTONIX) EC tablet 40 mg  40 mg Oral Early Morning     Allergies   Allergen Reactions    Other Anaphylaxis     Blue Dye during ultrasound  Blue Dye during ultrasound  Blue Dye during ultrasound    Ciprofloxacin Hives and Itching     Redness  Redness  Annotation - [id number]: rash and itching    Clindamycin Hives and Itching     Redness  Redness  Reaction Date: 06Aug2012;     Clindamycin/Lincomycin     Levaquin [Levofloxacin] Hives and Itching     Redness  Reaction Date: [id number]; Annotation - [id number]: "FACIAL ITCHING"  Redness    Lincomycin     Nitrofuran Derivatives     Penicillins Hives and Itching     Redness  Reaction Date: 07Dec2005;    Redness  Tolerated Pip/tazo 5/10/2019    Sulfa Antibiotics Hives and Itching     Redness  Redness  Reaction Date: 07Dec2005; Redness    Sulfacetamide     Sulfasalazine     Ceftriaxone Rash     Tolerates Cephalexin     Iv Contrast  [Iodinated Diagnostic Agents] Itching and Rash     Other reaction(s): Anaphylaxis    Linezolid Rash     Rash,flushed face after 3rd day of taking this ABX, started benadryl subsided by the end of the nite   Nitrofurantoin Hives, Itching, Nausea Only and Rash     Redness  Reaction Date: [id number];   Redness       Objective   Vitals: Blood pressure 125/98, pulse 83, temperature 98 6 °F (37 °C), temperature source Tympanic, resp  rate 18, height 5' 3" (1 6 m), weight 72 6 kg (160 lb), SpO2 99 %, not currently breastfeeding , Body mass index is 28 34 kg/m²  Physical Exam   Constitutional: She appears healthy  No distress  HENT:   Nose: Nose normal    Mouth/Throat: Oropharynx is clear  Eyes: Pupils are equal, round, and reactive to light  Conjunctivae are normal    Neck: Normal range of motion  Neck supple  No JVD present  Cardiovascular: Normal rate and regular rhythm  Exam reveals no gallop and no friction rub  Murmur heard  Systolic murmur is present with a grade of 2/6 at the upper left sternal border  Pulmonary/Chest: Effort normal and breath sounds normal  She has no wheezes  She has no rales  Abdominal: Soft  She exhibits no distension  There is no tenderness  Musculoskeletal: She exhibits no edema  Neurological: She is alert and oriented to person, place, and time  Skin: Skin is warm and dry         Lab Results:     Troponins:   Results from last 7 days   Lab Units 01/08/20  0550 01/08/20  0301 01/08/20  0007   TROPONIN I ng/mL 0 23* 0 25* 0 15*       CBC with diff:   Results from last 7 days   Lab Units 01/08/20  0322 01/07/20  1550   WBC Thousand/uL 6 05 6 19   HEMOGLOBIN g/dL 13 3 15 1   HEMATOCRIT % 40 5 45 8   MCV fL 99* 99*   PLATELETS Thousands/uL 252 320   MCH pg 32 4 32 5   MCHC g/dL 32 8 33 0   RDW % 14 5 14 5   MPV fL 10 6 10 9   NRBC AUTO /100 WBCs  --  0       CMP:   Results from last 7 days   Lab Units 20  0322 20  1550   POTASSIUM mmol/L 4 4 4 9   CHLORIDE mmol/L 106 104   CO2 mmol/L 30 30   BUN mg/dL 21 25   CREATININE mg/dL 0 93 1 24   CALCIUM mg/dL 8 6 9 3   AST U/L  --  14   ALT U/L  --  20   ALK PHOS U/L  --  108   EGFR ml/min/1 73sq m 54 38       Magnesium:   Results from last 7 days   Lab Units 20  1550   MAGNESIUM mg/dL 1 6       Coags:   Results from last 7 days   Lab Units 20  1550   PTT seconds 27   INR  1 05       Lipid Profile:         Cardiac testing:   Results for orders placed during the hospital encounter of 16   Echo complete with contrast if indicated    [place] [id number]  [phone]    Transthoracic Echocardiogram  2D, M-mode, Doppler, and Color Doppler    Study date:  21-Dec-2016    Patient: Maddie Masters  MR number: [id number]  Account number: [de-identified]  : 07-Mar-1928  Age: 80 years  Gender: Female  Status: Routine  Location: Echo lab  Height: 63 in  Weight: 159 7 lb  BP: 140/ 90 mmHg    Indications: Dizziness    Diagnoses: 780 4 - DIZZINESS AND GIDDINESS    Sonographer:  London Lopez Leader  Primary Physician:  Greg Sarmiento MD  Referring Physician:  Greg Sarmiento MD  Group:  Claudine Kin  Interpreting Physician:  Layne Hashimoto, MD    SUMMARY    LEFT VENTRICLE:  Systolic function was vigorous by visual assessment  Ejection fraction was  estimated in the range of 60 % to 65 %  Although no diagnostic regional wall  motion abnormality was identified, this possibility cannot be completely  excluded on the basis of this study  There was mild concentric hypertrophy  Doppler parameters were consistent with abnormal left ventricular relaxation  (grade 1 diastolic dysfunction)  LEFT ATRIUM:  The atrium was moderately dilated      MITRAL VALVE:  There was mild regurgitation  TRICUSPID VALVE:  There was mild regurgitation  Estimated peak PA pressure was 40 to 45 mmHg  PULMONIC VALVE:  There was trace regurgitation  HISTORY: PRIOR HISTORY: HTN, GERD, HLD, Breast Cancer, Hypothyroidism    PROCEDURE: The procedure was performed in the echo lab  This was a routine  study  The transthoracic approach was used  The study included complete 2D  imaging, M-mode, complete spectral Doppler, and color Doppler  The heart rate  was 84 bpm, at the start of the study  Image quality was good  LEFT VENTRICLE: Size was normal  Systolic function was vigorous by visual  assessment  Ejection fraction was estimated in the range of 60 % to 65 %  Although no diagnostic regional wall motion abnormality was identified, this  possibility cannot be completely excluded on the basis of this study  There was  mild concentric hypertrophy  DOPPLER: Doppler parameters were consistent with  abnormal left ventricular relaxation (grade 1 diastolic dysfunction)  RIGHT VENTRICLE: The size was normal  Systolic function was normal     LEFT ATRIUM: The atrium was moderately dilated  RIGHT ATRIUM: Size was normal     MITRAL VALVE: Valve structure was normal  There was normal leaflet separation  DOPPLER: The transmitral velocity was within the normal range  There was no  evidence for stenosis  There was mild regurgitation  AORTIC VALVE: The valve was trileaflet  Leaflets exhibited normal thickness  DOPPLER: There was no evidence for stenosis  There was no regurgitation  TRICUSPID VALVE: DOPPLER: There was mild regurgitation  Estimated peak PA  pressure was 40 to 45 mmHg  PULMONIC VALVE: DOPPLER: There was trace regurgitation  PERICARDIUM: There was no thickening or calcification  There was no pericardial  effusion  AORTA: The root exhibited normal size  SYSTEMIC VEINS: IVC: The inferior vena cava was normal in size   Respirophasic  changes were normal     SYSTEM MEASUREMENT TABLES    2D mode  AoR Diam 2D: 3 3 cm  LA Diam (2D): 4 6 cm  LA/Ao (2D): 1 39  FS (2D Teich): 33 7 %  IVSd (2D): 1 3 cm  LVDEV: 111 cm³  LVESV: 41 6 cm³  LVIDd(2D): 4 86 cm  LVISd (2D): 3 22 cm  LVPWd (2D): 1 23 cm  SV (Teich): 69 4 cm³    Apical four chamber  LVEF A4C: 67 %    Unspecified Scan Mode  MV Peak A Luis: 1160 mm/s  MV Peak E Luis   Mean: 644 mm/s  MVA (PHT): 5 64 cm squared  PHT: 39 ms  Max P mm[Hg]  V Max: 3040 mm/s  Vmax: 2920 mm/s  RA Area: 15 5 cm squared  RA Volume: 39 4 cm³  TAPSE: 2 1 cm    [employer] Accredited Echocardiography Laboratory    Prepared and electronically signed by    Miguel Fuentes MD  Signed 28-KGD 09:11:02       EKG: Personally reviewed: NSR with nonspecific ST abnormalities    Imaging: I have personally reviewed pertinent films in PACS

## 2020-01-08 NOTE — QUICK NOTE
Notified by by nursing of patient's chest pain which resolved with one dose of nitro sublingual   Repeat EKG with no acute ST segment changes  Patient pending cardiology consult in AM   Continue to trend troponins

## 2020-01-08 NOTE — NURSING NOTE
Pt received to unit via stretcher from ER  Pt is alert and oriented x's 4  Pt given remote control,  instructed how to use  Pt without chest pain or pressure  Lung sounds are clear  Pt with colostomy  Lower extremities are discolored, warm and dry  Presently, no apparrent distress

## 2020-01-08 NOTE — PHYSICAL THERAPY NOTE
PT TREATMENT     01/08/20 0835   Note Type   Note type Eval/Treat   Pain Assessment   Pain Assessment No/denies pain   Home Living   Type of [address] One level  (1 [address])   Home Equipment Cane;Walker   Prior Function   Level of [place] Independent with ADLs and functional mobility  (ambulates without device)   Lives With Alone   Receives Help From   (daughter)   ADL Assistance Independent   Restrictions/Precautions   Other Precautions Chair Alarm; Fall Risk;O2   General   Additional Pertinent History Pt admitted with chest pain and SOB that has resolved  Pt reports she feels "stiff" from laying in bed but otherwise feels well  Cognition   Arousal/Participation Cooperative   Memory Decreased short term memory   Following Commands Follows one step commands without difficulty   RLE Assessment   RLE Assessment   (ROM WFL, MMT 4/5)   LLE Assessment   LLE Assessment   (ROM WFL, MMT 4/5)   Bed Mobility   Supine to Sit 5  Supervision   Sit to Supine 5  Supervision   Transfers   Sit to Stand 4  Minimal assistance   Stand to Sit 4  Minimal assistance   Stand pivot 4  Minimal assistance   Ambulation/Elevation   Gait pattern   (initially stiff and mildly unsteady)   Gait Assistance 4  Minimal assist   Assistive Device   (02)   Distance 15 feet   Balance   Static Sitting Good   Dynamic Sitting Fair +   Static Standing Fair +   Dynamic Standing Fair   Assessment   Prognosis Good   Problem List Decreased strength;Decreased endurance;Decreased mobility; Impaired balance   Assessment Patient seen for Physical Therapy evaluation  Patient admitted with Chest pain  Comorbidities affecting patient's physical performance include: htn, chronic L knee pain, dizziness, IBS, anxiety  Personal factors affecting patient at time of initial evaluation include: stairs to enter home, inability to navigate level surfaces without external assistance, anxiety and hearing impairments   Prior to admission, patient was independent with functional mobility without assistive device  Please find objective findings from Physical Therapy assessment regarding body systems outlined above with impairments and limitations including weakness, impaired balance, gait deviations, decreased activity tolerance, decreased functional mobility tolerance and fall risk  The Barthel Index was used as a functional outcome tool presenting with a score of 65 today indicating moderate limitations of functional mobility and ADLS  Patient's clinical presentation is currently evolving as seen in patient's presentation of increased fall risk, new onset of impairment of functional mobility and decreased endurance  Pt would benefit from continued Physical Therapy treatment to address deficits as defined above and maximize level of functional mobility  As demonstrated by objective findings, the assigned level of complexity for this evaluation is moderate  Goals   Patient Goals "go home" "get warm"   STG Expiration Date 01/15/20   Short Term Goal #1 independent bed mobility, independent transfers, supervision ambulation with cane 100 feet   LTG Expiration Date 01/22/20   Long Term Goal #1 independent ambulation with cane or without device 200 feet, improve standing dynamic balance to at least fair+ to decrease fall risk  Plan   Treatment/Interventions ADL retraining;Functional transfer training;LE strengthening/ROM; Therapeutic exercise; Endurance training;Gait training;Bed mobility; Equipment eval/education;Patient/family training;Elevations   PT Frequency 5x/wk   Recommendation   Recommendation Home with family support   Equipment Recommended   (pt has a cane and walker)   Barthel Index   Feeding 10   Bathing 0   Grooming Score 5   Dressing Score 5   Bladder Score 5   Bowels Score 10   Toilet Use Score 5   Transfers (Bed/Chair) Score 10   Mobility (Level Surface) Score 10   Stairs Score 5   Barthel Index Score 65   Licensure   [place] License Number  Edwige Felty Marleny Santacruz PT [id number]

## 2020-01-08 NOTE — ASSESSMENT & PLAN NOTE
Rule out ACS  Patient with CP/SOB with exertion (was taking out the garbage) resolved with rest  CINTHYA score 4, with personal hx of CAD  Denies chest pain this morning  Likely type 2 MI secondary to hypertension  · Tele monitor- normal sinus rhythm  · Mild troponin elevation, peak troponin 0 25  · Continue ASA/statin  · NTG PRN CP  · Cardiology consulted, appreciate input  Will manage conservatively as patient does not want sedation and has IV contrast allergy  Added metoprolol  Continue aspirin, statin  2D echo pending

## 2020-01-08 NOTE — SOCIAL WORK
Pt is currently under observation  Met with pt  Resides alone in a one floor home with one step to enter  Has cane and RW at home  Uses cane as needed only  No hhc  Has been to [facility] in the past   Uses [facility] for meds or gets assist through her  for life  +LW  POA is her daughter Buck Juarez  Explained role of cm, no d/c needs anticipated  CM reviewed d/c planning process including the following: identifying help at home, patient preference for d/c planning needs, and availability of the treatment team to discuss questions or concerns patient and/or family may have regarding understanding of medications and recognizing signs and symptoms once discharged  CM also encouraged patient to follow up with all recommended appointments after discharge  Patient advised of importance for patient and family to participate in managing patient's medical well being

## 2020-01-08 NOTE — UTILIZATION REVIEW
Initial Clinical Review  PATIENT WAS OBSERVATION STATUS 1/7/20 CONVERTED TO INPATIENT ADMISSION 1/8/20 FOR CONTINUE WORKUP TREATMENT MI 2/2 HTN ADJUSTING MEDICATIONS    Admission: Date/Time/Statement:   Orders Placed This Encounter   Procedures    Place in Observation     Standing Status:   Standing     Number of Occurrences:   1     Order Specific Question:   Admitting Physician     Answer:   Ruma Ojeda [[id number]]     Order Specific Question:   Level of Care     Answer:   Med Surg [16]    Inpatient Admission     Standing Status:   Standing     Number of Occurrences:   1     Order Specific Question:   Admitting Physician     Answer:   Tabby Davenport     Order Specific Question:   Level of Care     Answer:   Med Surg [16]     Order Specific Question:   Estimated length of stay     Answer:   More than 2 Midnights     Order Specific Question:   Certification     Answer:   I certify that inpatient services are medically necessary for this patient for a duration of greater than two midnights  See H&P and MD Progress Notes for additional information about the patient's course of treatment  ED Arrival Information     Expected Arrival Acuity Means of Arrival Escorted By Service Admission Type    - 1/7/2020 15:39 Urgent Ambulance [address] [facility]    Arrival Complaint    Chest Pain         Chief Complaint   Patient presents with    Shortness of Breath     Pt states that she went to take out the garbage and when she was walking back she could not catch her breath  Pt states that she was having CP before but now she just feels sore  Assessment/Plan: 80 y o  female with a PMH of CAD s/p MI x2, HTN, GERD, renal stones, breast ca s/p right partial mastectomy, s/p hartmansOA knee with chronic pain who presents with chest pain  Patient was taking out the garbage and was walking back to the house, when she stated to have left sided chest pain, non radiating, associated with SOB   Her daughter gave her 325mg ASA and called EMS  CP resolved with resting, but per ED staff, en route patient had another episode of CP and tele monitor showed PVCs, which resolved once the CP resolved  Since arrival to ED she has had no further CP  She was feeling quite anxious and scared earlier but feels better now  * Chest pain  Assessment & Plan  Rule out ACS  Patient with CP/SOB with exertion (was taking out the garbage) resolved with rest  CINTHYA score 4, with personal hx of CAD  · Admit to observation  · Tele monitor  · Serial troponins  · ASA/statin  · NTG PRN CP  · Cardiology consulted  · Will order nuclear stress test for tomorrow AM     Hypothyroidism  Assessment & Plan  TSH 4 332  Continue synthroid     Esophageal reflux  Assessment & Plan  Continue PPI     Coronary artery disease of native artery of native heart  Assessment & Plan  Hx of MI     Benign essential hypertension  Assessment & Plan  BP elevated on admission  Continue home cardizem  Monitor BP  VTE Prophylaxis: Enoxaparin (Lovenox)  / sequential compression device   Code Status: DNR/DNI     Anticipated Length of Stay:  Patient will be admitted on an Observation basis with an anticipated length of stay of  < 2 midnights  Justification for Hospital Stay: chest pain rule out ACS    ADDENDUM: Patient noted to have an elevated troponin with no further episodes of chest pain per nursing  At this point will hold off on stress test pending formal cardiology consult in AM     1/8/20  CARDIOLOGY CONSULT  Chest pain with  likely type 2 Myocardial infarction secondary to hypertension and likely underlying CAD  Troponin peak at 0 2  She is chest pain free  Discussed with patient in detail     - Will manage conservatively as she does not want sedation and has an IV contrast allergy   - Add metoprolol to diltiazem     - Continue statin   - ASA 81 mg daily   - If symptoms reoccur, will discontinue diltiazem and increase metoprolol      - Ambulate  - Will review echocardiogram   2  Hypertension - BP significantly elevated upon arrival   - Add metoprolol  - monitor on telemetry for bradycardia  3  Dyslipidemia  - on statin and zetia    1/8/20  Current Patient Status:  Inpatient  Certification Statement: The patient will continue to require additional inpatient hospital stay due to Chest pain     ED Triage Vitals   Temperature Pulse Respirations Blood Pressure SpO2   01/07/20 1554 01/07/20 1554 01/07/20 1554 01/07/20 1554 01/07/20 1554   (!) 97 °F (36 1 °C) 98 22 (!) 194/81 95 %      Temp Source Heart Rate Source Patient Position - Orthostatic VS BP Location FiO2 (%)   01/07/20 1554 01/07/20 2001 01/07/20 1554 01/07/20 1554 --   Tympanic Monitor Lying Right arm       Pain Score       01/07/20 2030       8        Wt Readings from Last 1 Encounters:   01/07/20 72 6 kg (160 lb)     Additional Vital Signs:   Pertinent Labs/Diagnostic Test Results:   cxr No acute cardiopulmonary disease    Results from last 7 days   Lab Units 01/08/20  0322 01/07/20  1550   WBC Thousand/uL 6 05 6 19   HEMOGLOBIN g/dL 13 3 15 1   HEMATOCRIT % 40 5 45 8   PLATELETS Thousands/uL 252 320   NEUTROS ABS Thousands/µL  --  3 88         Results from last 7 days   Lab Units 01/08/20  0322 01/07/20  1550   SODIUM mmol/L 141 142   POTASSIUM mmol/L 4 4 4 9   CHLORIDE mmol/L 106 104   CO2 mmol/L 30 30   ANION GAP mmol/L 5 8   BUN mg/dL 21 25   CREATININE mg/dL 0 93 1 24   EGFR ml/min/1 73sq m 54 38   CALCIUM mg/dL 8 6 9 3   MAGNESIUM mg/dL  --  1 6   PHOSPHORUS mg/dL  --  3 2     Results from last 7 days   Lab Units 01/07/20  1550   AST U/L 14   ALT U/L 20   ALK PHOS U/L 108   TOTAL PROTEIN g/dL 7 6   ALBUMIN g/dL 4 1   TOTAL BILIRUBIN mg/dL 0 30         Results from last 7 days   Lab Units 01/08/20  0322 01/07/20  1550   GLUCOSE RANDOM mg/dL 92 212*     Results from last 7 days   Lab Units 01/08/20  0550 01/08/20  0301 01/08/20  0007 01/07/20  2019 01/07/20  1550   TROPONIN I ng/mL 0 23* 0 25* 0 15* 0 03 <0 02 Results from last 7 days   Lab Units 01/07/20  1550   PROTIME seconds 11 2   INR  1 05   PTT seconds 27     Results from last 7 days   Lab Units 01/07/20  1550   TSH 3RD GENERATON uIU/mL 4 332*     Results from last 7 days   Lab Units 01/07/20  1550   NT-PRO BNP pg/mL 425     Results from last 7 days   Lab Units 01/07/20  2115   CLARITY UA  Slightly Cloudy   COLOR UA  Yellow   SPEC GRAV UA  1 020   PH UA  6 0   GLUCOSE UA mg/dl Negative   KETONES UA mg/dl Negative   BLOOD UA  Negative   PROTEIN UA mg/dl Negative   NITRITE UA  Negative   BILIRUBIN UA  Negative   UROBILINOGEN UA E U /dl 0 2   LEUKOCYTES UA  Trace*   WBC UA /hpf 1-2*   RBC UA /hpf None Seen   BACTERIA UA /hpf Occasional   EPITHELIAL CELLS WET PREP /hpf Moderate*     ED Treatment:   Medication Administration from 01/07/2020 1539 to 01/07/2020 1805       Date/Time Order Dose Route Action Action by Comments     01/07/2020 1624 sodium chloride 0 9 % bolus 500 mL 500 mL Intravenous New Bag James Peña RN         Past Medical History:   Diagnosis Date    Adenocarcinoma of breast ([facility] ) 9/4/2012    Procedure/Onset: 12/07/2005    Anxiety 2/28/2018    Arthritis     joints    Asymptomatic varicose veins of bilateral lower extremities 10/14/2016    Benign colon polyp 2/17/2014    Breast [facility] ) 2005    right    Cancer ([facility] )     breast ca, skin ca, currently in remission, tumors in chest wall    Cardiac disease     hx MI x 2    Chronic pain disorder     knees    Chronic rhinitis 9/30/2013    Chronic venous insufficiency 5/22/2018    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    S/p Mindy's procedure with the end colostomy    Complete uterovaginal prolapse 11/19/2015    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    Procedure/Onset: 05/26/2006    Coronary artery disease     Disease of thyroid gland     hypothyroidism    Gait disturbance 12/19/2016    GERD (gastroesophageal reflux disease)     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 Procedure/Onset: 02/22/2007    History of prolapse of bladder     History of radiation therapy 2005    to breast right    Hypertension     Irritable bowel syndrome 9/4/2012    Procedure/Onset: 09/23/2010    Kidney stone     2016    Myocardial infarction [facility])     1990's x2    Nephrolithiasis 2/26/2016    Osteoarthritis of left knee 10/19/2016    Perforation of sigmoid colon due to diverticulitis 5/9/2019    Added automatically from request for surgery [id number]    Peripheral vertigo 9/4/2012    Procedure/Onset: 04/04/2007    Psoriasis 12/19/2016    Rectocele 3/10/2016    Last Assessment & Plan:  As below   Stress incontinence in female 7/6/2016     Present on Admission:   Chest pain   Benign essential hypertension   Coronary artery disease of native artery of native heart   Esophageal reflux   Hypothyroidism      Admitting Diagnosis: Shortness of breath [R06 02]  Chest pain [R07 9]  Dyspnea on exertion [R06 09]  Age/Sex: 80 y o  female  Admission Orders:  Scheduled Medications:    Medications:  aspirin 81 mg Oral QAM   diltiazem 180 mg Oral Daily   enoxaparin 40 mg Subcutaneous Daily   ezetimibe 10 mg-pravastatin 80 mg combo dose  Oral HS   fluticasone 1 spray Each Nare BID   hydroxyurea 500 mg Oral Once per day on Mon Wed Fri   levothyroxine 50 mcg Oral Early Morning   pantoprazole 40 mg Oral Early Morning     Continuous IV Infusions:     PRN Meds:    acetaminophen 650 mg Oral Q6H PRN   nitroglycerin 0 4 mg Sublingual Q5 Min PRN   ondansetron 4 mg Intravenous Q6H PRN       IP CONSULT TO CARDIOLOGY    Network Utilization Review Department  [email or web address]  ATTENTION: Please call with any questions or concerns to [phone] and carefully listen to the prompts so that you are directed to the right person   All voicemails are confidential   Joel Reyes all requests for admission clinical reviews, approved or denied determinations and any other requests to dedicated fax number below belonging to the campus where the patient is receiving treatment   List of dedicated fax numbers for the Facilities:  [address] DENIALS (Administrative/Medical Necessity) [phone]   [address]  (Maternity/NICU/Pediatrics) [phone]   Dylan Serna [phone]   [facility] [phone]   [facility] [phone]   [employer] [phone]   [address] [place] [address] [phone]   [facility]  [phone]   [address] [facility]  [address] [facility] [address] [facility] [phone]

## 2020-01-08 NOTE — PROGRESS NOTES
Progress Note - Shayy Lomas 3/7/1928, 80 y o  female MRN: [id number]    Unit/Bed#: [address] [address] Encounter: [id number]    Primary Care Provider: Ramo Almodovar MD   Date and time admitted to hospital: 1/7/2020  3:40 PM        * Chest pain  Assessment & Plan  Rule out ACS  Patient with CP/SOB with exertion (was taking out the garbage) resolved with rest  CINTHYA score 4, with personal hx of CAD  Denies chest pain this morning  Likely type 2 MI secondary to hypertension  · Tele monitor- normal sinus rhythm  · Mild troponin elevation, peak troponin 0 25  · Continue ASA/statin  · NTG PRN CP  · Cardiology consulted, appreciate input  Will manage conservatively as patient does not want sedation and has IV contrast allergy  Added metoprolol  Continue aspirin, statin  2D echo pending  Hypothyroidism  Assessment & Plan  TSH 4 332  Continue synthroid    Esophageal reflux  Assessment & Plan  Continue PPI    Coronary artery disease of native artery of native heart  Assessment & Plan  Hx of MI, no cardiac stent or bypass history  Benign essential hypertension  Assessment & Plan  BP elevated on admission  Continue home cardizem  BP improving  Monitor        VTE Pharmacologic Prophylaxis:   Pharmacologic: Enoxaparin (Lovenox)  Mechanical VTE Prophylaxis in Place: Yes    Patient Centered Rounds: I have performed bedside rounds with nursing staff today  Discussions with Specialists or Other Care Team Provider:  Cardiology    Education and Discussions with Family / Patient:  Yes    Time Spent for Care: 20 minutes  More than 50% of total time spent on counseling and coordination of care as described above      Current Length of Stay: 0 day(s)    Current Patient Status:  Inpatient  Certification Statement: The patient will continue to require additional inpatient hospital stay due to Chest pain    Discharge Plan:  Home in 24 hours    Code Status: Level 3 - DNAR and DNI      Subjective:   Patient denies chest pain this morning  Reports feeling thirsty and hungry  Denies headache, dizziness, SOB, nausea, vomiting, diarrhea, constipation, fever or chills  Objective:     Vitals:   Temp (24hrs), Av °F (36 7 °C), Min:97 4 °F (36 3 °C), Max:98 6 °F (37 °C)    Temp:  [97 4 °F (36 3 °C)-98 6 °F (37 °C)] 97 9 °F (36 6 °C)  HR:  [71-96] 71  Resp:  [18-20] 18  BP: (121-194)/(58-98) 130/69  SpO2:  [96 %-99 %] 96 %  Body mass index is 28 34 kg/m²  Input and Output Summary (last 24 hours): Intake/Output Summary (Last 24 hours) at 2020 1705  Last data filed at 2020 0551  Gross per 24 hour   Intake 250 ml   Output 600 ml   Net -350 ml       Physical Exam:     Physical Exam   Constitutional: She is oriented to person, place, and time  She appears well-developed and well-nourished  HENT:   Head: Normocephalic and atraumatic  Neck: Normal range of motion  Neck supple  No JVD present  No tracheal deviation present  No thyromegaly present  Cardiovascular: Normal rate and regular rhythm  No murmur heard  Pulmonary/Chest: Effort normal and breath sounds normal  No respiratory distress  She has no wheezes  She has no rales  Abdominal: Soft  Bowel sounds are normal  She exhibits no distension  There is no tenderness  There is no guarding  Musculoskeletal: Normal range of motion  She exhibits no edema, tenderness or deformity  Neurological: She is alert and oriented to person, place, and time  Skin: Skin is warm and dry  Psychiatric: She has a normal mood and affect  Judgment normal    Nursing note and vitals reviewed        Additional Data:     Labs:    Results from last 7 days   Lab Units 20  0322 20  1550   WBC Thousand/uL 6 05 6 19   HEMOGLOBIN g/dL 13 3 15 1   HEMATOCRIT % 40 5 45 8   PLATELETS Thousands/uL 252 320   NEUTROS PCT %  --  62   LYMPHS PCT %  --  29   MONOS PCT %  --  5   EOS PCT %  --  3     Results from last 7 days   Lab Units 20  0322 20  1550   POTASSIUM mmol/L 4 4 4 9   CHLORIDE mmol/L 106 104   CO2 mmol/L 30 30   BUN mg/dL 21 25   CREATININE mg/dL 0 93 1 24   CALCIUM mg/dL 8 6 9 3   ALK PHOS U/L  --  108   ALT U/L  --  20   AST U/L  --  14     Results from last 7 days   Lab Units 01/07/20  1550   INR  1 05       * I Have Reviewed All Lab Data Listed Above  * Additional Pertinent Lab Tests Reviewed: [address] Admission Reviewed    Imaging:    Imaging Reports Reviewed Today Include:  None  Imaging Personally Reviewed by Myself Includes:  None    Recent Cultures (last 7 days):           Last 24 Hours Medication List:     Current Facility-Administered Medications:  acetaminophen 650 mg Oral Q6H PRN Sharlee Closs, MD   aspirin 81 mg Oral QAM Sharlee Closs, MD   diltiazem 180 mg Oral Daily Sharlee Closs, MD   enoxaparin 40 mg Subcutaneous Daily Sharlee Closs, MD   ezetimibe 10 mg-pravastatin 80 mg combo dose  Oral HS Sharlee Closs, MD   fluticasone 1 spray Each Nare BID Sharlee Closs, MD   hydroxyurea 500 mg Oral Once per day on Mon Wed Fri Sharlee Closs, MD   levothyroxine 50 mcg Oral Early Morning Sharlee Closs, MD   metoprolol tartrate 12 5 mg Oral Q12H [address] Navte[place] DO [place]   nitroglycerin 0 4 mg Sublingual Q5 Min PRN Ignatius Fothergill, CRNP   ondansetron 4 mg Intravenous Q6H PRN Sharlee Closs, MD   pantoprazole 40 mg Oral Early Morning Sharlee Closs, MD        Today, Patient Was Seen By: GLORIA Emerson    ** Please Note: Dragon 360 Dictation voice to text software may have been used in the creation of this document   **

## 2020-01-09 ENCOUNTER — APPOINTMENT (INPATIENT)
Dept: RADIOLOGY | Facility: HOSPITAL | Age: 85
DRG: 281 | End: 2020-01-09
Payer: MEDICARE

## 2020-01-09 VITALS
HEIGHT: 63 IN | RESPIRATION RATE: 18 BRPM | WEIGHT: 160 LBS | TEMPERATURE: 97.4 F | BODY MASS INDEX: 28.35 KG/M2 | DIASTOLIC BLOOD PRESSURE: 67 MMHG | OXYGEN SATURATION: 96 % | HEART RATE: 79 BPM | SYSTOLIC BLOOD PRESSURE: 138 MMHG

## 2020-01-09 LAB
ANION GAP SERPL CALCULATED.3IONS-SCNC: 7 MMOL/L (ref 4–13)
ATRIAL RATE: 75 BPM
BUN SERPL-MCNC: 22 MG/DL (ref 5–25)
CALCIUM SERPL-MCNC: 9.1 MG/DL (ref 8.3–10.1)
CHLORIDE SERPL-SCNC: 105 MMOL/L (ref 100–108)
CO2 SERPL-SCNC: 29 MMOL/L (ref 21–32)
CREAT SERPL-MCNC: 0.97 MG/DL (ref 0.6–1.3)
GFR SERPL CREATININE-BSD FRML MDRD: 51 ML/MIN/1.73SQ M
GLUCOSE SERPL-MCNC: 106 MG/DL (ref 65–140)
GLUCOSE SERPL-MCNC: 112 MG/DL (ref 65–140)
MAGNESIUM SERPL-MCNC: 1.3 MG/DL (ref 1.6–2.6)
P AXIS: 63 DEGREES
POTASSIUM SERPL-SCNC: 4.4 MMOL/L (ref 3.5–5.3)
PR INTERVAL: 264 MS
QRS AXIS: 38 DEGREES
QRSD INTERVAL: 72 MS
QT INTERVAL: 374 MS
QTC INTERVAL: 417 MS
SODIUM SERPL-SCNC: 141 MMOL/L (ref 136–145)
T WAVE AXIS: 43 DEGREES
TROPONIN I SERPL-MCNC: 0.1 NG/ML
TROPONIN I SERPL-MCNC: 0.1 NG/ML
VENTRICULAR RATE: 75 BPM

## 2020-01-09 PROCEDURE — 93010 ELECTROCARDIOGRAM REPORT: CPT | Performed by: INTERNAL MEDICINE

## 2020-01-09 PROCEDURE — 83735 ASSAY OF MAGNESIUM: CPT | Performed by: NURSE PRACTITIONER

## 2020-01-09 PROCEDURE — 70450 CT HEAD/BRAIN W/O DYE: CPT

## 2020-01-09 PROCEDURE — 93306 TTE W/DOPPLER COMPLETE: CPT | Performed by: INTERNAL MEDICINE

## 2020-01-09 PROCEDURE — 84484 ASSAY OF TROPONIN QUANT: CPT | Performed by: NURSE PRACTITIONER

## 2020-01-09 PROCEDURE — 82948 REAGENT STRIP/BLOOD GLUCOSE: CPT

## 2020-01-09 PROCEDURE — 97166 OT EVAL MOD COMPLEX 45 MIN: CPT

## 2020-01-09 PROCEDURE — 99239 HOSP IP/OBS DSCHRG MGMT >30: CPT | Performed by: NURSE PRACTITIONER

## 2020-01-09 PROCEDURE — 80048 BASIC METABOLIC PNL TOTAL CA: CPT | Performed by: NURSE PRACTITIONER

## 2020-01-09 PROCEDURE — 93005 ELECTROCARDIOGRAM TRACING: CPT

## 2020-01-09 PROCEDURE — 99232 SBSQ HOSP IP/OBS MODERATE 35: CPT | Performed by: INTERNAL MEDICINE

## 2020-01-09 RX ORDER — MAGNESIUM SULFATE HEPTAHYDRATE 40 MG/ML
2 INJECTION, SOLUTION INTRAVENOUS ONCE
Status: COMPLETED | OUTPATIENT
Start: 2020-01-09 | End: 2020-01-09

## 2020-01-09 RX ADMIN — LEVOTHYROXINE SODIUM 50 MCG: 50 TABLET ORAL at 06:55

## 2020-01-09 RX ADMIN — NITROGLYCERIN 0.4 MG: 0.4 TABLET SUBLINGUAL at 01:30

## 2020-01-09 RX ADMIN — FLUTICASONE PROPIONATE 1 SPRAY: 50 SPRAY, METERED NASAL at 09:40

## 2020-01-09 RX ADMIN — DILTIAZEM HYDROCHLORIDE 180 MG: 180 CAPSULE, COATED, EXTENDED RELEASE ORAL at 09:40

## 2020-01-09 RX ADMIN — ASPIRIN 81 MG 81 MG: 81 TABLET ORAL at 09:40

## 2020-01-09 RX ADMIN — MAGNESIUM SULFATE HEPTAHYDRATE 2 G: 40 INJECTION, SOLUTION INTRAVENOUS at 09:39

## 2020-01-09 RX ADMIN — PANTOPRAZOLE SODIUM 40 MG: 40 TABLET, DELAYED RELEASE ORAL at 06:55

## 2020-01-09 RX ADMIN — METOPROLOL TARTRATE 12.5 MG: 25 TABLET ORAL at 09:40

## 2020-01-09 RX ADMIN — ENOXAPARIN SODIUM 40 MG: 40 INJECTION SUBCUTANEOUS at 09:40

## 2020-01-09 RX ADMIN — NITROGLYCERIN 0.4 MG: 0.4 TABLET SUBLINGUAL at 01:40

## 2020-01-09 NOTE — QUICK NOTE
Per nursing, patient with periods of forgetfulness of overnight  At this time, patient was found in the hallway and disoriented to place  Patient was seen multiple times overnight for her chest discomfort and suspect a component of delirium secondary to hospitalization/poor sleep overnight, however, will rule out a more serious etiology - stat vitals, FBS, AM labs and CT head

## 2020-01-09 NOTE — DISCHARGE SUMMARY
Discharge- Ruthann Torres 3/7/1928, 80 y o  female MRN: [id number]    Unit/Bed#: [address] [address] Encounter: [id number]    Primary Care Provider: Ladarius Castellanos MD   Date and time admitted to hospital: 1/7/2020  3:40 PM        * Chest pain  Assessment & Plan  Rule out ACS  Patient with CP/SOB with exertion (was taking out the garbage) resolved with rest  CINTHYA score 4, with personal hx of CAD  Denies chest pain this morning  Likely type 2 MI secondary to hypertension  · Patient is telemetry revealed no overnight events, patient keeps on taking her telemetry off  Discontinued  · Mild troponin elevation, peak troponin 0 25  · Will continue aspirin and Cardizem  · No further incidences of chest discomfort today  · Cardiology indicated echocardiogram is normal, can discharge patient today  · Had started patient on metoprolol, after discussion with Cardiology will hold off on continuing this  · Patient will follow up with Cardiology in the office and will discuss this  · Cardiology consulted, appreciate input  Will manage conservatively as patient does not want sedation and has IV contrast   · Patient will be discharged home today         Coronary artery disease of native artery of native heart  Assessment & Plan  Hx of MI, no cardiac stent or bypass history  Will continue patient's home medications of aspirin and Cardizem  As per plan above, patient will follow up with Cardiology in the office in the next 1-2 weeks  Patient will be discharged home today with family support  Benign essential hypertension  Assessment & Plan  BP elevated on admission  Continue home cardizem  Most current blood pressure 138/67  Patient will be following up with Cardiology in 1-2 weeks  Patient will also follow up with her primary care physician in 1-2 weeks  This was discussed with the patient and her daughter at the bedside        Hypothyroidism  Assessment & Plan  TSH 4 332  Continue synthroid  Follow-up with primary care physician in 1-2 weeks  Esophageal reflux  Assessment & Plan  Continue PPI        Discharging Physician / Practitioner: GLORIA Morales  PCP: Cuauhtemoc Barba MD  Admission Date:   Admission Orders (From admission, onward)     Ordered        01/08/20 1707  Inpatient Admission  Once         01/07/20 1721  Place in Observation  Once                   Discharge Date: 01/09/20    Resolved Problems  Date Reviewed: 1/9/2020    None          Consultations During Hospital Stay:  · Cardiology  Procedures Performed:     · Echocardiogram    Significant Findings / Test Results:     · Chest x-ray performed 1/7 shows no acute cardiopulmonary disease  · CT of brain performed 1/9 showed no acute intracranial abnormality  Incidental Findings:   · None  Test Results Pending at Discharge (will require follow up): · None  Outpatient Tests Requested:  · None  Complications:  None  Reason for Admission:  Shortness of breath and chest pain  Hospital Course:     Diogenes Shaikh is a 80 y o  female patient past medical history of CAD status post MI x2, hypertension, GERD, renal stones, breast CA status post right partial mastectomy, osteoarthritis of the knee with chronic pain who originally presented to the hospital on 1/7/2020 due to chest pain  Patient was taking the garbage out and when she was walking back to the how she started to have left-sided chest pain with some shortness of breath  Her daughter gave her 325 mg of aspirin and activated EMS  Chest pain resolved with resting however patient did have another episode of chest pain in route to the hospital and her telemetry did show PVCs which resolved  Patient was admitted for further evaluation and treatment  Her troponins did trend up to 0 2, which is secondary to hypertension and underlying CAD as per Cardiology  Final troponin was trending down to 0 1  Patient was monitored on telemetry, continued sinus rhythm    Cardiology discussed with patient having cardiac catheterization which patient declined as she has IV contrast allergy and does not want sedation  Patient is to be medically managed, will continue with patient's diltiazem, statin and aspirin  Patient was placed on metoprolol during hospitalization, however after discussion with Cardiology will hold off on sending patient home on metoprolol  She will follow up with Cardiology in 1-2 weeks  Patient did have episode of confusion overnight, question sun down, CT of the head was performed which was negative for any acute intracranial abnormalities  This was discussed with the daughter, the daughter indicated that she will be staying with her mother initially and will monitor her  Patient was seen by physical therapy during her hospitalization, she is independent and ambulates using a single-point cane  She will be discharged home today  She will follow up with her primary care physician in 1-2 weeks  This was discussed with the daughter and the patient at the bedside, they verbalized understanding and are agreeable to the plan  Please see above list of diagnoses and related plan for additional information  Condition at Discharge: good     Discharge Day Visit / Exam:     Subjective:  I do not understand why those women were in my room last night trying to get my purse  Patient indicated that she was very upset by all the activity, as per nursing staff patient had periods of confusion and nurse practitioner overnight was called to see the patient  Patient also had a CT of the head performed  Was able to redirect patient, and she did calm down  She denied any headache, chest pain, shortness of breath, abdominal pain, nausea, vomiting or diarrhea  She is requesting to go home today      Vitals: Blood Pressure: 138/67 (01/09/20 0805)  Pulse: 79 (01/09/20 0805)  Temperature: (!) 97 4 °F (36 3 °C) (01/09/20 0805)  Temp Source: Oral (01/09/20 0805)  Respirations: 18 (01/09/20 0805)  Height: 5' 3" (160 cm) (01/07/20 1846)  Weight - Scale: 72 6 kg (160 lb) (01/07/20 1846)  SpO2: 96 % (01/09/20 0805)     Exam:   Physical Exam   Constitutional: She is oriented to person, place, and time  She appears well-nourished  Patient initially was a little weepy, was disturbed by previous night's events  Verbal reassurance provided  HENT:   Head: Normocephalic  Eyes: Pupils are equal, round, and reactive to light  Conjunctivae and EOM are normal    Neck: Normal range of motion  Cardiovascular: Normal rate  Murmur heard  Pulmonary/Chest: Effort normal and breath sounds normal  No respiratory distress  Abdominal: Soft  Bowel sounds are normal  She exhibits no distension  There is no tenderness  Genitourinary:   Genitourinary Comments: Voiding spontaneously  Musculoskeletal: Normal range of motion  She exhibits no edema  Neurological: She is alert and oriented to person, place, and time  Short-term memory forgetful  Skin: Skin is warm and dry  Capillary refill takes less than 2 seconds  Psychiatric: She has a normal mood and affect  Her behavior is normal  Judgment and thought content normal    Nursing note and vitals reviewed  Discussion with Family:  I spoke with the patient and her daughter at the bedside, I have answered all questions to the best of my abilities  Discharge instructions/Information to patient and family:   See after visit summary for information provided to patient and family  Provisions for Follow-Up Care:  See after visit summary for information related to follow-up care and any pertinent home health orders  Disposition:     Home    For Discharges to [facility]:   · Not Applicable to this Patient - Not Applicable to this Patient    Planned Readmission:  No      Discharge Statement:  I spent greater than 30 minutes discharging the patient  This time was spent on the day of discharge   I had direct contact with the patient on the day of discharge  Greater than 50% of the total time was spent examining patient, answering all patient questions, arranging and discussing plan of care with patient as well as directly providing post-discharge instructions  Additional time then spent on discharge activities  Discharge Medications:  See after visit summary for reconciled discharge medications provided to patient and family        ** Please Note: This note has been constructed using a voice recognition system **

## 2020-01-09 NOTE — ASSESSMENT & PLAN NOTE
BP elevated on admission  Continue home cardizem  Most current blood pressure 138/67  Patient will be following up with Cardiology in 1-2 weeks  Patient will also follow up with her primary care physician in 1-2 weeks  This was discussed with the patient and her daughter at the bedside

## 2020-01-09 NOTE — NURSING NOTE
AVS reviewed with pt and daughter (tone) at bedside, education given and opportunity for questions provided  Both verbalized understanding  IV removed and inspected, catheter intact no s/s of infection, pt tolerated well  Pt left walking with belongings in hand accompanied by daughter, no signs of distress noted

## 2020-01-09 NOTE — ASSESSMENT & PLAN NOTE
Hx of MI, no cardiac stent or bypass history  Will continue patient's home medications of aspirin and Cardizem  As per plan above, patient will follow up with Cardiology in the office in the next 1-2 weeks  Patient will be discharged home today with family support

## 2020-01-09 NOTE — ASSESSMENT & PLAN NOTE
Rule out ACS  Patient with CP/SOB with exertion (was taking out the garbage) resolved with rest  CINTHYA score 4, with personal hx of CAD  Denies chest pain this morning  Likely type 2 MI secondary to hypertension  · Patient is telemetry revealed no overnight events, patient keeps on taking her telemetry off  Discontinued  · Mild troponin elevation, peak troponin 0 25  · Will continue aspirin and Cardizem  · No further incidences of chest discomfort today  · Cardiology indicated echocardiogram is normal, can discharge patient today  · Had started patient on metoprolol, after discussion with Cardiology will hold off on continuing this  · Patient will follow up with Cardiology in the office and will discuss this  · Cardiology consulted, appreciate input  Will manage conservatively as patient does not want sedation and has IV contrast   · Patient will be discharged home today  Anum Ansari

## 2020-01-09 NOTE — OCCUPATIONAL THERAPY NOTE
OT Evaluation       01/09/20 0933   Note Type   Note type Eval only   Pain Assessment   Pain Assessment No/denies pain   Pain Score No Pain   Home Living   Type of [address] One level  (1 [address]RISTY)   Bathroom Shower/Tub Tub/shower unit   Bathroom Toilet Standard   Bathroom Equipment Grab bars in shower; Shower chair   Home Equipment Walker;Cane  (Does not use)   Prior Function   Level of [place] Independent with ADLs and functional mobility   Lives With Alone   Receives Help From Family   ADL Assistance Independent   IADLs Needs assistance   Comments Daughter lives nearby and assists with ADLs   ADL   Eating Assistance 7  Independent   Grooming Assistance 7  Independent   UB Bathing Assistance 5  Supervision/Setup   LB Bathing Assistance 5  Supervision/Setup   UB Dressing Assistance 7  Independent   LB Dressing Assistance 7  Independent   LB Dressing Deficit Don/doff R sock; Don/doff L sock   Toileting Assistance  7  Independent   Additional Comments Patient supervision for some ADLs due to confusion, cognitive limitations   Bed Mobility   Additional Comments Not assessed, patient seated in    Transfers   Sit to Stand 5  Supervision   Stand to Sit 5  Supervision   Stand pivot 5  Supervision   Toilet transfer 5  Supervision   Additional items Standard toilet   Functional Mobility   Functional Mobility 5  Supervision   Additional Comments Ambulated short and long household distance with no AD   Balance   Static Sitting Good   Dynamic Sitting Fair +   Static Standing Fair +   Dynamic Standing Fair   Activity Tolerance   Activity Tolerance Patient tolerated treatment well   RUE Assessment   RUE Assessment WFL  (Strength grossly 4/5 throughout)   LUE Assessment   LUE Assessment WFL  (Strength grossly 4/5 throughout)   Cognition   Overall Cognitive Status Impaired   Arousal/Participation Alert; Cooperative   Attention Attends with cues to redirect   Orientation Level Oriented to person;Disoriented to place; Disoriented to time;Disoriented to situation   Following Commands Follows multistep commands with increased time or repetition   Comments Patient confused, oriented to self, oriented to place with cueing, oriented to month and year but not date; patient reporting "I don't quite remember how I got here"  Patient with some awareness of confusion  Per chart patient ambulating in hallways and into other patient's rooms earlier this morning   Assessment   Limitation Decreased Safe judgement during ADL;Decreased cognition;Decreased self-care trans;Decreased high-level ADLs   Prognosis Good   Assessment Patient evaluated by Occupational Therapy  Patient admitted with Chest pain  The patients occupational profile, medical and therapy history includes a extensive additional review of physical, cognitive, or psychosocial history related to current functional performance  Comorbidities affecting functional mobility and ADLS include: htn, chronic L knee pain, dizziness, IBS, anxiety  Prior to admission, patient was independent with functional mobility without assistive device, independent with ADLS and requiring assist for IADLS  The evaluation identifies the following performance deficits: weakness, decreased ROM, decreased endurance, increased fall risk, new onset of impairment of functional mobility, decreased ADLS, decreased safety awareness, impaired judgement, decreased cognition and decreased strength, that result in activity limitations and/or participation restrictions  This evaluation requires clinical decision making of moderate complexity, because the patient may present with comorbidities that affect occupational performance and required minimal or moderate modification of tasks or assistance with the consideration of several treatment options  The Barthel Index was used as a functional outcome tool presenting with a score of 70, indicating moderate limitations of functional mobility and ADLS    Patient will benefit from skilled Occupational Therapy services to address above deficits and facilitate a safe return to prior level of function  Goals   Patient Goals To go home   STG Time Frame   (1-7 days)   Short Term Goal #1 Patient will orient self x 4 with moderate verbal cues   Short Term Goal #2 Patient will be able to to verbalize understanding and perform safety awareness strategies during ADLS and functional mobility at least 75% of the time with minimal cueing to improve safety during functional ADLs and mobility and reduce fall risk  Short Term Goal  Patient will increase standing tolerance to 10 minutes during ADL task to decrease assistance level and decrease fall risk; Patient will increase bed mobility to independent in preparation for ADLS and transfers; Patient will increase functional mobility to and from bathroom with no assistive device independently to increase performance with ADLS and to use a toilet; Patient will tolerate 10 minutes of UE ROM/strengthening to increase general activity tolerance and performance in ADLS/IADLS; Patient will improve functional activity tolerance to 10 minutes of sustained functional tasks to increase participation in basic self-care and decrease assistance level;  Patient will be able to to verbalize understanding and perform energy conservation/proper body mechanics during ADLS and functional mobility at least 75% of the time with minimal cueing to decrease signs of fatigue and increase stamina to return to prior level of function; Patient will increase dynamic sitting balance to good to improve the ability to sit at edge of bed or on a chair for ADLS;  Patient will increase dynamic standing balance to fair+ to improve postural stability and decrease fall risk during standing ADLS and transfers       LTG Time Frame   (8-14 days)   Long Term Goal #1 Patient will orient self x 4 with minimal verbal cues   Long Term Goal #2 Patient will be able to to verbalize understanding and perform safety awareness strategies during ADLS and functional mobility at least 90% of the time with no cueing to improve safety during functional ADLs and mobility and reduce fall risk  Long Term Goal Patient will increase standing tolerance to 15 minutes during ADL task to decrease assistance level and decrease fall risk; Patient will tolerate 15 minutes of UE ROM/strengthening to increase general activity tolerance and performance in ADLS/IADLS; Patient will improve functional activity tolerance to 15 minutes of sustained functional tasks to increase participation in basic self-care and decrease assistance level;  Patient will be able to to verbalize understanding and perform energy conservation/proper body mechanics during ADLS and functional mobility at least 90% of the time with nocueing to decrease signs of fatigue and increase stamina to return to prior level of function; S;  Patient will increase static/dynamic standing balance to good to improve postural stability and decrease fall risk during standing ADLS and transfers  Functional Transfer Goals   Pt Will Perform All Functional Transfers   (LTG independent)   ADL Goals   Pt Will Perform Bathing   (LTG independent)   Plan   Treatment Interventions ADL retraining;UE strengthening/ROM; Cognitive reorientation;Patient/family training;Energy conservation   Goal Expiration Date 01/23/20   OT Frequency 3-5x/wk   Recommendation   OT Discharge Recommendation Home with family support   Barthel Index   Feeding 10   Bathing 0   Grooming Score 5   Dressing Score 10   Bladder Score 5   Bowels Score 10   Toilet Use Score 5   Transfers (Bed/Chair) Score 10   Mobility (Level Surface) Score 10   Stairs Score 5   Barthel Index Score 79   Licensure   [place] License Number  Martin, OTR/ELI [id number]

## 2020-01-09 NOTE — PLAN OF CARE
Problem: CARDIOVASCULAR - ADULT  Goal: Maintains optimal cardiac output and hemodynamic stability  Description  INTERVENTIONS:  - Monitor I/O, vital signs and rhythm  - Monitor for S/S and trends of decreased cardiac output  - Administer and titrate ordered vasoactive medications to optimize hemodynamic stability  - Assess quality of pulses, skin color and temperature  - Assess for signs of decreased coronary artery perfusion  - Instruct patient to report change in severity of symptoms  Outcome: Progressing  Goal: Absence of cardiac dysrhythmias or at baseline rhythm  Description  INTERVENTIONS:  - Continuous cardiac monitoring, vital signs, obtain 12 lead EKG if ordered  - Administer antiarrhythmic and heart rate control medications as ordered  - Monitor electrolytes and administer replacement therapy as ordered  Outcome: Progressing     Problem: HEMATOLOGIC - ADULT  Goal: Maintains hematologic stability  Description  INTERVENTIONS  - Assess for signs and symptoms of bleeding or hemorrhage  - Monitor labs  - Administer supportive blood products/factors as ordered and appropriate  Outcome: Progressing     Problem: PAIN - ADULT  Goal: Verbalizes/displays adequate comfort level or baseline comfort level  Description  Interventions:  - Encourage patient to monitor pain and request assistance  - Assess pain using appropriate pain scale  - Administer analgesics based on type and severity of pain and evaluate response  - Implement non-pharmacological measures as appropriate and evaluate response  - Consider cultural and social influences on pain and pain management  - Notify physician/advanced practitioner if interventions unsuccessful or patient reports new pain  Outcome: Progressing

## 2020-01-09 NOTE — QUICK NOTE
Called to bedside by nursing who reported the patient was experiencing chest discomfort  Repeat EKG without any changes, and vitals stable with /76 and HR 76  The patient is describing the discomfort as a pressure, which she says is much less compared to the chest pain that brought her in to the hospital  Patient is agitated, though alert and oriented  She denies lightheadedness, dizziness, palpitations  Will trend troponin  Patient was given 2 SL nitro with minimal improvement of her discomfort  Will continue to monitor vitals and symptoms and give the third SL if discomfort does not improve

## 2020-01-09 NOTE — DISCHARGE INSTRUCTIONS
Angina   WHAT YOU NEED TO KNOW:   Angina is pain, pressure, or tightness that is usually felt in your chest  Chest pain may come on when you are stressed or do physical activities, such as walking or exercising  Angina is caused by decreased blood flow and oxygen to your heart  These are often caused by atherosclerosis (hardening of the arteries)  If left untreated, angina may get worse, increase your risk for a heart attack, or become life-threatening  DISCHARGE INSTRUCTIONS:   Call 911 if:   · You have any of the following signs of a heart attack:      ¨ Squeezing, pressure, or pain in your chest that lasts longer than 5 minutes or returns    ¨ Discomfort or pain in your back, neck, jaw, stomach, or arm     ¨ Trouble breathing    ¨ Nausea or vomiting    ¨ Lightheadedness or a sudden cold sweat, especially with chest pain or trouble breathing    · You have chest pain that does not go away after you take medicine as directed  · You lose feeling in your face, arms, or legs, or you suddenly feel weak  Return to the emergency department if:   · Your angina is happening more frequently, lasting longer, or causing worse pain  Contact your healthcare provider if:   · You are dizzy or nauseated after you take your medicine  · You have shortness of breath at rest     · You have new or worse swelling in your feet or ankles  · You have questions or concerns about your condition or care  Medicines: You may need any of the following:  · Antiplatelets , such as aspirin, help prevent blood clots  Take your antiplatelet medicine exactly as directed  These medicines make it more likely for you to bleed or bruise  If you are told to take aspirin, do not take acetaminophen or ibuprofen instead  · Blood thinners    help prevent blood clots  Examples of blood thinners include heparin and warfarin  Clots can cause strokes, heart attacks, and death   The following are general safety guidelines to follow while you are taking a blood thinner:    ¨ Watch for bleeding and bruising while you take blood thinners  Watch for bleeding from your gums or nose  Watch for blood in your urine and bowel movements  Use a soft washcloth on your skin, and a soft toothbrush to brush your teeth  This can keep your skin and gums from bleeding  If you shave, use an electric shaver  Do not play contact sports  ¨ Tell your dentist and other healthcare providers that you take anticoagulants  Wear a bracelet or necklace that says you take this medicine  ¨ Do not start or stop any medicines unless your healthcare provider tells you to  Many medicines cannot be used with blood thinners  ¨ Tell your healthcare provider right away if you forget to take the medicine, or if you take too much  ¨ Warfarin  is a blood thinner that you may need to take  The following are things you should be aware of if you take warfarin  § Foods and medicines can affect the amount of warfarin in your blood  Do not make major changes to your diet while you take warfarin  Warfarin works best when you eat about the same amount of vitamin K every day  Vitamin K is found in green leafy vegetables and certain other foods  Ask for more information about what to eat when you are taking warfarin  § You will need to see your healthcare provider for follow-up visits when you are on warfarin  You will need regular blood tests  These tests are used to decide how much medicine you need  · Other medicines  may be given to open the arteries to your heart, slow your heartbeat, or decrease your blood pressure or cholesterol  · Do not take certain medicines without asking your healthcare provider first   These include NSAIDs, herbal or vitamin supplements, or hormones (estrogen or progestin)  · Take your medicine as directed  Contact your healthcare provider if you think your medicine is not helping or if you have side effects   Tell him or her if you are allergic to any medicine  Keep a list of the medicines, vitamins, and herbs you take  Include the amounts, and when and why you take them  Bring the list or the pill bottles to follow-up visits  Carry your medicine list with you in case of an emergency  Follow up with your healthcare provider as directed:  Keep a record or a calendar with details about your chest pain  Every time you have pain or symptoms, record what the pain is like, how long it lasts, and how severe it is  Also record what you are doing when the pain starts, and what makes it go away  Bring this with you every time you see your healthcare provider  Write down your questions so you remember to ask them during your visits  Cardiac rehabilitation:  Your healthcare provider may recommend that you attend cardiac rehabilitation (rehab)  This is a program run by specialists who will help you safely strengthen your heart and prevent more heart disease  The plan includes exercise, relaxation, stress management, and heart-healthy nutrition  Healthcare providers will also check to make sure any medicines you are taking are working  The plan may also include instructions for when you can drive, return to work, and do other normal daily activities  Manage angina:   · Do not smoke  Nicotine and other chemicals in cigarettes and cigars can cause heart and lung damage  Ask your healthcare provider for information if you currently smoke and need help to quit  E-cigarettes or smokeless tobacco still contain nicotine  Talk to your healthcare provider before you use these products  · Maintain a healthy weight  When you weigh more than is healthy for you, your heart must work harder  You are at higher risk for serious health problems if you are overweight  Ask your healthcare provider how much you should weigh  Ask him to help you create a weight loss plan if you are overweight  · Ask about activity    Your healthcare provider will tell you which activities to limit or avoid  Ask about the best exercise plan for you  · Eat heart-healthy foods  Include fresh fruits and vegetables in your meal plan  Choose low-fat foods, such as skim or 1% fat milk, low-fat cheese and yogurt, fish, chicken (without skin), and lean meats  Eat two 4-ounce servings of fish high in omega-3 fats each week, such as salmon, fresh tuna, and herring  Do not eat foods that are high in sodium, such as canned foods, potato chips, salty snacks, and cold cuts  Put less table salt on your food  · Avoid activities that cause angina  Pay attention to your symptoms and find out what seems to make your angina worse  · Ask if you should have a flu vaccine  The flu vaccine will decrease your risk for an infection  An infection can put more stress on your heart and worsen your angina  © 2017 [employer] Information is for End User's use only and may not be sold, redistributed or otherwise used for commercial purposes  All illustrations and images included in CareNotes® are the copyrighted property of [employer]  or [employer]  The above information is an  only  It is not intended as medical advice for individual conditions or treatments  Talk to your doctor, nurse or pharmacist before following any medical regimen to see if it is safe and effective for you

## 2020-01-09 NOTE — NURSING NOTE
Nurse Koby Titus) found the patient wandering in the hallways about to enter into another patient's room  Patient was walked safely back into assigned room  Linette CASTRO notified and aware  As per GLORIA's directions, vital signs were taken  Fingerstick glucose obtained 106 mgdl  Patient was taken to CT for ordered imaging  Neuro assessment yielded that the patient was oriented to person and time; but disoriented to place and situation  At this time, patient is resting comfortably in chair in room  Will continue to monitor  Will pass on during nurse handoff

## 2020-01-09 NOTE — NURSING NOTE
Patient c/o chest pressure in left anterior chest wall 6/10  Vitals taken  EKG completed  Sonya Nichols came up to evaluate the patient at the bedside  As per patient, chest pressure occurs intermittently but is less than the chest pain which she felt upon admission  Patient c/o slight agitation  Troponin was drawn as ordered  0 10 ng/ml   Two doses of nitroglycerin were administered to help relieve chest pain  As per CRNP, third dose to be held  Patient was re-evaluated by CRNP (alongside Roly Pollock PA-C) at the bedside during which patient denied presence of chest pressure  Will continue to monitor  Will pass on during nurse handoff

## 2020-01-10 ENCOUNTER — TRANSITIONAL CARE MANAGEMENT (OUTPATIENT)
Dept: FAMILY MEDICINE CLINIC | Facility: CLINIC | Age: 85
End: 2020-01-10

## 2020-01-10 NOTE — PROGRESS NOTES
Progress Note - Cardiology   Deborah Sever 80 y o  female MRN: [id number]  Unit/Bed#: [address] [place] [address] Encounter: [id number]    Assessment:  1  Chest pain with  likely type 2 Myocardial infarction secondary to hypertension and likely underlying CAD  Troponin peak at 0 2  She is chest pain free  Discussed with patient in detail     - Will manage conservatively as she does not want sedation and has an IV contrast allergy   - Continue diltiazem  - Continue statin   - ASA 81 mg daily   - Discussed with patient's daughter in detail  Will stop metoprolol as she has developed a large amount of confusion     - Ambulate  - No new wall motion abnormaliteis  2  Hypertension - BP significantly elevated upon arrival   - Add metoprolol  - monitor on telemetry for bradycardia  3  Dyslipidemia  - on statin and zetia     Plan:  As above  Discussed with patient's daughter  Subjective/Objective   Chief Complaint: Chest pain    Subjective: No chest pain or shortness of breath  Confused overnight    Objective:     Vitals: /67 (BP Location: Right arm)   Pulse 79   Temp (!) 97 4 °F (36 3 °C) (Oral)   Resp 18   Ht 5' 3" (1 6 m)   Wt 72 6 kg (160 lb)   SpO2 96%   BMI 28 34 kg/m²   Vitals:    01/07/20 1846   Weight: 72 6 kg (160 lb)     Orthostatic Blood Pressures      Most Recent Value   Blood Pressure  138/67 filed at 01/09/2020 0805   Patient Position - Orthostatic VS  Sitting filed at 01/09/2020 0805          No intake or output data in the 24 hours ending 01/09/20 2118    Invasive Devices     Drain            Closed/Suction Drain Right; Inferior Abdomen Other (Comment) 10 Fr  245 days    Colostomy Descending/sigmoid  days              Physical Exam   Constitutional: She appears healthy  No distress  HENT:   Nose: Nose normal    Mouth/Throat: Oropharynx is clear  Eyes: Pupils are equal, round, and reactive to light  Conjunctivae are normal    Neck: Normal range of motion  Neck supple  No JVD present  Cardiovascular: Normal rate and regular rhythm  Exam reveals no gallop and no friction rub  Murmur heard  Pulmonary/Chest: Effort normal and breath sounds normal  She has no wheezes  She has no rales  Abdominal: Soft  She exhibits no distension  There is no tenderness  Musculoskeletal: She exhibits no edema  Neurological: She is alert and oriented to person, place, and time  Skin: Skin is warm and dry  Lab Results:   CBC with diff:   Results from last 7 days   Lab Units 01/08/20  0322   WBC Thousand/uL 6 05   RBC Million/uL 4 11   HEMOGLOBIN g/dL 13 3   HEMATOCRIT % 40 5   MCV fL 99*   MCH pg 32 4   MCHC g/dL 32 8   RDW % 14 5   MPV fL 10 6   PLATELETS Thousands/uL 252     CMP:   Results from last 7 days   Lab Units 01/09/20  0630  01/07/20  1550   SODIUM mmol/L 141   < > 142   POTASSIUM mmol/L 4 4   < > 4 9   CHLORIDE mmol/L 105   < > 104   CO2 mmol/L 29   < > 30   BUN mg/dL 22   < > 25   CREATININE mg/dL 0 97   < > 1 24   CALCIUM mg/dL 9 1   < > 9 3   AST U/L  --   --  14   ALT U/L  --   --  20   ALK PHOS U/L  --   --  108   EGFR ml/min/1 73sq m 51   < > 38    < > = values in this interval not displayed       Imaging: I have personally reviewed pertinent films in PACS  EKG: DARRYN

## 2020-01-13 ENCOUNTER — EPISODE CHANGES (OUTPATIENT)
Dept: CASE MANAGEMENT | Facility: HOSPITAL | Age: 85
End: 2020-01-13

## 2020-01-14 ENCOUNTER — APPOINTMENT (OUTPATIENT)
Dept: LAB | Facility: HOSPITAL | Age: 85
End: 2020-01-14
Attending: INTERNAL MEDICINE
Payer: MEDICARE

## 2020-01-14 DIAGNOSIS — E78.5 HYPERLIPIDEMIA, UNSPECIFIED HYPERLIPIDEMIA TYPE: ICD-10-CM

## 2020-01-14 DIAGNOSIS — E03.9 HYPOTHYROIDISM, UNSPECIFIED TYPE: ICD-10-CM

## 2020-01-14 LAB
CHOLEST SERPL-MCNC: 179 MG/DL (ref 50–200)
HDLC SERPL-MCNC: 71 MG/DL
LDLC SERPL CALC-MCNC: 90 MG/DL (ref 0–100)
NONHDLC SERPL-MCNC: 108 MG/DL
T4 FREE SERPL-MCNC: 1.44 NG/DL (ref 0.76–1.46)
TRIGL SERPL-MCNC: 92 MG/DL
TSH SERPL DL<=0.05 MIU/L-ACNC: 3.76 UIU/ML (ref 0.36–3.74)

## 2020-01-14 PROCEDURE — 84439 ASSAY OF FREE THYROXINE: CPT

## 2020-01-14 PROCEDURE — 80061 LIPID PANEL: CPT

## 2020-01-14 PROCEDURE — 36415 COLL VENOUS BLD VENIPUNCTURE: CPT

## 2020-01-14 PROCEDURE — 84443 ASSAY THYROID STIM HORMONE: CPT

## 2020-01-15 NOTE — PHYSICIAN ADVISOR
Current patient class: Inpatient  The patient is currently on Hospital Day: 3 at [address] [place] [address]      The patient was admitted to the hospital at  on 1/8/20 for the following diagnosis:  Shortness of breath [R06 02]  Chest pain [R07 9]  Dyspnea on exertion [R06 09]       There is documentation in the medical record of an expected length of stay of at least 2 midnights  The patient is therefore expected to satisfy the 2 midnight benchmark and given the 2 midnight presumption is appropriate for INPATIENT ADMISSION  Given this expectation of a satisfying stay, CMS instructs us that the patient is most often appropriate for inpatient admission under part A provided medical necessity is documented in the chart  After review of the relevant documentation, labs, vital signs and test results, the patient is appropriate for INPATIENT ADMISSION  Admission to the hospital as an inpatient is a complex decision making process which requires the practitioner to consider the patients presenting complaint, history and physical examination and all relevant testing  With this in mind, in this case, the patient was deemed appropriate for INPATIENT ADMISSION  After review of the documentation and testing available at the time of the admission I concur with this clinical determination of medical necessity  Rationale is as follows: The patient is a 80 yrs old Female who presented to the ED at 1/7/2020  3:40 PM with a chief complaint of Shortness of Breath (Pt states that she went to take out the garbage and when she was walking back she could not catch her breath  Pt states that she was having CP before but now she just feels sore ) Pt with h/o cad  She was monitored on telemetry and  found to have elevated troponin to peak 0 25  BP on admission was also elevated to 194/81 and she was hypertensive during admission    Cardiology evaluated patient and echocardiogram showed normal ef without wall motion abnormalities  She was offered cardiac catheterization but declined electing medical management  She also had an episode of confusion while hospitalized and ct head performed showing no acute findings      The patients vitals on arrival were ED Triage Vitals   Temperature Pulse Respirations Blood Pressure SpO2   01/07/20 1554 01/07/20 1554 01/07/20 1554 01/07/20 1554 01/07/20 1554   (!) 97 °F (36 1 °C) 98 22 (!) 194/81 95 %      Temp Source Heart Rate Source Patient Position - Orthostatic VS BP Location FiO2 (%)   01/07/20 1554 01/07/20 2001 01/07/20 1554 01/07/20 1554 --   Tympanic Monitor Lying Right arm       Pain Score       01/07/20 2030       8           Past Medical History:   Diagnosis Date    Adenocarcinoma of breast ([facility] [address] ) 9/4/2012    Procedure/Onset: 12/07/2005    Anxiety 2/28/2018    Arthritis     joints    Asymptomatic varicose veins of bilateral lower extremities 10/14/2016    Benign colon polyp 2/17/2014    Breast cancer (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 [address] )     1990's x2    Nephrolithiasis 2/26/2016    Osteoarthritis of left knee 10/19/2016    Perforation of sigmoid colon due to diverticulitis 5/9/2019    Added automatically from request for surgery [id number]    Peripheral vertigo 9/4/2012    Procedure/Onset: 04/04/2007    Psoriasis 12/19/2016    Rectocele 3/10/2016    Last Assessment & Plan:  As below   Stress incontinence in female 7/6/2016     Past Surgical History:   Procedure Laterality Date    APPENDECTOMY      during HAMLET    BLADDER SURGERY      suspension surgery no sling, used fiberglass suspension technique    BREAST LUMPECTOMY Right 2005    BREAST SURGERY Right     lumpectomy    CHEST WALL TUMOR EXCISION  2005    COLONOSCOPY      CYSTOSCOPY      HARTMANS PROCEDURE N/A 5/9/2019    Procedure: Estuardo Thomas;  Surgeon: Lianna Augustin MD;  Location: [facility]IN OR;  Service: General    HYSTERECTOMY Bilateral     hamlet w/removal of both ovaries    KNEE ARTHROSCOPY      Last assessed: 7/30/15    SD CYSTOURETHROSCOPY,URETER CATHETER Left 2/26/2016    Procedure: CYSTOSCOPY RETROGRADE PYELOGRAM WITH INSERTION STENT URETERAL;  Surgeon: Anne House MD;  Location: [address] [place] [address];  Service: Urology    SD XCAPSL CTRC RMVL INSJ IO LENS PROSTH W/O ECP Left 4/10/2017    Procedure: EXTRACTION EXTRACAPSULAR CATARACT PHACO INTRAOCULAR LENS (IOL);   Surgeon: Shari Zepeda MD;  Location: [facility] MAIN OR;  Service: Ophthalmology    SKIN BIOPSY Left     Leg for [facility]           Consults have been placed to:   IP CONSULT TO CARDIOLOGY    Vitals:    01/09/20 0242 01/09/20 0350 01/09/20 0647 01/09/20 0805   BP: 163/75 131/73 (!) 181/77 138/67   BP Location: Right arm Left arm Right arm Right arm   Pulse: 81 77 82 79   Resp: 18 18 18 18   Temp:  97 5 °F (36 4 °C) 97 5 °F (36 4 °C) (!) 97 4 °F (36 3 °C)   TempSrc:  Tympanic Oral Oral   SpO2: 95% 97% 97% 96%   Weight:       Height:           Most recent labs:    No results for input(s): WBC, HGB, HCT, PLT, K, NA, CALCIUM, BUN, CREATININE, LIPASE, AMYLASE, INR, TROPONINI, CKTOTAL, AST, ALT, ALKPHOS, BILITOT in the last 72 hours  Scheduled Meds:  Continuous Infusions:  No current facility-administered medications for this encounter  PRN Meds:      Surgical procedures (if appropriate):

## 2020-01-16 ENCOUNTER — PATIENT OUTREACH (OUTPATIENT)
Dept: CASE MANAGEMENT | Facility: OTHER | Age: 85
End: 2020-01-16

## 2020-01-16 NOTE — PROGRESS NOTES
Patient is very pleasant  She denies a recurrence of chest pain or SOB  She has her appointments on her calendar & all of her meds  She is not interested in care management in this time, but may need assistance reordering her colostomy bags & will call me if she would like help  OP CM phone number provided  She thanks me for my call

## 2020-01-17 ENCOUNTER — OFFICE VISIT (OUTPATIENT)
Dept: FAMILY MEDICINE CLINIC | Facility: CLINIC | Age: 85
End: 2020-01-17
Payer: MEDICARE

## 2020-01-17 VITALS
TEMPERATURE: 96.7 F | HEART RATE: 84 BPM | RESPIRATION RATE: 16 BRPM | BODY MASS INDEX: 29.59 KG/M2 | DIASTOLIC BLOOD PRESSURE: 70 MMHG | HEIGHT: 63 IN | SYSTOLIC BLOOD PRESSURE: 134 MMHG | WEIGHT: 167 LBS

## 2020-01-17 DIAGNOSIS — I25.118 CORONARY ARTERY DISEASE OF NATIVE ARTERY OF NATIVE HEART WITH STABLE ANGINA PECTORIS (HCC): ICD-10-CM

## 2020-01-17 DIAGNOSIS — C50.919 ADENOCARCINOMA OF BREAST, UNSPECIFIED LATERALITY (HCC): ICD-10-CM

## 2020-01-17 DIAGNOSIS — Z93.3 COLOSTOMY IN PLACE (HCC): ICD-10-CM

## 2020-01-17 DIAGNOSIS — D47.3 ESSENTIAL THROMBOCYTHEMIA (HCC): ICD-10-CM

## 2020-01-17 DIAGNOSIS — I10 BENIGN ESSENTIAL HYPERTENSION: ICD-10-CM

## 2020-01-17 DIAGNOSIS — R07.9 CHEST PAIN, UNSPECIFIED TYPE: Primary | ICD-10-CM

## 2020-01-17 DIAGNOSIS — E03.9 HYPOTHYROIDISM, UNSPECIFIED TYPE: ICD-10-CM

## 2020-01-17 PROCEDURE — 99495 TRANSJ CARE MGMT MOD F2F 14D: CPT | Performed by: NURSE PRACTITIONER

## 2020-01-17 RX ORDER — LEVOTHYROXINE SODIUM 0.05 MG/1
50 TABLET ORAL EVERY MORNING
Qty: 90 TABLET | Refills: 0 | Status: CANCELLED | OUTPATIENT
Start: 2020-01-17

## 2020-01-17 NOTE — ASSESSMENT & PLAN NOTE
Mild elevation in TSH, normal free T4  TSH has been elevated for the past year  Will increase Levothyroxine and recheck labs in 2 months  She is asymptomatic and would like to continue with her current dose

## 2020-01-17 NOTE — PROGRESS NOTES
Assessment/Plan:    1  Chest pain, unspecified type    2  Coronary artery disease of native artery of native heart  Assessment & Plan:  She has a f/u with cardiology arranged for next week  Continue current medications      3  Benign essential hypertension  Assessment & Plan:  Stable on current medications  Continue same  4  Hypothyroidism, unspecified type  Assessment & Plan:  Mild elevation in TSH, normal free T4  TSH has been elevated for the past year  Will increase Levothyroxine and recheck labs in 2 months  She is asymptomatic and would like to continue with her current dose  5  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([address] )    6  Essential thrombocythemia [facility])  Assessment & Plan:  Managed by hem/onc  She is on Hydoxyurea for this      7  Adenocarcinoma of breast, unspecified laterality [facility])  Assessment & Plan: On Aromasin  Continue as per oncology team            There are no Patient Instructions on file for this visit  Return for Next scheduled follow up  Subjective:      Patient ID: Ruthann Torres is a 80 y o  female  Chief Complaint   Patient presents with    Transition of Care Management      des -chest pain--lj       Here today for hospital follow up  She was taking the trash out when she developed chest pains  These resolved spontaneously  She was admitted for observation  She declined cardiac catheterization and has a f/u with cardiology scheduled for next week  She is feeling well without complaints or concerns today  Hospital course otherwise uncomplicated         TCM Call (since 12/17/2019)     Date and time call was made  1/10/2020  9:01 AM    Hospital care reviewed  Records reviewed    Patient was hospitialized at  [address] [place] [address]    Date of Admission  01/07/20    Date of discharge  01/09/20    Diagnosis  Chest Pain    Disposition  Home    Were the patients medications reviewed and updated  Yes    Current Symptoms  None      TCM Call (since 12/17/2019)     Post hospital issues  -- <img src='C:FILES (X86)    Should patient be enrolled in anticoag monitoring? No    Scheduled for follow up? Yes    Patients specialists  Cardiologist    Cardiologist name  Dr Misty Orellana    Did you obtain your prescribed medications  Yes    Do you need help managing your prescriptions or medications  Yes    Is transportation to your appointment needed  Yes    Specify why  She does not drive    I have advised the patient to call PCP with any new or worsening symptoms  [facility]  Family    The type of support provided  Physical; Emotional    Do you have social support  Yes, as much as I need    Are you recieving any outpatient services  No    Are you recieving home care services  No    Have you fallen in the last 12 months  Yes    How many times  one fall- she "scraped her knee"    Interperter language line needed  No    Counseling  Family <img src='C:FILES (X86)    Counseling topics  Activities of daily living; Importance of RX compliance; patient and family education; instructions for management; Risk factor reduction    Comments  I spoke with Jeimy's daughter Elia Baeza who states that her mother is doing fine  She lives alone but Elia Baeza is there with her every day,  She manages her medications for her and she assists her with any ADL's that Avis Middleton needs assistance with  She has no c/o at this time  She knows to take her to the ER if any chest pain, dyspnea, weakness, dizziness, etc Elia Baeza will discuss her mother's short term memory issues  This was discussed in the hospital as well  Shannen          The following portions of the patient's history were reviewed and updated as appropriate: allergies, current medications, past family history, past medical history, past social history, past surgical history and problem list     Review of Systems   Constitutional: Negative for chills, fatigue and fever  Respiratory: Negative for cough, shortness of breath and wheezing  Cardiovascular: Negative for chest pain, palpitations and leg swelling  Gastrointestinal: Negative for abdominal pain, diarrhea, nausea and vomiting  Skin: Negative for rash  Neurological: Negative for dizziness and headaches  Current Outpatient Medications   Medication Sig Dispense Refill    acetaminophen (TYLENOL) 325 mg tablet Take 2 tablets (650 mg total) by mouth every 6 (six) hours as needed for mild pain, headaches or fever 30 tablet 0    aspirin 81 MG tablet Take 81 mg by mouth every morning        Cholecalciferol (VITAMIN D3) 125 MCG (5000 UT) CAPS Take by mouth      diltiazem (CARDIZEM CD) 180 mg 24 hr capsule TAKE 1 CAPSULE DAILY 90 capsule 3    exemestane (AROMASIN) 25 MG tablet Take 25 mg by mouth daily   ezetimibe-simvastatin (VYTORIN) 10-40 mg per tablet Take 1 tablet by mouth daily at bedtime   fluticasone (FLONASE) 50 mcg/act nasal spray       hydroxyurea (HYDREA) 500 mg capsule Take 500 mg by mouth daily m w f      levothyroxine 50 mcg tablet Take 1 tablet (50 mcg total) by mouth every morning 90 tablet 0    Multiple Vitamins-Minerals (CENTRUM SILVER ULTRA WOMENS) TABS Take by mouth      omeprazole (PriLOSEC) 40 MG capsule Take 1 capsule (40 mg total) by mouth daily 90 capsule 4     No current facility-administered medications for this visit  Objective:    /70   Pulse 84   Temp (!) 96 7 °F (35 9 °C)   Resp 16   Ht 5' 3" (1 6 m)   Wt 75 8 kg (167 lb)   BMI 29 58 kg/m²        Physical Exam   Constitutional: She appears well-developed and well-nourished  HENT:   Head: Normocephalic and atraumatic  Right Ear: Tympanic membrane, external ear and ear canal normal    Left Ear: Tympanic membrane, external ear and ear canal normal    Nose: No mucosal edema or rhinorrhea  Mouth/Throat: Uvula is midline, oropharynx is clear and moist and mucous membranes are normal    Eyes: Conjunctivae are normal    Neck: Neck supple  No edema present   No thyromegaly present  Cardiovascular: Normal rate, regular rhythm, normal heart sounds and intact distal pulses  No murmur heard  Pulmonary/Chest: Effort normal and breath sounds normal    Abdominal: Bowel sounds are normal  She exhibits no distension  There is no splenomegaly or hepatomegaly  There is no tenderness  Lymphadenopathy:        Right cervical: No superficial cervical adenopathy present  Left cervical: No superficial cervical adenopathy present  Skin: Skin is warm, dry and intact  No rash noted  Psychiatric: She has a normal mood and affect  Nursing note and vitals reviewed               Gianni Martinez

## 2020-01-18 ENCOUNTER — APPOINTMENT (EMERGENCY)
Dept: RADIOLOGY | Facility: HOSPITAL | Age: 85
DRG: 282 | End: 2020-01-18
Attending: EMERGENCY MEDICINE
Payer: MEDICARE

## 2020-01-18 ENCOUNTER — HOSPITAL ENCOUNTER (INPATIENT)
Facility: HOSPITAL | Age: 85
LOS: 2 days | Discharge: HOME | DRG: 282 | End: 2020-01-21
Attending: EMERGENCY MEDICINE | Admitting: FAMILY MEDICINE
Payer: MEDICARE

## 2020-01-18 DIAGNOSIS — I10 BENIGN ESSENTIAL HYPERTENSION: ICD-10-CM

## 2020-01-18 DIAGNOSIS — I25.118 CORONARY ARTERY DISEASE OF NATIVE ARTERY OF NATIVE HEART WITH STABLE ANGINA PECTORIS (HCC): ICD-10-CM

## 2020-01-18 DIAGNOSIS — R07.9 CHEST PAIN, UNSPECIFIED TYPE: Primary | ICD-10-CM

## 2020-01-18 LAB
ALBUMIN SERPL BCP-MCNC: 3.7 G/DL (ref 3.5–5)
ALP SERPL-CCNC: 90 U/L (ref 46–116)
ALT SERPL W P-5'-P-CCNC: 19 U/L (ref 12–78)
ANION GAP SERPL CALCULATED.3IONS-SCNC: 9 MMOL/L (ref 4–13)
APTT PPP: 27 SECONDS (ref 23–37)
AST SERPL W P-5'-P-CCNC: 18 U/L (ref 5–45)
BASOPHILS # BLD AUTO: 0.07 THOUSANDS/ΜL (ref 0–0.1)
BASOPHILS NFR BLD AUTO: 1 % (ref 0–1)
BILIRUB SERPL-MCNC: 0.3 MG/DL (ref 0.2–1)
BILIRUB UR QL STRIP: NEGATIVE
BUN SERPL-MCNC: 23 MG/DL (ref 5–25)
CALCIUM SERPL-MCNC: 9 MG/DL (ref 8.3–10.1)
CHLORIDE SERPL-SCNC: 104 MMOL/L (ref 100–108)
CLARITY UR: CLEAR
CO2 SERPL-SCNC: 27 MMOL/L (ref 21–32)
COLOR UR: YELLOW
CREAT SERPL-MCNC: 1.14 MG/DL (ref 0.6–1.3)
EOSINOPHIL # BLD AUTO: 0.2 THOUSAND/ΜL (ref 0–0.61)
EOSINOPHIL NFR BLD AUTO: 3 % (ref 0–6)
ERYTHROCYTE [DISTWIDTH] IN BLOOD BY AUTOMATED COUNT: 14.6 % (ref 11.6–15.1)
GFR SERPL CREATININE-BSD FRML MDRD: 42 ML/MIN/1.73SQ M
GLUCOSE SERPL-MCNC: 200 MG/DL (ref 65–140)
GLUCOSE UR STRIP-MCNC: NEGATIVE MG/DL
HCT VFR BLD AUTO: 41.5 % (ref 34.8–46.1)
HGB BLD-MCNC: 13.8 G/DL (ref 11.5–15.4)
HGB UR QL STRIP.AUTO: NEGATIVE
IMM GRANULOCYTES # BLD AUTO: 0.01 THOUSAND/UL (ref 0–0.2)
IMM GRANULOCYTES NFR BLD AUTO: 0 % (ref 0–2)
INR PPP: 1.06 (ref 0.91–1.09)
KETONES UR STRIP-MCNC: NEGATIVE MG/DL
LEUKOCYTE ESTERASE UR QL STRIP: NEGATIVE
LYMPHOCYTES # BLD AUTO: 1.75 THOUSANDS/ΜL (ref 0.6–4.47)
LYMPHOCYTES NFR BLD AUTO: 27 % (ref 14–44)
MCH RBC QN AUTO: 32.1 PG (ref 26.8–34.3)
MCHC RBC AUTO-ENTMCNC: 33.3 G/DL (ref 31.4–37.4)
MCV RBC AUTO: 97 FL (ref 82–98)
MONOCYTES # BLD AUTO: 0.31 THOUSAND/ΜL (ref 0.17–1.22)
MONOCYTES NFR BLD AUTO: 5 % (ref 4–12)
NEUTROPHILS # BLD AUTO: 4.17 THOUSANDS/ΜL (ref 1.85–7.62)
NEUTS SEG NFR BLD AUTO: 64 % (ref 43–75)
NITRITE UR QL STRIP: NEGATIVE
NRBC BLD AUTO-RTO: 0 /100 WBCS
NT-PROBNP SERPL-MCNC: 613 PG/ML
PH UR STRIP.AUTO: 6.5 [PH]
PLATELET # BLD AUTO: 315 THOUSANDS/UL (ref 149–390)
PMV BLD AUTO: 11 FL (ref 8.9–12.7)
POTASSIUM SERPL-SCNC: 4.3 MMOL/L (ref 3.5–5.3)
PROT SERPL-MCNC: 6.7 G/DL (ref 6.4–8.2)
PROT UR STRIP-MCNC: NEGATIVE MG/DL
PROTHROMBIN TIME: 11.4 SECONDS (ref 9.8–12)
RBC # BLD AUTO: 4.3 MILLION/UL (ref 3.81–5.12)
SODIUM SERPL-SCNC: 140 MMOL/L (ref 136–145)
SP GR UR STRIP.AUTO: 1.01 (ref 1–1.03)
TROPONIN I SERPL-MCNC: 0.02 NG/ML
TROPONIN I SERPL-MCNC: 0.09 NG/ML
TSH SERPL DL<=0.05 MIU/L-ACNC: 3.63 UIU/ML (ref 0.36–3.74)
UROBILINOGEN UR QL STRIP.AUTO: 0.2 E.U./DL
WBC # BLD AUTO: 6.51 THOUSAND/UL (ref 4.31–10.16)

## 2020-01-18 PROCEDURE — 99220 PR INITIAL OBSERVATION CARE/DAY 70 MINUTES: CPT | Performed by: NURSE PRACTITIONER

## 2020-01-18 PROCEDURE — 83880 ASSAY OF NATRIURETIC PEPTIDE: CPT | Performed by: EMERGENCY MEDICINE

## 2020-01-18 PROCEDURE — 93005 ELECTROCARDIOGRAM TRACING: CPT

## 2020-01-18 PROCEDURE — 85025 COMPLETE CBC W/AUTO DIFF WBC: CPT | Performed by: EMERGENCY MEDICINE

## 2020-01-18 PROCEDURE — 84443 ASSAY THYROID STIM HORMONE: CPT | Performed by: EMERGENCY MEDICINE

## 2020-01-18 PROCEDURE — 85730 THROMBOPLASTIN TIME PARTIAL: CPT | Performed by: EMERGENCY MEDICINE

## 2020-01-18 PROCEDURE — 81003 URINALYSIS AUTO W/O SCOPE: CPT | Performed by: NURSE PRACTITIONER

## 2020-01-18 PROCEDURE — 80053 COMPREHEN METABOLIC PANEL: CPT | Performed by: EMERGENCY MEDICINE

## 2020-01-18 PROCEDURE — 99285 EMERGENCY DEPT VISIT HI MDM: CPT

## 2020-01-18 PROCEDURE — 99285 EMERGENCY DEPT VISIT HI MDM: CPT | Performed by: EMERGENCY MEDICINE

## 2020-01-18 PROCEDURE — 71045 X-RAY EXAM CHEST 1 VIEW: CPT

## 2020-01-18 PROCEDURE — 36415 COLL VENOUS BLD VENIPUNCTURE: CPT | Performed by: EMERGENCY MEDICINE

## 2020-01-18 PROCEDURE — 87081 CULTURE SCREEN ONLY: CPT | Performed by: NURSE PRACTITIONER

## 2020-01-18 PROCEDURE — 1123F ACP DISCUSS/DSCN MKR DOCD: CPT | Performed by: INTERNAL MEDICINE

## 2020-01-18 PROCEDURE — 84484 ASSAY OF TROPONIN QUANT: CPT | Performed by: NURSE PRACTITIONER

## 2020-01-18 PROCEDURE — 85610 PROTHROMBIN TIME: CPT | Performed by: EMERGENCY MEDICINE

## 2020-01-18 PROCEDURE — 84484 ASSAY OF TROPONIN QUANT: CPT | Performed by: EMERGENCY MEDICINE

## 2020-01-18 RX ORDER — NITROGLYCERIN 0.4 MG/1
0.4 TABLET SUBLINGUAL
Status: DISCONTINUED | OUTPATIENT
Start: 2020-01-18 | End: 2020-01-21 | Stop reason: HOSPADM

## 2020-01-18 RX ORDER — LEVOTHYROXINE SODIUM 0.05 MG/1
50 TABLET ORAL
Status: DISCONTINUED | OUTPATIENT
Start: 2020-01-19 | End: 2020-01-21 | Stop reason: HOSPADM

## 2020-01-18 RX ORDER — DILTIAZEM HYDROCHLORIDE 180 MG/1
180 CAPSULE, COATED, EXTENDED RELEASE ORAL DAILY
Status: DISCONTINUED | OUTPATIENT
Start: 2020-01-19 | End: 2020-01-21 | Stop reason: HOSPADM

## 2020-01-18 RX ORDER — PANTOPRAZOLE SODIUM 40 MG/1
40 TABLET, DELAYED RELEASE ORAL
Status: DISCONTINUED | OUTPATIENT
Start: 2020-01-19 | End: 2020-01-21 | Stop reason: HOSPADM

## 2020-01-18 RX ORDER — FLUTICASONE PROPIONATE 50 MCG
1 SPRAY, SUSPENSION (ML) NASAL DAILY
Status: DISCONTINUED | OUTPATIENT
Start: 2020-01-19 | End: 2020-01-21 | Stop reason: HOSPADM

## 2020-01-18 RX ORDER — ASPIRIN 81 MG/1
81 TABLET, CHEWABLE ORAL EVERY MORNING
Status: DISCONTINUED | OUTPATIENT
Start: 2020-01-19 | End: 2020-01-21 | Stop reason: HOSPADM

## 2020-01-18 RX ADMIN — NITROGLYCERIN 0.4 MG: 0.4 TABLET SUBLINGUAL at 23:05

## 2020-01-18 RX ADMIN — SODIUM CHLORIDE 500 ML: 0.9 INJECTION, SOLUTION INTRAVENOUS at 21:02

## 2020-01-18 RX ADMIN — EZETIMIBE: 10 TABLET ORAL at 23:05

## 2020-01-19 LAB
ANION GAP SERPL CALCULATED.3IONS-SCNC: 6 MMOL/L (ref 4–13)
BUN SERPL-MCNC: 18 MG/DL (ref 5–25)
CALCIUM SERPL-MCNC: 8.2 MG/DL (ref 8.3–10.1)
CHLORIDE SERPL-SCNC: 107 MMOL/L (ref 100–108)
CHOLEST SERPL-MCNC: 154 MG/DL (ref 50–200)
CO2 SERPL-SCNC: 29 MMOL/L (ref 21–32)
CREAT SERPL-MCNC: 0.97 MG/DL (ref 0.6–1.3)
ERYTHROCYTE [DISTWIDTH] IN BLOOD BY AUTOMATED COUNT: 14.3 % (ref 11.6–15.1)
GFR SERPL CREATININE-BSD FRML MDRD: 51 ML/MIN/1.73SQ M
GLUCOSE P FAST SERPL-MCNC: 100 MG/DL (ref 65–99)
GLUCOSE SERPL-MCNC: 100 MG/DL (ref 65–140)
HCT VFR BLD AUTO: 39.1 % (ref 34.8–46.1)
HDLC SERPL-MCNC: 51 MG/DL
HGB BLD-MCNC: 12.9 G/DL (ref 11.5–15.4)
LDLC SERPL CALC-MCNC: 74 MG/DL (ref 0–100)
MAGNESIUM SERPL-MCNC: 1.7 MG/DL (ref 1.6–2.6)
MCH RBC QN AUTO: 32.1 PG (ref 26.8–34.3)
MCHC RBC AUTO-ENTMCNC: 33 G/DL (ref 31.4–37.4)
MCV RBC AUTO: 97 FL (ref 82–98)
NONHDLC SERPL-MCNC: 103 MG/DL
PLATELET # BLD AUTO: 284 THOUSANDS/UL (ref 149–390)
PMV BLD AUTO: 10.9 FL (ref 8.9–12.7)
POTASSIUM SERPL-SCNC: 4.3 MMOL/L (ref 3.5–5.3)
RBC # BLD AUTO: 4.02 MILLION/UL (ref 3.81–5.12)
SODIUM SERPL-SCNC: 142 MMOL/L (ref 136–145)
TRIGL SERPL-MCNC: 146 MG/DL
TROPONIN I SERPL-MCNC: 0.09 NG/ML
TROPONIN I SERPL-MCNC: 0.1 NG/ML
WBC # BLD AUTO: 5.8 THOUSAND/UL (ref 4.31–10.16)

## 2020-01-19 PROCEDURE — 80061 LIPID PANEL: CPT | Performed by: NURSE PRACTITIONER

## 2020-01-19 PROCEDURE — 80048 BASIC METABOLIC PNL TOTAL CA: CPT | Performed by: NURSE PRACTITIONER

## 2020-01-19 PROCEDURE — 99222 1ST HOSP IP/OBS MODERATE 55: CPT | Performed by: INTERNAL MEDICINE

## 2020-01-19 PROCEDURE — 97535 SELF CARE MNGMENT TRAINING: CPT

## 2020-01-19 PROCEDURE — 84484 ASSAY OF TROPONIN QUANT: CPT | Performed by: NURSE PRACTITIONER

## 2020-01-19 PROCEDURE — 85027 COMPLETE CBC AUTOMATED: CPT | Performed by: NURSE PRACTITIONER

## 2020-01-19 PROCEDURE — 99232 SBSQ HOSP IP/OBS MODERATE 35: CPT | Performed by: FAMILY MEDICINE

## 2020-01-19 PROCEDURE — 97166 OT EVAL MOD COMPLEX 45 MIN: CPT

## 2020-01-19 PROCEDURE — 83735 ASSAY OF MAGNESIUM: CPT | Performed by: NURSE PRACTITIONER

## 2020-01-19 RX ORDER — DIPHENHYDRAMINE HCL 25 MG
50 TABLET ORAL
Status: DISCONTINUED | OUTPATIENT
Start: 2020-01-20 | End: 2020-01-21 | Stop reason: HOSPADM

## 2020-01-19 RX ORDER — METHYLPREDNISOLONE 4 MG/1
32 TABLET ORAL
Status: DISCONTINUED | OUTPATIENT
Start: 2020-01-19 | End: 2020-01-19

## 2020-01-19 RX ORDER — DIPHENHYDRAMINE HCL 25 MG
50 TABLET ORAL
Status: DISCONTINUED | OUTPATIENT
Start: 2020-01-20 | End: 2020-01-19

## 2020-01-19 RX ORDER — SODIUM CHLORIDE 9 MG/ML
50 INJECTION, SOLUTION INTRAVENOUS CONTINUOUS
Status: DISCONTINUED | OUTPATIENT
Start: 2020-01-20 | End: 2020-01-21 | Stop reason: HOSPADM

## 2020-01-19 RX ORDER — LANOLIN ALCOHOL/MO/W.PET/CERES
3 CREAM (GRAM) TOPICAL
Status: DISCONTINUED | OUTPATIENT
Start: 2020-01-19 | End: 2020-01-21 | Stop reason: HOSPADM

## 2020-01-19 RX ORDER — METHYLPREDNISOLONE 4 MG/1
32 TABLET ORAL
Status: COMPLETED | OUTPATIENT
Start: 2020-01-20 | End: 2020-01-20

## 2020-01-19 RX ADMIN — Medication 1 TABLET: at 10:16

## 2020-01-19 RX ADMIN — DILTIAZEM HYDROCHLORIDE 180 MG: 180 CAPSULE, COATED, EXTENDED RELEASE ORAL at 10:16

## 2020-01-19 RX ADMIN — METOPROLOL TARTRATE 25 MG: 25 TABLET ORAL at 21:06

## 2020-01-19 RX ADMIN — ENOXAPARIN SODIUM 30 MG: 30 INJECTION SUBCUTANEOUS at 10:16

## 2020-01-19 RX ADMIN — MELATONIN 3 MG: at 00:50

## 2020-01-19 RX ADMIN — ASPIRIN 81 MG 81 MG: 81 TABLET ORAL at 10:16

## 2020-01-19 RX ADMIN — METOPROLOL TARTRATE 25 MG: 25 TABLET ORAL at 13:54

## 2020-01-19 RX ADMIN — LEVOTHYROXINE SODIUM 50 MCG: 50 TABLET ORAL at 06:39

## 2020-01-19 RX ADMIN — EZETIMIBE: 10 TABLET ORAL at 21:06

## 2020-01-19 RX ADMIN — PANTOPRAZOLE SODIUM 40 MG: 40 TABLET, DELAYED RELEASE ORAL at 06:39

## 2020-01-20 ENCOUNTER — TELEPHONE (OUTPATIENT)
Dept: CARDIOLOGY CLINIC | Facility: CLINIC | Age: 85
End: 2020-01-20

## 2020-01-20 ENCOUNTER — APPOINTMENT (INPATIENT)
Dept: NON INVASIVE DIAGNOSTICS | Facility: HOSPITAL | Age: 85
DRG: 282 | End: 2020-01-20
Payer: MEDICARE

## 2020-01-20 LAB
ANION GAP SERPL CALCULATED.3IONS-SCNC: 10 MMOL/L (ref 4–13)
ATRIAL RATE: 100 BPM
BUN SERPL-MCNC: 21 MG/DL (ref 5–25)
CALCIUM SERPL-MCNC: 9.1 MG/DL (ref 8.3–10.1)
CHLORIDE SERPL-SCNC: 104 MMOL/L (ref 100–108)
CO2 SERPL-SCNC: 27 MMOL/L (ref 21–32)
CREAT SERPL-MCNC: 0.96 MG/DL (ref 0.6–1.3)
ERYTHROCYTE [DISTWIDTH] IN BLOOD BY AUTOMATED COUNT: 14.3 % (ref 11.6–15.1)
GFR SERPL CREATININE-BSD FRML MDRD: 52 ML/MIN/1.73SQ M
GLUCOSE SERPL-MCNC: 142 MG/DL (ref 65–140)
HCT VFR BLD AUTO: 42.3 % (ref 34.8–46.1)
HGB BLD-MCNC: 13.9 G/DL (ref 11.5–15.4)
MAGNESIUM SERPL-MCNC: 1.9 MG/DL (ref 1.6–2.6)
MCH RBC QN AUTO: 31.7 PG (ref 26.8–34.3)
MCHC RBC AUTO-ENTMCNC: 32.9 G/DL (ref 31.4–37.4)
MCV RBC AUTO: 97 FL (ref 82–98)
MRSA NOSE QL CULT: NORMAL
P AXIS: 62 DEGREES
PLATELET # BLD AUTO: 297 THOUSANDS/UL (ref 149–390)
PMV BLD AUTO: 10.9 FL (ref 8.9–12.7)
POTASSIUM SERPL-SCNC: 4.4 MMOL/L (ref 3.5–5.3)
PR INTERVAL: 232 MS
QRS AXIS: 11 DEGREES
QRSD INTERVAL: 92 MS
QT INTERVAL: 336 MS
QTC INTERVAL: 433 MS
RBC # BLD AUTO: 4.38 MILLION/UL (ref 3.81–5.12)
SODIUM SERPL-SCNC: 141 MMOL/L (ref 136–145)
T WAVE AXIS: 11 DEGREES
VENTRICULAR RATE: 100 BPM
WBC # BLD AUTO: 6.27 THOUSAND/UL (ref 4.31–10.16)

## 2020-01-20 PROCEDURE — 99232 SBSQ HOSP IP/OBS MODERATE 35: CPT | Performed by: INTERNAL MEDICINE

## 2020-01-20 PROCEDURE — 97163 PT EVAL HIGH COMPLEX 45 MIN: CPT

## 2020-01-20 PROCEDURE — 80048 BASIC METABOLIC PNL TOTAL CA: CPT | Performed by: NURSE PRACTITIONER

## 2020-01-20 PROCEDURE — B2151ZZ FLUOROSCOPY OF LEFT HEART USING LOW OSMOLAR CONTRAST: ICD-10-PCS | Performed by: INTERNAL MEDICINE

## 2020-01-20 PROCEDURE — 99232 SBSQ HOSP IP/OBS MODERATE 35: CPT | Performed by: NURSE PRACTITIONER

## 2020-01-20 PROCEDURE — C1769 GUIDE WIRE: HCPCS

## 2020-01-20 PROCEDURE — 97110 THERAPEUTIC EXERCISES: CPT

## 2020-01-20 PROCEDURE — B2111ZZ FLUOROSCOPY OF MULTIPLE CORONARY ARTERIES USING LOW OSMOLAR CONTRAST: ICD-10-PCS | Performed by: INTERNAL MEDICINE

## 2020-01-20 PROCEDURE — 93010 ELECTROCARDIOGRAM REPORT: CPT | Performed by: INTERNAL MEDICINE

## 2020-01-20 PROCEDURE — 85027 COMPLETE CBC AUTOMATED: CPT | Performed by: NURSE PRACTITIONER

## 2020-01-20 PROCEDURE — 83735 ASSAY OF MAGNESIUM: CPT | Performed by: NURSE PRACTITIONER

## 2020-01-20 PROCEDURE — 4A023N7 MEASUREMENT OF CARDIAC SAMPLING AND PRESSURE, LEFT HEART, PERCUTANEOUS APPROACH: ICD-10-PCS | Performed by: INTERNAL MEDICINE

## 2020-01-20 PROCEDURE — 93458 L HRT ARTERY/VENTRICLE ANGIO: CPT | Performed by: INTERNAL MEDICINE

## 2020-01-20 PROCEDURE — C1894 INTRO/SHEATH, NON-LASER: HCPCS

## 2020-01-20 PROCEDURE — 99152 MOD SED SAME PHYS/QHP 5/>YRS: CPT | Performed by: INTERNAL MEDICINE

## 2020-01-20 PROCEDURE — 93458 L HRT ARTERY/VENTRICLE ANGIO: CPT

## 2020-01-20 RX ORDER — CLOPIDOGREL BISULFATE 75 MG/1
75 TABLET ORAL DAILY
Status: DISCONTINUED | OUTPATIENT
Start: 2020-01-20 | End: 2020-01-21 | Stop reason: HOSPADM

## 2020-01-20 RX ORDER — NITROGLYCERIN 20 MG/100ML
INJECTION INTRAVENOUS CODE/TRAUMA/SEDATION MEDICATION
Status: COMPLETED | OUTPATIENT
Start: 2020-01-20 | End: 2020-01-20

## 2020-01-20 RX ORDER — LIDOCAINE HYDROCHLORIDE 10 MG/ML
INJECTION, SOLUTION EPIDURAL; INFILTRATION; INTRACAUDAL; PERINEURAL CODE/TRAUMA/SEDATION MEDICATION
Status: COMPLETED | OUTPATIENT
Start: 2020-01-20 | End: 2020-01-20

## 2020-01-20 RX ORDER — VERAPAMIL HYDROCHLORIDE 2.5 MG/ML
INJECTION, SOLUTION INTRAVENOUS CODE/TRAUMA/SEDATION MEDICATION
Status: COMPLETED | OUTPATIENT
Start: 2020-01-20 | End: 2020-01-20

## 2020-01-20 RX ORDER — MIDAZOLAM HYDROCHLORIDE 2 MG/2ML
INJECTION, SOLUTION INTRAMUSCULAR; INTRAVENOUS CODE/TRAUMA/SEDATION MEDICATION
Status: COMPLETED | OUTPATIENT
Start: 2020-01-20 | End: 2020-01-20

## 2020-01-20 RX ORDER — FENTANYL CITRATE 50 UG/ML
INJECTION, SOLUTION INTRAMUSCULAR; INTRAVENOUS CODE/TRAUMA/SEDATION MEDICATION
Status: COMPLETED | OUTPATIENT
Start: 2020-01-20 | End: 2020-01-20

## 2020-01-20 RX ORDER — SODIUM CHLORIDE 9 MG/ML
50 INJECTION, SOLUTION INTRAVENOUS CONTINUOUS
Status: CANCELLED | OUTPATIENT
Start: 2020-01-23 | End: 2020-01-23

## 2020-01-20 RX ORDER — HEPARIN SODIUM 1000 [USP'U]/ML
INJECTION, SOLUTION INTRAVENOUS; SUBCUTANEOUS CODE/TRAUMA/SEDATION MEDICATION
Status: COMPLETED | OUTPATIENT
Start: 2020-01-20 | End: 2020-01-20

## 2020-01-20 RX ADMIN — ENOXAPARIN SODIUM 30 MG: 30 INJECTION SUBCUTANEOUS at 10:52

## 2020-01-20 RX ADMIN — DILTIAZEM HYDROCHLORIDE 180 MG: 180 CAPSULE, COATED, EXTENDED RELEASE ORAL at 10:54

## 2020-01-20 RX ADMIN — METHYLPREDNISOLONE 32 MG: 4 TABLET ORAL at 01:09

## 2020-01-20 RX ADMIN — HEPARIN SODIUM 3000 UNITS: 1000 INJECTION INTRAVENOUS; SUBCUTANEOUS at 15:09

## 2020-01-20 RX ADMIN — MIDAZOLAM HYDROCHLORIDE 1 MG: 1 INJECTION, SOLUTION INTRAMUSCULAR; INTRAVENOUS at 15:08

## 2020-01-20 RX ADMIN — DIPHENHYDRAMINE HCL 50 MG: 25 TABLET, COATED ORAL at 13:19

## 2020-01-20 RX ADMIN — VERAPAMIL HYDROCHLORIDE 2.5 MG: 2.5 INJECTION, SOLUTION INTRAVENOUS at 15:09

## 2020-01-20 RX ADMIN — PANTOPRAZOLE SODIUM 40 MG: 40 TABLET, DELAYED RELEASE ORAL at 05:36

## 2020-01-20 RX ADMIN — Medication 1 TABLET: at 10:54

## 2020-01-20 RX ADMIN — CLOPIDOGREL BISULFATE 75 MG: 75 TABLET ORAL at 19:28

## 2020-01-20 RX ADMIN — IOHEXOL 95 ML: 350 INJECTION, SOLUTION INTRAVENOUS at 15:27

## 2020-01-20 RX ADMIN — METOPROLOL TARTRATE 25 MG: 25 TABLET ORAL at 10:54

## 2020-01-20 RX ADMIN — LEVOTHYROXINE SODIUM 50 MCG: 50 TABLET ORAL at 05:36

## 2020-01-20 RX ADMIN — METOPROLOL TARTRATE 25 MG: 25 TABLET ORAL at 21:58

## 2020-01-20 RX ADMIN — SODIUM CHLORIDE 50 ML/HR: 0.9 INJECTION, SOLUTION INTRAVENOUS at 23:36

## 2020-01-20 RX ADMIN — METHYLPREDNISOLONE 32 MG: 4 TABLET ORAL at 13:20

## 2020-01-20 RX ADMIN — FENTANYL CITRATE 100 MCG: 50 INJECTION, SOLUTION INTRAMUSCULAR; INTRAVENOUS at 15:06

## 2020-01-20 RX ADMIN — SODIUM CHLORIDE 50 ML/HR: 0.9 INJECTION, SOLUTION INTRAVENOUS at 01:10

## 2020-01-20 RX ADMIN — NITROGLYCERIN 200 MCG: 20 INJECTION INTRAVENOUS at 15:09

## 2020-01-20 RX ADMIN — FLUTICASONE PROPIONATE 1 SPRAY: 50 SPRAY, METERED NASAL at 10:52

## 2020-01-20 RX ADMIN — ASPIRIN 81 MG 81 MG: 81 TABLET ORAL at 10:54

## 2020-01-20 RX ADMIN — EZETIMIBE: 10 TABLET ORAL at 22:28

## 2020-01-20 RX ADMIN — LIDOCAINE HYDROCHLORIDE 1 ML: 10 INJECTION, SOLUTION EPIDURAL; INFILTRATION; INTRACAUDAL; PERINEURAL at 15:06

## 2020-01-20 NOTE — TELEPHONE ENCOUNTER
Dtr was unaware of cardiac cath scheduled today - is it necessary - would like to discuss the procedure for her mom - callback     Tel: [phone]

## 2020-01-21 ENCOUNTER — EPISODE CHANGES (OUTPATIENT)
Dept: CASE MANAGEMENT | Facility: HOSPITAL | Age: 85
End: 2020-01-21

## 2020-01-21 ENCOUNTER — TRANSITIONAL CARE MANAGEMENT (OUTPATIENT)
Dept: FAMILY MEDICINE CLINIC | Facility: CLINIC | Age: 85
End: 2020-01-21

## 2020-01-21 VITALS
RESPIRATION RATE: 20 BRPM | WEIGHT: 168.21 LBS | HEIGHT: 62 IN | HEART RATE: 68 BPM | SYSTOLIC BLOOD PRESSURE: 167 MMHG | DIASTOLIC BLOOD PRESSURE: 76 MMHG | OXYGEN SATURATION: 96 % | TEMPERATURE: 98.1 F | BODY MASS INDEX: 30.95 KG/M2

## 2020-01-21 LAB
ANION GAP SERPL CALCULATED.3IONS-SCNC: 8 MMOL/L (ref 4–13)
BASOPHILS # BLD AUTO: 0.03 THOUSANDS/ΜL (ref 0–0.1)
BASOPHILS NFR BLD AUTO: 0 % (ref 0–1)
BUN SERPL-MCNC: 25 MG/DL (ref 5–25)
CALCIUM SERPL-MCNC: 9.2 MG/DL (ref 8.3–10.1)
CHLORIDE SERPL-SCNC: 103 MMOL/L (ref 100–108)
CO2 SERPL-SCNC: 27 MMOL/L (ref 21–32)
CREAT SERPL-MCNC: 0.93 MG/DL (ref 0.6–1.3)
EOSINOPHIL # BLD AUTO: 0 THOUSAND/ΜL (ref 0–0.61)
EOSINOPHIL NFR BLD AUTO: 0 % (ref 0–6)
ERYTHROCYTE [DISTWIDTH] IN BLOOD BY AUTOMATED COUNT: 14.1 % (ref 11.6–15.1)
GFR SERPL CREATININE-BSD FRML MDRD: 54 ML/MIN/1.73SQ M
GLUCOSE SERPL-MCNC: 118 MG/DL (ref 65–140)
HCT VFR BLD AUTO: 41.1 % (ref 34.8–46.1)
HGB BLD-MCNC: 13.7 G/DL (ref 11.5–15.4)
IMM GRANULOCYTES # BLD AUTO: 0.08 THOUSAND/UL (ref 0–0.2)
IMM GRANULOCYTES NFR BLD AUTO: 1 % (ref 0–2)
LYMPHOCYTES # BLD AUTO: 1.12 THOUSANDS/ΜL (ref 0.6–4.47)
LYMPHOCYTES NFR BLD AUTO: 11 % (ref 14–44)
MCH RBC QN AUTO: 32.3 PG (ref 26.8–34.3)
MCHC RBC AUTO-ENTMCNC: 33.3 G/DL (ref 31.4–37.4)
MCV RBC AUTO: 97 FL (ref 82–98)
MONOCYTES # BLD AUTO: 0.43 THOUSAND/ΜL (ref 0.17–1.22)
MONOCYTES NFR BLD AUTO: 4 % (ref 4–12)
NEUTROPHILS # BLD AUTO: 8.3 THOUSANDS/ΜL (ref 1.85–7.62)
NEUTS SEG NFR BLD AUTO: 84 % (ref 43–75)
NRBC BLD AUTO-RTO: 0 /100 WBCS
PLATELET # BLD AUTO: 326 THOUSANDS/UL (ref 149–390)
PMV BLD AUTO: 11.1 FL (ref 8.9–12.7)
POTASSIUM SERPL-SCNC: 4.9 MMOL/L (ref 3.5–5.3)
RBC # BLD AUTO: 4.24 MILLION/UL (ref 3.81–5.12)
SODIUM SERPL-SCNC: 138 MMOL/L (ref 136–145)
WBC # BLD AUTO: 9.96 THOUSAND/UL (ref 4.31–10.16)

## 2020-01-21 PROCEDURE — 85025 COMPLETE CBC W/AUTO DIFF WBC: CPT | Performed by: NURSE PRACTITIONER

## 2020-01-21 PROCEDURE — 80048 BASIC METABOLIC PNL TOTAL CA: CPT | Performed by: NURSE PRACTITIONER

## 2020-01-21 PROCEDURE — 99239 HOSP IP/OBS DSCHRG MGMT >30: CPT | Performed by: NURSE PRACTITIONER

## 2020-01-21 PROCEDURE — 99232 SBSQ HOSP IP/OBS MODERATE 35: CPT | Performed by: INTERNAL MEDICINE

## 2020-01-21 RX ORDER — ISOSORBIDE MONONITRATE 30 MG/1
30 TABLET, EXTENDED RELEASE ORAL DAILY
Qty: 30 TABLET | Refills: 0 | Status: SHIPPED | OUTPATIENT
Start: 2020-01-21 | End: 2020-04-15 | Stop reason: SDUPTHER

## 2020-01-21 RX ORDER — ISOSORBIDE MONONITRATE 30 MG/1
30 TABLET, EXTENDED RELEASE ORAL DAILY
Status: DISCONTINUED | OUTPATIENT
Start: 2020-01-21 | End: 2020-01-21 | Stop reason: HOSPADM

## 2020-01-21 RX ORDER — CLOPIDOGREL BISULFATE 75 MG/1
75 TABLET ORAL DAILY
Qty: 30 TABLET | Refills: 0 | Status: SHIPPED | OUTPATIENT
Start: 2020-01-22 | End: 2021-01-28 | Stop reason: HOSPADM

## 2020-01-21 RX ADMIN — LEVOTHYROXINE SODIUM 50 MCG: 50 TABLET ORAL at 05:07

## 2020-01-21 RX ADMIN — ENOXAPARIN SODIUM 30 MG: 30 INJECTION SUBCUTANEOUS at 08:24

## 2020-01-21 RX ADMIN — Medication 1 TABLET: at 08:25

## 2020-01-21 RX ADMIN — FLUTICASONE PROPIONATE 1 SPRAY: 50 SPRAY, METERED NASAL at 08:25

## 2020-01-21 RX ADMIN — DILTIAZEM HYDROCHLORIDE 180 MG: 180 CAPSULE, COATED, EXTENDED RELEASE ORAL at 08:25

## 2020-01-21 RX ADMIN — ASPIRIN 81 MG 81 MG: 81 TABLET ORAL at 08:25

## 2020-01-21 RX ADMIN — CLOPIDOGREL BISULFATE 75 MG: 75 TABLET ORAL at 08:24

## 2020-01-21 RX ADMIN — METOPROLOL TARTRATE 25 MG: 25 TABLET ORAL at 08:25

## 2020-01-21 RX ADMIN — ISOSORBIDE MONONITRATE 30 MG: 30 TABLET, EXTENDED RELEASE ORAL at 11:01

## 2020-01-21 RX ADMIN — PANTOPRAZOLE SODIUM 40 MG: 40 TABLET, DELAYED RELEASE ORAL at 05:07

## 2020-01-23 ENCOUNTER — CONSULT (OUTPATIENT)
Dept: CARDIOLOGY CLINIC | Facility: CLINIC | Age: 85
End: 2020-01-23
Payer: MEDICARE

## 2020-01-23 VITALS
BODY MASS INDEX: 31.1 KG/M2 | WEIGHT: 169 LBS | DIASTOLIC BLOOD PRESSURE: 72 MMHG | HEART RATE: 71 BPM | HEIGHT: 62 IN | OXYGEN SATURATION: 98 % | SYSTOLIC BLOOD PRESSURE: 148 MMHG

## 2020-01-23 DIAGNOSIS — I21.4 NSTEMI (NON-ST ELEVATED MYOCARDIAL INFARCTION) (HCC): ICD-10-CM

## 2020-01-23 DIAGNOSIS — I10 BENIGN ESSENTIAL HYPERTENSION: Primary | ICD-10-CM

## 2020-01-23 DIAGNOSIS — I25.2 HEALED MYOCARDIAL INFARCT: ICD-10-CM

## 2020-01-23 DIAGNOSIS — I25.118 CORONARY ARTERY DISEASE OF NATIVE ARTERY OF NATIVE HEART WITH STABLE ANGINA PECTORIS (HCC): ICD-10-CM

## 2020-01-23 DIAGNOSIS — E78.5 HYPERLIPIDEMIA, UNSPECIFIED HYPERLIPIDEMIA TYPE: ICD-10-CM

## 2020-01-23 PROCEDURE — 93000 ELECTROCARDIOGRAM COMPLETE: CPT | Performed by: INTERNAL MEDICINE

## 2020-01-23 PROCEDURE — 99214 OFFICE O/P EST MOD 30 MIN: CPT | Performed by: INTERNAL MEDICINE

## 2020-01-24 NOTE — PROGRESS NOTES
Cardiology Follow Up    Phoebe Viramontes  3/7/1928  [id number]      Interval History: Phoebe Viramontes is here for follow up of recent hospitalization  She was admitted with chest pain and had an abnormal troponin  She was treated medically  After discharge, she went home and developed pain again as she was making pasta  Cardiac catheterization was done which showed multivessel CAD including a 95% ostial circumflex stenosis  Medications were adjusted including addition of metoprolol, isosorbide, diltiazem and plavix  Since hospital discharge, she has not had any further chest pain  She denies any shortness of breath or LE Edema  The following portions of the patient's history were reviewed and updated as appropriate: allergies, current medications, past family history, past medical history, past social history, past surgical history and problem list     Current Outpatient Medications on File Prior to Visit   Medication Sig Dispense Refill    acetaminophen (TYLENOL) 325 mg tablet Take 2 tablets (650 mg total) by mouth every 6 (six) hours as needed for mild pain, headaches or fever 30 tablet 0    aspirin 81 MG tablet Take 81 mg by mouth every morning        Cholecalciferol (VITAMIN D3) 125 MCG (5000 UT) CAPS Take by mouth      clopidogrel (PLAVIX) 75 mg tablet Take 1 tablet (75 mg total) by mouth daily 30 tablet 0    diltiazem (CARDIZEM CD) 180 mg 24 hr capsule TAKE 1 CAPSULE DAILY 90 capsule 3    exemestane (AROMASIN) 25 MG tablet Take 25 mg by mouth daily   ezetimibe-simvastatin (VYTORIN) 10-40 mg per tablet Take 1 tablet by mouth daily at bedtime        fluticasone (FLONASE) 50 mcg/act nasal spray       hydroxyurea (HYDREA) 500 mg capsule Take 500 mg by mouth daily m w f      isosorbide mononitrate (IMDUR) 30 mg 24 hr tablet Take 1 tablet (30 mg total) by mouth daily 30 tablet 0    levothyroxine 50 mcg tablet Take 1 tablet (50 mcg total) by mouth every morning 90 tablet 0    metoprolol tartrate (LOPRESSOR) 25 mg tablet Take 1 tablet (25 mg total) by mouth every 12 (twelve) hours 60 tablet 0    Multiple Vitamins-Minerals (CENTRUM SILVER ULTRA WOMENS) TABS Take by mouth      omeprazole (PriLOSEC) 40 MG capsule Take 1 capsule (40 mg total) by mouth daily 90 capsule 4     No current facility-administered medications on file prior to visit  Review of Systems:  Review of Systems   Constitutional: Negative for chills, fatigue and fever  HENT: Negative for congestion, nosebleeds and postnasal drip  Respiratory: Negative for cough, chest tightness and shortness of breath  Cardiovascular: Negative for chest pain, palpitations and leg swelling  Gastrointestinal: Negative for abdominal distention, abdominal pain, diarrhea, nausea and vomiting  Endocrine: Negative for polydipsia, polyphagia and polyuria  Musculoskeletal: Negative for gait problem and myalgias  Skin: Negative for color change, pallor and rash  Allergic/Immunologic: Negative for environmental allergies, food allergies and immunocompromised state  Neurological: Negative for dizziness, seizures, syncope and light-headedness  Hematological: Negative for adenopathy  Does not bruise/bleed easily  Psychiatric/Behavioral: Negative for dysphoric mood  The patient is not nervous/anxious  Physical Exam:  /72 (BP Location: Right arm, Patient Position: Sitting, Cuff Size: Standard)   Pulse 71   Ht 5' 2" (1 575 m)   Wt 76 7 kg (169 lb)   SpO2 98% Comment: RA  BMI 30 91 kg/m²    Physical Exam   Constitutional: She is oriented to person, place, and time  She appears well-developed  No distress  HENT:   Head: Normocephalic and atraumatic  Eyes: Pupils are equal, round, and reactive to light  Conjunctivae and EOM are normal    Neck: Neck supple  No JVD present  No thyromegaly present  Cardiovascular: Normal rate and regular rhythm   Exam reveals no gallop and no friction rub  Murmur heard  Pulmonary/Chest: Effort normal and breath sounds normal    Abdominal: Soft  She exhibits no distension  There is no tenderness  Musculoskeletal: She exhibits no edema  Neurological: She is alert and oriented to person, place, and time  No cranial nerve deficit  Skin: Skin is warm and dry  No rash noted  She is not diaphoretic  No erythema  Psychiatric: She has a normal mood and affect  Her behavior is normal  Judgment and thought content normal        Cardiographics  ECG: normal sinus rhythm, no blocks or conduction defects, no ischemic changes    Labs:  Lab Results   Component Value Date    K 4 9 01/21/2020     01/21/2020    CO2 27 01/21/2020    BUN 25 01/21/2020    CREATININE 0 93 01/21/2020    CALCIUM 9 2 01/21/2020     Lab Results   Component Value Date    WBC 9 96 01/21/2020    HGB 13 7 01/21/2020    HCT 41 1 01/21/2020    MCV 97 01/21/2020     01/21/2020     Lab Results   Component Value Date    TRIG 146 01/19/2020    HDL 51 01/19/2020    LDLDIRECT 29 10/23/2013     Imaging: Xr Chest 1 View Portable    Result Date: 1/19/2020  Narrative: CHEST INDICATION:   htn  COMPARISON:  1/7/2020  EXAM PERFORMED/VIEWS:  XR CHEST PORTABLE FINDINGS: Cardiomediastinal silhouette appears unremarkable  Lung markings are crowded secondary to low lung volumes  Within limitations of this examination there is no focal airspace opacity to suggest pneumonia  No pneumothorax or pleural effusion  Osseous structures appear within normal limits for patient age  Impression: No active pulmonary disease on examination which is somewhat limited secondary to low lung volumes  Workstation performed: [id number]     Xr Chest 1 View Portable    Result Date: 1/7/2020  Narrative: CHEST INDICATION: Chest pain with some shortness of breath   COMPARISON:  None EXAM PERFORMED/VIEWS:  XR CHEST PORTABLE FINDINGS:  Monitoring leads and clips project over the chest  Cardiomediastinal silhouette appears unremarkable  Linear atelectasis or scarring left mid to lower lung zone  The lungs are otherwise clear  No pneumothorax or pleural effusion  Osseous structures appear within normal limits for patient age  Surgical clips right axilla  Impression: No acute cardiopulmonary disease  Workstation performed: [id number]     Ct Head Wo Contrast    Result Date: 1/9/2020  Narrative: CT BRAIN - WITHOUT CONTRAST INDICATION:   Altered mental status  COMPARISON:  12/8/2016 TECHNIQUE:  CT examination of the brain was performed  In addition to axial images, coronal 2D reformatted images were created and submitted for interpretation  Radiation dose length product (DLP) for this visit:  929 35 079 mGy-cm   This examination, like all CT scans performed in the [facility], was performed utilizing techniques to minimize radiation dose exposure, including the use of iterative reconstruction and automated exposure control  IMAGE QUALITY:  Diagnostic  FINDINGS: PARENCHYMA: Decreased attenuation is noted in periventricular and subcortical white matter demonstrating an appearance that is statistically most likely to represent mild microangiopathic change; this appearance is similar when compared to most recent prior examination  Chronic lacunar infarction(s) are noted in basal ganglia, unchanged from prior exam  No CT signs of acute infarction  No intracranial mass, mass effect or midline shift  No acute parenchymal hemorrhage  VENTRICLES AND EXTRA-AXIAL SPACES:  Ventricles and extra-axial CSF spaces are prominent commensurate with the degree of volume loss  No hydrocephalus  No acute extra-axial hemorrhage  VISUALIZED ORBITS AND PARANASAL SINUSES:  Left ocular lens implant present  Mild mucosal thickening in the left sphenoid sinus and scattered ethmoid air cells  CALVARIUM AND EXTRACRANIAL SOFT TISSUES:  Normal      Impression: No acute intracranial abnormality   Workstation performed: [id number] Discussion/Summary:  1  Benign essential hypertension    2  Healed myocardial infarct    3  Hyperlipidemia, unspecified hyperlipidemia type    4  Coronary artery disease of native artery of native heart    5  NSTEMI (non-ST elevated myocardial infarction) [facility])        - Ms Álvaro Simpson has stable angina with multivessel CAD  She is being treated medically with metoprolol and cardizem along with long acting nitrates  - Begin cardiac rehab  - Continue current Rx  If fails medical therapy than can consider PCI of circumflex  - Continue Plavix  - Discussed with patient and daughter in detail  Prevention:  The ASCVD Risk score (Ba Hdez et al , 2013) failed to calculate for the following reasons: The 2013 ASCVD risk score is only valid for ages 36 to 78    The patient has a prior MI or stroke diagnosis     Current BP: /72 (BP Location: Right arm, Patient Position: Sitting, Cuff Size: Standard)   Pulse 71   Ht 5' 2" (1 575 m)   Wt 76 7 kg (169 lb)   SpO2 98% Comment: RA  BMI 30 91 kg/m²      Weight: Current BMI Body mass index is 30 91 kg/m²  Smoking:   Social History     Tobacco Use    Smoking status: Former Smoker     Packs/day: 0 10     Last attempt to quit: 1970     Years since quittin 0    Smokeless tobacco: Never Used   Substance Use Topics    Alcohol use:  Yes     Alcohol/week: 1 0 standard drinks     Types: 1 Glasses of wine per week     Frequency: 4 or more times a week     Drinks per session: 1 or 2     Comment: 1 glass of wine before bed

## 2020-01-26 ENCOUNTER — HOSPITAL ENCOUNTER (EMERGENCY)
Facility: HOSPITAL | Age: 85
Discharge: HOME | DRG: 871 | End: 2020-01-26
Attending: EMERGENCY MEDICINE | Admitting: EMERGENCY MEDICINE
Payer: MEDICARE

## 2020-01-26 ENCOUNTER — APPOINTMENT (EMERGENCY)
Dept: RADIOLOGY | Facility: HOSPITAL | Age: 85
DRG: 871 | End: 2020-01-26
Payer: MEDICARE

## 2020-01-26 VITALS
OXYGEN SATURATION: 95 % | HEART RATE: 72 BPM | SYSTOLIC BLOOD PRESSURE: 140 MMHG | RESPIRATION RATE: 20 BRPM | WEIGHT: 167.33 LBS | TEMPERATURE: 97.9 F | HEIGHT: 62 IN | BODY MASS INDEX: 30.79 KG/M2 | DIASTOLIC BLOOD PRESSURE: 65 MMHG

## 2020-01-26 DIAGNOSIS — J40 BRONCHITIS: ICD-10-CM

## 2020-01-26 DIAGNOSIS — R07.9 CHEST PAIN, UNSPECIFIED TYPE: Primary | ICD-10-CM

## 2020-01-26 LAB
ALBUMIN SERPL BCP-MCNC: 3.5 G/DL (ref 3.5–5)
ALP SERPL-CCNC: 77 U/L (ref 46–116)
ALT SERPL W P-5'-P-CCNC: 22 U/L (ref 12–78)
ANION GAP SERPL CALCULATED.3IONS-SCNC: 8 MMOL/L (ref 4–13)
APTT PPP: 30 SECONDS (ref 23–37)
AST SERPL W P-5'-P-CCNC: 20 U/L (ref 5–45)
BASOPHILS # BLD AUTO: 0.02 THOUSANDS/ΜL (ref 0–0.1)
BASOPHILS NFR BLD AUTO: 0 % (ref 0–1)
BILIRUB SERPL-MCNC: 0.5 MG/DL (ref 0.2–1)
BUN SERPL-MCNC: 17 MG/DL (ref 5–25)
CALCIUM SERPL-MCNC: 8.4 MG/DL (ref 8.3–10.1)
CHLORIDE SERPL-SCNC: 103 MMOL/L (ref 100–108)
CO2 SERPL-SCNC: 28 MMOL/L (ref 21–32)
CREAT SERPL-MCNC: 1.13 MG/DL (ref 0.6–1.3)
EOSINOPHIL # BLD AUTO: 0.1 THOUSAND/ΜL (ref 0–0.61)
EOSINOPHIL NFR BLD AUTO: 2 % (ref 0–6)
ERYTHROCYTE [DISTWIDTH] IN BLOOD BY AUTOMATED COUNT: 14.4 % (ref 11.6–15.1)
GFR SERPL CREATININE-BSD FRML MDRD: 43 ML/MIN/1.73SQ M
GLUCOSE SERPL-MCNC: 108 MG/DL (ref 65–140)
HCT VFR BLD AUTO: 40 % (ref 34.8–46.1)
HGB BLD-MCNC: 13.2 G/DL (ref 11.5–15.4)
IMM GRANULOCYTES # BLD AUTO: 0.03 THOUSAND/UL (ref 0–0.2)
IMM GRANULOCYTES NFR BLD AUTO: 1 % (ref 0–2)
INR PPP: 1.08 (ref 0.91–1.09)
LYMPHOCYTES # BLD AUTO: 0.64 THOUSANDS/ΜL (ref 0.6–4.47)
LYMPHOCYTES NFR BLD AUTO: 11 % (ref 14–44)
MCH RBC QN AUTO: 31.6 PG (ref 26.8–34.3)
MCHC RBC AUTO-ENTMCNC: 33 G/DL (ref 31.4–37.4)
MCV RBC AUTO: 96 FL (ref 82–98)
MONOCYTES # BLD AUTO: 0.42 THOUSAND/ΜL (ref 0.17–1.22)
MONOCYTES NFR BLD AUTO: 7 % (ref 4–12)
NEUTROPHILS # BLD AUTO: 4.45 THOUSANDS/ΜL (ref 1.85–7.62)
NEUTS SEG NFR BLD AUTO: 79 % (ref 43–75)
NRBC BLD AUTO-RTO: 0 /100 WBCS
PLATELET # BLD AUTO: 250 THOUSANDS/UL (ref 149–390)
PMV BLD AUTO: 11.2 FL (ref 8.9–12.7)
POTASSIUM SERPL-SCNC: 4.2 MMOL/L (ref 3.5–5.3)
PROT SERPL-MCNC: 6.7 G/DL (ref 6.4–8.2)
PROTHROMBIN TIME: 11.6 SECONDS (ref 9.8–12)
RBC # BLD AUTO: 4.18 MILLION/UL (ref 3.81–5.12)
SODIUM SERPL-SCNC: 139 MMOL/L (ref 136–145)
TROPONIN I SERPL-MCNC: 0.02 NG/ML
TROPONIN I SERPL-MCNC: <0.02 NG/ML
WBC # BLD AUTO: 5.66 THOUSAND/UL (ref 4.31–10.16)

## 2020-01-26 PROCEDURE — 71045 X-RAY EXAM CHEST 1 VIEW: CPT

## 2020-01-26 PROCEDURE — 84484 ASSAY OF TROPONIN QUANT: CPT | Performed by: EMERGENCY MEDICINE

## 2020-01-26 PROCEDURE — 99285 EMERGENCY DEPT VISIT HI MDM: CPT

## 2020-01-26 PROCEDURE — 85025 COMPLETE CBC W/AUTO DIFF WBC: CPT | Performed by: EMERGENCY MEDICINE

## 2020-01-26 PROCEDURE — 99285 EMERGENCY DEPT VISIT HI MDM: CPT | Performed by: EMERGENCY MEDICINE

## 2020-01-26 PROCEDURE — 85610 PROTHROMBIN TIME: CPT | Performed by: EMERGENCY MEDICINE

## 2020-01-26 PROCEDURE — 80053 COMPREHEN METABOLIC PANEL: CPT | Performed by: EMERGENCY MEDICINE

## 2020-01-26 PROCEDURE — 85730 THROMBOPLASTIN TIME PARTIAL: CPT | Performed by: EMERGENCY MEDICINE

## 2020-01-26 PROCEDURE — 36415 COLL VENOUS BLD VENIPUNCTURE: CPT | Performed by: EMERGENCY MEDICINE

## 2020-01-26 PROCEDURE — 93005 ELECTROCARDIOGRAM TRACING: CPT

## 2020-01-26 RX ORDER — AZITHROMYCIN 250 MG/1
TABLET, FILM COATED ORAL
Qty: 6 TABLET | Refills: 0 | Status: ON HOLD | OUTPATIENT
Start: 2020-01-26 | End: 2020-02-01

## 2020-01-26 NOTE — ED PROCEDURE NOTE
PROCEDURE  ECG 12 Lead Documentation Only  Date/Time: 1/26/2020 9:54 AM  Performed by: Dayana Carey DO  Authorized by: Dayana Carey DO     ECG reviewed by me, the ED Provider: yes    Patient location:  ED  Interpretation:     Interpretation: abnormal    Quality:     Tracing quality:  Limited by artifact  Rate:     ECG rate:  76    ECG rate assessment: normal    Rhythm:     Rhythm: sinus rhythm    Ectopy:     Ectopy: none    Conduction:     Conduction: abnormal      Abnormal conduction: 1st degree    ST segments:     ST segments:  Normal  T waves:     T waves: normal    Q waves:     Q waves:  V1 and V2         Dayana Carey DO  01/26/20 1541

## 2020-01-26 NOTE — ED NOTES
Daughter returned to  - [facility] the pharmacy that the z-pack was faxed to is currently closed and requesting it be phone to [address]  Script for z-pack phoned into [facility] in [place]  01/26/20 [id number]

## 2020-01-26 NOTE — PROGRESS NOTES
Assessment/Plan:    1  Coronary artery disease of native artery of native heart  Assessment & Plan:    She is currently asymptomatic and continues on plavix, antihypertensives, imdur  Follows with cardiology  2  Healed myocardial infarct  Assessment & Plan:  Care per cardiology  She will start cardiac rehab when she has recovered from her respiratory illness  3  Adenocarcinoma of breast, unspecified laterality [facility])  Assessment & Plan:  She follows with oncology  4  Cough  Comments:  Continue zpack and added tessalon perles and atrovent inhaler PRN  Advised strongly on increased fluids including gatorade and pedialyte  Low threshold for ER referral if symptoms worsen  Appears to be mildly dehydrated and needs for fluids was stressed  Orders:  -     benzonatate (TESSALON) 200 MG capsule; Take 1 capsule (200 mg total) by mouth 3 (three) times a day as needed for cough  -     ipratropium (ATROVENT HFA) 17 mcg/act inhaler; Inhale 2 puffs every 6 (six) hours    5  Hypothyroidism, unspecified type  Assessment & Plan: This has been stable and last TSH checked during hospitalization is within goal        6  Benign essential hypertension  Assessment & Plan:  Is tolerating new medications without side effects  BP is well controlled and she will continue current medications  7  Essential thrombocythemia [facility])  Assessment & Plan:  Last labs from the hospital show normal platelet level  8  Hyperlipidemia, unspecified hyperlipidemia type  Assessment & Plan:  Well controlled on vytorin, up to date with labs  There are no Patient Instructions on file for this visit  Return in about 4 weeks (around 2/24/2020)  Subjective:      Patient ID: Evangelina Mccallum is a 80 y o  female  Chief Complaint   Patient presents with    Transition of Care Management     [facility]       Here for TCM, as per note  She was admitted for NSTEMI and cardiac cath was done  Plan is to try to treat medically  She was back in the ER over the weekend for new onset cough, congestion, dyspnea, wheeze, decreased appetite  Denies fever  Was given zithromax but has only had her second dose today  She feels weak and tired  Daughter is trying to get her to drink  Did have CXR in the ER and no pneumonia was seen  She is to start cardiac rehab but does not feel she can do this at this time  Has already seen cardiology and is doing well with her new medications, including plavix  TCM Call (since 12/27/2019)     Date and time call was made  1/21/2020  3:39 PM    Hospital care reviewed  Records reviewed    Patient was hospitialized at  [address] [place] [address]    Date of Admission  01/18/20    Date of discharge  01/21/20    Diagnosis  Chest Pain    Disposition  Home    Were the patients medications reviewed and updated  Yes    Current Symptoms  Fatigue; None    Fatigue severity  Mild      TCM Call (since 12/27/2019)     Post hospital issues  None <img src='C:FILES (X86)    Should patient be enrolled in anticoag monitoring? No    Scheduled for follow up?   Yes    Patients specialists  Cardiologist    Cardiologist name   Wendie Watt, DO (Cardiology)    Did you obtain your prescribed medications  Yes    Do you need help managing your prescriptions or medications  Yes    Why type of assitance do you need  Her daughter assists her    Is transportation to your appointment needed  Yes    Specify why  She does not drive    I have advised the patient to call PCP with any new or worsening symptoms  [place]  Family <img src='C:FILES (X86)    The type of support provided  Physical; Emotional    Do you have social support  Yes, as much as I need    Are you recieving any outpatient services  No    Are you recieving home care services  No    Have you fallen in the last 12 months  No    How many times  one fall- she "scraped her knee"    Interperter language line needed  No    Counseling Family    Counseling topics  Activities of daily living; Importance of RX compliance; patient and family education; instructions for management; Risk factor reduction    Comments  I spoke with her daughter Frank Segal  She is doing fine at home  She does have a concern about her mother's intermittent "crying" episodes but this is not new for her  She will discuss this at her mother's appt  She has no c/p or dyspnea  Her daughter knows to call us at any time with any questions or concerns and to have her seen in the ER if any chest pain, dyspnea, weakness, etc  Elfegoparish Wick does have life alert in place [facility]          The following portions of the patient's history were reviewed and updated as appropriate: allergies, current medications, past family history, past medical history, past social history, past surgical history and problem list     Review of Systems   Constitutional: Positive for fatigue  HENT: Negative  Eyes: Negative  Respiratory: Positive for cough and shortness of breath  Negative for wheezing  Cardiovascular: Negative  Negative for chest pain, palpitations and leg swelling  Gastrointestinal: Negative  Poor appetite   Endocrine: Negative  Genitourinary: Negative  Musculoskeletal: Negative  Skin: Negative  Allergic/Immunologic: Negative  Neurological: Negative  Hematological: Negative  Psychiatric/Behavioral: Negative            Current Outpatient Medications   Medication Sig Dispense Refill    acetaminophen (TYLENOL) 325 mg tablet Take 2 tablets (650 mg total) by mouth every 6 (six) hours as needed for mild pain, headaches or fever 30 tablet 0    aspirin 81 MG tablet Take 81 mg by mouth every morning        Cholecalciferol (VITAMIN D3) 125 MCG (5000 UT) CAPS Take by mouth      clopidogrel (PLAVIX) 75 mg tablet Take 1 tablet (75 mg total) by mouth daily 30 tablet 0    diltiazem (CARDIZEM CD) 180 mg 24 hr capsule TAKE 1 CAPSULE DAILY 90 capsule 3    exemestane (AROMASIN) 25 MG tablet Take 25 mg by mouth daily   ezetimibe-simvastatin (VYTORIN) 10-40 mg per tablet Take 1 tablet by mouth daily at bedtime   fluticasone (FLONASE) 50 mcg/act nasal spray       hydroxyurea (HYDREA) 500 mg capsule Take 500 mg by mouth daily m w f      isosorbide mononitrate (IMDUR) 30 mg 24 hr tablet Take 1 tablet (30 mg total) by mouth daily 30 tablet 0    levothyroxine 50 mcg tablet Take 1 tablet (50 mcg total) by mouth every morning 90 tablet 0    metoprolol tartrate (LOPRESSOR) 25 mg tablet Take 1 tablet (25 mg total) by mouth every 12 (twelve) hours 60 tablet 0    Multiple Vitamins-Minerals (CENTRUM SILVER ULTRA WOMENS) TABS Take by mouth      omeprazole (PriLOSEC) 40 MG capsule Take 1 capsule (40 mg total) by mouth daily 90 capsule 4    azithromycin (ZITHROMAX) 250 mg tablet Take 2 tablets today then 1 tablet daily x 4 days 6 tablet 0    benzonatate (TESSALON) 200 MG capsule Take 1 capsule (200 mg total) by mouth 3 (three) times a day as needed for cough 20 capsule 0    ipratropium (ATROVENT HFA) 17 mcg/act inhaler Inhale 2 puffs every 6 (six) hours 1 Inhaler 2     No current facility-administered medications for this visit  Objective:    /70   Pulse 76   Temp 99 4 °F (37 4 °C)   Resp 18   Ht 5' 2" (1 575 m)   Wt 74 4 kg (164 lb)   BMI 30 00 kg/m²        Physical Exam   Constitutional: She appears well-developed and well-nourished  Appears fatigued, MM slightly dry, mild tenting  Eyes: Conjunctivae are normal    Neck: Neck supple  No JVD present  No thyromegaly present  Cardiovascular: Normal rate, regular rhythm, normal heart sounds and intact distal pulses  Exam reveals no gallop and no friction rub  No murmur heard  Pulmonary/Chest: Effort normal  She has wheezes  She has no rales  Few scattered wheezes throughout, good air movement, no rales   Abdominal: Soft  Bowel sounds are normal  She exhibits no distension  There is no tenderness  Musculoskeletal: She exhibits no edema                Bethanie Ventura MD

## 2020-01-27 ENCOUNTER — APPOINTMENT (EMERGENCY)
Dept: RADIOLOGY | Facility: HOSPITAL | Age: 85
DRG: 871 | End: 2020-01-27
Payer: MEDICARE

## 2020-01-27 ENCOUNTER — OFFICE VISIT (OUTPATIENT)
Dept: FAMILY MEDICINE CLINIC | Facility: CLINIC | Age: 85
End: 2020-01-27
Payer: MEDICARE

## 2020-01-27 ENCOUNTER — HOSPITAL ENCOUNTER (INPATIENT)
Facility: HOSPITAL | Age: 85
LOS: 5 days | Discharge: NURSING, REHAB OR LONG-TERM CARE | DRG: 871 | End: 2020-02-01
Attending: EMERGENCY MEDICINE | Admitting: FAMILY MEDICINE
Payer: MEDICARE

## 2020-01-27 VITALS
HEIGHT: 62 IN | TEMPERATURE: 99.4 F | RESPIRATION RATE: 18 BRPM | HEART RATE: 76 BPM | WEIGHT: 164 LBS | DIASTOLIC BLOOD PRESSURE: 70 MMHG | BODY MASS INDEX: 30.18 KG/M2 | SYSTOLIC BLOOD PRESSURE: 130 MMHG

## 2020-01-27 DIAGNOSIS — J96.01 ACUTE RESPIRATORY FAILURE WITH HYPOXIA AND HYPERCAPNIA (HCC): ICD-10-CM

## 2020-01-27 DIAGNOSIS — E78.5 HYPERLIPIDEMIA, UNSPECIFIED HYPERLIPIDEMIA TYPE: ICD-10-CM

## 2020-01-27 DIAGNOSIS — C50.919 ADENOCARCINOMA OF BREAST, UNSPECIFIED LATERALITY (HCC): ICD-10-CM

## 2020-01-27 DIAGNOSIS — I25.118 CORONARY ARTERY DISEASE OF NATIVE ARTERY OF NATIVE HEART WITH STABLE ANGINA PECTORIS (HCC): Primary | ICD-10-CM

## 2020-01-27 DIAGNOSIS — D47.3 ESSENTIAL THROMBOCYTHEMIA (HCC): ICD-10-CM

## 2020-01-27 DIAGNOSIS — M25.562 CHRONIC PAIN OF LEFT KNEE: ICD-10-CM

## 2020-01-27 DIAGNOSIS — I25.2 HEALED MYOCARDIAL INFARCT: ICD-10-CM

## 2020-01-27 DIAGNOSIS — J40 BRONCHITIS: ICD-10-CM

## 2020-01-27 DIAGNOSIS — G89.29 CHRONIC PAIN OF LEFT KNEE: ICD-10-CM

## 2020-01-27 DIAGNOSIS — J18.9 PNEUMONIA: ICD-10-CM

## 2020-01-27 DIAGNOSIS — E03.9 HYPOTHYROIDISM, UNSPECIFIED TYPE: ICD-10-CM

## 2020-01-27 DIAGNOSIS — R65.10 SIRS (SYSTEMIC INFLAMMATORY RESPONSE SYNDROME) (HCC): ICD-10-CM

## 2020-01-27 DIAGNOSIS — R05.9 COUGH: ICD-10-CM

## 2020-01-27 DIAGNOSIS — R06.03 RESPIRATORY DISTRESS: Primary | ICD-10-CM

## 2020-01-27 DIAGNOSIS — J18.9 HCAP (HEALTHCARE-ASSOCIATED PNEUMONIA): ICD-10-CM

## 2020-01-27 DIAGNOSIS — I10 BENIGN ESSENTIAL HYPERTENSION: ICD-10-CM

## 2020-01-27 DIAGNOSIS — J96.02 ACUTE RESPIRATORY FAILURE WITH HYPOXIA AND HYPERCAPNIA (HCC): ICD-10-CM

## 2020-01-27 PROBLEM — E86.0 DEHYDRATION: Status: ACTIVE | Noted: 2020-01-27

## 2020-01-27 LAB
ALBUMIN SERPL BCP-MCNC: 4.2 G/DL (ref 3.5–5)
ALP SERPL-CCNC: 96 U/L (ref 46–116)
ALT SERPL W P-5'-P-CCNC: 52 U/L (ref 12–78)
ANION GAP SERPL CALCULATED.3IONS-SCNC: 12 MMOL/L (ref 4–13)
APTT PPP: 29 SECONDS (ref 25–32)
ARTERIAL PATENCY WRIST A: YES
AST SERPL W P-5'-P-CCNC: 82 U/L (ref 5–45)
ATRIAL RATE: 76 BPM
BASE EXCESS BLDA CALC-SCNC: -3.8 MMOL/L
BASOPHILS # BLD AUTO: 0.06 THOUSANDS/ΜL (ref 0–0.1)
BASOPHILS NFR BLD AUTO: 1 % (ref 0–1)
BILIRUB SERPL-MCNC: 0.9 MG/DL (ref 0.2–1)
BODY TEMPERATURE: 97.4 DEGREES FEHRENHEIT
BUN SERPL-MCNC: 17 MG/DL (ref 5–25)
CALCIUM SERPL-MCNC: 8.8 MG/DL (ref 8.3–10.1)
CHLORIDE SERPL-SCNC: 94 MMOL/L (ref 100–108)
CO2 SERPL-SCNC: 27 MMOL/L (ref 21–32)
CREAT SERPL-MCNC: 1.06 MG/DL (ref 0.6–1.3)
EOSINOPHIL # BLD AUTO: 0.04 THOUSAND/ΜL (ref 0–0.61)
EOSINOPHIL NFR BLD AUTO: 0 % (ref 0–6)
ERYTHROCYTE [DISTWIDTH] IN BLOOD BY AUTOMATED COUNT: 14.6 % (ref 11.6–15.1)
FLUAV RNA NPH QL NAA+PROBE: NORMAL
FLUBV RNA NPH QL NAA+PROBE: NORMAL
GFR SERPL CREATININE-BSD FRML MDRD: 46 ML/MIN/1.73SQ M
GLUCOSE SERPL-MCNC: 182 MG/DL (ref 65–140)
GLUCOSE SERPL-MCNC: 216 MG/DL (ref 65–140)
HCO3 BLDA-SCNC: 21.7 MMOL/L (ref 22–28)
HCT VFR BLD AUTO: 49 % (ref 34.8–46.1)
HGB BLD-MCNC: 16.4 G/DL (ref 11.5–15.4)
IMM GRANULOCYTES # BLD AUTO: 0.19 THOUSAND/UL (ref 0–0.2)
IMM GRANULOCYTES NFR BLD AUTO: 2 % (ref 0–2)
INR PPP: 1.1 (ref 0.91–1.09)
IPAP: 12
LACTATE SERPL-SCNC: 1.8 MMOL/L (ref 0.5–2)
LYMPHOCYTES # BLD AUTO: 2.11 THOUSANDS/ΜL (ref 0.6–4.47)
LYMPHOCYTES NFR BLD AUTO: 17 % (ref 14–44)
MCH RBC QN AUTO: 32.5 PG (ref 26.8–34.3)
MCHC RBC AUTO-ENTMCNC: 33.5 G/DL (ref 31.4–37.4)
MCV RBC AUTO: 97 FL (ref 82–98)
MONOCYTES # BLD AUTO: 1.01 THOUSAND/ΜL (ref 0.17–1.22)
MONOCYTES NFR BLD AUTO: 8 % (ref 4–12)
NEUTROPHILS # BLD AUTO: 8.91 THOUSANDS/ΜL (ref 1.85–7.62)
NEUTS SEG NFR BLD AUTO: 72 % (ref 43–75)
NON VENT- BIPAP: ABNORMAL
NRBC BLD AUTO-RTO: 0 /100 WBCS
NT-PROBNP SERPL-MCNC: 1388 PG/ML
O2 CT BLDA-SCNC: 18.4 ML/DL (ref 16–23)
OXYHGB MFR BLDA: 94.8 % (ref 94–97)
P AXIS: 75 DEGREES
PCO2 BLDA: 41.2 MM HG (ref 36–44)
PCO2 TEMP ADJ BLDA: 40 MM HG (ref 36–44)
PEEP MAX SETTING VENT: 6 CM[H2O]
PH BLD: 7.35 [PH] (ref 7.35–7.45)
PH BLDA: 7.34 [PH] (ref 7.35–7.45)
PLATELET # BLD AUTO: 378 THOUSANDS/UL (ref 149–390)
PMV BLD AUTO: 11.5 FL (ref 8.9–12.7)
PO2 BLD: 79.2 MM HG (ref 75–129)
PO2 BLDA: 82.9 MM HG (ref 75–129)
POTASSIUM SERPL-SCNC: 4.5 MMOL/L (ref 3.5–5.3)
PR INTERVAL: 216 MS
PROT SERPL-MCNC: 8.2 G/DL (ref 6.4–8.2)
PROTHROMBIN TIME: 11.7 SECONDS (ref 9.8–12)
QRS AXIS: 31 DEGREES
QRSD INTERVAL: 74 MS
QT INTERVAL: 360 MS
QTC INTERVAL: 405 MS
RBC # BLD AUTO: 5.05 MILLION/UL (ref 3.81–5.12)
RSV RNA NPH QL NAA+PROBE: NORMAL
SODIUM SERPL-SCNC: 133 MMOL/L (ref 136–145)
SPECIMEN SOURCE: ABNORMAL
T WAVE AXIS: 40 DEGREES
TROPONIN I SERPL-MCNC: <0.02 NG/ML
VENT BIPAP FIO2: 50 %
VENTRICULAR RATE: 76 BPM
WBC # BLD AUTO: 12.32 THOUSAND/UL (ref 4.31–10.16)

## 2020-01-27 PROCEDURE — 71045 X-RAY EXAM CHEST 1 VIEW: CPT

## 2020-01-27 PROCEDURE — 87631 RESP VIRUS 3-5 TARGETS: CPT | Performed by: PHYSICIAN ASSISTANT

## 2020-01-27 PROCEDURE — 83605 ASSAY OF LACTIC ACID: CPT | Performed by: EMERGENCY MEDICINE

## 2020-01-27 PROCEDURE — 99214 OFFICE O/P EST MOD 30 MIN: CPT | Performed by: INTERNAL MEDICINE

## 2020-01-27 PROCEDURE — 99285 EMERGENCY DEPT VISIT HI MDM: CPT

## 2020-01-27 PROCEDURE — 84484 ASSAY OF TROPONIN QUANT: CPT | Performed by: EMERGENCY MEDICINE

## 2020-01-27 PROCEDURE — 99285 EMERGENCY DEPT VISIT HI MDM: CPT | Performed by: EMERGENCY MEDICINE

## 2020-01-27 PROCEDURE — 85610 PROTHROMBIN TIME: CPT | Performed by: EMERGENCY MEDICINE

## 2020-01-27 PROCEDURE — 85730 THROMBOPLASTIN TIME PARTIAL: CPT | Performed by: EMERGENCY MEDICINE

## 2020-01-27 PROCEDURE — 94760 N-INVAS EAR/PLS OXIMETRY 1: CPT

## 2020-01-27 PROCEDURE — 99222 1ST HOSP IP/OBS MODERATE 55: CPT | Performed by: PHYSICIAN ASSISTANT

## 2020-01-27 PROCEDURE — 5A09357 ASSISTANCE WITH RESPIRATORY VENTILATION, LESS THAN 24 CONSECUTIVE HOURS, CONTINUOUS POSITIVE AIRWAY PRESSURE: ICD-10-PCS | Performed by: FAMILY MEDICINE

## 2020-01-27 PROCEDURE — 96374 THER/PROPH/DIAG INJ IV PUSH: CPT

## 2020-01-27 PROCEDURE — 82948 REAGENT STRIP/BLOOD GLUCOSE: CPT

## 2020-01-27 PROCEDURE — 83880 ASSAY OF NATRIURETIC PEPTIDE: CPT | Performed by: EMERGENCY MEDICINE

## 2020-01-27 PROCEDURE — 36415 COLL VENOUS BLD VENIPUNCTURE: CPT | Performed by: EMERGENCY MEDICINE

## 2020-01-27 PROCEDURE — 93010 ELECTROCARDIOGRAM REPORT: CPT | Performed by: INTERNAL MEDICINE

## 2020-01-27 PROCEDURE — 84145 PROCALCITONIN (PCT): CPT | Performed by: EMERGENCY MEDICINE

## 2020-01-27 PROCEDURE — 94660 CPAP INITIATION&MGMT: CPT

## 2020-01-27 PROCEDURE — 85025 COMPLETE CBC W/AUTO DIFF WBC: CPT | Performed by: EMERGENCY MEDICINE

## 2020-01-27 PROCEDURE — 87081 CULTURE SCREEN ONLY: CPT | Performed by: PHYSICIAN ASSISTANT

## 2020-01-27 PROCEDURE — 87040 BLOOD CULTURE FOR BACTERIA: CPT | Performed by: EMERGENCY MEDICINE

## 2020-01-27 PROCEDURE — 80053 COMPREHEN METABOLIC PANEL: CPT | Performed by: EMERGENCY MEDICINE

## 2020-01-27 PROCEDURE — 82805 BLOOD GASES W/O2 SATURATION: CPT | Performed by: EMERGENCY MEDICINE

## 2020-01-27 PROCEDURE — 93005 ELECTROCARDIOGRAM TRACING: CPT

## 2020-01-27 PROCEDURE — 36600 WITHDRAWAL OF ARTERIAL BLOOD: CPT

## 2020-01-27 RX ORDER — HEPARIN SODIUM 5000 [USP'U]/ML
5000 INJECTION, SOLUTION INTRAVENOUS; SUBCUTANEOUS EVERY 8 HOURS SCHEDULED
Status: DISCONTINUED | OUTPATIENT
Start: 2020-01-27 | End: 2020-02-01 | Stop reason: HOSPADM

## 2020-01-27 RX ORDER — PANTOPRAZOLE SODIUM 40 MG/1
40 TABLET, DELAYED RELEASE ORAL
Status: DISCONTINUED | OUTPATIENT
Start: 2020-01-28 | End: 2020-02-01 | Stop reason: HOSPADM

## 2020-01-27 RX ORDER — SODIUM CHLORIDE, SODIUM GLUCONATE, SODIUM ACETATE, POTASSIUM CHLORIDE, MAGNESIUM CHLORIDE, SODIUM PHOSPHATE, DIBASIC, AND POTASSIUM PHOSPHATE .53; .5; .37; .037; .03; .012; .00082 G/100ML; G/100ML; G/100ML; G/100ML; G/100ML; G/100ML; G/100ML
75 INJECTION, SOLUTION INTRAVENOUS CONTINUOUS
Status: DISCONTINUED | OUTPATIENT
Start: 2020-01-27 | End: 2020-01-28

## 2020-01-27 RX ORDER — BENZONATATE 200 MG/1
200 CAPSULE ORAL 3 TIMES DAILY PRN
Qty: 20 CAPSULE | Refills: 0 | Status: SHIPPED | OUTPATIENT
Start: 2020-01-27 | End: 2020-02-18

## 2020-01-27 RX ORDER — CEFTRIAXONE 1 G/50ML
1000 INJECTION, SOLUTION INTRAVENOUS ONCE
Status: COMPLETED | OUTPATIENT
Start: 2020-01-27 | End: 2020-01-27

## 2020-01-27 RX ORDER — LEVOTHYROXINE SODIUM 0.05 MG/1
50 TABLET ORAL EVERY MORNING
Status: DISCONTINUED | OUTPATIENT
Start: 2020-01-28 | End: 2020-02-01 | Stop reason: HOSPADM

## 2020-01-27 RX ORDER — IMIPENEM AND CILASTATIN 500; 500 MG/1; MG/1
250 INJECTION, POWDER, FOR SOLUTION INTRAVENOUS EVERY 8 HOURS
Status: DISCONTINUED | OUTPATIENT
Start: 2020-01-27 | End: 2020-01-27

## 2020-01-27 RX ORDER — VANCOMYCIN HYDROCHLORIDE 1 G/200ML
15 INJECTION, SOLUTION INTRAVENOUS EVERY 24 HOURS
Status: DISCONTINUED | OUTPATIENT
Start: 2020-01-27 | End: 2020-01-29

## 2020-01-27 RX ORDER — ASPIRIN 81 MG/1
81 TABLET, CHEWABLE ORAL EVERY MORNING
Status: DISCONTINUED | OUTPATIENT
Start: 2020-01-28 | End: 2020-02-01 | Stop reason: HOSPADM

## 2020-01-27 RX ORDER — HYDROXYUREA 500 MG/1
500 CAPSULE ORAL DAILY
Status: DISCONTINUED | OUTPATIENT
Start: 2020-01-28 | End: 2020-02-01 | Stop reason: HOSPADM

## 2020-01-27 RX ORDER — DILTIAZEM HYDROCHLORIDE 180 MG/1
180 CAPSULE, COATED, EXTENDED RELEASE ORAL DAILY
Status: DISCONTINUED | OUTPATIENT
Start: 2020-01-28 | End: 2020-02-01 | Stop reason: HOSPADM

## 2020-01-27 RX ORDER — CLOPIDOGREL BISULFATE 75 MG/1
75 TABLET ORAL DAILY
Status: DISCONTINUED | OUTPATIENT
Start: 2020-01-28 | End: 2020-02-01 | Stop reason: HOSPADM

## 2020-01-27 RX ORDER — ISOSORBIDE MONONITRATE 30 MG/1
30 TABLET, EXTENDED RELEASE ORAL DAILY
Status: DISCONTINUED | OUTPATIENT
Start: 2020-01-28 | End: 2020-02-01 | Stop reason: HOSPADM

## 2020-01-27 RX ADMIN — INSULIN LISPRO 1 UNITS: 100 INJECTION, SOLUTION INTRAVENOUS; SUBCUTANEOUS at 23:34

## 2020-01-27 RX ADMIN — SODIUM CHLORIDE 1000 ML: 0.9 INJECTION, SOLUTION INTRAVENOUS at 19:52

## 2020-01-27 RX ADMIN — EZETIMIBE: 10 TABLET ORAL at 22:16

## 2020-01-27 RX ADMIN — SODIUM CHLORIDE, SODIUM GLUCONATE, SODIUM ACETATE, POTASSIUM CHLORIDE, MAGNESIUM CHLORIDE, SODIUM PHOSPHATE, DIBASIC, AND POTASSIUM PHOSPHATE 75 ML/HR: .53; .5; .37; .037; .03; .012; .00082 INJECTION, SOLUTION INTRAVENOUS at 23:19

## 2020-01-27 RX ADMIN — HEPARIN SODIUM 5000 UNITS: 5000 INJECTION INTRAVENOUS; SUBCUTANEOUS at 21:40

## 2020-01-27 RX ADMIN — CEFTRIAXONE 1000 MG: 1 INJECTION, SOLUTION INTRAVENOUS at 19:52

## 2020-01-27 RX ADMIN — METOPROLOL TARTRATE 25 MG: 25 TABLET ORAL at 21:42

## 2020-01-27 RX ADMIN — VANCOMYCIN HYDROCHLORIDE 1000 MG: 1 INJECTION, SOLUTION INTRAVENOUS at 22:16

## 2020-01-27 NOTE — ASSESSMENT & PLAN NOTE
Is tolerating new medications without side effects  BP is well controlled and she will continue current medications

## 2020-01-27 NOTE — ASSESSMENT & PLAN NOTE
Care per cardiology  She will start cardiac rehab when she has recovered from her respiratory illness

## 2020-01-27 NOTE — ASSESSMENT & PLAN NOTE
She is currently asymptomatic and continues on plavix, antihypertensives, imdur  Follows with cardiology

## 2020-01-28 LAB
ANION GAP SERPL CALCULATED.3IONS-SCNC: 11 MMOL/L (ref 4–13)
ATRIAL RATE: 99 BPM
BASOPHILS # BLD MANUAL: 0 THOUSAND/UL (ref 0–0.1)
BASOPHILS NFR MAR MANUAL: 0 % (ref 0–1)
BUN SERPL-MCNC: 13 MG/DL (ref 5–25)
CALCIUM SERPL-MCNC: 7.9 MG/DL (ref 8.3–10.1)
CHLORIDE SERPL-SCNC: 103 MMOL/L (ref 100–108)
CO2 SERPL-SCNC: 25 MMOL/L (ref 21–32)
CREAT SERPL-MCNC: 0.91 MG/DL (ref 0.6–1.3)
EOSINOPHIL # BLD MANUAL: 0.05 THOUSAND/UL (ref 0–0.4)
EOSINOPHIL NFR BLD MANUAL: 1 % (ref 0–6)
ERYTHROCYTE [DISTWIDTH] IN BLOOD BY AUTOMATED COUNT: 14.8 % (ref 11.6–15.1)
GFR SERPL CREATININE-BSD FRML MDRD: 55 ML/MIN/1.73SQ M
GLUCOSE SERPL-MCNC: 112 MG/DL (ref 65–140)
GLUCOSE SERPL-MCNC: 121 MG/DL (ref 65–140)
GLUCOSE SERPL-MCNC: 90 MG/DL (ref 65–140)
GLUCOSE SERPL-MCNC: 93 MG/DL (ref 65–140)
HCT VFR BLD AUTO: 42 % (ref 34.8–46.1)
HGB BLD-MCNC: 13.9 G/DL (ref 11.5–15.4)
L PNEUMO1 AG UR QL IA.RAPID: NEGATIVE
LG PLATELETS BLD QL SMEAR: PRESENT
LYMPHOCYTES # BLD AUTO: 0.68 THOUSAND/UL (ref 0.6–4.47)
LYMPHOCYTES # BLD AUTO: 13 % (ref 14–44)
MAGNESIUM SERPL-MCNC: 1.8 MG/DL (ref 1.6–2.6)
MCH RBC QN AUTO: 32 PG (ref 26.8–34.3)
MCHC RBC AUTO-ENTMCNC: 33.1 G/DL (ref 31.4–37.4)
MCV RBC AUTO: 97 FL (ref 82–98)
MONOCYTES # BLD AUTO: 0.26 THOUSAND/UL (ref 0–1.22)
MONOCYTES NFR BLD: 5 % (ref 4–12)
NEUTROPHILS # BLD MANUAL: 4.1 THOUSAND/UL (ref 1.85–7.62)
NEUTS BAND NFR BLD MANUAL: 9 % (ref 0–8)
NEUTS SEG NFR BLD AUTO: 69 % (ref 43–75)
P AXIS: 45 DEGREES
PHOSPHATE SERPL-MCNC: 3.2 MG/DL (ref 2.3–4.1)
PLATELET # BLD AUTO: 244 THOUSANDS/UL (ref 149–390)
PLATELET BLD QL SMEAR: ADEQUATE
PMV BLD AUTO: 11 FL (ref 8.9–12.7)
POTASSIUM SERPL-SCNC: 4 MMOL/L (ref 3.5–5.3)
PR INTERVAL: 248 MS
PROCALCITONIN SERPL-MCNC: 0.92 NG/ML
PROCALCITONIN SERPL-MCNC: <0.05 NG/ML
QRS AXIS: 23 DEGREES
QRSD INTERVAL: 94 MS
QT INTERVAL: 326 MS
QTC INTERVAL: 418 MS
RBC # BLD AUTO: 4.35 MILLION/UL (ref 3.81–5.12)
RBC MORPH BLD: NORMAL
S PNEUM AG UR QL: NEGATIVE
SODIUM SERPL-SCNC: 139 MMOL/L (ref 136–145)
T WAVE AXIS: 34 DEGREES
TOTAL CELLS COUNTED SPEC: 100
TROPONIN I SERPL-MCNC: 0.06 NG/ML
TROPONIN I SERPL-MCNC: 0.1 NG/ML
VARIANT LYMPHS # BLD AUTO: 3 %
VENTRICULAR RATE: 99 BPM
WBC # BLD AUTO: 5.25 THOUSAND/UL (ref 4.31–10.16)

## 2020-01-28 PROCEDURE — 80048 BASIC METABOLIC PNL TOTAL CA: CPT | Performed by: PHYSICIAN ASSISTANT

## 2020-01-28 PROCEDURE — 99232 SBSQ HOSP IP/OBS MODERATE 35: CPT | Performed by: ANESTHESIOLOGY

## 2020-01-28 PROCEDURE — 93010 ELECTROCARDIOGRAM REPORT: CPT | Performed by: INTERNAL MEDICINE

## 2020-01-28 PROCEDURE — 83735 ASSAY OF MAGNESIUM: CPT | Performed by: PHYSICIAN ASSISTANT

## 2020-01-28 PROCEDURE — 84145 PROCALCITONIN (PCT): CPT | Performed by: PHYSICIAN ASSISTANT

## 2020-01-28 PROCEDURE — 93005 ELECTROCARDIOGRAM TRACING: CPT

## 2020-01-28 PROCEDURE — 87449 NOS EACH ORGANISM AG IA: CPT | Performed by: PHYSICIAN ASSISTANT

## 2020-01-28 PROCEDURE — 84100 ASSAY OF PHOSPHORUS: CPT | Performed by: PHYSICIAN ASSISTANT

## 2020-01-28 PROCEDURE — 84484 ASSAY OF TROPONIN QUANT: CPT | Performed by: PHYSICIAN ASSISTANT

## 2020-01-28 PROCEDURE — 94760 N-INVAS EAR/PLS OXIMETRY 1: CPT

## 2020-01-28 PROCEDURE — 82948 REAGENT STRIP/BLOOD GLUCOSE: CPT

## 2020-01-28 PROCEDURE — 85007 BL SMEAR W/DIFF WBC COUNT: CPT | Performed by: PHYSICIAN ASSISTANT

## 2020-01-28 PROCEDURE — 85027 COMPLETE CBC AUTOMATED: CPT | Performed by: PHYSICIAN ASSISTANT

## 2020-01-28 RX ORDER — MAGNESIUM SULFATE HEPTAHYDRATE 40 MG/ML
2 INJECTION, SOLUTION INTRAVENOUS ONCE
Status: COMPLETED | OUTPATIENT
Start: 2020-01-28 | End: 2020-01-28

## 2020-01-28 RX ORDER — ACETAMINOPHEN 325 MG/1
650 TABLET ORAL EVERY 6 HOURS PRN
Status: DISCONTINUED | OUTPATIENT
Start: 2020-01-28 | End: 2020-02-01 | Stop reason: HOSPADM

## 2020-01-28 RX ORDER — LABETALOL 20 MG/4 ML (5 MG/ML) INTRAVENOUS SYRINGE
10 EVERY 4 HOURS PRN
Status: DISCONTINUED | OUTPATIENT
Start: 2020-01-28 | End: 2020-02-01 | Stop reason: HOSPADM

## 2020-01-28 RX ADMIN — LABETALOL 20 MG/4 ML (5 MG/ML) INTRAVENOUS SYRINGE 10 MG: at 19:21

## 2020-01-28 RX ADMIN — MAGNESIUM SULFATE HEPTAHYDRATE 2 G: 40 INJECTION, SOLUTION INTRAVENOUS at 09:22

## 2020-01-28 RX ADMIN — ISOSORBIDE MONONITRATE 30 MG: 30 TABLET, EXTENDED RELEASE ORAL at 08:20

## 2020-01-28 RX ADMIN — HEPARIN SODIUM 5000 UNITS: 5000 INJECTION INTRAVENOUS; SUBCUTANEOUS at 22:17

## 2020-01-28 RX ADMIN — HEPARIN SODIUM 5000 UNITS: 5000 INJECTION INTRAVENOUS; SUBCUTANEOUS at 14:17

## 2020-01-28 RX ADMIN — LEVOTHYROXINE SODIUM 50 MCG: 50 TABLET ORAL at 08:20

## 2020-01-28 RX ADMIN — CLOPIDOGREL BISULFATE 75 MG: 75 TABLET ORAL at 08:20

## 2020-01-28 RX ADMIN — METOPROLOL TARTRATE 25 MG: 25 TABLET ORAL at 08:20

## 2020-01-28 RX ADMIN — METOPROLOL TARTRATE 25 MG: 25 TABLET ORAL at 22:00

## 2020-01-28 RX ADMIN — HEPARIN SODIUM 5000 UNITS: 5000 INJECTION INTRAVENOUS; SUBCUTANEOUS at 05:51

## 2020-01-28 RX ADMIN — EZETIMIBE: 10 TABLET ORAL at 22:18

## 2020-01-28 RX ADMIN — ASPIRIN 81 MG 81 MG: 81 TABLET ORAL at 08:20

## 2020-01-28 RX ADMIN — PANTOPRAZOLE SODIUM 40 MG: 40 TABLET, DELAYED RELEASE ORAL at 05:52

## 2020-01-28 RX ADMIN — CEFEPIME HYDROCHLORIDE 2000 MG: 2 INJECTION, POWDER, FOR SOLUTION INTRAVENOUS at 08:19

## 2020-01-28 RX ADMIN — HYDROXYUREA 500 MG: 500 CAPSULE ORAL at 08:21

## 2020-01-28 RX ADMIN — CEFEPIME HYDROCHLORIDE 2000 MG: 2 INJECTION, POWDER, FOR SOLUTION INTRAVENOUS at 20:00

## 2020-01-28 RX ADMIN — ACETAMINOPHEN 650 MG: 325 TABLET, FILM COATED ORAL at 06:14

## 2020-01-28 RX ADMIN — VANCOMYCIN HYDROCHLORIDE 1000 MG: 1 INJECTION, SOLUTION INTRAVENOUS at 21:29

## 2020-01-28 RX ADMIN — DILTIAZEM HYDROCHLORIDE 180 MG: 180 CAPSULE, COATED, EXTENDED RELEASE ORAL at 08:20

## 2020-01-28 NOTE — RESPIRATORY THERAPY NOTE
RT Protocol Note  Ruthann Torres 80 y o  female MRN: [id number]  Unit/Bed#: ICU 11 Encounter: [id number]    Assessment    Principal Problem:    Acute respiratory failure with hypoxia and hypercapnia (HCC)  Active Problems:    Benign essential hypertension    GERD (gastroesophageal reflux disease)    Hypothyroidism    Chronic venous insufficiency    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)    HCAP (healthcare-associated pneumonia)    Dehydration      Home Pulmonary Medications:  None        Past Medical History:   Diagnosis Date    Adenocarcinoma of breast ([facility] ) 9/4/2012    Procedure/Onset: 12/07/2005    Anxiety 2/28/2018    Arthritis     joints    Asymptomatic varicose veins of bilateral lower extremities 10/14/2016    Benign colon polyp 2/17/2014    Breast cancer ([place] [address] ) 2005    right    Cancer ([place] [address] )     breast ca, skin ca, currently in remission, tumors in chest wall    Cardiac disease     hx MI x 2    Chronic pain disorder     knees    Chronic rhinitis 9/30/2013    Chronic venous insufficiency 5/22/2018    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    Procedure/Onset: 05/26/2006    Coronary artery disease     Disease of thyroid gland     hypothyroidism    Gait disturbance 12/19/2016    GERD (gastroesophageal reflux disease)     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    Procedure/Onset: 02/22/2007    History of prolapse of bladder     History of radiation therapy 2005    to breast right    Hypertension     Irritable bowel syndrome 9/4/2012    Procedure/Onset: 09/23/2010    Kidney stone     2016    Myocardial infarction ([facility] )     1990's x2    Nephrolithiasis 2/26/2016    Osteoarthritis of left knee 10/19/2016    Perforation of sigmoid colon due to diverticulitis 5/9/2019    Added automatically from request for surgery [id number]    Peripheral vertigo 9/4/2012 Procedure/Onset: 2007    Psoriasis 2016    Rectocele 3/10/2016    Last Assessment & Plan:  As below   Stress incontinence in female 2016     Social History     Socioeconomic History    Marital status:      Spouse name: None    Number of children: None    Years of education: None    Highest education level: None   Occupational History    None   Social Needs    Financial resource strain: None    Food insecurity:     Worry: None     Inability: None    Transportation needs:     Medical: None     Non-medical: None   Tobacco Use    Smoking status: Former Smoker     Packs/day: 0 10     Last attempt to quit: 1970     Years since quittin 1    Smokeless tobacco: Never Used   Substance and Sexual Activity    Alcohol use:  Yes     Alcohol/week: 1 0 standard drinks     Types: 1 Glasses of wine per week     Frequency: 4 or more times a week     Drinks per session: 1 or 2     Comment: 1 glass of wine before bed    Drug use: Never    Sexual activity: Not Currently   Lifestyle    Physical activity:     Days per week: None     Minutes per session: None    Stress: None   Relationships    Social connections:     Talks on phone: None     Gets together: None     Attends Judaism service: None     Active member of club or organization: None     Attends meetings of clubs or organizations: None     Relationship status: None    Intimate partner violence:     Fear of current or ex partner: None     Emotionally abused: None     Physically abused: None     Forced sexual activity: None   Other Topics Concern    None   Social History Narrative    Cultural background: Non-    Primary spoken-language English    Racial background: white    Retired       Subjective         Objective    Physical Exam:   Assessment Type: Assess only  General Appearance: Awake, Alert  Respiratory Pattern: Tachypneic  Chest Assessment: Chest expansion symmetrical  Bilateral Breath Sounds: Rhonchi  Cough: Non-productive  O2 Device: placed pt on Vapotherm    Vitals:  Blood pressure 129/61, pulse 77, temperature (!) 97 3 °F (36 3 °C), temperature source Temporal, resp  rate (!) 28, weight 74 3 kg (163 lb 12 8 oz), SpO2 95 %, not currently breastfeeding      Results from last 7 days   Lab Units 01/27/20 1938   PH ART  7 340*   PCO2 ART mm Hg 41 2   PO2 ART mm Hg 82 9   HCO3 ART mmol/L 21 7*   BASE EXC ART mmol/L -3 8   O2 CONTENT ART mL/dL 18 4   O2 HGB, ARTERIAL % 94 8   ABG SOURCE  Radial, Right   EULALIA TEST  Yes           O2 Device: placed pt on Vapotherm     Plan    Respiratory Plan: Vent/NIV/HFNC        Resp Comments: placed pt on vapotherm

## 2020-01-28 NOTE — ASSESSMENT & PLAN NOTE
- 1/20 Cardiac cath revealed significant stenosis  - Continue to medically manage, continue home med regimen   - Not currently complaining of chest pain, EKG unchanged from prior

## 2020-01-28 NOTE — ASSESSMENT & PLAN NOTE
- (+) productive cough for the last 3 days, started on azithromycin by pcp without improvement  - Daughter found her diaphoretic/sob early afternoon and called EMS who placed her on Bipap with improvement  - CXR shows scattered consolidations b/l, elevated WBC, SOB  - Requiring BiPAP in ED, transitioned to HFNC shortly after admission and weaned down to 30L 40%, continue to wean o2 as tolerated  - Continue broad-spectrum abx  - Per patient and family she is ok with limited intubation but does not want CPR to be performed

## 2020-01-28 NOTE — PROGRESS NOTES
Vancomycin Assessment    Shayy Lomas is a 80 y o  female who is currently receiving vancomycin 1000mg IV q24h for Pneumonia     Relevant clinical data and objective history reviewed:  Creatinine   Date Value Ref Range Status   01/27/2020 1 06 0 60 - 1 30 mg/dL Final     Comment:     Standardized to IDMS reference method   01/26/2020 1 13 0 60 - 1 30 mg/dL Final     Comment:     Standardized to IDMS reference method   01/21/2020 0 93 0 60 - 1 30 mg/dL Final     Comment:     Standardized to IDMS reference method     /61   Pulse 77   Temp (!) 97 3 °F (36 3 °C) (Temporal)   Resp (!) 28   Wt 74 3 kg (163 lb 12 8 oz)   SpO2 95%   BMI 29 96 kg/m²   No intake/output data recorded  Lab Results   Component Value Date/Time    BUN 17 01/27/2020 06:26 PM    WBC 12 32 (H) 01/27/2020 06:26 PM    HGB 16 4 (H) 01/27/2020 06:26 PM    HCT 49 0 (H) 01/27/2020 06:26 PM    MCV 97 01/27/2020 06:26 PM     01/27/2020 06:26 PM     Temp Readings from Last 3 Encounters:   01/27/20 (!) 97 3 °F (36 3 °C) (Temporal)   01/27/20 99 4 °F (37 4 °C)   01/26/20 97 9 °F (36 6 °C) (Oral)     Vancomycin Days of Therapy: 1    Assessment/Plan  The patient is currently on vancomycin utilizing scheduled dosing based on adjusted body weight (due to obesity)  Baseline risks associated with therapy include: pre-existing renal impairment, concomitant nephrotoxic medications and advanced age  The patient is currently receiving 1000mg IV q24h and is clinically appropriate and dose will be continued  Pharmacy will also follow closely for s/sx of nephrotoxicity, infusion reactions and appropriateness of therapy  BMP and CBC will be ordered per protocol  Plan for trough as patient approaches steady state, prior to the 4th  dose at approximately 2100 on 1/30/20  Due to infection severity, will target a trough of 15-20 (appropriate for most indications)     Pharmacy will continue to follow the patients culture results and clinical progress daily      Ronaldo Acosta, Pharmacist

## 2020-01-28 NOTE — ASSESSMENT & PLAN NOTE
- Recent hospitalization 1/18/20-1/21/20  - New onset of symptoms starting 1/24/20 with productive cough and progressive dyspnea, trialed AZT at home from PCP without improvement  - Continue broad spectrum Abx for HCAP with Vancomycin and Cefepime  - Pharmacy consulted  - Known allergies to pcn and cephalosporin but has tolerated both in past with minimal adverse reactions, will monitor very closely in the ICU  No adverse effects after cefepime administration

## 2020-01-28 NOTE — ED NOTES
Pt transported to ICU 11 via stretcher  Verbal report given to Felix Dyson RN at bedside  Pt is settled in her room, denies any pain, VS DOUG Scruggs RN  01/27/20 9747

## 2020-01-28 NOTE — ED PROVIDER NOTES
History  Chief Complaint   Patient presents with    Chest Pain     came on in the night 7/10 scale  denies pain upon arrival to room 1   took 4 baby Frank Marcelino this am and her am meds  cough for a few days getting worse  appt with family  tomorrow post hosptial d/c  Patient presents for evaluation of chest pain  Patient states started during the night center of chest 7/10 described as discomfort  Took 4 baby ASA and it went away after about 30-45 minutes  No dyspnea  Patient is currently chest pain free  Recently admitted for chest pain  Patient also states since yesterday has been coughiing with clear sputum  No fever or chills  History provided by:  Patient   used: No    Chest Pain   Associated symptoms: no fever and no shortness of breath        Prior to Admission Medications   Prescriptions Last Dose Informant Patient Reported? Taking? Cholecalciferol (VITAMIN D3) 125 MCG (5000 UT) CAPS 1/25/2020 at Unknown time Child Yes Yes   Sig: Take by mouth   Multiple Vitamins-Minerals (CENTRUM SILVER ULTRA WOMENS) TABS 1/26/2020 at [address] [place] [address] Yes Yes   Sig: Take by mouth   acetaminophen (TYLENOL) 325 mg tablet Unknown at Unknown time Child No No   Sig: Take 2 tablets (650 mg total) by mouth every 6 (six) hours as needed for mild pain, headaches or fever   aspirin 81 MG tablet 1/26/2020 at [address] [place] [address] Yes Yes   Sig: Take 81 mg by mouth every morning     clopidogrel (PLAVIX) 75 mg tablet 1/26/2020 at 0845 Child No Yes   Sig: Take 1 tablet (75 mg total) by mouth daily   diltiazem (CARDIZEM CD) 180 mg 24 hr capsule 1/26/2020 at 0845 Child No Yes   Sig: TAKE 1 CAPSULE DAILY   exemestane (AROMASIN) 25 MG tablet 1/26/2020 at [address] [place] [address] Yes Yes   Sig: Take 25 mg by mouth daily  ezetimibe-simvastatin (VYTORIN) 10-40 mg per tablet 1/25/2020 at Unknown time Child Yes Yes   Sig: Take 1 tablet by mouth daily at bedtime     fluticasone (FLONASE) 50 mcg/act nasal spray Unknown at Unknown time Child Yes No hydroxyurea (HYDREA) 500 mg capsule 1/26/2020 at [address] [place] [address] Yes Yes   Sig: Take 500 mg by mouth daily m w f   isosorbide mononitrate (IMDUR) 30 mg 24 hr tablet 1/26/2020 at 0845 Child No Yes   Sig: Take 1 tablet (30 mg total) by mouth daily   levothyroxine 50 mcg tablet 1/26/2020 at 0845 Child No Yes   Sig: Take 1 tablet (50 mcg total) by mouth every morning   metoprolol tartrate (LOPRESSOR) 25 mg tablet 1/26/2020 at 0845 Child No Yes   Sig: Take 1 tablet (25 mg total) by mouth every 12 (twelve) hours   omeprazole (PriLOSEC) 40 MG capsule 1/26/2020 at [address] [place] [address] No Yes   Sig: Take 1 capsule (40 mg total) by mouth daily      Facility-Administered Medications: None       Past Medical History:   Diagnosis Date    Adenocarcinoma of breast ([facility] ) 9/4/2012    Procedure/Onset: 12/07/2005    Anxiety 2/28/2018    Arthritis     joints    Asymptomatic varicose veins of bilateral lower extremities 10/14/2016    Benign colon polyp 2/17/2014    Breast cancer ([facility] ) 2005    right    Cancer ([facility] )     breast ca, skin ca, currently in remission, tumors in chest wall    Cardiac disease     hx MI x 2    Chronic pain disorder     knees    Chronic rhinitis 9/30/2013    Chronic venous insufficiency 5/22/2018    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/2019    S/p Mindy's procedure with the end colostomy    Complete uterovaginal prolapse 11/19/2015    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    Procedure/Onset: 05/26/2006    Coronary artery disease     Disease of thyroid gland     hypothyroidism    Gait disturbance 12/19/2016    GERD (gastroesophageal reflux disease)     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    Procedure/Onset: 02/22/2007    History of prolapse of bladder     History of radiation therapy 2005    to breast right    Hypertension     Irritable bowel syndrome 9/4/2012    Procedure/Onset: 09/23/2010    Kidney stone     2016    Myocardial infarction ([facility] )     1990's x2    Nephrolithiasis 2/26/2016    Osteoarthritis of left knee 10/19/2016    Perforation of sigmoid colon due to diverticulitis 5/9/2019    Added automatically from request for surgery [id number]    Peripheral vertigo 9/4/2012    Procedure/Onset: 04/04/2007    Psoriasis 12/19/2016    Rectocele 3/10/2016    Last Assessment & Plan:  As below   Stress incontinence in female 7/6/2016       Past Surgical History:   Procedure Laterality Date    APPENDECTOMY      during HAMLET    BLADDER SURGERY      suspension surgery no sling, used fiberglass suspension technique    BREAST LUMPECTOMY Right 2005    BREAST SURGERY Right     lumpectomy    CHEST WALL TUMOR EXCISION  2005    COLONOSCOPY      CYSTOSCOPY      HARTMANS PROCEDURE N/A 5/9/2019    Procedure: Cely Mate;  Surgeon: Rober Nielsen MD;  Location: [facility] MAIN OR;  Service: General    HYSTERECTOMY Bilateral     hamlet w/removal of both ovaries    KNEE ARTHROSCOPY      Last assessed: 7/30/15    OH CYSTOURETHROSCOPY,URETER CATHETER Left 2/26/2016    Procedure: CYSTOSCOPY RETROGRADE PYELOGRAM WITH INSERTION STENT URETERAL;  Surgeon: Parker Ramirez MD;  Location: [address] [place] [address];  Service: Urology    OH XCAPSL CTRC RMVL INSJ IO LENS PROSTH W/O ECP Left 4/10/2017    Procedure: EXTRACTION EXTRACAPSULAR CATARACT PHACO INTRAOCULAR LENS (IOL); Surgeon: Ashley Falk MD;  Location: [facility] MAIN OR;  Service: Ophthalmology    SKIN BIOPSY Left     Leg for [facility]       Family History   Problem Relation Age of Onset    Angina Mother     Hypertension Mother     Heart attack Mother         Myocardial Infarction    Early death Father         age 36 MI    Heart disease Father     Hypertension Father     Heart attack Father         Myocardial Infarction    Heart disease Sister     Heart disease Sister         Cardiac Disorder     I have reviewed and agree with the history as documented      Social History     Tobacco Use    Smoking status: Former Smoker     Packs/day: 0 10     Last attempt to quit: 1970 Years since quittin 1    Smokeless tobacco: Never Used   Substance Use Topics    Alcohol use: Yes     Alcohol/week: 1 0 standard drinks     Types: 1 Glasses of wine per week     Frequency: 4 or more times a week     Drinks per session: 1 or 2     Comment: 1 glass of wine before bed    Drug use: Never        Review of Systems   Constitutional: Negative for chills and fever  Respiratory: Negative for shortness of breath  Cardiovascular: Positive for chest pain  All other systems reviewed and are negative  Physical Exam  Physical Exam   Constitutional: She is oriented to person, place, and time  No distress  HENT:   Mouth/Throat: Oropharynx is clear and moist    Eyes: Pupils are equal, round, and reactive to light  Neck: Normal range of motion  Cardiovascular: Normal rate, regular rhythm and intact distal pulses  Pulmonary/Chest: Effort normal and breath sounds normal  No respiratory distress  Abdominal: Soft  There is no tenderness  Musculoskeletal: Normal range of motion  Neurological: She is alert and oriented to person, place, and time  Skin: She is not diaphoretic  Nursing note and vitals reviewed        Vital Signs  ED Triage Vitals [20 0941]   Temperature Pulse Respirations Blood Pressure SpO2   97 9 °F (36 6 °C) 82 20 143/66 93 %      Temp Source Heart Rate Source Patient Position - Orthostatic VS BP Location FiO2 (%)   Oral Monitor Sitting Left arm --      Pain Score       No Pain           Vitals:    20 1215 20 1245 20 1315 20 1345   BP: 133/62 130/63 132/63 140/65   Pulse: 74 72 74 72   Patient Position - Orthostatic VS:             Visual Acuity      ED Medications  Medications - No data to display    Diagnostic Studies  Results Reviewed     Procedure Component Value Units Date/Time    Troponin I [[id number]]  (Normal) Collected:  20 1257    Lab Status:  Final result Specimen:  Blood from Arm, Left Updated:  20 1322 Troponin I 0 02 ng/mL     Protime-INR [[id number]]  (Normal) Collected:  01/26/20 1003    Lab Status:  Final result Specimen:  Blood from Arm, Left Updated:  01/26/20 1053     Protime 11 6 seconds      INR 1 08    APTT [[id number]]  (Normal) Collected:  01/26/20 1003    Lab Status:  Final result Specimen:  Blood from Arm, Left Updated:  01/26/20 1053     PTT 30 seconds     Comprehensive metabolic panel [[id number]] Collected:  01/26/20 1003    Lab Status:  Final result Specimen:  Blood from Arm, Left Updated:  01/26/20 1029     Sodium 139 mmol/L      Potassium 4 2 mmol/L      Chloride 103 mmol/L      CO2 28 mmol/L      ANION GAP 8 mmol/L      BUN 17 mg/dL      Creatinine 1 13 mg/dL      Glucose 108 mg/dL      Calcium 8 4 mg/dL      AST 20 U/L      ALT 22 U/L      Alkaline Phosphatase 77 U/L      Total Protein 6 7 g/dL      Albumin 3 5 g/dL      Total Bilirubin 0 50 mg/dL      eGFR 43 ml/min/1 73sq m     Narrative:       [facility] guidelines for Chronic Kidney Disease (CKD):     Stage 1 with normal or high GFR (GFR > 90 mL/min/1 73 square meters)    Stage 2 Mild CKD (GFR = 60-89 mL/min/1 73 square meters)    Stage 3A Moderate CKD (GFR = 45-59 mL/min/1 73 square meters)    Stage 3B Moderate CKD (GFR = 30-44 mL/min/1 73 square meters)    Stage 4 Severe CKD (GFR = 15-29 mL/min/1 73 square meters)    Stage 5 End Stage CKD (GFR <15 mL/min/1 73 square meters)  Note: GFR calculation is accurate only with a steady state creatinine    Troponin I [[id number]]  (Normal) Collected:  01/26/20 1003    Lab Status:  Final result Specimen:  Blood from Arm, Left Updated:  01/26/20 1027     Troponin I <0 02 ng/mL     CBC and differential [[id number]]  (Abnormal) Collected:  01/26/20 1003    Lab Status:  Final result Specimen:  Blood from Arm, Left Updated:  01/26/20 1009     WBC 5 66 Thousand/uL      RBC 4 18 Million/uL      Hemoglobin 13 2 g/dL      Hematocrit 40 0 %      MCV 96 fL      MCH 31 6 pg      MCHC 33 0 g/dL      RDW 14 4 %      MPV 11 2 fL      Platelets 084 Thousands/uL      nRBC 0 /100 WBCs      Neutrophils Relative 79 %      Immat GRANS % 1 %      Lymphocytes Relative 11 %      Monocytes Relative 7 %      Eosinophils Relative 2 %      Basophils Relative 0 %      Neutrophils Absolute 4 45 Thousands/µL      Immature Grans Absolute 0 03 Thousand/uL      Lymphocytes Absolute 0 64 Thousands/µL      Monocytes Absolute 0 42 Thousand/µL      Eosinophils Absolute 0 10 Thousand/µL      Basophils Absolute 0 02 Thousands/µL                  XR chest 1 view portable   Final Result by Christie Renteria MD (01/27 0617)      No acute cardiopulmonary disease  Workstation performed: [id number]                    Procedures  Procedures         ED Course  ED Course as of Jan 28 1453   Osmany Phelps   1115 Case discussed with Dr Genesis Montiel who is familiar with the patient  Agreed patient needs a second Troponin  After that discuss with the patient rather they want to proceed with PCI or continue medical management  18 Discussed treatment options with daughter and patient  Given this was the first episode of pain and she just started the medication would like to continue the medical management at this time  Understand the risks of PCI and not doing the PCI  Will continue to follow up outpatient and if anything changes will discuss with cardiology or return to he ER       [address] Genesis Montiel at bedside              HEART Risk Score      Most Recent Value   History  1 Filed at: 01/26/2020 1048   ECG  1 Filed at: 01/26/2020 1048   Age  2 Filed at: 01/26/2020 1048   Risk Factors  2 Filed at: 01/26/2020 1048   Troponin  0 Filed at: 01/26/2020 1048   Heart Score Risk Calculator   History  1 Filed at: 01/26/2020 1048   ECG  1 Filed at: 01/26/2020 1048   Age  2 Filed at: 01/26/2020 1048   Risk Factors  2 Filed at: 01/26/2020 1048   Troponin  0 Filed at: 01/26/2020 1048   HEART Score  6 Filed at: 01/26/2020 1048   HEART Score  6 Filed at: 01/26/2020 1048                            [facility]  Number of Diagnoses or Management Options  Bronchitis:   Chest pain, unspecified type:   Diagnosis management comments: Pulse ox 95% on RA indicating adequate oxygenation  CXR: NAD as read by me    Patient coughed up clear sputum with streak of bright red blood while in the ER  CXR was negative for pneumonia and patient has no fever or elevated WBC  Given recent hospitalizations and patient's age will treat with Zithromax for bronchitis  Chest pain currently resolved troponin negative x2  Case was discussed with Cardiology  Patient had craterization on last admission  Showed disease but no stent placed as patient opted for medical management given the risks of PCI with her advanced age and comorbidities  At this time discussed with the daughter and patient whether they wanted to continue with medical management and outpatient follow up with cardiology or be admitted with plan for PCI  Patient and daughter in agreement that they would like to continue with medical management at this time  Dr Gavin Bateman at bedside and also saw the patient          Amount and/or Complexity of Data Reviewed  Clinical lab tests: ordered and reviewed  Tests in the radiology section of CPT®: ordered and reviewed  Decide to obtain previous medical records or to obtain history from someone other than the patient: yes  Review and summarize past medical records: yes  Discuss the patient with other providers: yes  Independent visualization of images, tracings, or specimens: yes    Patient Progress  Patient progress: stable        Disposition  Final diagnoses:   Chest pain, unspecified type   Bronchitis     Time reflects when diagnosis was documented in both MDM as applicable and the Disposition within this note     Time User Action Codes Description Comment    1/26/2020  1:29 PM Kendal Jaquez Add [R07 9] Chest pain, unspecified type     1/26/2020  1:30 PM Mark, [facility] Bronchitis       ED Disposition     ED Disposition Condition Date/Time Comment    Discharge Stable Sun Jan 26, 2020 [id number] [address] [place] discharge to home/self care  Follow-up Information     Follow up With Specialties Details Why Contact Info    Hoa Hernandez MD Internal Medicine, Family Medicine  as scheduled [address]  [place] [address]  [phone]            Discharge Medication List as of 1/26/2020  1:31 PM      START taking these medications    Details   azithromycin (ZITHROMAX) 250 mg tablet Take 2 tablets today then 1 tablet daily x 4 days, Normal         CONTINUE these medications which have NOT CHANGED    Details   aspirin 81 MG tablet Take 81 mg by mouth every morning  , Historical Med      Cholecalciferol (VITAMIN D3) 125 MCG (5000 UT) CAPS Take by mouth, Historical Med      clopidogrel (PLAVIX) 75 mg tablet Take 1 tablet (75 mg total) by mouth daily, Starting Wed 1/22/2020, Until Fri 2/21/2020, Normal      diltiazem (CARDIZEM CD) 180 mg 24 hr capsule TAKE 1 CAPSULE DAILY, Normal      exemestane (AROMASIN) 25 MG tablet Take 25 mg by mouth daily  , Historical Med      ezetimibe-simvastatin (VYTORIN) 10-40 mg per tablet Take 1 tablet by mouth daily at bedtime  , Historical Med      hydroxyurea (HYDREA) 500 mg capsule Take 500 mg by mouth daily m w f, Historical Med      isosorbide mononitrate (IMDUR) 30 mg 24 hr tablet Take 1 tablet (30 mg total) by mouth daily, Starting Tue 1/21/2020, Until Thu 2/20/2020, Normal      levothyroxine 50 mcg tablet Take 1 tablet (50 mcg total) by mouth every morning, Starting Sun 12/15/2019, Normal      metoprolol tartrate (LOPRESSOR) 25 mg tablet Take 1 tablet (25 mg total) by mouth every 12 (twelve) hours, Starting Tue 1/21/2020, Until Thu 2/20/2020, Normal      Multiple Vitamins-Minerals (CENTRUM SILVER ULTRA WOMENS) TABS Take by mouth, Historical Med      omeprazole (PriLOSEC) 40 MG capsule Take 1 capsule (40 mg total) by mouth daily, Starting Mon 11/25/2019, Normal      acetaminophen (TYLENOL) 325 mg tablet Take 2 tablets (650 mg total) by mouth every 6 (six) hours as needed for mild pain, headaches or fever, Starting Wed 5/15/2019, Normal      fluticasone (FLONASE) 50 mcg/act nasal spray Starting Sat 6/30/2018, Historical Med           No discharge procedures on file      ED Provider  Electronically Signed by           Jered Casey DO  01/28/20 [place]

## 2020-01-28 NOTE — H&P
H&P- Derekquline Credit 3/7/1928, 80 y o  female MRN: [id number]    Unit/Bed#: BIBI Encounter: [id number]    Primary Care Provider: Selene Boucher MD   Date and time admitted to hospital: 1/27/2020  6:19 PM      * Acute respiratory failure with hypoxia and hypercapnia (HCC)  Assessment & Plan  - (+) productive cough for the last 3 days, started on azithromycin by pcp without improvement  - Daughter found her diaphoretic/sob early afternoon and called EMS who placed her on Bipap with improvement  - CXR shows scattered consolidations b/l, elevated WBC, SOB  - Requiring BiPAP in ED, ABG 7 35/40/82/21 on 12/6/50%, will transition to HFNC and wean as tolerated  - Start broad spectrum abx targeting HCAP d/t recent hospitalization  MRSA swab, attempt to collect sputum sample for culture  - Per patient and family she is ok with limited intubation but does not want CPR to be performed    HCAP (healthcare-associated pneumonia)  Assessment & Plan  - Recent hospitalization 1/18/20-1/21/20  - New onset of symptoms starting 1/24/20 with productive cough and progressive dyspnea, trialed AZT at home from PCP without improvement  - Will start on broad spectrum Abx for HCAP with Vancomycin and Cefepime  - Pharmacy consulted  - Known allergies to pcn and cephalosporin but has tolerated both in past with minimal adverse reactions, will monitor very closely in the ICU    - Attempt to collect sputum sample    CAD (coronary artery disease)  Assessment & Plan  - 1/20 Cardiac cath revealed significant stenosis  - Continue to medically manage, continue home med regimen   - Not currently complaining of chest pain, EKG unchanged from prior    Dehydration  Assessment & Plan  - CBC shows increase in all cell lines consistent with dehydration, HD stable  - Pt states that her colostomy output has been increased the last few days and her PO intake has not been great   - Given 1L NSS bolus in ED and started on light hydration with isolyte at 75ml/hr    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[facility])  Assessment & Plan  - 5/2019 Hartmans procedure performed D/t diverticulitis and diverticula perforation  - Increased output, continue to monitor strict I/O's    Hypothyroidism  Assessment & Plan  - home dose synthroid ordered    GERD (gastroesophageal reflux disease)  Assessment & Plan  - Home PPI ordered    Benign essential hypertension  Assessment & Plan  - Continue to monitor, home meds continued      -------------------------------------------------------------------------------------------------------------  Chief Complaint: SOB    History of Present Illness   Domingo Gao is a 80 y o  female w/ pmh of CAD (significant stenosis), HTN, Mild Dementia, HLD, Hypothyroidism, and Colostomy  Patient had been experiencing a productive cough for the last 3 days with progressive dyspnea, she reached out to her PCP who prescribed azithromycin that she began taking  Today her daughter visited her and found her to be diaphoretic, lethargic, and working really hard to breath  She immediately called EMS who placed her on Bipap and brought her to the ED  CXR shows scattered areas of consolidation b/l, crackles noted throughout, along with a leukocytosis  Her BNP is elevated but does not appear to be overloaded  She was recently hospitalized 1/18-1/21 for angina work-up, cath reveal significant stenosis that has been medically managed since discharge, the patient does not complain about chest pain currently and EKG is unchanged compared to last tracing  History obtained from child, chart review and the patient   -------------------------------------------------------------------------------------------------------------  Dispo:  Admit to Stepdown Level 1    Code Status: Level 2 - DNAR: but accepts endotracheal intubation  --------------------------------------------------------------------------------------------------------------  Review of Systems   Constitutional: Positive for activity change, diaphoresis and fatigue  HENT: Negative  Respiratory: Positive for cough (Productive) and shortness of breath  Cardiovascular: Negative for chest pain  Gastrointestinal: Positive for diarrhea  Negative for abdominal distention and abdominal pain  Genitourinary: Negative for difficulty urinating and dysuria  A 12-point, complete review of systems was reviewed and negative except as stated above     Physical Exam   Constitutional: She is oriented to person, place, and time  No distress  HENT:   Head: Normocephalic and atraumatic  Right Ear: External ear normal    Left Ear: External ear normal    Mouth/Throat: No oropharyngeal exudate  Eyes: Pupils are equal, round, and reactive to light  Conjunctivae are normal    Neck: Normal range of motion  Neck supple  Cardiovascular: Normal rate, regular rhythm, normal heart sounds and intact distal pulses  No murmur heard  Pulmonary/Chest: Effort normal  No respiratory distress  She has no wheezes  Wearing Bipap, Crackles auscultated throughout   Abdominal: Soft  Bowel sounds are normal  She exhibits no distension  There is no tenderness  Musculoskeletal: Normal range of motion  She exhibits no edema  Neurological: She is alert and oriented to person, place, and time  Skin: Skin is warm and dry  Capillary refill takes less than 2 seconds  She is not diaphoretic      --------------------------------------------------------------------------------------------------------------  Vitals:   Vitals:    01/27/20 1930 01/27/20 1945 01/27/20 1957 01/27/20 2015   BP: 129/62 128/59 126/58 129/61   BP Location:   Right arm    Pulse: 80 82 79 76   Resp: (!) 27 20 (!) 26 (!) 25   Temp:   97 8 °F (36 6 °C)    TempSrc:   Tympanic    SpO2: 95% 95% 98% 95%   Weight:         Temp  Min: 96 °F (35 6 °C)  Max: 99 4 °F (37 4 °C)  IBW: -92 5 kg     Body mass index is 30 54 kg/m²      Laboratory and Diagnostics:  Results from last 7 days   Lab Units 01/27/20  1826 01/26/20  1003 01/21/20  0504   WBC Thousand/uL 12 32* 5 66 9 96   HEMOGLOBIN g/dL 16 4* 13 2 13 7   HEMATOCRIT % 49 0* 40 0 41 1   PLATELETS Thousands/uL 378 250 326   NEUTROS PCT % 72 79* 84*   MONOS PCT % 8 7 4     Results from last 7 days   Lab Units 01/27/20  1826 01/26/20  1003 01/21/20  0504   SODIUM mmol/L 133* 139 138   POTASSIUM mmol/L 4 5 4 2 4 9   CHLORIDE mmol/L 94* 103 103   CO2 mmol/L 27 28 27   ANION GAP mmol/L 12 8 8   BUN mg/dL 17 17 25   CREATININE mg/dL 1 06 1 13 0 93   CALCIUM mg/dL 8 8 8 4 9 2   GLUCOSE RANDOM mg/dL 216* 108 118   ALT U/L 52 22  --    AST U/L 82* 20  --    ALK PHOS U/L 96 77  --    ALBUMIN g/dL 4 2 3 5  --    TOTAL BILIRUBIN mg/dL 0 90 0 50  --           Results from last 7 days   Lab Units 01/27/20  1826 01/26/20  1003   INR  1 10* 1 08   PTT seconds 29 30      Results from last 7 days   Lab Units 01/27/20  1826 01/26/20  1257 01/26/20  1003   TROPONIN I ng/mL <0 02 0 02 <0 02     Results from last 7 days   Lab Units 01/27/20  1835   LACTIC ACID mmol/L 1 8     ABG:  Results from last 7 days   Lab Units 01/27/20  1938   PH ART  7 340*   PCO2 ART mm Hg 41 2   PO2 ART mm Hg 82 9   HCO3 ART mmol/L 21 7*   BASE EXC ART mmol/L -3 8   ABG SOURCE  Radial, Right     VBG:  Results from last 7 days   Lab Units 01/27/20  1938   ABG SOURCE  Radial, Right           Micro:        EKG: Sinus rhythm with 1st degree AV block  Imaging: I have personally reviewed pertinent reports     and I have personally reviewed pertinent films in PACS      Historical Information   Past Medical History:   Diagnosis Date    Adenocarcinoma of breast ([facility] ) 9/4/2012    Procedure/Onset: 12/07/2005    Anxiety 2/28/2018    Arthritis     joints    Asymptomatic varicose veins of bilateral lower extremities 10/14/2016    Benign colon polyp 2/17/2014    Breast cancer ([facility] ) 2005    right    Cancer ([address] )     breast ca, skin ca, currently in remission, tumors in chest wall    Cardiac disease     hx MI x 2    Chronic pain disorder     knees    Chronic rhinitis 9/30/2013    Chronic venous insufficiency 5/22/2018    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[facility]) 9/10/2019    S/p Mindy's procedure with the end colostomy    Complete uterovaginal prolapse 11/19/2015    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    Procedure/Onset: 05/26/2006    Coronary artery disease     Disease of thyroid gland     hypothyroidism    Gait disturbance 12/19/2016    GERD (gastroesophageal reflux disease)     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    Procedure/Onset: 02/22/2007    History of prolapse of bladder     History of radiation therapy 2005    to breast right    Hypertension     Irritable bowel syndrome 9/4/2012    Procedure/Onset: 09/23/2010    Kidney stone     2016    Myocardial infarction [facility] )     1990's x2    Nephrolithiasis 2/26/2016    Osteoarthritis of left knee 10/19/2016    Perforation of sigmoid colon due to diverticulitis 5/9/2019    Added automatically from request for surgery [id number]    Peripheral vertigo 9/4/2012    Procedure/Onset: 04/04/2007    Psoriasis 12/19/2016    Rectocele 3/10/2016    Last Assessment & Plan:  As below      Stress incontinence in female 7/6/2016     Past Surgical History:   Procedure Laterality Date    APPENDECTOMY      during HAMLET    BLADDER SURGERY      suspension surgery no sling, used fiberglass suspension technique    BREAST LUMPECTOMY Right 2005    BREAST SURGERY Right     lumpectomy    CHEST WALL TUMOR EXCISION  2005    COLONOSCOPY      CYSTOSCOPY      HARTMANS PROCEDURE N/A 5/9/2019    Procedure: Redgie Freeze PROCEDURE;  Surgeon: Kierra Villalta MD;  [facility];  Service: General    HYSTERECTOMY Bilateral     hamlet w/removal of both ovaries    KNEE ARTHROSCOPY      Last assessed: 7/30/15    WA CYSTOURETHROSCOPY,URETER CATHETER Left 2/26/2016    Procedure: CYSTOSCOPY RETROGRADE PYELOGRAM WITH INSERTION STENT URETERAL;  Surgeon: Carter Barone MD;  [place];  Service: Urology    TN XCAPSL CTRC RMVL INSJ IO LENS PROSTH W/O ECP Left 4/10/2017    Procedure: EXTRACTION EXTRACAPSULAR CATARACT PHACO INTRAOCULAR LENS (IOL);   Surgeon: Deyanira Castañeda MD;  [facility] MAIN OR;  Service: Ophthalmology    SKIN BIOPSY Left     Leg for [facility]     Social History   Social History     Substance and Sexual Activity   Alcohol Use Yes    Alcohol/week: 1 0 standard drinks    Types: 1 Glasses of wine per week    Frequency: 4 or more times a week    Drinks per session: 1 or 2    Comment: 1 glass of wine before bed     Social History     Substance and Sexual Activity   Drug Use Never     Social History     Tobacco Use   Smoking Status Former Smoker    Packs/day: 0 10    Last attempt to quit: 1970    Years since quittin 1   Smokeless Tobacco Never Used     Exercise History: able to ambulate 15 feet before feeling angina  Family History:   Family History   Problem Relation Age of Onset    Angina Mother     Hypertension Mother     Heart attack Mother         Myocardial Infarction    Early death Father         age 36 MI    Heart disease Father     Hypertension Father     Heart attack Father         Myocardial Infarction    Heart disease Sister     Heart disease Sister         Cardiac Disorder     I have reviewed this patient's family history and commented on sigificant items within the HPI      Medications:  Current Facility-Administered Medications   Medication Dose Route Frequency    [START ON 2020] aspirin tablet 81 mg  81 mg Oral QAM    cefepime (MAXIPIME) 2 g/50 mL dextrose IVPB  2,000 mg Intravenous Q12H    [START ON 2020] clopidogrel (PLAVIX) tablet 75 mg  75 mg Oral Daily    [START ON 2020] diltiazem (CARDIZEM CD) 24 hr capsule 180 mg  180 mg Oral Daily    ezetimibe 10 mg-pravastatin 80 mg combo dose   Oral HS    heparin (porcine) subcutaneous injection 5,000 Units  5,000 Units Subcutaneous Q8H [address]    [START ON 2020] hydroxyurea (HYDREA) capsule 500 mg  500 mg Oral Daily    [START ON 1/28/2020] insulin lispro (HumaLOG) 100 units/mL subcutaneous injection 1-6 Units  1-6 Units Subcutaneous Q6H [address]    [START ON 1/28/2020] isosorbide mononitrate (IMDUR) 24 hr tablet 30 mg  30 mg Oral Daily    [START ON 1/28/2020] levothyroxine tablet 50 mcg  50 mcg Oral QAM    metoprolol tartrate (LOPRESSOR) tablet 25 mg  25 mg Oral Q12H [address]    [START ON 1/28/2020] pantoprazole (PROTONIX) EC tablet 40 mg  40 mg Oral Early Morning    vancomycin (VANCOCIN) 1,250 mg in sodium chloride 0 9 % 250 mL IVPB  15 mg/kg Intravenous Q12H     Home medications:  Prior to Admission Medications   Prescriptions Last Dose Informant Patient Reported? Taking? Cholecalciferol (VITAMIN D3) 125 MCG (5000 UT) CAPS  Child Yes No   Sig: Take by mouth   Multiple Vitamins-Minerals (CENTRUM SILVER ULTRA WOMENS) TABS  Child Yes No   Sig: Take by mouth   acetaminophen (TYLENOL) 325 mg tablet  Child No No   Sig: Take 2 tablets (650 mg total) by mouth every 6 (six) hours as needed for mild pain, headaches or fever   aspirin 81 MG tablet  Child Yes No   Sig: Take 81 mg by mouth every morning     azithromycin (ZITHROMAX) 250 mg tablet   No No   Sig: Take 2 tablets today then 1 tablet daily x 4 days   benzonatate (TESSALON) 200 MG capsule   No No   Sig: Take 1 capsule (200 mg total) by mouth 3 (three) times a day as needed for cough   clopidogrel (PLAVIX) 75 mg tablet  Child No No   Sig: Take 1 tablet (75 mg total) by mouth daily   diltiazem (CARDIZEM CD) 180 mg 24 hr capsule  Child No No   Sig: TAKE 1 CAPSULE DAILY   exemestane (AROMASIN) 25 MG tablet  Child Yes No   Sig: Take 25 mg by mouth daily  ezetimibe-simvastatin (VYTORIN) 10-40 mg per tablet  Child Yes No   Sig: Take 1 tablet by mouth daily at bedtime     fluticasone (FLONASE) 50 mcg/act nasal spray  Child Yes No   hydroxyurea (HYDREA) 500 mg capsule  Child Yes No   Sig: Take 500 mg by mouth daily m w f   ipratropium (ATROVENT HFA) 17 mcg/act inhaler   No No   Sig: Inhale 2 puffs every 6 (six) hours   isosorbide mononitrate (IMDUR) 30 mg 24 hr tablet  Child No No   Sig: Take 1 tablet (30 mg total) by mouth daily   levothyroxine 50 mcg tablet  Child No No   Sig: Take 1 tablet (50 mcg total) by mouth every morning   metoprolol tartrate (LOPRESSOR) 25 mg tablet  Child No No   Sig: Take 1 tablet (25 mg total) by mouth every 12 (twelve) hours   omeprazole (PriLOSEC) 40 MG capsule  Child No No   Sig: Take 1 capsule (40 mg total) by mouth daily      Facility-Administered Medications: None     Allergies: Allergies   Allergen Reactions    Other Anaphylaxis     Blue Dye during ultrasound      Ciprofloxacin Hives and Itching     rash and itching    Clindamycin Hives and Itching     Redness      Levaquin [Levofloxacin] Hives and Itching     Rash and itching    Lincomycin     Penicillins Hives and Itching     Tolerated Pip/tazo 5/10/2019    Sulfa Antibiotics Hives and Itching     Redness      Sulfacetamide     Sulfasalazine     Ceftriaxone Rash     Tolerates Cephalexin     Iv Contrast  [Iodinated Diagnostic Agents] Itching and Rash     Other reaction(s): Anaphylaxis    Linezolid Rash     Rash,flushed face after 3rd day of taking this ABX, started benadryl subsided by the end of the night   Nitrofurantoin Hives, Itching, Nausea Only and Rash       ------------------------------------------------------------------------------------------------------------  Advance Directive and Living Will:      Power of :    POLST:    ------------------------------------------------------------------------------------------------------------  Anticipated Length of Stay is > 2 midnights    Counseling / Coordination of Care  Total time spent today 60 minutes  Greater than 50% of total time was spent with the patient and / or family counseling and / or coordination of care   A description of the counseling / coordination of care: [id number] [facility] Karissa Mcarthur PA-C        Portions of the record may have been created with voice recognition software  Occasional wrong word or "sound a like" substitutions may have occurred due to the inherent limitations of voice recognition software    Read the chart carefully and recognize, using context, where substitutions have occurred

## 2020-01-28 NOTE — PLAN OF CARE
Problem: Potential for Falls  Goal: Patient will remain free of falls  Description  INTERVENTIONS:  - Assess patient frequently for physical needs  -  Identify cognitive and physical deficits and behaviors that affect risk of falls    -  [place] fall precautions as indicated by assessment   - Educate patient/family on patient safety including physical limitations  - Instruct patient to call for assistance with activity based on assessment  - Modify environment to reduce risk of injury  - Consider OT/PT consult to assist with strengthening/mobility  Outcome: Progressing     Problem: RESPIRATORY - ADULT  Goal: Achieves optimal ventilation and oxygenation  Description  INTERVENTIONS:  - Assess for changes in respiratory status  - Assess for changes in mentation and behavior  - Position to facilitate oxygenation and minimize respiratory effort  - Oxygen administered by appropriate delivery if ordered  - Encourage broncho-pulmonary hygiene including cough, deep breathe, Incentive Spirometry  - Assess the need for suctioning and aspirate as needed  - Assess and instruct to report SOB or any respiratory difficulty  - Respiratory Therapy support as indicated   Outcome: Progressing     Problem: Prexisting or High Potential for Compromised Skin Integrity  Goal: Skin integrity is maintained or improved  Description  INTERVENTIONS:  - Identify patients at risk for skin breakdown  - Assess and monitor skin integrity  - Assess and monitor nutrition and hydration status  - Monitor labs   - Assess for incontinence   - Turn and reposition patient  - Assist with mobility/ambulation  - Relieve pressure over bony prominences  - Avoid friction and shearing  - Provide appropriate hygiene as needed including keeping skin clean and dry  - Evaluate need for skin moisturizer/barrier cream  - Collaborate with interdisciplinary team   - Patient/family teaching  - Consider wound care consult   Outcome: Progressing     Problem: INFECTION - ADULT  Goal: Absence or prevention of progression during hospitalization  Description  INTERVENTIONS:  - Assess and monitor for signs and symptoms of infection  - Monitor lab/diagnostic results  - Monitor all insertion sites, i e  indwelling lines, tubes, and drains  - Monitor endotracheal if appropriate and nasal secretions for changes in amount and color  - [place] appropriate cooling/warming therapies per order  - Administer medications as ordered  - Instruct and encourage patient and family to use good hand hygiene technique  - Identify and instruct in appropriate isolation precautions for identified infection/condition  Outcome: Progressing     Problem: SAFETY ADULT  Goal: Maintain or return to baseline ADL function  Description  INTERVENTIONS:  -  Assess patient's ability to carry out ADLs; assess patient's baseline for ADL function and identify physical deficits which impact ability to perform ADLs (bathing, care of mouth/teeth, toileting, grooming, dressing, etc )  - Assess/evaluate cause of self-care deficits   - Assess range of motion  - Assess patient's mobility; develop plan if impaired  - Assess patient's need for assistive devices and provide as appropriate  - Encourage maximum independence but intervene and supervise when necessary  - Involve family in performance of ADLs  - Assess for home care needs following discharge   - Consider OT consult to assist with ADL evaluation and planning for discharge  - Provide patient education as appropriate  Outcome: Progressing  Goal: Maintain or return mobility status to optimal level  Description  INTERVENTIONS:  - Assess patient's baseline mobility status (ambulation, transfers, stairs, etc )    - Identify cognitive and physical deficits and behaviors that affect mobility  - Identify mobility aids required to assist with transfers and/or ambulation (gait belt, sit-to-stand, lift, walker, cane, etc )  - [place] fall precautions as indicated by assessment  - Record patient progress and toleration of activity level on Mobility SBAR; progress patient to next Phase/Stage  - Instruct patient to call for assistance with activity based on assessment  - Consider rehabilitation consult to assist with strengthening/weightbearing, etc   Outcome: Progressing     Problem: DISCHARGE PLANNING  Goal: Discharge to home or other facility with appropriate resources  Description  INTERVENTIONS:  - Identify barriers to discharge w/patient and caregiver  - Arrange for needed discharge resources and transportation as appropriate  - Identify discharge learning needs (meds, wound care, etc )  - Arrange for interpretive services to assist at discharge as needed  - Refer to Case Management Department for coordinating discharge planning if the patient needs post-hospital services based on physician/advanced practitioner order or complex needs related to functional status, cognitive ability, or social support system  Outcome: Progressing     Problem: GASTROINTESTINAL - ADULT  Goal: Minimal or absence of nausea and/or vomiting  Description  INTERVENTIONS:  - Administer IV fluids if ordered to ensure adequate hydration  - Maintain NPO status until nausea and vomiting are resolved  - Nasogastric tube if ordered  - Administer ordered antiemetic medications as needed  - Provide nonpharmacologic comfort measures as appropriate  - Advance diet as tolerated, if ordered  - Consider nutrition services referral to assist patient with adequate nutrition and appropriate food choices  Outcome: Progressing  Goal: Maintains or returns to baseline bowel function  Description  INTERVENTIONS:  - Assess bowel function  - Encourage oral fluids to ensure adequate hydration  - Administer IV fluids if ordered to ensure adequate hydration  - Administer ordered medications as needed  - Encourage mobilization and activity  - Consider nutritional services referral to assist patient with adequate nutrition and appropriate food choices  Outcome: Progressing  Goal: Maintains adequate nutritional intake  Description  INTERVENTIONS:  - Monitor percentage of each meal consumed  - Identify factors contributing to decreased intake, treat as appropriate  - Assist with meals as needed  - Monitor I&O, weight, and lab values if indicated  - Obtain nutrition services referral as needed  Outcome: Progressing  Goal: Establish and maintain optimal ostomy function  Description  INTERVENTIONS:  - Assess bowel function  - Encourage oral fluids to ensure adequate hydration  - Administer IV fluids if ordered to ensure adequate hydration   - Administer ordered medications as needed  - Encourage mobilization and activity  - Nutrition services referral to assist patient with appropriate food choices  - Assess stoma site  - Consider wound care consult   Outcome: Progressing     Problem: METABOLIC, FLUID AND ELECTROLYTES - ADULT  Goal: Electrolytes maintained within normal limits  Description  INTERVENTIONS:  - Monitor labs and assess patient for signs and symptoms of electrolyte imbalances  - Administer electrolyte replacement as ordered  - Monitor response to electrolyte replacements, including repeat lab results as appropriate  - Instruct patient on fluid and nutrition as appropriate  Outcome: Progressing  Goal: Fluid balance maintained  Description  INTERVENTIONS:  - Monitor labs   - Monitor I/O and WT  - Instruct patient on fluid and nutrition as appropriate  - Assess for signs & symptoms of volume excess or deficit  Outcome: Progressing  Goal: Glucose maintained within target range  Description  INTERVENTIONS:  - Monitor Blood Glucose as ordered  - Assess for signs and symptoms of hyperglycemia and hypoglycemia  - Administer ordered medications to maintain glucose within target range  - Assess nutritional intake and initiate nutrition service referral as needed  Outcome: Progressing

## 2020-01-28 NOTE — PROGRESS NOTES
Transfer Note - ICU/Stepdown Transfer to [facility]/MS tele   Leo Mcfarland 80 y o  female MRN: [id number]  [facility] 45   Unit/Bed#: ICU 11 Encounter: [id number]    Code Status: Level 2 - DNAR: but accepts endotracheal intubation    Reason for ICU/Stepdown admission:  Stable from respiratory standpoint, weaned off BiPAP    Active problems: Principal Problem:    Acute respiratory failure with hypoxia and hypercapnia (HCC)  Active Problems:    CAD (coronary artery disease)    HCAP (healthcare-associated pneumonia)    Dehydration    Benign essential hypertension    GERD (gastroesophageal reflux disease)    Hypothyroidism    Chronic venous insufficiency    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)  Resolved Problems:    * No resolved hospital problems  *      * Acute respiratory failure with hypoxia and hypercapnia (HCC)  Assessment & Plan  - (+) productive cough for the last 3 days, started on azithromycin by pcp without improvement  - Daughter found her diaphoretic/sob early afternoon and called EMS who placed her on Bipap with improvement  - CXR shows scattered consolidations b/l, elevated WBC, SOB  - Requiring BiPAP in ED, transitioned to HFNC shortly after admission and weaned down to 30L 40%, continue to wean o2 as tolerated  - Continue broad-spectrum abx  - Per patient and family she is ok with limited intubation but does not want CPR to be performed    HCAP (healthcare-associated pneumonia)  Assessment & Plan  - Recent hospitalization 1/18/20-1/21/20  - New onset of symptoms starting 1/24/20 with productive cough and progressive dyspnea, trialed AZT at home from PCP without improvement  - Continue broad spectrum Abx for HCAP with Vancomycin and Cefepime  - Pharmacy consulted  - Known allergies to pcn and cephalosporin but has tolerated both in past with minimal adverse reactions, will monitor very closely in the ICU  No adverse effects after cefepime administration        CAD (coronary artery disease)  Assessment & Plan  - 1/20 Cardiac cath revealed significant stenosis  - Continue to medically manage, continue home med regimen   - Not currently complaining of chest pain, EKG unchanged from prior    Dehydration  Assessment & Plan  - CBC shows increase in all cell lines consistent with dehydration, HD stable  - Pt states that her colostomy output has been increased the last few days and her PO intake has not been great   - Given 1L NSS bolus in ED and started on light hydration with isolyte at 75ml/hr for 6 hours, then stopped  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[facility])  Assessment & Plan  - 5/2019 Hartmans procedure performed D/t diverticulitis and diverticula perforation  - Increased output, continue to monitor strict I/O's, isolette 75 cc/hour given for 6 hours  Hypothyroidism  Assessment & Plan  - home dose synthroid ordered    GERD (gastroesophageal reflux disease)  Assessment & Plan  - Home PPI ordered    Benign essential hypertension  Assessment & Plan  - Continue to monitor, home meds continued        Consultants:   · None    History of Present Illness/Summary of clinical course:     HPI per admitting team:  Vickey You is a 80 y o  female w/ pmh of CAD (significant stenosis), HTN, Mild Dementia, HLD, Hypothyroidism, and Colostomy  Patient had been experiencing a productive cough for the last 3 days with progressive dyspnea, she reached out to her PCP who prescribed azithromycin that she began taking  Today her daughter visited her and found her to be diaphoretic, lethargic, and working really hard to breath  She immediately called EMS who placed her on Bipap and brought her to the ED  CXR shows scattered areas of consolidation b/l, crackles noted throughout, along with a leukocytosis  Her BNP is elevated but does not appear to be overloaded   She was recently hospitalized 1/18-1/21 for angina work-up, cath reveal significant stenosis that has been medically managed since discharge, the patient does not complain about chest pain currently and EKG is unchanged compared to last tracing  Patient does not use home oxygen or BiPAP at night at home  ED course:  WBC elevated 12 3, LA WNL, CXR:  consolidation involving both upper lobes and both lower lobes has developed during the interval consistent with multilobar pneumonia  1 L normal saline bolus, Primaxin 250 mg IV X 1, Rocephin 1 g IV X 1  BiPAP initiated in the ED, unable to wean off    ICU course:  Patient admitted to step-down unit as unable to wean off BiPAP in the ED  Early hours of 1/28 patient was weaned off BiPAP to HFNC and eventually tolerating nasal cannula 4 L of oxygen well  Antibiotics were initiated vancomycin and cefepime for Hcap  Flu RSV urine strep and Legionella remain negative, pending sputum culture Gram stain  On morning rounds patient was alert oriented x3, states she was back at her baseline feeding well and vital stable  Patient was having increased output from colostomy bag and isolate 75 cc/hour was given early hours 1/28 for 6 hours  Urine strep Legionella flu and RSV PCR will negative  Perclose been elevated since admission from 0 05-0 9, continue to trend    Please refer to today's progress note for further clinical details  Recent or scheduled procedures:  None    Outstanding/pending diagnostics:  Sputum culture and Gram stain, MRSA nares ( stop Vanco mycin once resulted)       Mobilization Plan:  Patient may be up OOB as tolerated    Nutrition Plan:  Carbohydrate level 2 diet    Discharge Plan: Patient should be ready for discharge to med surg on 01/28/2020 after 3:00 p m  Specific Diagnosis Plan:    Sepsis: Source (cultures):  Blood culture, sputum culture and Gram stain pending, Antibiotics:  Vancomycin and cefepime, Length:  Initiated 1/27; Need for Infectious Disease consult:  Not applicable  Heart Failure:  Diuresis plan:  No; Cardiology consult placed    Not applicable      [  ] Family aware of transfer out of critical care: yes   [  ] Home medications that are not reordered and reason why:  None  [  ]     Spoke with Dr Dane Charles regarding transfer @ 330 pm  Patient accepted to their service      Supriya Ruiz MD

## 2020-01-28 NOTE — PROGRESS NOTES
Progress Note - Fabricio Lees 3/7/1928, 80 y o  female MRN: [id number]    Unit/Bed#: ICU 11 Encounter: [id number]    Primary Care Provider: Trent Fish MD   Date and time admitted to hospital: 1/27/2020  6:19 PM    * Acute respiratory failure with hypoxia and hypercapnia (HCC)  Assessment & Plan  - (+) productive cough for the last 3 days, started on azithromycin by pcp without improvement  - Daughter found her diaphoretic/sob early afternoon and called EMS who placed her on Bipap with improvement  - CXR shows scattered consolidations b/l, elevated WBC, SOB  - Requiring BiPAP in ED, transitioned to HFNC shortly after admission and weaned down to 30L 45%, continue to wean o2 as tolerated  - Continue broad-spectrum abx  - Per patient and family she is ok with limited intubation but does not want CPR to be performed    HCAP (healthcare-associated pneumonia)  Assessment & Plan  - Recent hospitalization 1/18/20-1/21/20  - New onset of symptoms starting 1/24/20 with productive cough and progressive dyspnea, trialed AZT at home from PCP without improvement  - Continue broad spectrum Abx for HCAP with Vancomycin and Cefepime  - Pharmacy consulted  - Known allergies to pcn and cephalosporin but has tolerated both in past with minimal adverse reactions, will monitor very closely in the ICU  No adverse effects after cefepime administration        CAD (coronary artery disease)  Assessment & Plan  - 1/20 Cardiac cath revealed significant stenosis  - Continue to medically manage, continue home med regimen   - Not currently complaining of chest pain, EKG unchanged from prior    Dehydration  Assessment & Plan  - CBC shows increase in all cell lines consistent with dehydration, HD stable  - Pt states that her colostomy output has been increased the last few days and her PO intake has not been great   - Given 1L NSS bolus in ED and started on light hydration with isolyte at 75ml/hr    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)  Assessment & Plan  - 2019 Hartmans procedure performed D/t diverticulitis and diverticula perforation  - Increased output, continue to monitor strict I/O's    Hypothyroidism  Assessment & Plan  - home dose synthroid ordered    GERD (gastroesophageal reflux disease)  Assessment & Plan  - Home PPI ordered    Benign essential hypertension  Assessment & Plan  - Continue to monitor, home meds continued    ----------------------------------------------------------------------------------------  HPI/24hr events: Patient able to be weaned off of Bipap shortly after admission  Currently on HFNC at 30L 40%  Disposition: Continue Stepdown Level 1 level of care   Code Status: Level 2 - DNAR: but accepts endotracheal intubation  ---------------------------------------------------------------------------------------  SUBJECTIVE    Review of Systems   Respiratory: Negative for shortness of breath and wheezing  Cardiovascular: Negative for chest pain  All other systems reviewed and are negative  Review of systems was reviewed and negative unless stated above in HPI/24-hour events   ---------------------------------------------------------------------------------------  OBJECTIVE    Vitals   Vitals:    20 2134 20 2143 20 2200 20 2314   BP:   145/67    BP Location:       Pulse:  77 75 67   Resp:  (!) 28 (!) 34 (!) 23   Temp: (!) 97 3 °F (36 3 °C)      TempSrc: Temporal      SpO2:  95% 95% 93%   Weight:       Height:   5' 2" (1 575 m)      Temp (24hrs), Av 7 °F (36 5 °C), Min:96 °F (35 6 °C), Max:99 4 °F (37 4 °C)  Current: Temperature: (!) 97 3 °F (36 3 °C)        SpO2: SpO2: 93 %  O2 Flow Rate (L/min): 30 L/min    Physical Exam   Constitutional: She is oriented to person, place, and time  No distress  HENT:   Head: Normocephalic and atraumatic     Right Ear: External ear normal    Left Ear: External ear normal    Mouth/Throat: Oropharynx is clear and moist    Eyes: Pupils are equal, round, and reactive to light  Conjunctivae and EOM are normal    Neck: Normal range of motion  Neck supple  Cardiovascular: Normal rate, regular rhythm, normal heart sounds and intact distal pulses  No murmur heard  Pulmonary/Chest: Effort normal and breath sounds normal  No respiratory distress  She has no wheezes  Abdominal: Soft  Bowel sounds are normal  She exhibits no distension  There is no tenderness  Musculoskeletal: Normal range of motion  She exhibits no edema  Neurological: She is alert and oriented to person, place, and time  Skin: Skin is warm and dry  Capillary refill takes less than 2 seconds  She is not diaphoretic         Invasive/non-invasive ventilation settings   Respiratory    Lab Data (Last 4 hours)    None         O2/Vent Data (Last 4 hours)      01/27 2143 01/27 2314        Non-Invasive Ventilation Mode HFNC HFNC                  Laboratory and Diagnostics:  Results from last 7 days   Lab Units 01/27/20 1826 01/26/20  1003 01/21/20  0504   WBC Thousand/uL 12 32* 5 66 9 96   HEMOGLOBIN g/dL 16 4* 13 2 13 7   HEMATOCRIT % 49 0* 40 0 41 1   PLATELETS Thousands/uL 378 250 326   NEUTROS PCT % 72 79* 84*   MONOS PCT % 8 7 4     Results from last 7 days   Lab Units 01/27/20  1826 01/26/20  1003 01/21/20  0504   SODIUM mmol/L 133* 139 138   POTASSIUM mmol/L 4 5 4 2 4 9   CHLORIDE mmol/L 94* 103 103   CO2 mmol/L 27 28 27   ANION GAP mmol/L 12 8 8   BUN mg/dL 17 17 25   CREATININE mg/dL 1 06 1 13 0 93   CALCIUM mg/dL 8 8 8 4 9 2   GLUCOSE RANDOM mg/dL 216* 108 118   ALT U/L 52 22  --    AST U/L 82* 20  --    ALK PHOS U/L 96 77  --    ALBUMIN g/dL 4 2 3 5  --    TOTAL BILIRUBIN mg/dL 0 90 0 50  --           Results from last 7 days   Lab Units 01/27/20  1826 01/26/20  1003   INR  1 10* 1 08   PTT seconds 29 30      Results from last 7 days   Lab Units 01/27/20 1826 01/26/20  1257 01/26/20  1003   TROPONIN I ng/mL <0 02 0 02 <0 02     Results from last 7 days   Lab Units 01/27/20  1835   LACTIC ACID mmol/L 1 8 ABG:  Results from last 7 days   Lab Units 01/27/20 1938   PH ART  7 340*   PCO2 ART mm Hg 41 2   PO2 ART mm Hg 82 9   HCO3 ART mmol/L 21 7*   BASE EXC ART mmol/L -3 8   ABG SOURCE  Radial, Right     VBG:  Results from last 7 days   Lab Units 01/27/20  1938   ABG SOURCE  Radial, Right     Results from last 7 days   Lab Units 01/27/20  1835   PROCALCITONIN ng/ml <0 05       Micro        EKG: NSR  Imaging: I have personally reviewed pertinent reports  and I have personally reviewed pertinent films in PACS    Intake and Output  I/O       01/26 0701 - 01/27 0700 01/27 0701 - 01/28 0700    IV Piggyback  50    Total Intake(mL/kg)  50 (0 7)    Urine (mL/kg/hr)  375    Stool  200    Total Output  575    Net  -525                Height and Weights   Height: 5' 2" (157 5 cm)  IBW: 50 1 kg  Body mass index is 29 96 kg/m²  Weight (last 2 days)     Date/Time   Weight    01/27/20 2007   74 3 (163 8)    01/27/20 1840   75 8 (167)                Nutrition       Diet Orders   (From admission, onward)             Start     Ordered    01/27/20 2005  Diet NPO  Diet effective now     Question Answer Comment   Diet Type NPO    RD to adjust diet per protocol?  No        01/27/20 2006                  Active Medications  Scheduled Meds:  Current Facility-Administered Medications:  aspirin 81 mg Oral QAM [facility], PA-C    cefepime 2,000 mg Intravenous Q12H [facility], PA-C    clopidogrel 75 mg Oral Daily [facility], PA-C    diltiazem 180 mg Oral Daily [facility], PA-C    ezetimibe 10 mg-pravastatin 80 mg combo dose  Oral HS [facility], PA-C    heparin (porcine) 5,000 Units Subcutaneous Q8H [address] [facility], PA-C    hydroxyurea 500 mg Oral Daily [facility], PA-C    insulin lispro 1-6 Units Subcutaneous Q6H [address] [facility], PA-C    isosorbide mononitrate 30 mg Oral Daily [facility], PA-C    levothyroxine 50 mcg Oral QAM [facility], PA-C    metoprolol tartrate 25 mg Oral Q12H [address] [facility], [place]    multi-electrolyte 75 mL/hr Intravenous Continuous Halie Fisher PA-C Last Rate: 75 mL/hr (01/27/20 2319)   pantoprazole 40 mg Oral Early Morning Halie Fisher PA-C    vancomycin 15 mg/kg (Adjusted) Intravenous Q24H Halie Fisher PA-C Last Rate: 1,000 mg (01/27/20 2216)     Continuous Infusions:    multi-electrolyte 75 mL/hr Last Rate: 75 mL/hr (01/27/20 2319)     PRN Meds:      ---------------------------------------------------------------------------------------  Advance Directive and Living Will:      Power of :    POLST:    ---------------------------------------------------------------------------------------  Counseling / Coordination of Care  Total time spent today 45 minutes  Greater than 50% of total time was spent with the patient and / or family counseling and / or coordination of care  A description of the counseling / coordination of care: Talia Butler PA-C      Portions of the record may have been created with voice recognition software  Occasional wrong word or "sound a like" substitutions may have occurred due to the inherent limitations of voice recognition software    Read the chart carefully and recognize, using context, where substitutions have occurred

## 2020-01-28 NOTE — ASSESSMENT & PLAN NOTE
- (+) productive cough for the last 3 days, started on azithromycin by pcp without improvement  - Daughter found her diaphoretic/sob early afternoon and called EMS who placed her on Bipap with improvement  - CXR shows scattered consolidations b/l, elevated WBC, SOB  - Requiring BiPAP in ED, ABG 7 35/40/82/21 on 12/6/50%, will transition to HFNC and wean as tolerated  - Start broad spectrum abx targeting HCAP d/t recent hospitalization   MRSA swab, attempt to collect sputum sample for culture  - Per patient and family she is ok with limited intubation but does not want CPR to be performed

## 2020-01-28 NOTE — ASSESSMENT & PLAN NOTE
- 5/2019 Hartmans procedure performed D/t diverticulitis and diverticula perforation  - Increased output, continue to monitor strict I/O's

## 2020-01-28 NOTE — ED PROVIDER NOTES
History  Chief Complaint   Patient presents with    Shortness of Breath     Pt arrives on CPAP, c/o increasing SOB since this am      Patient is a 20-year-old female that presents via medics for call for sudden onset of shortness of breath  Patient was found to be in respiratory distress and was put on CPAP by the medics  Patient is awake and alert in obvious respiratory distress  She is only able to answer 1 word answers  She stated that the shortness of breath started this morning, it is not associated with chest pain or shortness of breath  She has no documented fever, states she has had a cough for several days  Patient denies any nausea vomiting or diarrhea  Prior to Admission Medications   Prescriptions Last Dose Informant Patient Reported? Taking?    Cholecalciferol (VITAMIN D3) 125 MCG (5000 UT) CAPS 1/27/2020 at Unknown time Child Yes Yes   Sig: Take by mouth   Multiple Vitamins-Minerals (CENTRUM SILVER ULTRA WOMENS) TABS 1/27/2020 at Unknown time Child Yes Yes   Sig: Take by mouth   acetaminophen (TYLENOL) 325 mg tablet  Child No No   Sig: Take 2 tablets (650 mg total) by mouth every 6 (six) hours as needed for mild pain, headaches or fever   aspirin 81 MG tablet 1/27/2020 at Unknown time Child Yes Yes   Sig: Take 81 mg by mouth every morning     azithromycin (ZITHROMAX) 250 mg tablet 1/27/2020 at Unknown time  No Yes   Sig: Take 2 tablets today then 1 tablet daily x 4 days   benzonatate (TESSALON) 200 MG capsule Not Taking at Unknown time  No No   Sig: Take 1 capsule (200 mg total) by mouth 3 (three) times a day as needed for cough   Patient not taking: Reported on 1/27/2020   clopidogrel (PLAVIX) 75 mg tablet 1/27/2020 at Unknown time Child No Yes   Sig: Take 1 tablet (75 mg total) by mouth daily   diltiazem (CARDIZEM CD) 180 mg 24 hr capsule 1/27/2020 at Unknown time Child No Yes   Sig: TAKE 1 CAPSULE DAILY   exemestane (AROMASIN) 25 MG tablet 1/27/2020 at Unknown time Child Yes Yes Sig: Take 25 mg by mouth daily  ezetimibe-simvastatin (VYTORIN) 10-40 mg per tablet 1/26/2020 at Unknown time Child Yes Yes   Sig: Take 1 tablet by mouth daily at bedtime     fluticasone (FLONASE) 50 mcg/act nasal spray  Child Yes No   hydroxyurea (HYDREA) 500 mg capsule 1/27/2020 at Unknown time Child Yes Yes   Sig: Take 500 mg by mouth daily m w f   ipratropium (ATROVENT HFA) 17 mcg/act inhaler Not Taking at Unknown time  No No   Sig: Inhale 2 puffs every 6 (six) hours   Patient not taking: Reported on 1/27/2020   isosorbide mononitrate (IMDUR) 30 mg 24 hr tablet 1/27/2020 at Unknown time Child No Yes   Sig: Take 1 tablet (30 mg total) by mouth daily   levothyroxine 50 mcg tablet 1/27/2020 at Unknown time Child No Yes   Sig: Take 1 tablet (50 mcg total) by mouth every morning   metoprolol tartrate (LOPRESSOR) 25 mg tablet 1/27/2020 at Unknown time Child No Yes   Sig: Take 1 tablet (25 mg total) by mouth every 12 (twelve) hours   omeprazole (PriLOSEC) 40 MG capsule 1/27/2020 at Unknown time Child No Yes   Sig: Take 1 capsule (40 mg total) by mouth daily      Facility-Administered Medications: None       Past Medical History:   Diagnosis Date    Adenocarcinoma of breast ([place] [address] ) 9/4/2012    Procedure/Onset: 12/07/2005    Anxiety 2/28/2018    Arthritis     joints    Asymptomatic varicose veins of bilateral lower extremities 10/14/2016    Benign colon polyp 2/17/2014    Breast cancer ([place] [address] ) 2005    right    CAD (coronary artery disease)     hx MI x 2    Cancer ([place] [address] )     breast ca, skin ca, currently in remission, tumors in chest wall    Chronic pain disorder     knees    Chronic rhinitis 9/30/2013    Chronic venous insufficiency 5/22/2018    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[facility]) 9/10/2019    S/p Mindy's procedure with the end colostomy    Complete uterovaginal prolapse 11/19/2015    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    Procedure/Onset: 05/26/2006    Coronary artery disease     Gait disturbance 12/19/2016    GERD (gastroesophageal reflux disease)     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    Procedure/Onset: 02/22/2007    History of prolapse of bladder     History of radiation therapy 2005    to breast right    Hypertension     Hypothyroidism     hypothyroidism    Irritable bowel syndrome 9/4/2012    Procedure/Onset: 09/23/2010    Kidney stone     2016    Myocardial infarction ([facility] )     1990's x2    Nephrolithiasis 2/26/2016    Osteoarthritis of left knee 10/19/2016    Perforation of sigmoid colon due to diverticulitis 5/9/2019    Added automatically from request for surgery [id number]    Peripheral vertigo 9/4/2012    Procedure/Onset: 04/04/2007    Psoriasis 12/19/2016    Rectocele 3/10/2016    Last Assessment & Plan:  As below   Stress incontinence in female 7/6/2016       Past Surgical History:   Procedure Laterality Date    APPENDECTOMY      during HAMLET    BLADDER SURGERY      suspension surgery no sling, used fiberglass suspension technique    BREAST LUMPECTOMY Right 2005    BREAST SURGERY Right     lumpectomy    CHEST WALL TUMOR EXCISION  2005    COLONOSCOPY      CYSTOSCOPY      HARTMANS PROCEDURE N/A 5/9/2019    Procedure: Arlyce Phlegm;  Surgeon: Jose Cruz Ohara MD;  Location: [facility] MAIN OR;  Service: General    HYSTERECTOMY Bilateral     hamlet w/removal of both ovaries    KNEE ARTHROSCOPY      Last assessed: 7/30/15    SC CYSTOURETHROSCOPY,URETER CATHETER Left 2/26/2016    Procedure: CYSTOSCOPY RETROGRADE PYELOGRAM WITH INSERTION STENT URETERAL;  Surgeon: Griselda Albe, MD;  Location: [address] [place] [address];  Service: Urology    SC XCAPSL CTRC RMVL INSJ IO LENS PROSTH W/O ECP Left 4/10/2017    Procedure: EXTRACTION EXTRACAPSULAR CATARACT PHACO INTRAOCULAR LENS (IOL);   Surgeon: Marco Fitzpatrick MD;  Location: [facility] MAIN OR;  Service: Ophthalmology    SKIN BIOPSY Left     Leg for [facility]       Family History   Problem Relation Age of Onset    Angina Mother     Hypertension Mother    Hernestorajan Naikaddy Heart attack Mother         Myocardial Infarction    Early death Father         age 36 MI    Heart disease Father     Hypertension Father     Heart attack Father         Myocardial Infarction    Heart disease Sister     Heart disease Sister         Cardiac Disorder     I have reviewed and agree with the history as documented  Social History     Tobacco Use    Smoking status: Former Smoker     Packs/day: 0 10     Last attempt to quit: 1970     Years since quittin 1    Smokeless tobacco: Never Used   Substance Use Topics    Alcohol use: Yes     Alcohol/week: 1 0 standard drinks     Types: 1 Glasses of wine per week     Frequency: 4 or more times a week     Drinks per session: 1 or 2     Comment: 1 glass of wine before bed    Drug use: Never        Review of Systems   Constitutional: Positive for fatigue  Negative for chills and fever  HENT: Positive for congestion  Negative for facial swelling, sore throat and trouble swallowing  Eyes: Negative for photophobia and visual disturbance  Respiratory: Positive for cough, chest tightness and shortness of breath  Cardiovascular: Negative for chest pain and leg swelling  Gastrointestinal: Negative for abdominal pain, nausea and vomiting  Genitourinary: Negative for dysuria and flank pain  Musculoskeletal: Negative for back pain and neck pain  Skin: Negative  Neurological: Negative for weakness and numbness  Hematological: Negative  Psychiatric/Behavioral: Negative  Physical Exam  Physical Exam   Constitutional: She is oriented to person, place, and time  She appears well-developed and well-nourished  She appears distressed  HENT:   Head: Normocephalic and atraumatic  Mouth/Throat: Oropharynx is clear and moist    Neck: Normal range of motion  Neck supple  Cardiovascular: Normal rate and regular rhythm  Pulmonary/Chest: Tachypnea noted  She is in respiratory distress   She has decreased breath sounds in the right lower field and the left lower field  She has no wheezes  She has rhonchi in the right upper field, the right middle field, the left upper field and the left middle field  She has no rales  Musculoskeletal: Normal range of motion  Right lower leg: Normal         Left lower leg: Normal    Neurological: She is alert and oriented to person, place, and time  Skin: Skin is warm and dry  Capillary refill takes less than 2 seconds  Psychiatric: She has a normal mood and affect  Her behavior is normal    Nursing note and vitals reviewed  Vital Signs  ED Triage Vitals   Temperature Pulse Respirations Blood Pressure SpO2   01/27/20 1821 01/27/20 1821 01/27/20 1821 01/27/20 1821 01/27/20 1829   (!) 96 °F (35 6 °C) 100 (!) 40 (!) 207/98 94 %      Temp Source Heart Rate Source Patient Position - Orthostatic VS BP Location FiO2 (%)   01/27/20 1821 01/27/20 1821 01/27/20 1821 01/27/20 1821 01/27/20 1830   Tympanic Monitor Lying Left arm 50      Pain Score       01/27/20 1859       No Pain           Vitals:    01/31/20 2055 01/31/20 2347 02/01/20 0813 02/01/20 1646   BP: 162/81 144/65 165/81 145/67   Pulse: 70 65 64 66   Patient Position - Orthostatic VS: Sitting Lying Sitting Lying         Visual Acuity  Visual Acuity      Most Recent Value   L Pupil Size (mm)  2   R Pupil Size (mm)  2   L Pupil Shape  Round   R Pupil Shape  Round          ED Medications  Medications   sodium chloride 0 9 % bolus 1,000 mL (1,000 mL Intravenous New Bag 1/27/20 1952)   cefTRIAXone (ROCEPHIN) IVPB (premix) 1,000 mg (0 mg Intravenous Stopped 1/27/20 2021)   magnesium sulfate 2 g/50 mL IVPB (premix) 2 g (0 g Intravenous Stopped 1/28/20 1322)       Diagnostic Studies  Results Reviewed     Procedure Component Value Units Date/Time    Blood culture #1 [[id number]] Collected:  01/27/20 1826    Lab Status:  Final result Specimen:  Blood from Arm, Left Updated:  02/02/20 0001     Blood Culture No Growth After 5 Days      Blood culture #2 [[id number]] Collected:  01/27/20 1829    Lab Status:  Final result Specimen:  Blood from Arm, Left Updated:  02/02/20 0001     Blood Culture No Growth After 5 Days  MRSA culture [[id number]]  (Normal) Collected:  01/27/20 2140    Lab Status:  Final result Specimen:  Nares from Nose Updated:  01/29/20 1233     MRSA Culture Only No Methicillin Resistant Staphlyococcus aureus (MRSA) isolated    Procalcitonin [[id number]]  (Abnormal) Collected:  01/28/20 0549    Lab Status:  Final result Specimen:  Blood from Arm, Right Updated:  01/28/20 1345     Procalcitonin 0 92 ng/ml     CBC and differential [[id number]]  (Normal) Collected:  01/28/20 0549    Lab Status:  Final result Specimen:  Blood from Arm, Right Updated:  01/28/20 0625     WBC 5 25 Thousand/uL      RBC 4 35 Million/uL      Hemoglobin 13 9 g/dL      Hematocrit 42 0 %      MCV 97 fL      MCH 32 0 pg      MCHC 33 1 g/dL      RDW 14 8 %      MPV 11 0 fL      Platelets 447 Thousands/uL     Narrative: This is an appended report  These results have been appended to a previously verified report      Basic metabolic panel [[id number]]  (Abnormal) Collected:  01/28/20 0549    Lab Status:  Final result Specimen:  Blood from Arm, Right Updated:  01/28/20 0618     Sodium 139 mmol/L      Potassium 4 0 mmol/L      Chloride 103 mmol/L      CO2 25 mmol/L      ANION GAP 11 mmol/L      BUN 13 mg/dL      Creatinine 0 91 mg/dL      Glucose 90 mg/dL      Calcium 7 9 mg/dL      eGFR 55 ml/min/1 73sq m     Narrative:       [facility] guidelines for Chronic Kid[facility] Disease (CKD):     Stage 1 with normal or high GFR (GFR > 90 mL/min/1 73 square meters)    Stage 2 Mild CKD (GFR = 60-89 mL/min/1 73 square meters)    Stage 3A Moderate CKD (GFR = 45-59 mL/min/1 73 square meters)    Stage 3B Moderate CKD (GFR = 30-44 mL/min/1 73 square meters)    Stage 4 Severe CKD (GFR = 15-29 mL/min/1 73 square meters)    Stage 5 End Stage CKD (GFR <15 mL/min/1 73 square meters)  Note: GFR calculation is accurate only with a steady state creatinine    Magnesium [[id number]]  (Normal) Collected:  01/28/20 0549    Lab Status:  Final result Specimen:  Blood from Arm, Right Updated:  01/28/20 0618     Magnesium 1 8 mg/dL     Phosphorus [[id number]]  (Normal) Collected:  01/28/20 0549    Lab Status:  Final result Specimen:  Blood from Arm, Right Updated:  01/28/20 0618     Phosphorus 3 2 mg/dL     Procalcitonin [[id number]]  (Normal) Collected:  01/27/20 1835    Lab Status:  Final result Specimen:  Blood from Arm, Left Updated:  01/28/20 0038     Procalcitonin <0 05 ng/ml     Influenza A/B and RSV PCR [[id number]]  (Normal) Collected:  01/27/20 2141    Lab Status:  Final result Specimen:  Nasopharyngeal Swab Updated:  01/27/20 2221     INFLUENZA A PCR None Detected     INFLUENZA B PCR None Detected     RSV PCR None Detected    Blood gas, arterial [[id number]]  (Abnormal) Collected:  01/27/20 1938    Lab Status:  Final result Specimen:  Blood, Arterial from Radial, Right Updated:  01/27/20 1951     pH, Arterial 7 340     PH ART TC 7 350     pCO2, Arterial 41 2 mm Hg      PCO2 (TC) Arterial 40 0 mm Hg      pO2, Arterial 82 9 mm Hg      PO2 (TC) Arterial 79 2 mm Hg      HCO3, Arterial 21 7 mmol/L      Base Excess, Arterial -3 8 mmol/L      O2 Content, Arterial 18 4 mL/dL      O2 HGB,Arterial  94 8 %      SOURCE Radial, Right     EULALIA TEST Yes     Temperature 97 4 Degrees Fehrenheit      Non Vent type BIPAP BIPAP     IPAP 12     EPAP 6     BIPAP fio2 50 %     Lactic acid, plasma [[id number]]  (Normal) Collected:  01/27/20 1835    Lab Status:  Final result Specimen:  Blood from Arm, Left Updated:  01/27/20 1901     LACTIC ACID 1 8 mmol/L     Narrative:       Result may be elevated if tourniquet was used during collection      CBC and differential [[id number]]  (Abnormal) Collected:  01/27/20 1826    Lab Status:  Edited Result - FINAL Specimen:  Blood from Arm, Left Updated:  01/27/20 1859     WBC 12 32 Thousand/uL      RBC 5 05 Million/uL      Hemoglobin 16 4 g/dL      Hematocrit 49 0 %      MCV 97 fL      MCH 32 5 pg      MCHC 33 5 g/dL      RDW 14 6 %      MPV 11 5 fL      Platelets 096 Thousands/uL      nRBC 0 /100 WBCs      Neutrophils Relative 72 %      Immat GRANS % 2 %      Lymphocytes Relative 17 %      Monocytes Relative 8 %      Eosinophils Relative 0 %      Basophils Relative 1 %      Neutrophils Absolute 8 91 Thousands/µL      Immature Grans Absolute 0 19 Thousand/uL      Lymphocytes Absolute 2 11 Thousands/µL      Monocytes Absolute 1 01 Thousand/µL      Eosinophils Absolute 0 04 Thousand/µL      Basophils Absolute 0 06 Thousands/µL     Comprehensive metabolic panel [[id number]]  (Abnormal) Collected:  01/27/20 1826    Lab Status:  Final result Specimen:  Blood from Arm, Left Updated:  01/27/20 1859     Sodium 133 mmol/L      Potassium 4 5 mmol/L      Chloride 94 mmol/L      CO2 27 mmol/L      ANION GAP 12 mmol/L      BUN 17 mg/dL      Creatinine 1 06 mg/dL      Glucose 216 mg/dL      Calcium 8 8 mg/dL      AST 82 U/L      ALT 52 U/L      Alkaline Phosphatase 96 U/L      Total Protein 8 2 g/dL      Albumin 4 2 g/dL      Total Bilirubin 0 90 mg/dL      eGFR 46 ml/min/1 73sq m     Narrative:       [place] guidelines for Chronic Kidney Disease (CKD):     Stage 1 with normal or high GFR (GFR > 90 mL/min/1 73 square meters)    Stage 2 Mild CKD (GFR = 60-89 mL/min/1 73 square meters)    Stage 3A Moderate CKD (GFR = 45-59 mL/min/1 73 square meters)    Stage 3B Moderate CKD (GFR = 30-44 mL/min/1 73 square meters)    Stage 4 Severe CKD (GFR = 15-29 mL/min/1 73 square meters)    Stage 5 End Stage CKD (GFR <15 mL/min/1 73 square meters)  Note: GFR calculation is accurate only with a steady state creatinine    NT-BNP PRO [[id number]]  (Abnormal) Collected:  01/27/20 1826    Lab Status:  Final result Specimen:  Blood from Arm, Left Updated:  01/27/20 1859     NT-proBNP 1,388 pg/mL     Troponin I [[id number]]  (Normal) Collected:  01/27/20 1826    Lab Status:  Final result Specimen:  Blood from Arm, Left Updated:  01/27/20 1858     Troponin I <0 02 ng/mL     Protime-INR [[id number]]  (Abnormal) Collected:  01/27/20 1826    Lab Status:  Final result Specimen:  Blood from Arm, Left Updated:  01/27/20 1848     Protime 11 7 seconds      INR 1 10    APTT [[id number]]  (Normal) Collected:  01/27/20 1826    Lab Status:  Final result Specimen:  Blood from Arm, Left Updated:  01/27/20 1848     PTT 29 seconds                  CT chest wo contrast   Final Result by Adeline Conrad MD (01/30 1449)      There are diffuse bilateral patchy regions of airspace consolidation consistent with pneumonia  Workstation performed: [id number]         XR chest 1 view portable   Final Result by Jeanine Hendrix MD (01/27 1924)      Multifocal consolidation involving both upper lobes and both lower lobes has developed during the interval consistent with multilobar pneumonia  The study was marked in [facility] for immediate notification              Workstation performed: [id number]                    Procedures  ECG 12 Lead Documentation Only  Date/Time: 1/27/2020 6:25 PM  Performed by: Sofia Monk MD  Authorized by: Sofia Monk MD     Indications / Diagnosis:  Shortness of breath  ECG reviewed by me, the ED Provider: yes    Patient location:  ED  Previous ECG:     Previous ECG:  Unavailable  Interpretation:     Interpretation: abnormal    Rate:     ECG rate:  99    ECG rate assessment: normal    Rhythm:     Rhythm: sinus rhythm    Ectopy:     Ectopy: none    QRS:     QRS axis:  Normal    QRS intervals:  Normal  Conduction:     Conduction: normal    ST segments:     ST segments:  Non-specific    Depression:  V4  T waves:     T waves: normal      CriticalCare Time  Performed by: Sofia Monk MD  Authorized by: Sofia Monk MD     Critical care provider statement:     Critical care time (minutes):  30 Critical care was necessary to treat or prevent imminent or life-threatening deterioration of the following conditions:  Respiratory failure    Critical care was time spent personally by me on the following activities:  Obtaining history from patient or surrogate, evaluation of patient's response to treatment, examination of patient, ordering and performing treatments and interventions, ordering and review of laboratory studies, ordering and review of radiographic studies and re-evaluation of patient's condition             ED Course                               MDM  Number of Diagnoses or Management Options  Pneumonia:   Respiratory distress:   [facility]ic [facility] syndrome) [facility]   Diagnosis management comments: Patient's blood pressure remained stable she had a elevated white count but a negative lactic acid  The patient was doing better on BiPAP  The x-ray showed multifocal pneumonias  Antibiotics were started when the results were back  I spoke to the family the patient and discussed the findings with them and the plan of being admitted to the hospital for further treatment with IV antibiotics and respiratory support  Answered all questions best my ability, the family and patient state understanding and are in agreement with the assessment plan         Amount and/or Complexity of Data Reviewed  Clinical lab tests: ordered and reviewed  Tests in the radiology section of CPT®: ordered and reviewed          Disposition  Final diagnoses:   Respiratory distress   Pneumonia   SIRS (systemic inflammatory response syndrome) ([facility] [address] )     Time reflects when diagnosis was documented in both MDM as applicable and the Disposition within this note     Time User Action Codes Description Comment    1/27/2020  8:00 PM Etta Charlton [R06 03] Respiratory distress     1/27/2020  8:00 PM Etta Charlton [J18 9] Pneumonia     1/27/2020  8:01 PM Etta Charlton [R65 10] SIRS (systemic inflammatory response syndrome) ([facility] )     2/1/2020  9:52 AM Serene Standard Add [J40] Bronchitis     2/1/2020  9:54 AM Ronnell Minus M Add [J96 01,  J96 02] Acute respiratory failure with hypoxia and hypercapnia ([facility] )     2/1/2020  9:54 AM Ronnell Minus M Add [J18 9] HCAP (healthcare-associated pneumonia)     2/1/2020  9:54 AM Serene Standard Add [C50 919] Adenocarcinoma of breast, unspecified laterality [facility])       ED Disposition     ED Disposition Condition Date/Time Comment    Admit Stable Mon Jan 27, 2020  7:59 PM Case was discussed with  Dr Gala Amaya and the patient's admission status was agreed to be Admission Status: inpatient status to the service of Dr Gala Amaya MD Documentation      Most Recent Value   Accepting Facility Name, [address] [place] [address], [facility]    Transported by (Company and Unit #)  [id number] [address] Name, [address] [place] [address], [facility]    Transported by (Company and Unit #)  [facility]      Follow-up Information     Follow up With Specialties Details Why Contact Info    Prateek Barrios MD Internal Medicine, Family Medicine Schedule an appointment as soon as possible for a visit in 1 week(s)  [address] [address]  [phone]      Sky Hewitt MD Hematology and Oncology, Hematology, Oncology Schedule an appointment as soon as possible for a visit in 1 month(s)  [address] [place]  [phone]            Discharge Medication List as of 2/1/2020  5:37 PM      START taking these medications    Details   guaiFENesin (MUCINEX) 600 mg 12 hr tablet Take 2 tablets (1,200 mg total) by mouth every 12 (twelve) hours for 5 days, Starting Sat 2/1/2020, Until Thu 2/6/2020, No Print      levalbuterol (XOPENEX) 0 63 mg/3 mL nebulizer solution Take 1 vial (0 63 mg total) by nebulization 3 (three) times a day, Starting Sat 2/1/2020, No Print      melatonin 3 mg Take 1 tablet (3 mg total) by mouth daily at bedtime, Starting Sat 2/1/2020, No Print      predniSONE 20 mg tablet Take 1 tablet (20 mg total) by mouth daily for 5 days, Starting Sun 2/2/2020, Until Fri 2/7/2020, No Print         CONTINUE these medications which have CHANGED    Details   cefuroxime (CEFTIN) 500 mg tablet Take 1 tablet (500 mg total) by mouth every 12 (twelve) hours for 5 doses, Starting Sat 2/1/2020, Until Tue 2/4/2020, No Print      exemestane (AROMASIN) 25 MG tablet Take 1 tablet (25 mg total) by mouth daily, Starting Wed 2/5/2020, No Print         CONTINUE these medications which have NOT CHANGED    Details   aspirin 81 MG tablet Take 81 mg by mouth every morning  , Historical Med      Cholecalciferol (VITAMIN D3) 125 MCG (5000 UT) CAPS Take by mouth, Historical Med      clopidogrel (PLAVIX) 75 mg tablet Take 1 tablet (75 mg total) by mouth daily, Starting Wed 1/22/2020, Until Fri 2/21/2020, Normal      diltiazem (CARDIZEM CD) 180 mg 24 hr capsule TAKE 1 CAPSULE DAILY, Normal      ezetimibe-simvastatin (VYTORIN) 10-40 mg per tablet Take 1 tablet by mouth daily at bedtime  , Historical Med      hydroxyurea (HYDREA) 500 mg capsule Take 500 mg by mouth daily m w f, Historical Med      isosorbide mononitrate (IMDUR) 30 mg 24 hr tablet Take 1 tablet (30 mg total) by mouth daily, Starting Tue 1/21/2020, Until Thu 2/20/2020, Normal      levothyroxine 50 mcg tablet Take 1 tablet (50 mcg total) by mouth every morning, Starting Sun 12/15/2019, Normal      metoprolol tartrate (LOPRESSOR) 25 mg tablet Take 1 tablet (25 mg total) by mouth every 12 (twelve) hours, Starting Tue 1/21/2020, Until Thu 2/20/2020, Normal      Multiple Vitamins-Minerals (CENTRUM SILVER ULTRA WOMENS) TABS Take by mouth, Historical Med      omeprazole (PriLOSEC) 40 MG capsule Take 1 capsule (40 mg total) by mouth daily, Starting Mon 11/25/2019, Normal      acetaminophen (TYLENOL) 325 mg tablet Take 2 tablets (650 mg total) by mouth every 6 (six) hours as needed for mild pain, headaches or fever, Starting Wed 5/15/2019, Normal      benzonatate (TESSALON) 200 MG capsule Take 1 capsule (200 mg total) by mouth 3 (three) times a day as needed for cough, Starting Mon 1/27/2020, Normal      fluticasone (FLONASE) 50 mcg/act nasal spray Starting Sat 6/30/2018, Historical Med      ipratropium (ATROVENT HFA) 17 mcg/act inhaler Inhale 2 puffs every 6 (six) hours, Starting Mon 1/27/2020, Normal         STOP taking these medications       azithromycin (ZITHROMAX) 500 MG tablet Comments:   Reason for Stopping:             Outpatient Discharge Orders   Discharge Diet     YAW Pathway:  Medications to avoid: phosphate or magnesium based laxatives, NSAIDs     YAW Pathway: Maintain SBP between 120-140 mm/Hg     YAW Pathway: Call Nursing Home MD for decreased oral intake     YAW Pathway: Daily Weights     YAW Pathway: Strict I&O     YAW Pathway:  Follow up bloodwork     Discharge Condition:  Improving     Patient Aware of Diagnosis: Yes     Free of Communicable Disease:   Yes     Physical Therapy Eval And Treat     Occupational Therapy Eval and Treat     Activity:  As Tolerated     Activity:  Per Rehab Recommendations       ED Provider  Electronically Signed by           Russ Garrison MD  02/02/20 5114

## 2020-01-28 NOTE — SOCIAL WORK
LOS - 1 day    CPR2  Bundle - AMI  30 day readmission    SW met with pt to assess needs and discuss plans  Discussed goals of making sure pt's needs are met upon discharge and that Freedom of Choice is offered  Pt's daughter was also present during visit  Pt is a 30 day readmission and admissions are related  Explored with pt and daughter factors that may have led to readmission  Pt's daughter did express that she had some difficulty obtaining pt's medication from her pharmacy following last ED visit  Per daughter it seemed to be a communication issue between local and mail order pharmacy which may not have been able to be avoided however daughter did not find pharmacy staff very understanding  Pt lives alone in her apartment  Physically pt reports being independent  Has a cane and walker but hasn't had to use them  Pt's daughter who lives in [place] visits daily to assist pt with medications, transportation and shopping  Pt does not have oxygen or nebulizer at home  No HHC  Pt does have history of rehab stay at [facility] and outpatient therapy  Pt has a Lifeline  Pt's PCP is Dr Hung Salinas MD   Preferred pharmacy is [facility] mail order for maintenance medication and [email or web address] for immediate needs  Per pt she has prescription coverage  Pt does have advanced directive/POA  Pt's daughter, Ronit Jansen, is her POA  No copy on chart, requested copy if possible  STR placement is being recommended  Pt's daughter feels rehab placement would be beneficial   Pt is open to rehab placement if needed  If pt is to return home pt and daughter would like home care services arranged  Currently pt and daughter will be considering rehab placement  Discussed enrollment in Medicare Bundle Program   Provided Bundle Program Notification Letter  Provided information on Medicare Bundle Program, shared list of preferred rehab providers and discussed ELOS    Pt's daughter will consider list and determine facility choices  After meeting pt's daughter approached SW outside of room  Daughter expressed concern about pt's inability to care for herself at home alone due to increasing forgetfulness and confusion  Daughter feels this also contributes to pt's frequent readmissions  SW discussed long term care options with daughter if pt is agreeable  Daughter stated that she is also considering assisted living facility options for pt  SW offered ongoing support and assistance with planning  SW will follow to monitor progress and assist with planning rehab when ready

## 2020-01-28 NOTE — ASSESSMENT & PLAN NOTE
- CBC shows increase in all cell lines consistent with dehydration, HD stable  - Pt states that her colostomy output has been increased the last few days and her PO intake has not been great   - Given 1L NSS bolus in ED and started on light hydration with isolyte at 75ml/hr for 6 hours, then stopped

## 2020-01-28 NOTE — ASSESSMENT & PLAN NOTE
- CBC shows increase in all cell lines consistent with dehydration, HD stable  - Pt states that her colostomy output has been increased the last few days and her PO intake has not been great   - Given 1L NSS bolus in ED and started on light hydration with isolyte at 75ml/hr

## 2020-01-28 NOTE — ED NOTES
Jered Gardner RN is ICU made aware of the admission, awaiting for ICU to call ED back        Luz Marina Maurer RN  01/27/20 2018

## 2020-01-28 NOTE — ASSESSMENT & PLAN NOTE
- 5/2019 Hartmans procedure performed D/t diverticulitis and diverticula perforation  - Increased output, continue to monitor strict I/O's, isolette 75 cc/hour given for 6 hours

## 2020-01-28 NOTE — ED NOTES
Pt awake with verbal stimuli, answering some questions, denies any pain  Pt skin is warm and dry  Family at bedside at this time         Marlon Gonzalezable, RUDOLPH  01/27/20 1931

## 2020-01-28 NOTE — ED NOTES
[place] resp at bedside   ICU  at bedside to edith Reardon RN  01/27/20 [address], RN  01/27/20 0756

## 2020-01-28 NOTE — ASSESSMENT & PLAN NOTE
- Recent hospitalization 1/18/20-1/21/20  - New onset of symptoms starting 1/24/20 with productive cough and progressive dyspnea, trialed AZT at home from PCP without improvement  - Will start on broad spectrum Abx for HCAP with Vancomycin and Cefepime  - Pharmacy consulted  - Known allergies to pcn and cephalosporin but has tolerated both in past with minimal adverse reactions, will monitor very closely in the ICU    - Attempt to collect sputum sample

## 2020-01-28 NOTE — PROGRESS NOTES
Vancomycin IV Pharmacy-to-Dose Consultation    Jacqueline Rudolph is a 80 y o  female who is currently receiving Vancomycin IV with management by the Pharmacy Consult service  Assessment/Plan:  The patient was reviewed  Renal function is stable and no signs or symptoms of nephrotoxicity and/or infusion reactions were documented in the chart  Based on todays assessment, continue current vancomycin (day # 2) dosing of 1000 mg IV q24h, with a plan for trough to be drawn at 2100 on 01/30/2020  We will continue to follow the patients culture results and clinical progress daily      Jazmin Casiano, Pharmacist

## 2020-01-28 NOTE — PLAN OF CARE
Problem: Potential for Falls  Goal: Patient will remain free of falls  Description  INTERVENTIONS:  - Assess patient frequently for physical needs  -  Identify cognitive and physical deficits and behaviors that affect risk of falls    -  [place] fall precautions as indicated by assessment   - Educate patient/family on patient safety including physical limitations  - Instruct patient to call for assistance with activity based on assessment  - Modify environment to reduce risk of injury  - Consider OT/PT consult to assist with strengthening/mobility  Outcome: Progressing     Problem: RESPIRATORY - ADULT  Goal: Achieves optimal ventilation and oxygenation  Description  INTERVENTIONS:  - Assess for changes in respiratory status  - Assess for changes in mentation and behavior  - Position to facilitate oxygenation and minimize respiratory effort  - Oxygen administered by appropriate delivery if ordered  - Encourage broncho-pulmonary hygiene including cough, deep breathe, Incentive Spirometry  - Assess the need for suctioning and aspirate as needed  - Assess and instruct to report SOB or any respiratory difficulty  - Respiratory Therapy support as indicated   Outcome: Progressing     Problem: Prexisting or High Potential for Compromised Skin Integrity  Goal: Skin integrity is maintained or improved  Description  INTERVENTIONS:  - Identify patients at risk for skin breakdown  - Assess and monitor skin integrity  - Assess and monitor nutrition and hydration status  - Monitor labs   - Assess for incontinence   - Turn and reposition patient  - Assist with mobility/ambulation  - Relieve pressure over bony prominences  - Avoid friction and shearing  - Provide appropriate hygiene as needed including keeping skin clean and dry  - Evaluate need for skin moisturizer/barrier cream  - Collaborate with interdisciplinary team   - Patient/family teaching  - Consider wound care consult   Outcome: Progressing     Problem: INFECTION - ADULT  Goal: Absence or prevention of progression during hospitalization  Description  INTERVENTIONS:  - Assess and monitor for signs and symptoms of infection  - Monitor lab/diagnostic results  - Monitor all insertion sites, i e  indwelling lines, tubes, and drains  - Monitor endotracheal if appropriate and nasal secretions for changes in amount and color  - [place] appropriate cooling/warming therapies per order  - Administer medications as ordered  - Instruct and encourage patient and family to use good hand hygiene technique  - Identify and instruct in appropriate isolation precautions for identified infection/condition  Outcome: Progressing     Problem: SAFETY ADULT  Goal: Maintain or return to baseline ADL function  Description  INTERVENTIONS:  -  Assess patient's ability to carry out ADLs; assess patient's baseline for ADL function and identify physical deficits which impact ability to perform ADLs (bathing, care of mouth/teeth, toileting, grooming, dressing, etc )  - Assess/evaluate cause of self-care deficits   - Assess range of motion  - Assess patient's mobility; develop plan if impaired  - Assess patient's need for assistive devices and provide as appropriate  - Encourage maximum independence but intervene and supervise when necessary  - Involve family in performance of ADLs  - Assess for home care needs following discharge   - Consider OT consult to assist with ADL evaluation and planning for discharge  - Provide patient education as appropriate  Outcome: Progressing  Goal: Maintain or return mobility status to optimal level  Description  INTERVENTIONS:  - Assess patient's baseline mobility status (ambulation, transfers, stairs, etc )    - Identify cognitive and physical deficits and behaviors that affect mobility  - Identify mobility aids required to assist with transfers and/or ambulation (gait belt, sit-to-stand, lift, walker, cane, etc )  - [place] fall precautions as indicated by assessment  - Record patient progress and toleration of activity level on Mobility SBAR; progress patient to next Phase/Stage  - Instruct patient to call for assistance with activity based on assessment  - Consider rehabilitation consult to assist with strengthening/weightbearing, etc   Outcome: Progressing     Problem: DISCHARGE PLANNING  Goal: Discharge to home or other facility with appropriate resources  Description  INTERVENTIONS:  - Identify barriers to discharge w/patient and caregiver  - Arrange for needed discharge resources and transportation as appropriate  - Identify discharge learning needs (meds, wound care, etc )  - Arrange for interpretive services to assist at discharge as needed  - Refer to Case Management Department for coordinating discharge planning if the patient needs post-hospital services based on physician/advanced practitioner order or complex needs related to functional status, cognitive ability, or social support system  Outcome: Progressing     Problem: GASTROINTESTINAL - ADULT  Goal: Minimal or absence of nausea and/or vomiting  Description  INTERVENTIONS:  - Administer IV fluids if ordered to ensure adequate hydration  - Maintain NPO status until nausea and vomiting are resolved  - Nasogastric tube if ordered  - Administer ordered antiemetic medications as needed  - Provide nonpharmacologic comfort measures as appropriate  - Advance diet as tolerated, if ordered  - Consider nutrition services referral to assist patient with adequate nutrition and appropriate food choices  Outcome: Progressing  Goal: Maintains or returns to baseline bowel function  Description  INTERVENTIONS:  - Assess bowel function  - Encourage oral fluids to ensure adequate hydration  - Administer IV fluids if ordered to ensure adequate hydration  - Administer ordered medications as needed  - Encourage mobilization and activity  - Consider nutritional services referral to assist patient with adequate nutrition and appropriate food choices  Outcome: Progressing  Goal: Maintains adequate nutritional intake  Description  INTERVENTIONS:  - Monitor percentage of each meal consumed  - Identify factors contributing to decreased intake, treat as appropriate  - Assist with meals as needed  - Monitor I&O, weight, and lab values if indicated  - Obtain nutrition services referral as needed  Outcome: Progressing  Goal: Establish and maintain optimal ostomy function  Description  INTERVENTIONS:  - Assess bowel function  - Encourage oral fluids to ensure adequate hydration  - Administer IV fluids if ordered to ensure adequate hydration   - Administer ordered medications as needed  - Encourage mobilization and activity  - Nutrition services referral to assist patient with appropriate food choices  - Assess stoma site  - Consider wound care consult   Outcome: Progressing     Problem: METABOLIC, FLUID AND ELECTROLYTES - ADULT  Goal: Electrolytes maintained within normal limits  Description  INTERVENTIONS:  - Monitor labs and assess patient for signs and symptoms of electrolyte imbalances  - Administer electrolyte replacement as ordered  - Monitor response to electrolyte replacements, including repeat lab results as appropriate  - Instruct patient on fluid and nutrition as appropriate  Outcome: Progressing  Goal: Fluid balance maintained  Description  INTERVENTIONS:  - Monitor labs   - Monitor I/O and WT  - Instruct patient on fluid and nutrition as appropriate  - Assess for signs & symptoms of volume excess or deficit  Outcome: Progressing  Goal: Glucose maintained within target range  Description  INTERVENTIONS:  - Monitor Blood Glucose as ordered  - Assess for signs and symptoms of hyperglycemia and hypoglycemia  - Administer ordered medications to maintain glucose within target range  - Assess nutritional intake and initiate nutrition service referral as needed  Outcome: Progressing

## 2020-01-29 LAB
ANION GAP SERPL CALCULATED.3IONS-SCNC: 6 MMOL/L (ref 4–13)
BASOPHILS # BLD MANUAL: 0 THOUSAND/UL (ref 0–0.1)
BASOPHILS NFR MAR MANUAL: 0 % (ref 0–1)
BUN SERPL-MCNC: 13 MG/DL (ref 5–25)
CALCIUM SERPL-MCNC: 8 MG/DL (ref 8.3–10.1)
CHLORIDE SERPL-SCNC: 101 MMOL/L (ref 100–108)
CO2 SERPL-SCNC: 30 MMOL/L (ref 21–32)
CREAT SERPL-MCNC: 1.04 MG/DL (ref 0.6–1.3)
EOSINOPHIL # BLD MANUAL: 0.14 THOUSAND/UL (ref 0–0.4)
EOSINOPHIL NFR BLD MANUAL: 3 % (ref 0–6)
ERYTHROCYTE [DISTWIDTH] IN BLOOD BY AUTOMATED COUNT: 13.9 % (ref 11.6–15.1)
GFR SERPL CREATININE-BSD FRML MDRD: 47 ML/MIN/1.73SQ M
GLUCOSE SERPL-MCNC: 109 MG/DL (ref 65–140)
GLUCOSE SERPL-MCNC: 113 MG/DL (ref 65–140)
GLUCOSE SERPL-MCNC: 115 MG/DL (ref 65–140)
GLUCOSE SERPL-MCNC: 154 MG/DL (ref 65–140)
GLUCOSE SERPL-MCNC: 169 MG/DL (ref 65–140)
GLUCOSE SERPL-MCNC: 246 MG/DL (ref 65–140)
HCT VFR BLD AUTO: 41.1 % (ref 34.8–46.1)
HGB BLD-MCNC: 13.8 G/DL (ref 11.5–15.4)
LG PLATELETS BLD QL SMEAR: PRESENT
LYMPHOCYTES # BLD AUTO: 1.03 THOUSAND/UL (ref 0.6–4.47)
LYMPHOCYTES # BLD AUTO: 22 % (ref 14–44)
MCH RBC QN AUTO: 31.4 PG (ref 26.8–34.3)
MCHC RBC AUTO-ENTMCNC: 33.6 G/DL (ref 31.4–37.4)
MCV RBC AUTO: 94 FL (ref 82–98)
MONOCYTES # BLD AUTO: 0.23 THOUSAND/UL (ref 0–1.22)
MONOCYTES NFR BLD: 5 % (ref 4–12)
MRSA NOSE QL CULT: NORMAL
NEUTROPHILS # BLD MANUAL: 3.28 THOUSAND/UL (ref 1.85–7.62)
NEUTS BAND NFR BLD MANUAL: 12 % (ref 0–8)
NEUTS SEG NFR BLD AUTO: 58 % (ref 43–75)
NRBC BLD AUTO-RTO: 0 /100 WBCS
PLATELET # BLD AUTO: 260 THOUSANDS/UL (ref 149–390)
PLATELET BLD QL SMEAR: ADEQUATE
PMV BLD AUTO: 10.4 FL (ref 8.9–12.7)
POTASSIUM SERPL-SCNC: 3.7 MMOL/L (ref 3.5–5.3)
PROCALCITONIN SERPL-MCNC: 0.77 NG/ML
RBC # BLD AUTO: 4.39 MILLION/UL (ref 3.81–5.12)
RBC MORPH BLD: NORMAL
SODIUM SERPL-SCNC: 137 MMOL/L (ref 136–145)
TOTAL CELLS COUNTED SPEC: 100
TROPONIN I SERPL-MCNC: 0.08 NG/ML
WBC # BLD AUTO: 4.69 THOUSAND/UL (ref 4.31–10.16)

## 2020-01-29 PROCEDURE — 82948 REAGENT STRIP/BLOOD GLUCOSE: CPT

## 2020-01-29 PROCEDURE — 97163 PT EVAL HIGH COMPLEX 45 MIN: CPT

## 2020-01-29 PROCEDURE — 94760 N-INVAS EAR/PLS OXIMETRY 1: CPT

## 2020-01-29 PROCEDURE — 80048 BASIC METABOLIC PNL TOTAL CA: CPT | Performed by: NURSE PRACTITIONER

## 2020-01-29 PROCEDURE — 97535 SELF CARE MNGMENT TRAINING: CPT

## 2020-01-29 PROCEDURE — 97167 OT EVAL HIGH COMPLEX 60 MIN: CPT

## 2020-01-29 PROCEDURE — 94668 MNPJ CHEST WALL SBSQ: CPT

## 2020-01-29 PROCEDURE — 87070 CULTURE OTHR SPECIMN AEROBIC: CPT | Performed by: STUDENT IN AN ORGANIZED HEALTH CARE EDUCATION/TRAINING PROGRAM

## 2020-01-29 PROCEDURE — 94669 MECHANICAL CHEST WALL OSCILL: CPT

## 2020-01-29 PROCEDURE — 99232 SBSQ HOSP IP/OBS MODERATE 35: CPT | Performed by: NURSE PRACTITIONER

## 2020-01-29 PROCEDURE — 97110 THERAPEUTIC EXERCISES: CPT

## 2020-01-29 PROCEDURE — 84145 PROCALCITONIN (PCT): CPT | Performed by: NURSE PRACTITIONER

## 2020-01-29 PROCEDURE — 87205 SMEAR GRAM STAIN: CPT | Performed by: STUDENT IN AN ORGANIZED HEALTH CARE EDUCATION/TRAINING PROGRAM

## 2020-01-29 PROCEDURE — 85007 BL SMEAR W/DIFF WBC COUNT: CPT | Performed by: NURSE PRACTITIONER

## 2020-01-29 PROCEDURE — 85027 COMPLETE CBC AUTOMATED: CPT | Performed by: NURSE PRACTITIONER

## 2020-01-29 PROCEDURE — 94640 AIRWAY INHALATION TREATMENT: CPT

## 2020-01-29 PROCEDURE — 94664 DEMO&/EVAL PT USE INHALER: CPT

## 2020-01-29 PROCEDURE — 84484 ASSAY OF TROPONIN QUANT: CPT | Performed by: PHYSICIAN ASSISTANT

## 2020-01-29 RX ORDER — METHYLPREDNISOLONE SODIUM SUCCINATE 40 MG/ML
20 INJECTION, POWDER, LYOPHILIZED, FOR SOLUTION INTRAMUSCULAR; INTRAVENOUS EVERY 8 HOURS SCHEDULED
Status: DISCONTINUED | OUTPATIENT
Start: 2020-01-29 | End: 2020-02-01 | Stop reason: HOSPADM

## 2020-01-29 RX ORDER — LEVALBUTEROL INHALATION SOLUTION 0.63 MG/3ML
0.63 SOLUTION RESPIRATORY (INHALATION) EVERY 8 HOURS PRN
Status: DISCONTINUED | OUTPATIENT
Start: 2020-01-29 | End: 2020-02-01

## 2020-01-29 RX ORDER — BENZONATATE 100 MG/1
100 CAPSULE ORAL 3 TIMES DAILY PRN
Status: DISCONTINUED | OUTPATIENT
Start: 2020-01-29 | End: 2020-02-01 | Stop reason: HOSPADM

## 2020-01-29 RX ADMIN — LEVOTHYROXINE SODIUM 50 MCG: 50 TABLET ORAL at 08:20

## 2020-01-29 RX ADMIN — METHYLPREDNISOLONE SODIUM SUCCINATE 20 MG: 40 INJECTION, POWDER, FOR SOLUTION INTRAMUSCULAR; INTRAVENOUS at 10:52

## 2020-01-29 RX ADMIN — HYDROXYUREA 500 MG: 500 CAPSULE ORAL at 08:29

## 2020-01-29 RX ADMIN — INSULIN LISPRO 1 UNITS: 100 INJECTION, SOLUTION INTRAVENOUS; SUBCUTANEOUS at 17:21

## 2020-01-29 RX ADMIN — BENZONATATE 100 MG: 100 CAPSULE ORAL at 20:08

## 2020-01-29 RX ADMIN — CEFEPIME HYDROCHLORIDE 2000 MG: 2 INJECTION, POWDER, FOR SOLUTION INTRAVENOUS at 08:16

## 2020-01-29 RX ADMIN — CLOPIDOGREL BISULFATE 75 MG: 75 TABLET ORAL at 08:21

## 2020-01-29 RX ADMIN — METHYLPREDNISOLONE SODIUM SUCCINATE 20 MG: 40 INJECTION, POWDER, FOR SOLUTION INTRAMUSCULAR; INTRAVENOUS at 21:49

## 2020-01-29 RX ADMIN — ASPIRIN 81 MG 81 MG: 81 TABLET ORAL at 08:20

## 2020-01-29 RX ADMIN — METOPROLOL TARTRATE 25 MG: 25 TABLET ORAL at 08:20

## 2020-01-29 RX ADMIN — CEFEPIME HYDROCHLORIDE 2000 MG: 2 INJECTION, POWDER, FOR SOLUTION INTRAVENOUS at 20:08

## 2020-01-29 RX ADMIN — PANTOPRAZOLE SODIUM 40 MG: 40 TABLET, DELAYED RELEASE ORAL at 07:11

## 2020-01-29 RX ADMIN — ISOSORBIDE MONONITRATE 30 MG: 30 TABLET, EXTENDED RELEASE ORAL at 08:21

## 2020-01-29 RX ADMIN — HEPARIN SODIUM 5000 UNITS: 5000 INJECTION INTRAVENOUS; SUBCUTANEOUS at 14:23

## 2020-01-29 RX ADMIN — HEPARIN SODIUM 5000 UNITS: 5000 INJECTION INTRAVENOUS; SUBCUTANEOUS at 21:50

## 2020-01-29 RX ADMIN — DILTIAZEM HYDROCHLORIDE 180 MG: 180 CAPSULE, COATED, EXTENDED RELEASE ORAL at 08:21

## 2020-01-29 RX ADMIN — EZETIMIBE: 10 TABLET ORAL at 21:49

## 2020-01-29 RX ADMIN — METOPROLOL TARTRATE 25 MG: 25 TABLET ORAL at 21:50

## 2020-01-29 RX ADMIN — HEPARIN SODIUM 5000 UNITS: 5000 INJECTION INTRAVENOUS; SUBCUTANEOUS at 07:12

## 2020-01-29 RX ADMIN — LEVALBUTEROL HYDROCHLORIDE 0.63 MG: 0.63 SOLUTION RESPIRATORY (INHALATION) at 09:55

## 2020-01-29 RX ADMIN — METHYLPREDNISOLONE SODIUM SUCCINATE 20 MG: 40 INJECTION, POWDER, FOR SOLUTION INTRAMUSCULAR; INTRAVENOUS at 14:24

## 2020-01-29 NOTE — PHYSICAL THERAPY NOTE
PT EVALUATION       01/29/20 1340   Note Type   Note type Eval/Treat   Pain Assessment   Pain Assessment No/denies pain   Home Living   Type of Home Apartment   Home Layout One level;Stairs to enter with rails  (1 [address])   Bathroom Shower/Tub Tub/shower unit   [employer] bars in shower; Shower chair   Home Equipment Walker   Prior Function   Level of [place] Independent with ADLs and functional mobility   Lives With Alone  (daughter checks on pt daily)   Receives Help From Family   ADL Assistance Independent   IADLs Needs assistance   Restrictions/Precautions   Other Precautions Chair Alarm;O2;Multiple lines;Telemetry; Fall Risk   General   Additional Pertinent History Pt admitted with Pneumonia, respiratory distress, SIRS   Family/Caregiver Present Yes  (daughter)   Cognition   Overall Cognitive Status WFL   Arousal/Participation Cooperative   Attention Attends with cues to redirect   Following Commands Follows multistep commands with increased time or repetition   RLE Assessment   RLE Assessment WFL  (grossly 3+/5 or better)   LLE Assessment   LLE Assessment WFL  (grossly 3+/5 or better)   Bed Mobility   Additional Comments Presents in chair   Transfers   Sit to Stand 5  Supervision   Additional items Verbal cues   Stand to Sit 5  Supervision   Additional items Verbal cues   Stand pivot 5  Supervision   Additional items Verbal cues   Ambulation/Elevation   Gait Assistance 5  Supervision   Assistive Device Rolling walker   Distance 20 feet with change in direction   Balance   Static Sitting Fair +   Dynamic Sitting Fair   Static Standing Fair   Dynamic Standing Fair   Ambulatory Fair   Activity Tolerance   Activity Tolerance Patient tolerated treatment well   Nurse Made Aware yes: Cindi   Assessment   Prognosis Good   Problem List Decreased strength;Decreased endurance; Impaired balance;Decreased mobility   Assessment Patient seen for Physical Therapy evaluation   Patient admitted with Acute respiratory failure with hypoxia and hypercapnia ([facility] [address] )  Comorbidities affecting patient's physical performance include: CAD, HTN, mild dementia colostomy  Personal factors affecting patient at time of initial evaluation include: lives in single story apartment, , ambulating with assistive device, stairs to enter home, inability to ambulate household distances, inability to perform ADLS and inability to perform IADLS   Prior to admission, patient was independent with functional mobility with rolling walker, independent with ADLS, requiring assist for IADLS, living alone in single story home with 1 steps to enter and ambulating household distance  Please find objective findings from Physical Therapy assessment regarding body systems outlined above with impairments and limitations including impaired balance, decreased endurance, decreased activity tolerance, decreased functional mobility tolerance and fall risk  The Barthel Index was used as a functional outcome tool presenting with a score of 40 today indicating marked limitations of functional mobility and ADLS  Patient's clinical presentation is currently unstable/unpredictable as seen in patient's presentation of vital sign response, increased fall risk, new onset of impairment of functional mobility and decreased endurance  Pt would benefit from continued Physical Therapy treatment to address deficits as defined above and maximize level of functional mobility  As demonstrated by objective findings, the assigned level of complexity for this evaluation is high     Goals   Patient Goals to return home   STG Expiration Date 02/05/20   Short Term Goal #1 Indep with all transfers to EOB and sit <> stand    Short Term Goal #2 Indep amb  with RW for functional household distances with good balance   LTG Expiration Date 02/12/20   Long Term Goal #1 supervision/ I  for amb  community distance with RW    Long Term Goal #2 Indep navigating 1 stair to allow pt to enter/exit her home   Plan   Treatment/Interventions ADL retraining;Functional transfer training;LE strengthening/ROM; Elevations; Therapeutic exercise; Endurance training;Patient/family training;Equipment eval/education; Bed mobility;Gait training;Spoke to nursing;Spoke to case management;OT   PT Frequency 5x/wk   Recommendation   Recommendation Short-term skilled PT   Barthel Index   Feeding 5   Bathing 0   Grooming Score 0   Dressing Score 5   Bladder Score 5   Bowels Score 10   Toilet Use Score 5   Transfers (Bed/Chair) Score 10   Mobility (Level Surface) Score 0   Stairs Score 0   Barthel Index Score 40   Licensure   [place] License Number  Magaly dickens PT  [id number]   PT TREATMENT  13:50 - 14:00  10 min  S: Pt states that she would like to sit in the sun, by the window  O; Pt amb  20 feet with RW and Min A  Pt rested, then able to amb around room with O2 extension on 3L with RW 60 feet with multiple changes in direction and Supervision  Pt returned to chair and chair positioned near window in the sun  Daughter present for session  All lines attached and RN resumed monitor  All needs in reach  A: pt tolerated well  Good endurance for walking  Limited by endurance, however was able to walk around room for at least 60 feet with several standing rest periods  P; Recommend: STR  Pending progress  Ana Rodriguez PT  [id number]

## 2020-01-29 NOTE — OCCUPATIONAL THERAPY NOTE
OT Evaluation       01/29/20 1146   Note Type   Note type Eval/Treat   Restrictions/Precautions   Other Precautions Chair Alarm; Bed Alarm;Multiple lines;O2;Fall Risk   Pain Assessment   Pain Assessment No/denies pain   Pain Score No Pain   Home Living   Type of Home Apartment   Home Layout One level;Stairs to enter with rails  (1 CHRISTY)   Bathroom Shower/Tub Tub/shower unit   Bathroom Toilet Standard   Bathroom Equipment Shower chair;Grab bars in [facility]   Prior Function   Level of [place] Independent with ADLs and functional mobility; Needs assistance with IADLs   Lives With Alone  (Daughter check in on patient daily)   Receives Help From Family   ADL Assistance Independent   IADLs Needs assistance   Comments Daughter lives nearby and assists with iADLs   ADL   Eating Assistance 5  Supervision/Setup   Grooming Assistance 5  Supervision/Setup   UB Bathing Assistance 4  Minimal Assistance   LB [address] 3  Moderate Assistance    [address] 4  Minimal Assistance    [address] 3  Moderate [address]  4  Minimal Assistance   Bed Mobility   Additional Comments Not assessed, patient seated in chair at start of session   Transfers   Sit to Stand 4  Minimal assistance   Additional items Verbal cues   Stand to Sit 4  Minimal assistance   Additional items Verbal cues   Stand pivot 4  Minimal assistance   Additional items Verbal cues  (With RW)   Functional Mobility   Functional Mobility 4  Minimal assistance   Additional Comments Ambulated few steps with RW and min assist   Balance   Static Sitting Fair +   Dynamic Sitting Fair   Static Standing Fair   Dynamic Standing Fair -   Activity Tolerance   Activity Tolerance Patient limited by fatigue   Nurse Made Aware Yes   RUE Assessment   RUE Assessment WFL  (Strength grossly 4-/5 throughout)   LUE Assessment   LUE Assessment WFL  (Strength grossly 4-/5 throughout)   Cognition   Overall Cognitive Status Impaired   Arousal/Participation Alert; Cooperative   Attention Attends with cues to redirect   Orientation Level Oriented to person;Oriented to place;Oriented to time;Disoriented to situation   Memory Decreased short term memory;Decreased recall of recent events   Following Commands Follows multistep commands with increased time or repetition   Assessment   Limitation Decreased ADL status; Decreased UE strength;Decreased Safe judgement during ADL;Decreased cognition;Decreased endurance;Decreased self-care trans;Decreased high-level ADLs   Prognosis Good   Assessment Patient evaluated by Occupational Therapy  Patient admitted with Acute respiratory failure with hypoxia and hypercapnia ([facility] [address] )  The patients occupational profile, medical and therapy history includes a extensive additional review of physical, cognitive, or psychosocial history related to current functional performance  Comorbidities affecting functional mobility and ADLS include: adenocarcinoma, anxiety, arthritis, breast cancer, cardiac disease, chronic pain disorder, colostomy, GERD, CAD, IBS, diverticulitis  Prior to admission, patient was independent with functional mobility without assistive device, independent with ADLS and requiring assist for IADLS  The evaluation identifies the following performance deficits: weakness, impaired balance, decreased endurance, increased fall risk, new onset of impairment of functional mobility, decreased ADLS, decreased activity tolerance, decreased safety awareness and decreased strength, that result in activity limitations and/or participation restrictions  This evaluation requires clinical decision making of high complexity, because the patient presents with comorbidites that affect occupational performance and required significant modification of tasks or assistance with consideration of multiple treatment options    The Barthel Index was used as a functional outcome tool presenting with a score of 40, indicating marked limitations of functional mobility and ADLS  Patient will benefit from skilled Occupational Therapy services to address above deficits and facilitate a safe return to prior level of function  Goals   Patient Goals To go home   STG Time Frame   (1-7 days)   Short Term Goal  Patient will increase standing tolerance to 10 minutes during ADL task to decrease assistance level and decrease fall risk; Patient will increase bed mobility to supervision in preparation for ADLS and transfers; Patient will increase functional mobility to and from bathroom with rolling walker with supervision to increase performance with ADLS and to use a toilet; Patient will tolerate 10 minutes of UE ROM/strengthening to increase general activity tolerance and performance in ADLS/IADLS; Patient will improve functional activity tolerance to 10 minutes of sustained functional tasks to increase participation in basic self-care and decrease assistance level;  Patient will be able to to verbalize understanding and perform energy conservation/proper body mechanics during ADLS and functional mobility at least 50% of the time with moderate cueing to decrease signs of fatigue and increase stamina to return to prior level of function; Patient will increase dynamic sitting balance to fair+ to improve the ability to sit at edge of bed or on a chair for ADLS;  Patient will increase dynamic standing balance to fair to improve postural stability and decrease fall risk during standing ADLS and transfers  LTG Time Frame   (8-14 days)   Long Term Goal Patient will increase standing tolerance to 15 minutes during ADL task to decrease assistance level and decrease fall risk; Patient will increase bed mobility to independent in preparation for ADLS and transfers;  Patient will increase functional mobility to and from bathroom with rolling walker independently to increase performance with ADLS and to use a toilet; Patient will tolerate 15 minutes of UE ROM/strengthening to increase general activity tolerance and performance in ADLS/IADLS; Patient will improve functional activity tolerance to 15 minutes of sustained functional tasks to increase participation in basic self-care and decrease assistance level;  Patient will be able to to verbalize understanding and perform energy conservation/proper body mechanics during ADLS and functional mobility at least 75% of the time with minimalcueing to decrease signs of fatigue and increase stamina to return to prior level of function; Patient will increase static/dynamic sitting balance to good to improve the ability to sit at edge of bed or on a chair for ADLS;  Patient will increase static/dynamic standing balance to fair+ to improve postural stability and decrease fall risk during standing ADLS and transfers  Functional Transfer Goals   Pt Will Perform All Functional Transfers   (STG supervision, LTG independent)   ADL Goals   Pt Will Perform Eating   (LTG independent)   Pt Will Perform Grooming   (LTG independent)   Pt Will Perform Bathing   (STG min assist, LTG supervision)   Pt Will Perform UE Dressing   (STG supervision, LTG independent)   Pt Will Perform LE Dressing   (STG min assist, LTG supervision)   Pt Will Perform Toileting   (STG supervision, LTG independent)   Plan   Treatment Interventions ADL retraining;Functional transfer training;UE strengthening/ROM; Endurance training;Patient/family training;Equipment evaluation/education; Compensatory technique education;Continued evaluation; Energy conservation; Activityengagement   Goal Expiration Date 02/12/20   OT Frequency 3-5x/wk   Recommendation   OT Discharge Recommendation Home OT   Barthel Index   Feeding 5   Bathing 0   Grooming Score 0   Dressing Score 5   Bladder Score 5   Bowels Score 10   Toilet Use Score 5   Transfers (Bed/Chair) Score 10   Mobility (Level Surface) Score 0   Stairs Score 0   Barthel Index Score 40   Licensure   [place] License Number  Yoselin Constantino, OTR/L [id number]

## 2020-01-29 NOTE — PLAN OF CARE
Problem: RESPIRATORY - ADULT  Goal: Achieves optimal ventilation and oxygenation  Description  INTERVENTIONS:  - Assess for changes in respiratory status  - Assess for changes in mentation and behavior  - Position to facilitate oxygenation and minimize respiratory effort  - Oxygen administered by appropriate delivery if ordered  - Encourage broncho-pulmonary hygiene including cough, deep breathe, Incentive Spirometry  - Assess the need for suctioning and aspirate as needed  - Assess and instruct to report SOB or any respiratory difficulty  - Respiratory Therapy support as indicated   Outcome: Progressing

## 2020-01-29 NOTE — OCCUPATIONAL THERAPY NOTE
OT Evaluation       01/29/20 1146   Note Type   Note type Eval/Treat   Restrictions/Precautions   Other Precautions Chair Alarm; Bed Alarm;Multiple lines;O2;Fall Risk   Pain Assessment   Pain Assessment No/denies pain   Pain Score No Pain   Home Living   Type of Home Apartment   Home Layout One level;Stairs to enter with rails  (1 [address]ST[address])   Bathroom Shower/Tub Tub/shower unit   Bathroom Toilet Standard   Bathroom Equipment Shower chair;Grab bars in [facility] 124   Prior Function   Level of [place] Independent with ADLs and functional mobility; Needs assistance with IADLs   Lives With Alone  (Daughter check in on patient daily)   Receives Help From Family   ADL Assistance Independent   IADLs Needs assistance   Comments Daughter lives nearby and assists with iADLs   ADL   Eating Assistance 5  Supervision/Setup   Grooming Assistance 5  Supervision/Setup   UB Bathing Assistance 4  Minimal Assistance   LB [address] 3  Moderate Assistance    [address] 4  Minimal Assistance    [address] 3  Moderate [address]  4  Minimal Assistance   Bed Mobility   Additional Comments Not assessed, patient seated in chair at start of session   Transfers   Sit to Stand 4  Minimal assistance   Additional items Verbal cues   Stand to Sit 4  Minimal assistance   Additional items Verbal cues   Stand pivot 4  Minimal assistance   Additional items Verbal cues  (With RW)   Functional Mobility   Functional Mobility 4  Minimal assistance   Additional Comments Ambulated few steps with RW and min assist   Balance   Static Sitting Fair +   Dynamic Sitting Fair   Static Standing Fair   Dynamic Standing Fair -   Activity Tolerance   Activity Tolerance Patient limited by fatigue   Nurse Made Aware Yes   RUE Assessment   RUE Assessment WFL  (Strength grossly 4-/5 throughout)   LUE Assessment   LUE Assessment WFL  (Strength grossly 4-/5 throughout)   Cognition   Overall Cognitive Status Impaired   Arousal/Participation Alert; Cooperative   Attention Attends with cues to redirect   Orientation Level Oriented to person;Oriented to place;Oriented to time;Disoriented to situation   Memory Decreased short term memory;Decreased recall of recent events   Following Commands Follows multistep commands with increased time or repetition   Assessment   Limitation Decreased ADL status; Decreased UE strength;Decreased Safe judgement during ADL;Decreased cognition;Decreased endurance;Decreased self-care trans;Decreased high-level ADLs   Prognosis Good   Assessment Patient evaluated by Occupational Therapy  Patient admitted with Acute respiratory failure with hypoxia and hypercapnia ([facility] [address] )  The patients occupational profile, medical and therapy history includes a extensive additional review of physical, cognitive, or psychosocial history related to current functional performance  Comorbidities affecting functional mobility and ADLS include: adenocarcinoma, anxiety, arthritis, breast cancer, cardiac disease, chronic pain disorder, colostomy, GERD, CAD, IBS, diverticulitis  Prior to admission, patient was independent with functional mobility without assistive device, independent with ADLS and requiring assist for IADLS  The evaluation identifies the following performance deficits: weakness, impaired balance, decreased endurance, increased fall risk, new onset of impairment of functional mobility, decreased ADLS, decreased activity tolerance, decreased safety awareness and decreased strength, that result in activity limitations and/or participation restrictions  This evaluation requires clinical decision making of high complexity, because the patient presents with comorbidites that affect occupational performance and required significant modification of tasks or assistance with consideration of multiple treatment options    The Barthel Index was used as a functional outcome tool presenting with a score of 40, indicating marked limitations of functional mobility and ADLS  Patient will benefit from skilled Occupational Therapy services to address above deficits and facilitate a safe return to prior level of function  Goals   Patient Goals To go home   STG Time Frame   (1-7 days)   Short Term Goal  Patient will increase standing tolerance to 10 minutes during ADL task to decrease assistance level and decrease fall risk; Patient will increase bed mobility to supervision in preparation for ADLS and transfers; Patient will increase functional mobility to and from bathroom with rolling walker with supervision to increase performance with ADLS and to use a toilet; Patient will tolerate 10 minutes of UE ROM/strengthening to increase general activity tolerance and performance in ADLS/IADLS; Patient will improve functional activity tolerance to 10 minutes of sustained functional tasks to increase participation in basic self-care and decrease assistance level;  Patient will be able to to verbalize understanding and perform energy conservation/proper body mechanics during ADLS and functional mobility at least 50% of the time with moderate cueing to decrease signs of fatigue and increase stamina to return to prior level of function; Patient will increase dynamic sitting balance to fair+ to improve the ability to sit at edge of bed or on a chair for ADLS;  Patient will increase dynamic standing balance to fair to improve postural stability and decrease fall risk during standing ADLS and transfers  LTG Time Frame   (8-14 days)   Long Term Goal Patient will increase standing tolerance to 15 minutes during ADL task to decrease assistance level and decrease fall risk; Patient will increase bed mobility to independent in preparation for ADLS and transfers;  Patient will increase functional mobility to and from bathroom with rolling walker independently to increase performance with ADLS and to use a toilet; Patient will tolerate 15 minutes of UE ROM/strengthening to increase general activity tolerance and performance in ADLS/IADLS; Patient will improve functional activity tolerance to 15 minutes of sustained functional tasks to increase participation in basic self-care and decrease assistance level;  Patient will be able to to verbalize understanding and perform energy conservation/proper body mechanics during ADLS and functional mobility at least 75% of the time with minimalcueing to decrease signs of fatigue and increase stamina to return to prior level of function; Patient will increase static/dynamic sitting balance to good to improve the ability to sit at edge of bed or on a chair for ADLS;  Patient will increase static/dynamic standing balance to fair+ to improve postural stability and decrease fall risk during standing ADLS and transfers  Functional Transfer Goals   Pt Will Perform All Functional Transfers   (STG supervision, LTG independent)   ADL Goals   Pt Will Perform Eating   (LTG independent)   Pt Will Perform Grooming   (LTG independent)   Pt Will Perform Bathing   (STG min assist, LTG supervision)   Pt Will Perform UE Dressing   (STG supervision, LTG independent)   Pt Will Perform LE Dressing   (STG min assist, LTG supervision)   Pt Will Perform Toileting   (STG supervision, LTG independent)   Plan   Treatment Interventions ADL retraining;Functional transfer training;UE strengthening/ROM; Endurance training;Patient/family training;Equipment evaluation/education; Compensatory technique education;Continued evaluation; Energy conservation; Activityengagement   Goal Expiration Date 02/12/20   OT Frequency 3-5x/wk   Additional Treatment Session   Start Time 1135   End Time 1146   Treatment Assessment Patient ambulated long household distance in room x2 with RW and min assist, min VCs for safety awareness  Patient performed ambulatory transfer to/from [facility] with RW and min assist, min VCs for safe transfer techniques   Once seated in recliner chair, patient performed lower body dressing activity: doff bilateral socks, able to complete approx  50% of task, when bending over to reach feet patient reported increased dizziness, remainder of dressing task deferred  Patient on 3L o2 throughout session, SPO2 maintained >94% during activity       Recommendation   OT Discharge Recommendation Home OT   Barthel Index   Feeding 5   Bathing 0   Grooming Score 0   Dressing Score 5   Bladder Score 5   Bowels Score 10   Toilet Use Score 5   Transfers (Bed/Chair) Score 10   Mobility (Level Surface) Score 0   Stairs Score 0   Barthel Index Score 36   Licensure   [place] License Number  Amarylhortensias Yunior, OTR/L [id number]

## 2020-01-29 NOTE — ASSESSMENT & PLAN NOTE
Will continue patient's home medications of Cardizem, mTOR and metoprolol  Continue with labetalol IV q 4 hours p r n     Most current blood pressure 143/64  Vital signs as per unit routine  Monitor

## 2020-01-29 NOTE — ASSESSMENT & PLAN NOTE
Patient has a history of known coronary artery disease  Recently underwent cardiac catheterization on 01/20/2020, significant stenosis noted  Being managed medically at this time  Patient denies any chest pain

## 2020-01-29 NOTE — ASSESSMENT & PLAN NOTE
Acute respiratory failure with hypoxia and hypercapnia requiring BiPAP in the field and continued BiPAP in the emergency department  ABGs performed in emergency department 7 35/40/82/21 on 12/06/2050%  Patient was subsequently admitted to the intensive care unit and continue to badge a BiPAP and weaned to high-flow nasal cannula, eventually to nasal cannula  Prior to admission patient had a productive cough 3 days prior to admission, had seen her primary care physician was treated with azithromycin outpatient  On the day of admission patient's daughter came to check on her and found her to be short of breath and diaphoretic  They presented to the emergency department via EMS for further evaluation and treatment  As patient had recent hospitalization from 01/18-01/20/20, patient was started on broad-spectrum antibiotics cefepime 2 g IV q 12 hours and vancomycin 1 g Q 24 hours  MRSA screen negative, will discontinue vancomycin  As per nursing when patient removed her oxygen she had dropped her oxygen in percentages down into the 80s  Continues with some tachypnea noted 26-38 documented  Acapella valve ordered to help mobilize retained secretions  Patient is using incentive spirometer  Nursing reports that patient is only able to achieve 250 cc  Patient noted to have some wheezing present on exam, Solu-Medrol 20 mg IV q 8 hours ordered  Procalcitonin had peaked at 0 92, decreased to 0 77  Patient is positive for bands of 12  Continue cefepime  Sputum for g stain C&S ordered  Monitor respiratory status closely

## 2020-01-29 NOTE — NURSING NOTE
Pt removed self from telemetry, pulse ox and pulled IV out- Refuses to allow telemetry, o2 or iv to be reattached  Pt made aware of reasons for iv,telemetry and O2- remains refusing

## 2020-01-29 NOTE — PROGRESS NOTES
Pt developed chest pain when told of transfer,non radiating  12 lead EKG done no changes  Seen by Dr Basil Chan hold transfer for now  Pt verbalized pain went away without intervention,' didn't want to leave' as verbalized by pt

## 2020-01-29 NOTE — CONSULTS
The patient's vancomycin therapy has been discontinued  Pharmacy will sign off now, thank you for this consult       Beverly Dennis, Pharmacist

## 2020-01-29 NOTE — PROGRESS NOTES
Progress Note - Radha Crockett 3/7/1928, 80 y o  female MRN: [id number]    Unit/Bed#: ICU 11 Encounter: [id number]    Primary Care Provider: Mateus Rice MD   Date and time admitted to hospital: 1/27/2020  6:19 PM        * Acute respiratory failure with hypoxia and hypercapnia (HCC)  Assessment & Plan  Acute respiratory failure with hypoxia and hypercapnia requiring BiPAP in the field and continued BiPAP in the emergency department  ABGs performed in emergency department 7 35/40/82/21 on 12/06/2050%  Patient was subsequently admitted to the intensive care unit and continue to badge a BiPAP and weaned to high-flow nasal cannula, eventually to nasal cannula  Prior to admission patient had a productive cough 3 days prior to admission, had seen her primary care physician was treated with azithromycin outpatient  On the day of admission patient's daughter came to check on her and found her to be short of breath and diaphoretic  They presented to the emergency department via EMS for further evaluation and treatment  As patient had recent hospitalization from 01/18-01/20/20, patient was started on broad-spectrum antibiotics cefepime 2 g IV q 12 hours and vancomycin 1 g Q 24 hours  MRSA screen negative, will discontinue vancomycin  As per nursing when patient removed her oxygen she had dropped her oxygen in percentages down into the 80s  Continues with some tachypnea noted 26-38 documented  Acapella valve ordered to help mobilize retained secretions  Patient is using incentive spirometer  Nursing reports that patient is only able to achieve 250 cc  Patient noted to have some wheezing present on exam, Solu-Medrol 20 mg IV q 8 hours ordered  Procalcitonin had peaked at 0 92, decreased to 0 77  Patient is positive for bands of 12  Continue cefepime  Sputum for g stain C&S ordered  Monitor respiratory status closely      HCAP (healthcare-associated pneumonia)  Assessment & Plan  Healthcare associated pneumonia being treated with broad-spectrum antibiotics as noted above  Patient had recent hospitalization from 01/18 through 01/20/2020  Patient had started with a cough on 01/24, had progressive dyspnea  She was seen by her primary care physician and prescribed a course of azithromycin  Primary care physician at that time indicated to patient she needed to hydrate as she appeared to be dehydrated  As noted above patient's daughter found her to be diaphoretic and short of breath on day of admission  MRSA screen negative, vancomycin discontinued  Acapella valve ordered to help mobilize secretions  Xopenex nebulizers ordered  Tessalon Perles ordered for cough  Continue to use incentive spirometer  Continue supplemental oxygen to keep O2 sats greater than 90%  Encourage out of bed for all meals and as much as possible  Sputum for g stain C&S ordered  Urine antigens negative  May consider vest PT for patient  Repeat CBC in [facility] CAD (coronary artery disease)  Assessment & Plan  Patient has a history of known coronary artery disease  Recently underwent cardiac catheterization on 01/20/2020, significant stenosis noted  Being managed medically at this time  Patient denies any chest pain  Benign essential hypertension  Assessment & Plan  Will continue patient's home medications of Cardizem, mTOR and metoprolol  Continue with labetalol IV q 4 hours p r n     Most current blood pressure 143/64  Vital signs as per unit routine  Monitor  Hypothyroidism  Assessment & Plan  Continue levothyroxine 50 mcg p o  Daily    Chronic venous insufficiency  Assessment & Plan  Elevate legs when out of bed  No edema noted at this time  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[facility])  Assessment & Plan  Patient has a history of ulcerative colitis  Has had colostomy in place for many years  Stoma pink  Colostomy draining brown stool  Dehydration  Assessment & Plan  Patient had received IV hydration  Labs improved    Repeat BMP in a m  GERD (gastroesophageal reflux disease)  Assessment & Plan  Continue patient's home medication of Protonix 40 mg p o  Daily  VTE Pharmacologic Prophylaxis:   Pharmacologic: Heparin  Mechanical VTE Prophylaxis in Place: Yes    Patient Centered Rounds: I have performed bedside rounds with nursing staff today  Discussions with Specialists or Other Care Team Provider: Multidisciplinary team      Education and Discussions with Family / Patient:  I spoke with the patient at the bedside, I have answered all questions to the best of my abilities  Time Spent for Care: 30 minutes  More than 50% of total time spent on counseling and coordination of care as described above  Current Length of Stay: 2 day(s)    Current Patient Status: Inpatient   Certification Statement: The patient will continue to require additional inpatient hospital stay due to Continued IV antibiotic, nebulizers, IV Solu-Medrol  Discharge Plan:  Not stable for discharge today  Code Status: Level 2 - DNAR: but accepts endotracheal intubation      Subjective:     Patient sitting up in chair, sleeping  Had eaten about 50% of her breakfast   Reports that her appetite is fair  Denies any nausea or vomiting  Reports that she is coughing a lot and is starting to bring up some sputum  Does feel some shortness of breath  Objective:     Vitals:   Temp (24hrs), Av 4 °F (36 9 °C), Min:98 °F (36 7 °C), Max:98 8 °F (37 1 °C)    Temp:  [98 °F (36 7 °C)-98 8 °F (37 1 °C)] 98 2 °F (36 8 °C)  HR:  [67-97] 67  Resp:  [20-52] 30  BP: (143-190)/(64-84) 143/64  SpO2:  [75 %-99 %] 97 %  Body mass index is 30 28 kg/m²  Input and Output Summary (last 24 hours): Intake/Output Summary (Last 24 hours) at 2020 1305  Last data filed at 2020 1001  Gross per 24 hour   Intake 500 ml   Output 875 ml   Net -375 ml       Physical Exam:     Physical Exam   Constitutional: She is oriented to person, place, and time     Acutely ill appearing elderly female sitting up in chair, sleeping  Awakens when spoken to  HENT:   Head: Normocephalic  Eyes: Conjunctivae and EOM are normal    Neck: Normal range of motion  Cardiovascular: Normal rate  Murmur heard  Pulmonary/Chest:   Bilateral wheezes, rhonchi noted  Patient has a loose occasionally productive cough  On exam patient tachypneic with rate of 24  Abdominal: Soft  Bowel sounds are normal  She exhibits no distension  There is no tenderness  Genitourinary:   Genitourinary Comments: Voiding spontaneously  Musculoskeletal: She exhibits no edema  Neurological: She is alert and oriented to person, place, and time  Nursing reported that patient became confused overnight, pulling IVs and monitor off  Skin: Skin is warm and dry  Capillary refill takes less than 2 seconds  There is pallor  Psychiatric: She has a normal mood and affect  Her behavior is normal  Judgment and thought content normal    Nursing note and vitals reviewed  Additional Data:     Labs:    Results from last 7 days   Lab Units 01/29/20  0956  01/27/20  1826   WBC Thousand/uL 4 69   < > 12 32*   HEMOGLOBIN g/dL 13 8   < > 16 4*   HEMATOCRIT % 41 1   < > 49 0*   PLATELETS Thousands/uL 260   < > 378   NEUTROS PCT %  --   --  72   LYMPHS PCT %  --   --  17   LYMPHO PCT % 22   < >  --    MONOS PCT %  --   --  8   MONO PCT % 5   < >  --    EOS PCT % 3   < > 0    < > = values in this interval not displayed  Results from last 7 days   Lab Units 01/29/20  0956  01/27/20  1826   POTASSIUM mmol/L 3 7   < > 4 5   CHLORIDE mmol/L 101   < > 94*   CO2 mmol/L 30   < > 27   BUN mg/dL 13   < > 17   CREATININE mg/dL 1 04   < > 1 06   CALCIUM mg/dL 8 0*   < > 8 8   ALK PHOS U/L  --   --  96   ALT U/L  --   --  52   AST U/L  --   --  82*    < > = values in this interval not displayed  Results from last 7 days   Lab Units 01/27/20  1826   INR  1 10*       * I Have Reviewed All Lab Data Listed Above    * Additional Pertinent Lab Tests Reviewed: [place] [facility] Admission Reviewed    Imaging:    Imaging Reports Reviewed Today Include:  Chest x-ray on 01/27 showing multi lobar pneumonia  Imaging Personally Reviewed by Myself Includes:  None  Recent Cultures (last 7 days):     Results from last 7 days   Lab Units 01/28/20  0549 01/27/20  1829 01/27/20 1826   BLOOD CULTURE   --  No Growth at 24 hrs  No Growth at 24 hrs  LEGIONELLA URINARY ANTIGEN  Negative  --   --        Last 24 Hours Medication List:     Current Facility-Administered Medications:  acetaminophen 650 mg Oral Q6H PRN Aure Marino MD    aspirin 81 mg Oral QAM Aure Marino MD    benzonatate 100 mg Oral TID PRN GLORIA Vela    cefepime 2,000 mg Intravenous Q12H Aure Marino MD Last Rate: Stopped (01/29/20 1417)   clopidogrel 75 mg Oral Daily Aure Marino MD    diltiazem 180 mg Oral Daily Aure Marino MD    ezetimibe 10 mg-pravastatin 80 mg combo dose  Oral HS Aure Marino MD    heparin (porcine) 5,000 Units Subcutaneous [facility] Aure Marino MD    hydroxyurea 500 mg Oral Daily Aure Marino MD    insulin lispro 1-6 Units Subcutaneous Q6H [address] Aure Marino MD    isosorbide mononitrate 30 mg Oral Daily Aure Marino MD    Labetalol HCl 10 mg Intravenous Q4H PRN Cleopatra Arana PA-C    levalbuterol 0 63 mg Nebulization Q8H PRN GLORIA Vela    levothyroxine 50 mcg Oral QAM Aure Marino MD    methylPREDNISolone sodium succinate 20 mg Intravenous [facility] GLORIA Vela    metoprolol tartrate 25 mg Oral Q12H Ivelisse Huynh MD    pantoprazole 40 mg Oral Early Morning Aure Marino MD         Today, Patient Was Seen By: GLORIA Vela    ** Please Note: Dictation voice to text software may have been used in the creation of this document   **

## 2020-01-29 NOTE — ASSESSMENT & PLAN NOTE
Healthcare associated pneumonia being treated with broad-spectrum antibiotics as noted above  Patient had recent hospitalization from 01/18 through 01/20/2020  Patient had started with a cough on 01/24, had progressive dyspnea  She was seen by her primary care physician and prescribed a course of azithromycin  Primary care physician at that time indicated to patient she needed to hydrate as she appeared to be dehydrated  As noted above patient's daughter found her to be diaphoretic and short of breath on day of admission  MRSA screen negative, vancomycin discontinued  Acapella valve ordered to help mobilize secretions  Xopenex nebulizers ordered  Tessalon Perles ordered for cough  Continue to use incentive spirometer  Continue supplemental oxygen to keep O2 sats greater than 90%  Encourage out of bed for all meals and as much as possible  Sputum for g stain C&S ordered  Urine antigens negative  May consider vest PT for patient  Repeat CBC in [facility] [place]

## 2020-01-29 NOTE — PLAN OF CARE
Problem: Potential for Falls  Goal: Patient will remain free of falls  Description  INTERVENTIONS:  - Assess patient frequently for physical needs  -  Identify cognitive and physical deficits and behaviors that affect risk of falls    -  [place] fall precautions as indicated by assessment   - Educate patient/family on patient safety including physical limitations  - Instruct patient to call for assistance with activity based on assessment  - Modify environment to reduce risk of injury  - Consider OT/PT consult to assist with strengthening/mobility  Outcome: Progressing     Problem: RESPIRATORY - ADULT  Goal: Achieves optimal ventilation and oxygenation  Description  INTERVENTIONS:  - Assess for changes in respiratory status  - Assess for changes in mentation and behavior  - Position to facilitate oxygenation and minimize respiratory effort  - Oxygen administered by appropriate delivery if ordered  - Encourage broncho-pulmonary hygiene including cough, deep breathe, Incentive Spirometry  - Assess the need for suctioning and aspirate as needed  - Assess and instruct to report SOB or any respiratory difficulty  - Respiratory Therapy support as indicated   Outcome: Progressing     Problem: Prexisting or High Potential for Compromised Skin Integrity  Goal: Skin integrity is maintained or improved  Description  INTERVENTIONS:  - Identify patients at risk for skin breakdown  - Assess and monitor skin integrity  - Assess and monitor nutrition and hydration status  - Monitor labs   - Assess for incontinence   - Turn and reposition patient  - Assist with mobility/ambulation  - Relieve pressure over bony prominences  - Avoid friction and shearing  - Provide appropriate hygiene as needed including keeping skin clean and dry  - Evaluate need for skin moisturizer/barrier cream  - Collaborate with interdisciplinary team   - Patient/family teaching  - Consider wound care consult   Outcome: Progressing     Problem: INFECTION - ADULT  Goal: Absence or prevention of progression during hospitalization  Description  INTERVENTIONS:  - Assess and monitor for signs and symptoms of infection  - Monitor lab/diagnostic results  - Monitor all insertion sites, i e  indwelling lines, tubes, and drains  - Monitor endotracheal if appropriate and nasal secretions for changes in amount and color  - [place] appropriate cooling/warming therapies per order  - Administer medications as ordered  - Instruct and encourage patient and family to use good hand hygiene technique  - Identify and instruct in appropriate isolation precautions for identified infection/condition  Outcome: Progressing     Problem: SAFETY ADULT  Goal: Maintain or return to baseline ADL function  Description  INTERVENTIONS:  -  Assess patient's ability to carry out ADLs; assess patient's baseline for ADL function and identify physical deficits which impact ability to perform ADLs (bathing, care of mouth/teeth, toileting, grooming, dressing, etc )  - Assess/evaluate cause of self-care deficits   - Assess range of motion  - Assess patient's mobility; develop plan if impaired  - Assess patient's need for assistive devices and provide as appropriate  - Encourage maximum independence but intervene and supervise when necessary  - Involve family in performance of ADLs  - Assess for home care needs following discharge   - Consider OT consult to assist with ADL evaluation and planning for discharge  - Provide patient education as appropriate  Outcome: Progressing  Goal: Maintain or return mobility status to optimal level  Description  INTERVENTIONS:  - Assess patient's baseline mobility status (ambulation, transfers, stairs, etc )    - Identify cognitive and physical deficits and behaviors that affect mobility  - Identify mobility aids required to assist with transfers and/or ambulation (gait belt, sit-to-stand, lift, walker, cane, etc )  - [place] fall precautions as indicated by assessment  - Record patient progress and toleration of activity level on Mobility SBAR; progress patient to next Phase/Stage  - Instruct patient to call for assistance with activity based on assessment  - Consider rehabilitation consult to assist with strengthening/weightbearing, etc   Outcome: Progressing     Problem: DISCHARGE PLANNING  Goal: Discharge to home or other facility with appropriate resources  Description  INTERVENTIONS:  - Identify barriers to discharge w/patient and caregiver  - Arrange for needed discharge resources and transportation as appropriate  - Identify discharge learning needs (meds, wound care, etc )  - Arrange for interpretive services to assist at discharge as needed  - Refer to Case Management Department for coordinating discharge planning if the patient needs post-hospital services based on physician/advanced practitioner order or complex needs related to functional status, cognitive ability, or social support system  Outcome: Progressing     Problem: GASTROINTESTINAL - ADULT  Goal: Minimal or absence of nausea and/or vomiting  Description  INTERVENTIONS:  - Administer IV fluids if ordered to ensure adequate hydration  - Maintain NPO status until nausea and vomiting are resolved  - Nasogastric tube if ordered  - Administer ordered antiemetic medications as needed  - Provide nonpharmacologic comfort measures as appropriate  - Advance diet as tolerated, if ordered  - Consider nutrition services referral to assist patient with adequate nutrition and appropriate food choices  Outcome: Progressing  Goal: Maintains or returns to baseline bowel function  Description  INTERVENTIONS:  - Assess bowel function  - Encourage oral fluids to ensure adequate hydration  - Administer IV fluids if ordered to ensure adequate hydration  - Administer ordered medications as needed  - Encourage mobilization and activity  - Consider nutritional services referral to assist patient with adequate nutrition and appropriate food choices  Outcome: Progressing  Goal: Maintains adequate nutritional intake  Description  INTERVENTIONS:  - Monitor percentage of each meal consumed  - Identify factors contributing to decreased intake, treat as appropriate  - Assist with meals as needed  - Monitor I&O, weight, and lab values if indicated  - Obtain nutrition services referral as needed  Outcome: Progressing  Goal: Establish and maintain optimal ostomy function  Description  INTERVENTIONS:  - Assess bowel function  - Encourage oral fluids to ensure adequate hydration  - Administer IV fluids if ordered to ensure adequate hydration   - Administer ordered medications as needed  - Encourage mobilization and activity  - Nutrition services referral to assist patient with appropriate food choices  - Assess stoma site  - Consider wound care consult   Outcome: Progressing     Problem: METABOLIC, FLUID AND ELECTROLYTES - ADULT  Goal: Electrolytes maintained within normal limits  Description  INTERVENTIONS:  - Monitor labs and assess patient for signs and symptoms of electrolyte imbalances  - Administer electrolyte replacement as ordered  - Monitor response to electrolyte replacements, including repeat lab results as appropriate  - Instruct patient on fluid and nutrition as appropriate  Outcome: Progressing  Goal: Fluid balance maintained  Description  INTERVENTIONS:  - Monitor labs   - Monitor I/O and WT  - Instruct patient on fluid and nutrition as appropriate  - Assess for signs & symptoms of volume excess or deficit  Outcome: Progressing  Goal: Glucose maintained within target range  Description  INTERVENTIONS:  - Monitor Blood Glucose as ordered  - Assess for signs and symptoms of hyperglycemia and hypoglycemia  - Administer ordered medications to maintain glucose within target range  - Assess nutritional intake and initiate nutrition service referral as needed  Outcome: Progressing

## 2020-01-29 NOTE — PROGRESS NOTES
Vancomycin IV Pharmacy-to-Dose Consultation    Radha Crockett is a 80 y o  female who is currently receiving Vancomycin IV with management by the Pharmacy Consult service  Assessment/Plan:  The patient was reviewed  Renal function is stable and no signs or symptoms of nephrotoxicity and/or infusion reactions were documented in the chart  Based on todays assessment, continue current vancomycin (day # 3) dosing of 1000mg IV q24h, with a plan for trough to be drawn at 2100 on 01/30/2020  We will continue to follow the patients culture results and clinical progress daily      Ralph Muñoz, Pharmacist

## 2020-01-29 NOTE — ASSESSMENT & PLAN NOTE
Impression: Puckering of macula, left eye: H35.372. OS. Condition: stable. Plan: Discussed diagnosis in detail with patient. No treatment is required at this time. Will continue to observe condition and or symptoms. OCT OS shows edema w/ERM OS. Patient has a history of ulcerative colitis  Has had colostomy in place for many years  Stoma pink  Colostomy draining brown stool

## 2020-01-29 NOTE — RESPIRATORY THERAPY NOTE
RT Protocol Note  Sohail Vitale 80 y o  female MRN: [id number]  Unit/Bed#: ICU 11 Encounter: [id number]    Assessment    Principal Problem:    Acute respiratory failure with hypoxia and hypercapnia (HCC)  Active Problems:    Benign essential hypertension    CAD (coronary artery disease)    GERD (gastroesophageal reflux disease)    Hypothyroidism    Chronic venous insufficiency    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([place] [address] )    HCAP (healthcare-associated pneumonia)    Dehydration      Home Pulmonary Medications:  atrovent inhaler  Home Devices/Therapy: Other (Comment)(none per patient)    Past Medical History:   Diagnosis Date    Adenocarcinoma of breast ([place] [address] ) 9/4/2012    Procedure/Onset: 12/07/2005    Anxiety 2/28/2018    Arthritis     joints    Asymptomatic varicose veins of bilateral lower extremities 10/14/2016    Benign colon polyp 2/17/2014    Breast cancer ([place] [address] ) 2005    right    Cancer ([place] [address] )     breast ca, skin ca, currently in remission, tumors in chest wall    Cardiac disease     hx MI x 2    Chronic pain disorder     knees    Chronic rhinitis 9/30/2013    Chronic venous insufficiency 5/22/2018    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) 9/10/2019    S/p Mindy's procedure with the end colostomy    Complete uterovaginal prolapse 11/19/2015    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    Procedure/Onset: 05/26/2006    Coronary artery disease     Disease of thyroid gland     hypothyroidism    Gait disturbance 12/19/2016    GERD (gastroesophageal reflux disease)     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    Procedure/Onset: 02/22/2007    History of prolapse of bladder     History of radiation therapy 2005    to breast right    Hypertension     Irritable bowel syndrome 9/4/2012    Procedure/Onset: 09/23/2010    Kidney stone     2016    Myocardial infarction ([place] [address] )     1990's x2    Nephrolithiasis 2/26/2016    Osteoarthritis of left knee 10/19/2016    Perforation of sigmoid colon due to diverticulitis 5/9/2019 Added automatically from request for surgery [id number]    Peripheral vertigo 2012    Procedure/Onset: 2007    Psoriasis 2016    Rectocele 3/10/2016    Last Assessment & Plan:  As below   Stress incontinence in female 2016     Social History     Socioeconomic History    Marital status:      Spouse name: None    Number of children: None    Years of education: None    Highest education level: None   Occupational History    None   Social Needs    Financial resource strain: None    Food insecurity:     Worry: None     Inability: None    Transportation needs:     Medical: None     Non-medical: None   Tobacco Use    Smoking status: Former Smoker     Packs/day: 0 10     Last attempt to quit: 1970     Years since quittin 1    Smokeless tobacco: Never Used   Substance and Sexual Activity    Alcohol use:  Yes     Alcohol/week: 1 0 standard drinks     Types: 1 Glasses of wine per week     Frequency: 4 or more times a week     Drinks per session: 1 or 2     Comment: 1 glass of wine before bed    Drug use: Never    Sexual activity: Not Currently   Lifestyle    Physical activity:     Days per week: None     Minutes per session: None    Stress: None   Relationships    Social connections:     Talks on phone: None     Gets together: None     Attends Yazidism service: None     Active member of club or organization: None     Attends meetings of clubs or organizations: None     Relationship status: None    Intimate partner violence:     Fear of current or ex partner: None     Emotionally abused: None     Physically abused: None     Forced sexual activity: None   Other Topics Concern    None   Social History Narrative    Cultural background: Non-    Primary spoken-language English    Racial background: white    Retired       Subjective    Subjective Data: pt states breathing is ok    Objective    Physical Exam:   Assessment Type: Pre-treatment  General Appearance: Alert, Awake  Respiratory Pattern: Tachypneic  Chest Assessment: Chest expansion symmetrical  Bilateral Breath Sounds: Rhonchi, Expiratory wheezes, Coarse  R Breath Sounds: Rhonchi, Expiratory wheezes, Coarse  L Breath Sounds: Rhonchi, Expiratory wheezes, Coarse  Location Specific: No  Cough: Non-productive, Congested, Strong  O2 Device: nc    Vitals:  Blood pressure 152/67, pulse 80, temperature 98 6 °F (37 °C), temperature source Temporal, resp  rate (!) 26, height 5' 2" (1 575 m), weight 75 1 kg (165 lb 9 oz), SpO2 (!) 85 %, not currently breastfeeding  Results from last 7 days   Lab Units 01/27/20  1938   PH ART  7 340*   PCO2 ART mm Hg 41 2   PO2 ART mm Hg 82 9   HCO3 ART mmol/L 21 7*   BASE EXC ART mmol/L -3 8   O2 CONTENT ART mL/dL 18 4   O2 HGB, ARTERIAL % 94 8   ABG SOURCE  Radial, Right   EULALIA TEST  Yes       Imaging and other studies: I have personally reviewed pertinent reports        O2 Device: nc     Plan    Respiratory Plan: No distress/Pulmonary history        Resp Comments: no distress noted

## 2020-01-30 ENCOUNTER — APPOINTMENT (INPATIENT)
Dept: RADIOLOGY | Facility: HOSPITAL | Age: 85
DRG: 871 | End: 2020-01-30
Payer: MEDICARE

## 2020-01-30 PROBLEM — E86.0 DEHYDRATION: Status: RESOLVED | Noted: 2020-01-27 | Resolved: 2020-01-30

## 2020-01-30 LAB
ATRIAL RATE: 99 BPM
GLUCOSE SERPL-MCNC: 114 MG/DL (ref 65–140)
GLUCOSE SERPL-MCNC: 159 MG/DL (ref 65–140)
GLUCOSE SERPL-MCNC: 191 MG/DL (ref 65–140)
GLUCOSE SERPL-MCNC: 258 MG/DL (ref 65–140)
P AXIS: 63 DEGREES
PR INTERVAL: 214 MS
QRS AXIS: 29 DEGREES
QRSD INTERVAL: 98 MS
QT INTERVAL: 340 MS
QTC INTERVAL: 436 MS
T WAVE AXIS: 31 DEGREES
VENTRICULAR RATE: 99 BPM

## 2020-01-30 PROCEDURE — 94668 MNPJ CHEST WALL SBSQ: CPT

## 2020-01-30 PROCEDURE — 99232 SBSQ HOSP IP/OBS MODERATE 35: CPT | Performed by: NURSE PRACTITIONER

## 2020-01-30 PROCEDURE — 97535 SELF CARE MNGMENT TRAINING: CPT

## 2020-01-30 PROCEDURE — 93010 ELECTROCARDIOGRAM REPORT: CPT | Performed by: INTERNAL MEDICINE

## 2020-01-30 PROCEDURE — 82948 REAGENT STRIP/BLOOD GLUCOSE: CPT

## 2020-01-30 PROCEDURE — 94760 N-INVAS EAR/PLS OXIMETRY 1: CPT

## 2020-01-30 PROCEDURE — 71250 CT THORAX DX C-: CPT

## 2020-01-30 RX ORDER — LANOLIN ALCOHOL/MO/W.PET/CERES
3 CREAM (GRAM) TOPICAL
Status: DISCONTINUED | OUTPATIENT
Start: 2020-01-30 | End: 2020-02-01 | Stop reason: HOSPADM

## 2020-01-30 RX ADMIN — LEVOTHYROXINE SODIUM 50 MCG: 50 TABLET ORAL at 08:18

## 2020-01-30 RX ADMIN — METHYLPREDNISOLONE SODIUM SUCCINATE 20 MG: 40 INJECTION, POWDER, FOR SOLUTION INTRAMUSCULAR; INTRAVENOUS at 06:17

## 2020-01-30 RX ADMIN — PANTOPRAZOLE SODIUM 40 MG: 40 TABLET, DELAYED RELEASE ORAL at 06:17

## 2020-01-30 RX ADMIN — METOPROLOL TARTRATE 25 MG: 25 TABLET ORAL at 08:19

## 2020-01-30 RX ADMIN — INSULIN LISPRO 3 UNITS: 100 INJECTION, SOLUTION INTRAVENOUS; SUBCUTANEOUS at 17:16

## 2020-01-30 RX ADMIN — CLOPIDOGREL BISULFATE 75 MG: 75 TABLET ORAL at 08:17

## 2020-01-30 RX ADMIN — INSULIN LISPRO 3 UNITS: 100 INJECTION, SOLUTION INTRAVENOUS; SUBCUTANEOUS at 13:37

## 2020-01-30 RX ADMIN — METHYLPREDNISOLONE SODIUM SUCCINATE 20 MG: 40 INJECTION, POWDER, FOR SOLUTION INTRAMUSCULAR; INTRAVENOUS at 21:09

## 2020-01-30 RX ADMIN — CEFEPIME HYDROCHLORIDE 2000 MG: 2 INJECTION, POWDER, FOR SOLUTION INTRAVENOUS at 20:55

## 2020-01-30 RX ADMIN — METHYLPREDNISOLONE SODIUM SUCCINATE 20 MG: 40 INJECTION, POWDER, FOR SOLUTION INTRAMUSCULAR; INTRAVENOUS at 13:36

## 2020-01-30 RX ADMIN — ISOSORBIDE MONONITRATE 30 MG: 30 TABLET, EXTENDED RELEASE ORAL at 08:18

## 2020-01-30 RX ADMIN — EZETIMIBE: 10 TABLET ORAL at 21:09

## 2020-01-30 RX ADMIN — DILTIAZEM HYDROCHLORIDE 180 MG: 180 CAPSULE, COATED, EXTENDED RELEASE ORAL at 08:17

## 2020-01-30 RX ADMIN — HYDROXYUREA 500 MG: 500 CAPSULE ORAL at 08:20

## 2020-01-30 RX ADMIN — CEFEPIME HYDROCHLORIDE 2000 MG: 2 INJECTION, POWDER, FOR SOLUTION INTRAVENOUS at 08:45

## 2020-01-30 RX ADMIN — ASPIRIN 81 MG 81 MG: 81 TABLET ORAL at 08:19

## 2020-01-30 RX ADMIN — BENZONATATE 100 MG: 100 CAPSULE ORAL at 13:37

## 2020-01-30 RX ADMIN — HEPARIN SODIUM 5000 UNITS: 5000 INJECTION INTRAVENOUS; SUBCUTANEOUS at 06:15

## 2020-01-30 RX ADMIN — HEPARIN SODIUM 5000 UNITS: 5000 INJECTION INTRAVENOUS; SUBCUTANEOUS at 13:37

## 2020-01-30 RX ADMIN — HEPARIN SODIUM 5000 UNITS: 5000 INJECTION INTRAVENOUS; SUBCUTANEOUS at 21:09

## 2020-01-30 RX ADMIN — METOPROLOL TARTRATE 25 MG: 25 TABLET ORAL at 21:09

## 2020-01-30 NOTE — PROGRESS NOTES
Progress Note - Holliday Buncombe 3/7/1928, 80 y o  female MRN: [id number]    Unit/Bed#: [address] [place] [address] Encounter: [id number]    Primary Care Provider: Abbey Christian MD   Date and time admitted to hospital: 1/27/2020  6:19 PM        * Acute respiratory failure with hypoxia and hypercapnia (HCC)  Assessment & Plan  Acute respiratory failure with hypoxia and hypercapnia in the setting of multifocal pneumonia   ABGs performed in emergency department 7 35/40/82/21   Patient was subsequently admitted to ICU, now on telemetry unit  Continue IV cefepime  Follow-up CT of the chest  Continue supplemental oxygen during the day and BiPAP as needed at nighttime  Continue bronchodilators, encourage incentive spirometer, encourage use of Acapella valve     HCAP (healthcare-associated pneumonia)  Assessment & Plan  Healthcare associated pneumonia being treated with broad-spectrum antibiotics   Patient had recent hospitalization from 01/18 through 01/20/2020  MRSA screen negative, vancomycin discontinued  Continue IV Cefepime   Check CT chest as patient has wheezing and rales   Acapella valve to help mobilize secretions  Xopenex nebulizers   Tessalon Perles for cough  Continue to use incentive spirometer  Continue supplemental oxygen to keep O2 sats greater than 90%  Encourage out of bed for all meals and as much as possible  May consider vest PT for patient  Repeat CBC in am    CAD (coronary artery disease)  Assessment & Plan  Patient has a history of known coronary artery disease  Recently underwent cardiac catheterization on 01/20/2020, significant stenosis noted  Being managed medically at this time  Patient denies any chest pain  Benign essential hypertension  Assessment & Plan  Will continue patient's home medications of Cardizem and metoprolol with holding parameters  Monitor BP     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[facility])  Assessment & Plan  Patient has a history of ulcerative colitis    Has had colostomy in place for many years   Stoma pink  Colostomy draining brown stool  Chronic venous insufficiency  Assessment & Plan  Elevate legs when out of bed  No edema noted at this time  Hypothyroidism  Assessment & Plan  Continue levothyroxine 50 mcg po daily    GERD (gastroesophageal reflux disease)  Assessment & Plan  Continue patient's home medication of Protonix 40 mg po daily  Dehydrationresolved as of 2020  Assessment & Plan  Resolved with IV hydration    VTE Pharmacologic Prophylaxis:   Pharmacologic: Heparin  Mechanical VTE Prophylaxis in Place: Yes    Patient Centered Rounds: I have performed bedside rounds with nursing staff today  Discussions with Specialists or Other Care Team Provider: Nursing, CM     Education and Discussions with Family / Patient: I have answered all questions to the best of my ability  Daughter at bedside  Time Spent for Care: 20 minutes  More than 50% of total time spent on counseling and coordination of care as described above  Current Length of Stay: 3 day(s)    Current Patient Status: Inpatient   Certification Statement: The patient will continue to require additional inpatient hospital stay due to multifocal pneumonia on IV ABX requiring CT chest    Discharge Plan: Likely in 24-48 hours to STR    Code Status: Level 2 - DNAR: but accepts endotracheal intubation      Subjective:   Resting comfortably OOB in chair with feet elevated  Overall, feels a little improved after starting IV steroids but continues with a moist, harsh cough  Denies HA, dizziness, lightheadedness, CP  Ambulating to the bathroom  Objective:     Vitals:   Temp (24hrs), Av 6 °F (36 4 °C), Min:97 5 °F (36 4 °C), Max:97 7 °F (36 5 °C)    Temp:  [97 5 °F (36 4 °C)-97 7 °F (36 5 °C)] 97 7 °F (36 5 °C)  HR:  [59-84] 59  Resp:  [14-25] 14  BP: (129-174)/(60-98) 144/68  SpO2:  [96 %-98 %] 97 %  Body mass index is 29 44 kg/m²  Input and Output Summary (last 24 hours):        Intake/Output Summary (Last 24 hours) at 1/30/2020 1559  Last data filed at 1/30/2020 1000  Gross per 24 hour   Intake 120 ml   Output 300 ml   Net -180 ml       Physical Exam:     Physical Exam   Constitutional: She is oriented to person, place, and time  She appears well-developed  She appears cachectic  She is cooperative  She appears ill (Acutely ill-appearing)  No distress  Pleasant female resting comfortably OOB in chair, O2 at 2 LPM   HENT:   Head: Normocephalic  Neck: Normal range of motion  Cardiovascular: Normal rate  Pulmonary/Chest: Accessory muscle usage (mild) present  No tachypnea  No respiratory distress  She has decreased breath sounds in the right lower field and the left lower field  She has wheezes in the right lower field and the left lower field  She has no rhonchi  She has rales in the right lower field and the left lower field  Abdominal: Soft  Bowel sounds are normal  She exhibits no distension  There is no tenderness  Colostomy bag in place   Musculoskeletal: Normal range of motion  She exhibits no edema or tenderness  Neurological: She is alert and oriented to person, place, and time  Skin: Skin is warm and dry  Capillary refill takes less than 2 seconds  She is not diaphoretic  Psychiatric: She has a normal mood and affect  Judgment normal    Nursing note and vitals reviewed  Additional Data:     Labs:    Results from last 7 days   Lab Units 01/29/20  0956  01/27/20  1826   WBC Thousand/uL 4 69   < > 12 32*   HEMOGLOBIN g/dL 13 8   < > 16 4*   HEMATOCRIT % 41 1   < > 49 0*   PLATELETS Thousands/uL 260   < > 378   NEUTROS PCT %  --   --  72   LYMPHS PCT %  --   --  17   LYMPHO PCT % 22   < >  --    MONOS PCT %  --   --  8   MONO PCT % 5   < >  --    EOS PCT % 3   < > 0    < > = values in this interval not displayed       Results from last 7 days   Lab Units 01/29/20  0956  01/27/20  1826   POTASSIUM mmol/L 3 7   < > 4 5   CHLORIDE mmol/L 101   < > 94*   CO2 mmol/L 30   < > 27   BUN mg/dL 13   < > 17   CREATININE mg/dL 1 04   < > 1 06   CALCIUM mg/dL 8 0*   < > 8 8   ALK PHOS U/L  --   --  96   ALT U/L  --   --  52   AST U/L  --   --  82*    < > = values in this interval not displayed  Results from last 7 days   Lab Units 01/27/20  1826   INR  1 10*       * I Have Reviewed All Lab Data Listed Above  * Additional Pertinent Lab Tests Reviewed: All Labs Within Last 24 Hours Reviewed    Imaging:    Imaging Reports Reviewed Today Include: CXR  Imaging Personally Reviewed by Myself Includes:  None    Recent Cultures (last 7 days):     Results from last 7 days   Lab Units 01/29/20  1120 01/28/20  0549 01/27/20  1829 01/27/20 1826   BLOOD CULTURE   --   --  No Growth at 48 hrs  No Growth at 48 hrs     SPUTUM CULTURE  Culture too young- will reincubate  --   --   --    GRAM STAIN RESULT  2+ Epithelial cells per low power field*  Rare Polys*  1+ Gram negative rods*  --   --   --    LEGIONELLA URINARY ANTIGEN   --  Negative  --   --        Last 24 Hours Medication List:     Current Facility-Administered Medications:  acetaminophen 650 mg Oral Q6H PRN Britta Robert MD    aspirin 81 mg Oral QAM Britta Robert MD    benzonatate 100 mg Oral TID PRN Deyanira Nayak DO    cefepime 2,000 mg Intravenous Q12H Britta Robert MD Last Rate: 2,000 mg (01/30/20 0845)   clopidogrel 75 mg Oral Daily Britta Robert MD    diltiazem 180 mg Oral Daily Britta Robert MD    ezetimibe 10 mg-pravastatin 80 mg combo dose  Oral HS Britta Robert MD    heparin (porcine) 5,000 Units Subcutaneous [facility] Britta Robert MD    hydroxyurea 500 mg Oral Daily Britta Robert MD    insulin lispro 1-6 Units Subcutaneous 4x Daily (AC & HS) Chico Bonner, DO    insulin lispro 3 Units Subcutaneous TID With Meals Edrie Filter, GLORIA    isosorbide mononitrate 30 mg Oral Daily Britta Robert MD    Labetalol HCl 10 mg Intravenous Q4H PRN Chico Bonner, DO    levalbuterol 0 63 mg Nebulization Q8H PRN Chico Bonner DO    levothyroxine 50 mcg Oral QAM Eva Betancourt MD    methylPREDNISolone sodium succinate 20 mg Intravenous [facility] Chico Bonner DO    metoprolol tartrate 25 mg Oral Q12H Tia Ritter MD    pantoprazole 40 mg Oral Early Morning Eva Betancourt MD         Today, Patient Was Seen By: GLORIA Plmumer    ** Please Note: Dictation voice to text software may have been used in the creation of this document   **

## 2020-01-30 NOTE — ASSESSMENT & PLAN NOTE
Will continue patient's home medications of Cardizem and metoprolol with holding parameters  Monitor BP

## 2020-01-30 NOTE — OCCUPATIONAL THERAPY NOTE
OT Treatment Note       01/30/20 7640   Restrictions/Precautions   Other Precautions Cognitive; Chair Alarm; Bed Alarm; Fall Risk   Pain Assessment   Pain Assessment No/denies pain   Pain Score No Pain   ADL   Grooming Assistance 5  Supervision/Setup   Grooming Deficit Brushing hair;Wash/dry hands   Grooming Comments While seated, use of comb; wash hands standing unsupported to sink   Toileting Assistance  5  Supervision/Setup   Transfers   Sit to Stand 5  Supervision   Stand to Sit 5  Supervision   Functional Mobility   Functional Mobility 4  Minimal assistance   Additional Comments Ambulated to/from bathroom with hand held assist   Toilet Transfers   Toilet Transfer Type To and from   Toilet Transfer to Standard toilet   Toilet Transfer Technique Ambulating   Toilet Transfers Minimal assistance   Toilet Transfers Comments Hand held assist, min VCs for safety awareness   Cognition   Arousal/Participation Alert; Cooperative   Following Commands Follows one step commands with increased time or repetition   Comments Patient confused at times, requires increased cues for safety   Activity Tolerance   Activity Tolerance Patient tolerated treatment well   Assessment   Assessment Patient seen for occupational therapy treatment today  Patient is able to perform ADLs and functional transfers/mobility with min assist, with hand held assist  Patient is progressing towards OT goals   Continue OT per POC   Recommendation   OT Discharge Recommendation 24 hour supervision/assist   Licensure   [place] License Number  ALIX Corbett/ELI [id number]

## 2020-01-30 NOTE — ASSESSMENT & PLAN NOTE
Patient has a history of ulcerative colitis  Has had colostomy in place for many years  Stoma pink  Colostomy draining brown stool

## 2020-01-30 NOTE — NURSING NOTE
Report received from ICU nurse  Patient transferred to room 408  Patient now resting comfortably in bed

## 2020-01-30 NOTE — ASSESSMENT & PLAN NOTE
Healthcare associated pneumonia being treated with broad-spectrum antibiotics   Patient had recent hospitalization from 01/18 through 01/20/2020  MRSA screen negative, vancomycin discontinued  Continue IV Cefepime   Check CT chest as patient has wheezing and rales   Acapella valve to help mobilize secretions  Xopenex nebulizers   Tessalon Perles for cough  Continue to use incentive spirometer  Continue supplemental oxygen to keep O2 sats greater than 90%  Encourage out of bed for all meals and as much as possible  May consider vest PT for patient    Repeat CBC in am

## 2020-01-30 NOTE — ASSESSMENT & PLAN NOTE
Acute respiratory failure with hypoxia and hypercapnia in the setting of multifocal pneumonia   ABGs performed in emergency department 7 35/40/82/21   Patient was subsequently admitted to ICU, now on telemetry unit  Continue IV cefepime  Follow-up CT of the chest  Continue supplemental oxygen during the day and BiPAP as needed at nighttime  Continue bronchodilators, encourage incentive spirometer, encourage use of Acapella valve

## 2020-01-31 LAB
ANION GAP SERPL CALCULATED.3IONS-SCNC: 6 MMOL/L (ref 4–13)
BACTERIA SPT RESP CULT: ABNORMAL
BACTERIA SPT RESP CULT: ABNORMAL
BASOPHILS # BLD AUTO: 0.01 THOUSANDS/ΜL (ref 0–0.1)
BASOPHILS NFR BLD AUTO: 0 % (ref 0–1)
BUN SERPL-MCNC: 28 MG/DL (ref 5–25)
CALCIUM SERPL-MCNC: 9.2 MG/DL (ref 8.3–10.1)
CHLORIDE SERPL-SCNC: 104 MMOL/L (ref 100–108)
CO2 SERPL-SCNC: 31 MMOL/L (ref 21–32)
CREAT SERPL-MCNC: 1.02 MG/DL (ref 0.6–1.3)
EOSINOPHIL # BLD AUTO: 0 THOUSAND/ΜL (ref 0–0.61)
EOSINOPHIL NFR BLD AUTO: 0 % (ref 0–6)
ERYTHROCYTE [DISTWIDTH] IN BLOOD BY AUTOMATED COUNT: 13.9 % (ref 11.6–15.1)
GFR SERPL CREATININE-BSD FRML MDRD: 48 ML/MIN/1.73SQ M
GLUCOSE SERPL-MCNC: 150 MG/DL (ref 65–140)
GLUCOSE SERPL-MCNC: 151 MG/DL (ref 65–140)
GLUCOSE SERPL-MCNC: 169 MG/DL (ref 65–140)
GLUCOSE SERPL-MCNC: 182 MG/DL (ref 65–140)
GLUCOSE SERPL-MCNC: 188 MG/DL (ref 65–140)
GLUCOSE SERPL-MCNC: 231 MG/DL (ref 65–140)
GRAM STN SPEC: ABNORMAL
HCT VFR BLD AUTO: 41.6 % (ref 34.8–46.1)
HGB BLD-MCNC: 13.8 G/DL (ref 11.5–15.4)
IMM GRANULOCYTES # BLD AUTO: 0.05 THOUSAND/UL (ref 0–0.2)
IMM GRANULOCYTES NFR BLD AUTO: 1 % (ref 0–2)
LYMPHOCYTES # BLD AUTO: 0.82 THOUSANDS/ΜL (ref 0.6–4.47)
LYMPHOCYTES NFR BLD AUTO: 9 % (ref 14–44)
MAGNESIUM SERPL-MCNC: 2.1 MG/DL (ref 1.6–2.6)
MCH RBC QN AUTO: 31.4 PG (ref 26.8–34.3)
MCHC RBC AUTO-ENTMCNC: 33.2 G/DL (ref 31.4–37.4)
MCV RBC AUTO: 95 FL (ref 82–98)
MONOCYTES # BLD AUTO: 0.22 THOUSAND/ΜL (ref 0.17–1.22)
MONOCYTES NFR BLD AUTO: 3 % (ref 4–12)
NEUTROPHILS # BLD AUTO: 7.78 THOUSANDS/ΜL (ref 1.85–7.62)
NEUTS SEG NFR BLD AUTO: 87 % (ref 43–75)
NRBC BLD AUTO-RTO: 0 /100 WBCS
PHOSPHATE SERPL-MCNC: 3.6 MG/DL (ref 2.3–4.1)
PLATELET # BLD AUTO: 339 THOUSANDS/UL (ref 149–390)
PMV BLD AUTO: 11 FL (ref 8.9–12.7)
POTASSIUM SERPL-SCNC: 4.8 MMOL/L (ref 3.5–5.3)
PROCALCITONIN SERPL-MCNC: 0.24 NG/ML
RBC # BLD AUTO: 4.4 MILLION/UL (ref 3.81–5.12)
SODIUM SERPL-SCNC: 141 MMOL/L (ref 136–145)
WBC # BLD AUTO: 8.88 THOUSAND/UL (ref 4.31–10.16)

## 2020-01-31 PROCEDURE — 82948 REAGENT STRIP/BLOOD GLUCOSE: CPT

## 2020-01-31 PROCEDURE — 94760 N-INVAS EAR/PLS OXIMETRY 1: CPT

## 2020-01-31 PROCEDURE — 99232 SBSQ HOSP IP/OBS MODERATE 35: CPT | Performed by: NURSE PRACTITIONER

## 2020-01-31 PROCEDURE — 85025 COMPLETE CBC W/AUTO DIFF WBC: CPT | Performed by: NURSE PRACTITIONER

## 2020-01-31 PROCEDURE — 80048 BASIC METABOLIC PNL TOTAL CA: CPT | Performed by: NURSE PRACTITIONER

## 2020-01-31 PROCEDURE — 97530 THERAPEUTIC ACTIVITIES: CPT

## 2020-01-31 PROCEDURE — 84145 PROCALCITONIN (PCT): CPT | Performed by: NURSE PRACTITIONER

## 2020-01-31 PROCEDURE — 94668 MNPJ CHEST WALL SBSQ: CPT

## 2020-01-31 PROCEDURE — 83735 ASSAY OF MAGNESIUM: CPT | Performed by: NURSE PRACTITIONER

## 2020-01-31 PROCEDURE — 84100 ASSAY OF PHOSPHORUS: CPT | Performed by: NURSE PRACTITIONER

## 2020-01-31 RX ADMIN — LEVOTHYROXINE SODIUM 50 MCG: 50 TABLET ORAL at 09:13

## 2020-01-31 RX ADMIN — HEPARIN SODIUM 5000 UNITS: 5000 INJECTION INTRAVENOUS; SUBCUTANEOUS at 22:01

## 2020-01-31 RX ADMIN — METOPROLOL TARTRATE 25 MG: 25 TABLET ORAL at 09:13

## 2020-01-31 RX ADMIN — METOPROLOL TARTRATE 25 MG: 25 TABLET ORAL at 20:58

## 2020-01-31 RX ADMIN — METHYLPREDNISOLONE SODIUM SUCCINATE 20 MG: 40 INJECTION, POWDER, FOR SOLUTION INTRAMUSCULAR; INTRAVENOUS at 22:00

## 2020-01-31 RX ADMIN — INSULIN LISPRO 3 UNITS: 100 INJECTION, SOLUTION INTRAVENOUS; SUBCUTANEOUS at 09:15

## 2020-01-31 RX ADMIN — PANTOPRAZOLE SODIUM 40 MG: 40 TABLET, DELAYED RELEASE ORAL at 05:22

## 2020-01-31 RX ADMIN — CLOPIDOGREL BISULFATE 75 MG: 75 TABLET ORAL at 09:13

## 2020-01-31 RX ADMIN — ISOSORBIDE MONONITRATE 30 MG: 30 TABLET, EXTENDED RELEASE ORAL at 09:14

## 2020-01-31 RX ADMIN — EZETIMIBE: 10 TABLET ORAL at 22:01

## 2020-01-31 RX ADMIN — CEFEPIME HYDROCHLORIDE 2000 MG: 2 INJECTION, POWDER, FOR SOLUTION INTRAVENOUS at 09:20

## 2020-01-31 RX ADMIN — HYDROXYUREA 500 MG: 500 CAPSULE ORAL at 09:15

## 2020-01-31 RX ADMIN — INSULIN LISPRO 3 UNITS: 100 INJECTION, SOLUTION INTRAVENOUS; SUBCUTANEOUS at 12:35

## 2020-01-31 RX ADMIN — HEPARIN SODIUM 5000 UNITS: 5000 INJECTION INTRAVENOUS; SUBCUTANEOUS at 05:18

## 2020-01-31 RX ADMIN — ASPIRIN 81 MG 81 MG: 81 TABLET ORAL at 09:14

## 2020-01-31 RX ADMIN — HEPARIN SODIUM 5000 UNITS: 5000 INJECTION INTRAVENOUS; SUBCUTANEOUS at 13:53

## 2020-01-31 RX ADMIN — METHYLPREDNISOLONE SODIUM SUCCINATE 20 MG: 40 INJECTION, POWDER, FOR SOLUTION INTRAMUSCULAR; INTRAVENOUS at 05:19

## 2020-01-31 RX ADMIN — INSULIN LISPRO 3 UNITS: 100 INJECTION, SOLUTION INTRAVENOUS; SUBCUTANEOUS at 17:22

## 2020-01-31 RX ADMIN — DILTIAZEM HYDROCHLORIDE 180 MG: 180 CAPSULE, COATED, EXTENDED RELEASE ORAL at 09:13

## 2020-01-31 RX ADMIN — BENZONATATE 100 MG: 100 CAPSULE ORAL at 10:36

## 2020-01-31 RX ADMIN — METHYLPREDNISOLONE SODIUM SUCCINATE 20 MG: 40 INJECTION, POWDER, FOR SOLUTION INTRAMUSCULAR; INTRAVENOUS at 13:54

## 2020-01-31 RX ADMIN — BENZONATATE 100 MG: 100 CAPSULE ORAL at 21:07

## 2020-01-31 RX ADMIN — CEFEPIME HYDROCHLORIDE 2000 MG: 2 INJECTION, POWDER, FOR SOLUTION INTRAVENOUS at 20:52

## 2020-01-31 NOTE — PROGRESS NOTES
Progress Note - Iliana Smiley 3/7/1928, 80 y o  female MRN: [id number]    Unit/Bed#: [address] [address] Encounter: [id number]    Primary Care Provider: Severa Comes, MD   Date and time admitted to hospital: 1/27/2020  6:19 PM        * Acute respiratory failure with hypoxia and hypercapnia (HCC)  Assessment & Plan  Acute respiratory failure with hypoxia and hypercapnia in the setting of multifocal pneumonia   ABGs performed in emergency department 7 35/40/82/21   Patient was subsequently admitted to ICU, now on telemetry unit  CT chest revealed diffuse bilateral patchy regions of airspace consolidation consistent with pneumonia  Continue IV cefepime, day #4  Continue supplemental oxygen during the day and BiPAP as needed at nighttime  Continue bronchodilators, encourage incentive spirometer, encourage use of Acapella valve     HCAP (healthcare-associated pneumonia)  Assessment & Plan  Healthcare associated pneumonia being treated with broad-spectrum antibiotics   Patient had recent hospitalization from 01/18 through 01/20/2020  CT chest: There are diffuse bilateral patchy regions of airspace consolidation consistent with pneumonia  MRSA screen negative, vancomycin discontinued  Continue IV Cefepime, day #4  Acapella valve to help mobilize secretions  Xopenex nebulizers   Tessalon Perles for cough  Continue to use incentive spirometer  Continue supplemental oxygen to keep O2 sats greater than 90%  Encourage out of bed for all meals and as much as possible  Procalcitonin improved from 0 77 -> 0 24  Plan to discharge to short-term rehab tomorrow on oral antibiotics    CAD (coronary artery disease)  Assessment & Plan  Patient has a history of known coronary artery disease  Recently underwent cardiac catheterization on 01/20/2020, significant stenosis noted  Being managed medically at this time  Patient denies any chest pain      Benign essential hypertension  Assessment & Plan  Will continue patient's home medications of Cardizem and metoprolol with holding parameters  Monitor BP     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[facility])  Assessment & Plan  Patient has a history of ulcerative colitis  Has had colostomy in place for many years  Stoma pink  Colostomy draining brown stool  Chronic venous insufficiency  Assessment & Plan  Elevate legs when out of bed  No edema noted at this time  Hypothyroidism  Assessment & Plan  Continue levothyroxine 50 mcg po daily    GERD (gastroesophageal reflux disease)  Assessment & Plan  Continue patient's home medication of Protonix 40 mg po daily  VTE Pharmacologic Prophylaxis:   Pharmacologic: Heparin  Mechanical VTE Prophylaxis in Place: Yes    Patient Centered Rounds: I have performed bedside rounds with nursing staff today  Discussions with Specialists or Other Care Team Provider: Nursing, CM     Education and Discussions with Family / Patient: I have answered all questions to the best of my ability  Daughter at bedside  Time Spent for Care: 20 minutes  More than 50% of total time spent on counseling and coordination of care as described above  Current Length of Stay: 4 day(s)    Current Patient Status: Inpatient   Certification Statement: The patient will continue to require additional inpatient hospital stay due to multifocal pneumonia on IV ABX and supportive care    Discharge Plan:  Planned discharge tomorrow to [address] [place] [address]     Code Status: Level 2 - DNAR: but accepts endotracheal intubation      Subjective:   Patient states her cough is bothering her today  She states his continues to be more and harsh  She states she coughed up a good amount of phlegm that had some blood streaks in it  She felt short of breath when she was coughing  She would like cough medication and close monitoring of this  She denies headache, dizziness, lightheadedness, chest pain, abdominal pain, vomiting, diarrhea      Objective:     Vitals:   Temp (24hrs), Av 4 °F (36 3 °C), Min:97 °F (36 1 °C), Max:97 8 °F (36 6 °C)    Temp:  [97 °F (36 1 °C)-97 8 °F (36 6 °C)] 97 8 °F (36 6 °C)  HR:  [60-82] 72  Resp:  [18-20] 20  BP: (152-163)/(68-75) 162/75  SpO2:  [95 %-99 %] 98 %  Body mass index is 29 44 kg/m²  Input and Output Summary (last 24 hours): Intake/Output Summary (Last 24 hours) at 1/31/2020 1530  Last data filed at 1/31/2020 1201  Gross per 24 hour   Intake 1055 ml   Output    Net 1055 ml       Physical Exam:     Physical Exam   Constitutional: She is oriented to person, place, and time  She appears well-developed  No distress  Pleasant female resting comfortably out of bed in chair on supplemental oxygen at 2 L   HENT:   Head: Normocephalic  Neck: Normal range of motion  Cardiovascular: Normal rate  Pulmonary/Chest: Effort normal  No accessory muscle usage  No tachypnea  No respiratory distress  She has decreased breath sounds in the right upper field, the right lower field, the left upper field and the left lower field  She has wheezes in the right lower field and the left lower field  She has rhonchi in the right lower field and the left lower field  She has no rales  Moist, harsh, wheezy cough   Abdominal: Soft  Bowel sounds are normal  She exhibits no distension  There is no tenderness  Musculoskeletal: Normal range of motion  She exhibits no edema  Neurological: She is alert and oriented to person, place, and time  Skin: Skin is warm and dry  Capillary refill takes less than 2 seconds  She is not diaphoretic  Psychiatric: She has a normal mood and affect  Judgment normal    Nursing note and vitals reviewed        Additional Data:     Labs:    Results from last 7 days   Lab Units 01/31/20  0508   WBC Thousand/uL 8 88   HEMOGLOBIN g/dL 13 8   HEMATOCRIT % 41 6   PLATELETS Thousands/uL 339   NEUTROS PCT % 87*   LYMPHS PCT % 9*   MONOS PCT % 3*   EOS PCT % 0     Results from last 7 days   Lab Units 01/31/20  0508  01/27/20  1826   POTASSIUM mmol/L 4 8   < > 4 5 CHLORIDE mmol/L 104   < > 94*   CO2 mmol/L 31   < > 27   BUN mg/dL 28*   < > 17   CREATININE mg/dL 1 02   < > 1 06   CALCIUM mg/dL 9 2   < > 8 8   ALK PHOS U/L  --   --  96   ALT U/L  --   --  52   AST U/L  --   --  82*    < > = values in this interval not displayed  Results from last 7 days   Lab Units 01/27/20  1826   INR  1 10*       * I Have Reviewed All Lab Data Listed Above  * Additional Pertinent Lab Tests Reviewed: All Labs Within Last 24 Hours Reviewed    Imaging:    Imaging Reports Reviewed Today Include: CXR, CT chest  Imaging Personally Reviewed by Myself Includes:  None    Recent Cultures (last 7 days):     Results from last 7 days   Lab Units 01/29/20  1120 01/28/20  0549 01/27/20  1829 01/27/20 1826   BLOOD CULTURE   --   --  No Growth at 72 hrs  No Growth at 72 hrs  SPUTUM CULTURE  1+ Growth of   Commensal respiratory miles only; No significant growth of Staph aureus/MRSA or Pseudomonas aeruginosa    --   --   --    GRAM STAIN RESULT  2+ Epithelial cells per low power field*  Rare Polys*  1+ Gram negative rods*  --   --   --    LEGIONELLA URINARY ANTIGEN   --  Negative  --   --        Last 24 Hours Medication List:     Current Facility-Administered Medications:  acetaminophen 650 mg Oral Q6H PRN Darwin Dhaliwal MD    aspirin 81 mg Oral QAM Darwin Dhaliwal MD    benzonatate 100 mg Oral TID PRN Jasper Florence DO    cefepime 2,000 mg Intravenous Q12H Darwin Dhaliwal MD Last Rate: 2,000 mg (01/31/20 0920)   clopidogrel 75 mg Oral Daily Darwin Dhaliwal MD    diltiazem 180 mg Oral Daily Darwin Dhaliwal MD    ezetimibe 10 mg-pravastatin 80 mg combo dose  Oral HS Darwin Dhaliwal MD    heparin (porcine) 5,000 Units Subcutaneous [facility] Darwin Dhaliwal MD    hydroxyurea 500 mg Oral Daily Darwin Dhaliwal MD    insulin lispro 1-6 Units Subcutaneous 4x Daily (AC & HS) Chico Bonner DO    insulin lispro 3 Units Subcutaneous TID With Meals GLORIA Dhillon    isosorbide mononitrate 30 mg Oral Daily Supriya Ruiz MD    Labetalol HCl 10 mg Intravenous Q4H PRN Ellen Cluas, DO    levalbuterol 0 63 mg Nebulization Q8H PRN Ellen Claus, DO    levothyroxine 50 mcg Oral QAM Supriya Ruiz MD    melatonin 3 mg Oral HS PRN Ellenrobert Devlin, DO    methylPREDNISolone sodium succinate 20 mg Intravenous [facility] Chico Bonner, DO    metoprolol tartrate 25 mg Oral Q12H Lamont Bee MD    pantoprazole 40 mg Oral Early Morning Supriya Ruiz MD         Today, Patient Was Seen By: GLORIA Lees    ** Please Note: Dictation voice to text software may have been used in the creation of this document   **

## 2020-01-31 NOTE — ASSESSMENT & PLAN NOTE
Healthcare associated pneumonia as patient had recent hospitalization 01/18 - 01/20/2020 for angina  CT chest: There are diffuse bilateral patchy regions of airspace consolidation consistent with pneumonia  MRSA screen negative, vancomycin discontinued  Procalcitonin improved from 0 77 -> 0 24  Urine antigens negative, Azithromycin discontinued   · Received IV Cefepime for 5 days  · Will transition to Ceftin 500 mg BID for 5 more doses to complete a 7 day course of therapy  · Continue Xopenex TID and Albuterol INH PRN  · Transition Solumedrol to Prednisone 20 mg daily x5 days  · Mucinex BID x5 days, Tessalon Perles PRN   · Continue to use incentive spirometer  · Continue supplemental oxygen to keep O2 sats greater than 90%

## 2020-01-31 NOTE — ASSESSMENT & PLAN NOTE
Patient has a history of diverticulitis with ruptured diverticulitis status post Mindy's procedure  · Continue colostomy care, monitor I&O

## 2020-01-31 NOTE — PHYSICAL THERAPY NOTE
PT TREATMENT     01/31/20 1555   Pain Assessment   Pain Assessment No/denies pain   Restrictions/Precautions   Other Precautions Pain; Fall Risk;O2;Bed Alarm; Chair Alarm   General   Chart Reviewed Yes   Cognition   Overall Cognitive Status [facility]   Arousal/Participation Cooperative; Alert   Subjective   Subjective "I'm going to rehab tomorrow"   Transfers   Sit to Stand 5  Supervision   Stand to Sit 5  Supervision   Stand pivot 5  Supervision   Ambulation/Elevation   Gait Assistance 5  Supervision   Assistive Device Rolling walker  (3L02)   Distance 3x100 feet   Assessment   Prognosis Good   Problem List Decreased strength;Decreased endurance; Impaired balance;Decreased mobility   Assessment Pt demonstrates improving endurance and function  Plan   Treatment/Interventions ADL retraining;Functional transfer training;LE strengthening/ROM; Therapeutic exercise; Endurance training;Gait training;Bed mobility; Patient/family training;Equipment eval/education   Progress Progressing toward goals   PT Frequency 5x/wk   Recommendation   Recommendation Short-term skilled PT   Equipment Recommended   (pt has a cane and a walker)   Licensure   [place] License Number  [facility]  [id number]

## 2020-01-31 NOTE — ASSESSMENT & PLAN NOTE
Patient has a history of known coronary artery disease  Recently underwent cardiac catheterization on 01/20/2020, significant stenosis noted per Cardiology recommended medical management  · No complaints of chest pain  Continue home medications

## 2020-01-31 NOTE — ASSESSMENT & PLAN NOTE
Acute respiratory failure with hypoxia and hypercapnia in the setting of multifocal pneumonia   ABGs performed in emergency department 7 35/40/82/21   Patient was subsequently admitted to ICU, now on telemetry unit  · CT chest revealed diffuse bilateral patchy regions of airspace consolidation consistent with pneumonia  · Received 5 days of IV cefepime  Will transition the Ceftin to complete a 7 day course of therapy    · Continue Xopenex TID and supplemental oxygen

## 2020-02-01 VITALS
SYSTOLIC BLOOD PRESSURE: 145 MMHG | TEMPERATURE: 97.5 F | BODY MASS INDEX: 29.94 KG/M2 | RESPIRATION RATE: 18 BRPM | WEIGHT: 162.7 LBS | DIASTOLIC BLOOD PRESSURE: 67 MMHG | OXYGEN SATURATION: 97 % | HEART RATE: 66 BPM | HEIGHT: 62 IN

## 2020-02-01 LAB
GLUCOSE SERPL-MCNC: 117 MG/DL (ref 65–140)
GLUCOSE SERPL-MCNC: 134 MG/DL (ref 65–140)
GLUCOSE SERPL-MCNC: 221 MG/DL (ref 65–140)

## 2020-02-01 PROCEDURE — 99239 HOSP IP/OBS DSCHRG MGMT >30: CPT | Performed by: NURSE PRACTITIONER

## 2020-02-01 PROCEDURE — 82948 REAGENT STRIP/BLOOD GLUCOSE: CPT

## 2020-02-01 PROCEDURE — 94640 AIRWAY INHALATION TREATMENT: CPT

## 2020-02-01 PROCEDURE — 94760 N-INVAS EAR/PLS OXIMETRY 1: CPT

## 2020-02-01 RX ORDER — LANOLIN ALCOHOL/MO/W.PET/CERES
3 CREAM (GRAM) TOPICAL
Refills: 0
Start: 2020-02-01

## 2020-02-01 RX ORDER — LEVALBUTEROL INHALATION SOLUTION 0.63 MG/3ML
0.63 SOLUTION RESPIRATORY (INHALATION)
Status: DISCONTINUED | OUTPATIENT
Start: 2020-02-01 | End: 2020-02-01 | Stop reason: HOSPADM

## 2020-02-01 RX ORDER — AZITHROMYCIN 250 MG/1
500 TABLET, FILM COATED ORAL EVERY 24 HOURS
Status: DISCONTINUED | OUTPATIENT
Start: 2020-02-01 | End: 2020-02-01

## 2020-02-01 RX ORDER — EXEMESTANE 25 MG/1
25 TABLET ORAL DAILY
Refills: 0
Start: 2020-02-05 | End: 2020-03-03 | Stop reason: SDUPTHER

## 2020-02-01 RX ORDER — CEFUROXIME AXETIL 500 MG/1
500 TABLET ORAL EVERY 12 HOURS SCHEDULED
Refills: 0
Start: 2020-02-01 | End: 2020-02-04

## 2020-02-01 RX ORDER — GUAIFENESIN 600 MG
1200 TABLET, EXTENDED RELEASE 12 HR ORAL EVERY 12 HOURS SCHEDULED
Refills: 0
Start: 2020-02-01 | End: 2020-02-06

## 2020-02-01 RX ORDER — GUAIFENESIN 600 MG
600 TABLET, EXTENDED RELEASE 12 HR ORAL EVERY 12 HOURS SCHEDULED
Status: DISCONTINUED | OUTPATIENT
Start: 2020-02-01 | End: 2020-02-01 | Stop reason: HOSPADM

## 2020-02-01 RX ORDER — AZITHROMYCIN 500 MG/1
500 TABLET, FILM COATED ORAL EVERY 24 HOURS
Refills: 0
Start: 2020-02-02 | End: 2020-02-01 | Stop reason: HOSPADM

## 2020-02-01 RX ORDER — CEFUROXIME AXETIL 500 MG/1
500 TABLET ORAL EVERY 12 HOURS SCHEDULED
Refills: 0
Start: 2020-02-01 | End: 2020-02-01 | Stop reason: SDUPTHER

## 2020-02-01 RX ORDER — LEVALBUTEROL INHALATION SOLUTION 0.63 MG/3ML
0.63 SOLUTION RESPIRATORY (INHALATION)
Refills: 0
Start: 2020-02-01 | End: 2020-04-16

## 2020-02-01 RX ORDER — PREDNISONE 20 MG/1
20 TABLET ORAL DAILY
Qty: 5 TABLET
Start: 2020-02-02 | End: 2020-02-07

## 2020-02-01 RX ADMIN — DILTIAZEM HYDROCHLORIDE 180 MG: 180 CAPSULE, COATED, EXTENDED RELEASE ORAL at 08:39

## 2020-02-01 RX ADMIN — LEVOTHYROXINE SODIUM 50 MCG: 50 TABLET ORAL at 08:38

## 2020-02-01 RX ADMIN — HYDROXYUREA 500 MG: 500 CAPSULE ORAL at 08:41

## 2020-02-01 RX ADMIN — INSULIN LISPRO 3 UNITS: 100 INJECTION, SOLUTION INTRAVENOUS; SUBCUTANEOUS at 17:13

## 2020-02-01 RX ADMIN — IPRATROPIUM BROMIDE 0.5 MG: 0.5 SOLUTION RESPIRATORY (INHALATION) at 12:27

## 2020-02-01 RX ADMIN — INSULIN LISPRO 3 UNITS: 100 INJECTION, SOLUTION INTRAVENOUS; SUBCUTANEOUS at 12:23

## 2020-02-01 RX ADMIN — CLOPIDOGREL BISULFATE 75 MG: 75 TABLET ORAL at 08:38

## 2020-02-01 RX ADMIN — ISOSORBIDE MONONITRATE 30 MG: 30 TABLET, EXTENDED RELEASE ORAL at 08:38

## 2020-02-01 RX ADMIN — PANTOPRAZOLE SODIUM 40 MG: 40 TABLET, DELAYED RELEASE ORAL at 06:10

## 2020-02-01 RX ADMIN — LEVALBUTEROL HYDROCHLORIDE 0.63 MG: 0.63 SOLUTION RESPIRATORY (INHALATION) at 12:28

## 2020-02-01 RX ADMIN — LEVALBUTEROL HYDROCHLORIDE 0.63 MG: 0.63 SOLUTION RESPIRATORY (INHALATION) at 14:07

## 2020-02-01 RX ADMIN — ASPIRIN 81 MG 81 MG: 81 TABLET ORAL at 08:38

## 2020-02-01 RX ADMIN — HEPARIN SODIUM 5000 UNITS: 5000 INJECTION INTRAVENOUS; SUBCUTANEOUS at 06:10

## 2020-02-01 RX ADMIN — HEPARIN SODIUM 5000 UNITS: 5000 INJECTION INTRAVENOUS; SUBCUTANEOUS at 14:49

## 2020-02-01 RX ADMIN — METOPROLOL TARTRATE 25 MG: 25 TABLET ORAL at 08:38

## 2020-02-01 RX ADMIN — INSULIN LISPRO 3 UNITS: 100 INJECTION, SOLUTION INTRAVENOUS; SUBCUTANEOUS at 08:41

## 2020-02-01 RX ADMIN — METHYLPREDNISOLONE SODIUM SUCCINATE 20 MG: 40 INJECTION, POWDER, FOR SOLUTION INTRAMUSCULAR; INTRAVENOUS at 06:10

## 2020-02-01 RX ADMIN — GUAIFENESIN 600 MG: 600 TABLET, EXTENDED RELEASE ORAL at 10:41

## 2020-02-01 RX ADMIN — IPRATROPIUM BROMIDE 0.5 MG: 0.5 SOLUTION RESPIRATORY (INHALATION) at 14:07

## 2020-02-01 RX ADMIN — CEFEPIME HYDROCHLORIDE 2000 MG: 2 INJECTION, POWDER, FOR SOLUTION INTRAVENOUS at 08:41

## 2020-02-01 NOTE — NURSING NOTE
AVS reviewed with Marylou GALDAMEZ from [facility]  IV removed  Pt left to be transported with irma kelly

## 2020-02-01 NOTE — TRANSPORTATION MEDICAL NECESSITY
Section I - General Information    Name of Patient: Evangelina Mccallum                 : 3/7/1928    Medicare #: [id number]  Transport Date: 20 (PCS is valid for round trips on this date and for all repetitive trips in the 60-day range as noted below )  Origin: [address] [facility] [address]: [address]  Is the pt's stay covered under Medicare Part A (PPS/DRG)   [x]     Closest appropriate facility? If no, why is transport to more distant facility required? Yes  If hospice pt, is this transport related to pt's terminal illness? NA       Section II - Medical Necessity Questionnaire  Ambulance transportation is medically necessary only if other means of transport are contraindicated or would be potentially harmful to the patient  To meet this requirement, the patient must either be "bed confined" or suffer from a condition such that transport by means other than ambulance is contraindicated by the patient's condition  The following questions must be answered by the medical professional signing below for this form to be valid:    1)  Describe the MEDICAL CONDITION (physical and/or mental) of this patient AT [address] [place] [address] that requires the patient to be transported in an ambulance and why transport by other means is contraindicated by the patient's condition: Pt confused, high risk for falls, and flight  2) Is the patient "bed confined" as defined below? No  To be "be confined" the patient must satisfy all three of the following conditions: (1) unable to get up from bed without Assistance; AND (2) unable to ambulate; AND (3) unable to sit in a chair or wheelchair  3) Can this patient safely be transported by car or wheelchair van (i e , seated during transport without a medical attendant or monitoring)?    No    4) In addition to completing questions 1-3 above, please check any of the following conditions that apply*:   *Note: supporting documentation for any boxes checked must be maintained in the patient's medical records  If hosp-hosp transfer, describe services needed at 2nd facility not available at 1st facility? Patient is comatose  Medical attendant required   Requires oxygen-unable to self administer      Section III - Signature of Physician or Healthcare Professional  I certify that the above information is true and correct based on my evaluation of this patient, and represent that the patient requires transport by ambulance and that other forms of transport are contraindicated  I understand that this information will be used by the Centers for Medicare and Medicaid Services (CMS) to support the determination of medical necessity for ambulance services, and I represent that I have personal knowledge of the patient's condition at time of transport  [x]  If this box is checked, I also certify that the patient is physically or mentally incapable of signing the ambulance service's claim and that the institution with which I am affiliated has furnished care, services, or assistance to the patient  My signature below is made on behalf of the patient pursuant to 42 CFR §424 36(b)(4)  In accordance with 42 CFR §424 37, the specific reason(s) that the patient is physically or mentally incapable of signing the claim form is as follows:       Signature of Physician* or Healthcare Professional______________________________________________________________  Signature Date 02/01/20 (For scheduled repetitive transports, this form is not valid for transports performed more than 60 days after this date)    Printed Name & Credentials of Physician or Healthcare Professional (MD, , RN, etc )_Acosta Lozano RN___________________  *Form must be signed by patient's attending physician for scheduled, repetitive transports   For non-repetitive, unscheduled ambulance transports, if unable to obtain the signature of the attending physician, any of the following may sign (choose appropriate option below)  [] Physician Assistant []  Clinical Nurse Specialist [x]  Registered Nurse  []  Nurse Practitioner  [x] Discharge Planner

## 2020-02-01 NOTE — QUICK NOTE
[facility] no longer has a bed available  Patient will be discharged to [facility]  per patient and daughter's request  Ivonne Klein up time is for 530 pm  Patient made aware

## 2020-02-01 NOTE — SOCIAL WORK
Pt was accepted at [facility]  Called to [facility] dispatch for transportation setup  Had been set up for 5:30PM with [address]  Pt already picked up  Had notified pt, daughter, nursing, and facility  No other d/c needs

## 2020-02-01 NOTE — SOCIAL WORK
Pt written for d/c today  Called to [facility] admissions to check on bed, they do not have a bed available  Daughter visited [address], no longer wanted a bed there  Daughter also not interested now in [facility]  Explained pt is now ready for d/c, would need a bed today  Provided more choices for [facility] which may have a bed, [facility]  [facility] is first choice now  Will follow

## 2020-02-01 NOTE — SOCIAL WORK
No beds available at either of the additional choices daughter made  Rec'd call recently from pt's daughter  Requested referral to [facility] make referral

## 2020-02-01 NOTE — DISCHARGE SUMMARY
Discharge- Radha Crockett 3/7/1928, 80 y o  female MRN: [id number]    Unit/Bed#: [address] [place] [address] Encounter: [id number]    Primary Care Provider: Mateus Rice MD   Date and time admitted to hospital: 1/27/2020  6:19 PM        * Acute respiratory failure with hypoxia and hypercapnia (HCC)  Assessment & Plan  Acute respiratory failure with hypoxia and hypercapnia in the setting of multifocal pneumonia   ABGs performed in emergency department 7 35/40/82/21   Patient was subsequently admitted to ICU, now on telemetry unit  · CT chest revealed diffuse bilateral patchy regions of airspace consolidation consistent with pneumonia  · Received 5 days of IV cefepime  Will transition the Ceftin to complete a 7 day course of therapy  · Continue Xopenex TID and supplemental oxygen    HCAP (healthcare-associated pneumonia)  Assessment & Plan  Healthcare associated pneumonia as patient had recent hospitalization 01/18 - 01/20/2020 for angina  CT chest: There are diffuse bilateral patchy regions of airspace consolidation consistent with pneumonia  MRSA screen negative, vancomycin discontinued  Procalcitonin improved from 0 77 -> 0 24  Urine antigens negative, Azithromycin discontinued   · Received IV Cefepime for 5 days  · Will transition to Ceftin 500 mg BID for 5 more doses to complete a 7 day course of therapy  · Continue Xopenex TID and Albuterol INH PRN  · Transition Solumedrol to Prednisone 20 mg daily x5 days  · Mucinex BID x5 days, Tessalon Perles PRN   · Continue to use incentive spirometer  · Continue supplemental oxygen to keep O2 sats greater than 90%  CAD (coronary artery disease)  Assessment & Plan  Patient has a history of known coronary artery disease  Recently underwent cardiac catheterization on 01/20/2020, significant stenosis noted per Cardiology recommended medical management  · No complaints of chest pain  Continue home medications       Benign essential hypertension  Assessment & Plan  · Continue Cardizem and Metoprolol   · Monitor, outpatient PCP follow-up    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[facility])  Assessment & Plan  Patient has a history of diverticulitis with ruptured diverticulitis status post Mindy's procedure  · Continue colostomy care, monitor I&O    Chronic venous insufficiency  Assessment & Plan  Elevate legs when out of bed  No edema noted at this time  Hypothyroidism  Assessment & Plan  Continue levothyroxine 50 mcg po daily    GERD (gastroesophageal reflux disease)  Assessment & Plan  Continue patient's home medication of Protonix 40 mg po daily  Discharging Physician / Practitioner: Jocelyne Sutherland, [facility]  PCP: Liliana Christy MD  Admission Date:   Admission Orders (From admission, onward)     Ordered        01/27/20 2001  Inpatient Admission  Once                   Discharge Date: 02/01/20    Resolved Problems  Date Reviewed: 2/1/2020          Resolved    Dehydration 1/30/2020     Resolved by  Jocelyne Sutherland, [address] [place] [address] Stay:  · Critical care     Procedures Performed:   · CXR: Multifocal consolidation involving both upper lobes and both lower lobes has developed during the interval consistent with multilobar pneumonia  · CT chest: There are diffuse bilateral patchy regions of airspace consolidation consistent with pneumonia  Significant Findings / Test Results:   · Multifocal pneumonia     Incidental Findings:   · None    Test Results Pending at Discharge (will require follow up): · None     Outpatient Tests Requested:  · None    Complications:  None    Reason for Admission: Multifocal pneumonia     Hospital Course:     Cody Flynn is a 80 y o  female patient CAD, adenocarcinoma of the breast, hypothyroidism, hypertension, GERD, and colostomy after diverticulitis status post Mindy's procedure who originally presented to the hospital on 1/27/2020 due to multifocal pneumonia and acute respiratory failure with hypoxia    Patient had been experiencing productive cough 3 days prior to admission  She reached at her primary care provider who prescribed azithromycin  She later became diaphoretic, lethargic, and extremely short of breath  She called EMS and sought medical attention in the emergency department  She was placed on BiPAP  She was initially admitted to the intensive care unit  Chest x-ray revealed scattered areas of consolidation bilaterally  Patient had crackles and leukocytosis on exam   Of note, she was recently hospitalized in January for angina workup for which cardiac catheterization revealed significant stenosis and Cardiology recommended medical management  Patient was treated with IV cefepime  Her leukocytosis resolved  Her procalcitonin normalized  Patient still requiring supplemental oxygen at 3 liters/minute  She continues with bilateral crackles and wheezing  She will be discharged on Ceftin 500 mg twice daily to complete a 7 day course of therapy along with Xopenex nebulizer treatments, prednisone 20 mg daily x5 days, and Mucinex  Patient is to follow up with her primary care provider and her oncologist after being discharged from short-term rehab  Patient will be discharged to short-term rehab for further strengthening and reconditioning  Please see above list of diagnoses and related plan for additional information  Condition at Discharge: stable     Discharge Day Visit / Exam:     Subjective:  Stating bed in chair with feet elevated on 3 L nasal cannula  States she feels like she has been 'run over by a truck ' she feels no worse but no better compared to yesterday  She continues with a moist productive cough  No longer experiencing hemoptysis  She denies fever or chills  Appetite is fair  She denies chest pain, abdominal pain, nausea, vomiting, or diarrhea  She is agreeable to short-term rehab    Vitals: Blood Pressure: 165/81 (02/01/20 0813)  Pulse: 64 (02/01/20 0813)  Temperature: 99 1 °F (37 3 °C) (02/01/20 0813)  Temp Source: Oral (02/01/20 0813)  Respirations: 19 (02/01/20 0813)  Height: 5' 2" (157 5 cm) (01/27/20 2200)  Weight - Scale: 73 8 kg (162 lb 11 2 oz) (02/01/20 0631)  SpO2: 96 % (02/01/20 0926)  Exam:   Physical Exam   Constitutional: She is oriented to person, place, and time  She appears well-developed  No distress  Resting comfortably out of bed in chair on 3 L nasal cannula   HENT:   Head: Normocephalic  Neck: Normal range of motion  Cardiovascular: Normal rate  Pulmonary/Chest: Accessory muscle usage present  No tachypnea  No respiratory distress  She has decreased breath sounds in the right lower field and the left lower field  She has wheezes in the right lower field and the left lower field  She has rhonchi in the right lower field and the left lower field  She has no rales  Abdominal: Soft  Bowel sounds are normal  She exhibits no distension  There is no tenderness  Colostomy bag   Musculoskeletal: Normal range of motion  She exhibits no edema  Neurological: She is alert and oriented to person, place, and time  Skin: Skin is warm and dry  Capillary refill takes less than 2 seconds  She is not diaphoretic  Psychiatric: She has a normal mood and affect  Her behavior is normal  Judgment normal    Nursing note and vitals reviewed  Discussion with Family: Daughter     Discharge instructions/Information to patient and family:   See after visit summary for information provided to patient and family  Provisions for Follow-Up Care:  See after visit summary for information related to follow-up care and any pertinent home health orders  Disposition:     Other [facility] at [address] [place] [address] to [facility] SNF:   · [facility] [employer] [facility] - Not Applicable to this Patient    Planned Readmission: None     Discharge Statement:  I spent > 30 minutes discharging the patient   This time was spent on the day of discharge  I had direct contact with the patient on the day of discharge  Greater than 50% of the total time was spent examining patient, answering all patient questions, arranging and discussing plan of care with patient as well as directly providing post-discharge instructions  Additional time then spent on discharge activities  Discharge Medications:  See after visit summary for reconciled discharge medications provided to patient and family        ** Please Note: This note has been constructed using a voice recognition system **

## 2020-02-01 NOTE — [place] UNIVERSAL TRANSFER FORM
[place] UNIVERSAL TRANSFER FORM  (ALL ITEMS MUST BE COMPLETED)    1  TRANSFER FROM: [address]      TRANSFER TO: Nathaniel Ignacia    2  DATE OF TRANSFER: 2/1/2020                        TIME OF TRANSFER: 1730    3  PATIENT NAME: LIZABETH Izaguirre      YOB: 1928                             GENDER: female    4  LANGUAGE:   English    5  PHYSICIAN NAME:  Keo Hernandez DO                   PHONE: [phone]    6  CODE STATUS: Level 2 - DNAR: but accepts endotracheal intubation        Out of Hospital DNR Attached: Yes    7  :                                      :  Extended Emergency Contact Information  Primary Emergency Contact: Jordan Lal 9[address] Phone: [phone]  Relation: [facility] Representative/Proxy:  No           Legal Guardian:  No             NAME OF:           HEALTH CARE REPRESENTATIVE/PROXY:                                         OR           LEGAL GUARDIAN, IF NOT :                                               PHONE:  (Day)           (Night)                        (Cell)    8  REASON FOR TRANSFER: (Must include brief medical history and recent changes in physical function or cognition ) STR            V/S: /67 (BP Location: Left arm)   Pulse 66   Temp 97 5 °F (36 4 °C) (Oral)   Resp 18   Ht 5' 2" (1 575 m)   Wt 73 8 kg (162 lb 11 2 oz)   SpO2 97%   BMI 29 76 kg/m²           PAIN: None    9  PRIMARY DIAGNOSIS: Acute respiratory failure with hypoxia and hypercapnia (HCC)      Secondary Diagnosis:         Pacemaker: No      Internal Defib: No          Mental Health Diagnosis (if Applicable):    10  RESTRAINTS: No     11  RESPIRATORY NEEDS: Oxygen Device 3L,    12  ISOLATION/PRECAUTION: None    13  ALLERGY: Other; Ciprofloxacin; Clindamycin; Levaquin [levofloxacin]; Lincomycin; Penicillins; Sulfa antibiotics; Sulfacetamide; Sulfasalazine; Ceftriaxone;  Iv contrast  [iodinated diagnostic agents]; Linezolid; and Nitrofurantoin    14  SENSORY:       Vision Good, Hearing Good  and Speech Clear    15  SKIN CONDITION: No Wounds    16  DIET: Regular    17  IV ACCESS: None    18  PERSONAL ITEMS SENT WITH PATIENT: Othercolostomy bags    19  ATTACHED DOCUMENTS: MUST ATTACH CURRENT MEDICATION INFORMATION Face Sheet, MAR, Diagnostic Studies and Labs    20  AT RISK ALERTS:Falls        HARM TO: N/A    21  WEIGHT BEARING STATUS:         Left Leg: Limited        Right Leg: Limited    22  MENTAL STATUS:Alert and Oriented    23  FUNCTION:        Walk: With Help        Transfer: With Help        Toilet: With Help        Feed: Self    24  IMMUNIZATIONS/SCREENING:     Immunization History   Administered Date(s) Administered    INFLUENZA 10/17/2018, 11/01/2019    Influenza Split High Dose Preservative Free IM 10/19/2012, 10/09/2013, 09/15/2016, 10/20/2017    Influenza TIV (IM) 10/19/2006, 10/25/2007, 10/20/2008, 09/25/2009, 11/18/2010, 10/03/2011, 10/12/2015    Pneumococcal Conjugate 13-Valent 01/04/2015    Pneumococcal Polysaccharide PPV23 10/21/2013    Td (adult), adsorbed 04/19/2006    Tdap 12/08/2016, 12/08/2016    Zoster 08/06/2012    Zoster Vaccine Recombinant 12/03/2019       25  BOWEL: Continent    26  BLADDER: Continent    27   S[facility] FAC[facility]                  Title: RN        Unit: 4N        Phone: [phone] [address] (if known):        Title:        Unit:         Phone:         FORM PREFILLED BY (if applicable)       Title:       Unit:        Phone:         FORM COMPLETED BY Humberto Hurst RN      Title: RN      Phone: [phone]

## 2020-02-02 LAB
BACTERIA BLD CULT: NORMAL
BACTERIA BLD CULT: NORMAL

## 2020-02-04 ENCOUNTER — PATIENT OUTREACH (OUTPATIENT)
Dept: CASE MANAGEMENT | Facility: OTHER | Age: 85
End: 2020-02-04

## 2020-02-04 ENCOUNTER — EPISODE CHANGES (OUTPATIENT)
Dept: CASE MANAGEMENT | Facility: HOSPITAL | Age: 85
End: 2020-02-04

## 2020-02-04 NOTE — PROGRESS NOTES
Inbasket notification received 2/3/20 from ZEYAD Vitale regarding patient enrollment into YAW / CKD   Inbasket notification received 2/3/20  from DREW Loza for new bundle  Email with bundle communication tool sent to Wil Issa with update on new bundle, DRG, and ELOS  This care manager will continue to monitor via chart and CarePort review throughout bundle episode

## 2020-02-12 ENCOUNTER — PATIENT OUTREACH (OUTPATIENT)
Dept: CASE MANAGEMENT | Facility: OTHER | Age: 85
End: 2020-02-12

## 2020-02-12 NOTE — PROGRESS NOTES
Email sent to facility requesting update on patient  Email reply from facility therapist received stating that patient is expected to discharge to her apartment 2/15/20 with [facility]  Then she is expected to transfer to [facility]  Patient does not need any DME and has supportive daughter  This care manager will continue to monitor via chart review throughout bundle episode

## 2020-02-13 ENCOUNTER — TELEPHONE (OUTPATIENT)
Dept: FAMILY MEDICINE CLINIC | Facility: CLINIC | Age: 85
End: 2020-02-13

## 2020-02-13 NOTE — TELEPHONE ENCOUNTER
Ashok Ling from the [facility] called stating the pt is being referred for homecare from [place] [address]  She will be discharged 2/15/20  Would Dr Layne Hernández okay this?

## 2020-02-17 ENCOUNTER — PATIENT OUTREACH (OUTPATIENT)
Dept: CASE MANAGEMENT | Facility: OTHER | Age: 85
End: 2020-02-17

## 2020-02-17 NOTE — PROGRESS NOTES
Email received from [facility] indicating patient discharge from [facility] to home with home health agency  BPCI form and Care Coordination note updated  Scanned documents entered today reveal creatinine level of 0 91 drawn 2/13/20  PCP appointment scheduled 2/18/20  Removed self from care team and sent InVerientet message to DREW Tariq and ZEYAD Arenas regarding patient handoff  Email sent to facility requesting copy of discharge paperwork  Email received with discharge paperwork  SNF form completed  Discharge paperwork attached to this encounter

## 2020-02-18 ENCOUNTER — OFFICE VISIT (OUTPATIENT)
Dept: FAMILY MEDICINE CLINIC | Facility: CLINIC | Age: 85
End: 2020-02-18
Payer: MEDICARE

## 2020-02-18 VITALS
OXYGEN SATURATION: 94 % | HEART RATE: 71 BPM | SYSTOLIC BLOOD PRESSURE: 110 MMHG | BODY MASS INDEX: 28.52 KG/M2 | TEMPERATURE: 98.2 F | RESPIRATION RATE: 18 BRPM | DIASTOLIC BLOOD PRESSURE: 60 MMHG | HEIGHT: 62 IN | WEIGHT: 155 LBS

## 2020-02-18 DIAGNOSIS — Z93.3 COLOSTOMY IN PLACE (HCC): ICD-10-CM

## 2020-02-18 DIAGNOSIS — I25.118 CORONARY ARTERY DISEASE OF NATIVE ARTERY OF NATIVE HEART WITH STABLE ANGINA PECTORIS (HCC): ICD-10-CM

## 2020-02-18 DIAGNOSIS — J96.02 ACUTE RESPIRATORY FAILURE WITH HYPOXIA AND HYPERCAPNIA (HCC): Primary | ICD-10-CM

## 2020-02-18 DIAGNOSIS — E03.9 HYPOTHYROIDISM, UNSPECIFIED TYPE: ICD-10-CM

## 2020-02-18 DIAGNOSIS — J96.01 ACUTE RESPIRATORY FAILURE WITH HYPOXIA AND HYPERCAPNIA (HCC): Primary | ICD-10-CM

## 2020-02-18 DIAGNOSIS — K21.9 GASTROESOPHAGEAL REFLUX DISEASE WITHOUT ESOPHAGITIS: ICD-10-CM

## 2020-02-18 DIAGNOSIS — I10 BENIGN ESSENTIAL HYPERTENSION: ICD-10-CM

## 2020-02-18 DIAGNOSIS — J18.9 HCAP (HEALTHCARE-ASSOCIATED PNEUMONIA): ICD-10-CM

## 2020-02-18 DIAGNOSIS — I87.2 CHRONIC VENOUS INSUFFICIENCY: ICD-10-CM

## 2020-02-18 PROCEDURE — 3074F SYST BP LT 130 MM HG: CPT | Performed by: NURSE PRACTITIONER

## 2020-02-18 PROCEDURE — 1160F RVW MEDS BY RX/DR IN RCRD: CPT | Performed by: NURSE PRACTITIONER

## 2020-02-18 PROCEDURE — 4040F PNEUMOC VAC/ADMIN/RCVD: CPT | Performed by: NURSE PRACTITIONER

## 2020-02-18 PROCEDURE — 1036F TOBACCO NON-USER: CPT | Performed by: NURSE PRACTITIONER

## 2020-02-18 PROCEDURE — 1111F DSCHRG MED/CURRENT MED MERGE: CPT | Performed by: NURSE PRACTITIONER

## 2020-02-18 PROCEDURE — 3008F BODY MASS INDEX DOCD: CPT | Performed by: NURSE PRACTITIONER

## 2020-02-18 PROCEDURE — 99214 OFFICE O/P EST MOD 30 MIN: CPT | Performed by: NURSE PRACTITIONER

## 2020-02-18 PROCEDURE — 3078F DIAST BP <80 MM HG: CPT | Performed by: NURSE PRACTITIONER

## 2020-02-18 RX ORDER — OMEPRAZOLE 40 MG/1
40 CAPSULE, DELAYED RELEASE ORAL DAILY
Qty: 90 CAPSULE | Refills: 0 | Status: SHIPPED | OUTPATIENT
Start: 2020-02-18 | End: 2020-11-25

## 2020-02-18 NOTE — LETTER
February 18, 2020    [address] [place] [id number]      To Whom It May Concern:    Jonny Christine is under my care  It is my professional and medical opinion that she would be better cared for in an assisted living facility at this time due to ongoing medical issues  Please take this into consideration and allow her to terminate her lease early  I appreciate your attention to this matter      Sincerely,             GLORIA Ambrocio        CC: No Recipients

## 2020-02-18 NOTE — PROGRESS NOTES
Assessment/Plan:    1  Acute respiratory failure with hypoxia and hypercapnia (HCC)  Assessment & Plan:  She is off supplemental O2 and is using her nebulizer once daily  2  HCAP (healthcare-associated pneumonia)  Assessment & Plan:  Symptoms resolved  Will recheck CXR to ensure resolution    Orders:  -     XR chest pa & lateral; Future; Expected date: 03/02/2020    3  Coronary artery disease of native artery of native heart with stable angina pectoris [facility])  Assessment & Plan:  Stable  4  Benign essential hypertension  Assessment & Plan:  Stable on current medications  5  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([facility] [address] )    6  Chronic venous insufficiency    7  Hypothyroidism, unspecified type  Assessment & Plan:  Stable on current dose of Levothyroxine      8  Gastroesophageal reflux disease without esophagitis  Assessment & Plan:  Stable on Prilosec    Orders:  -     omeprazole (PriLOSEC) 40 MG capsule; Take 1 capsule (40 mg total) by mouth daily          There are no Patient Instructions on file for this visit  Return in about 3 months (around 5/18/2020)  Subjective:      Patient ID: Robby Sommer is a 80 y o  female  Chief Complaint   Patient presents with    Follow-up     Esteban MENJIVAR  pt going to assisted living  [facility]       Here today for hospital f/u  She was most recently admitted for pneumonia  She was discharged to short term rehab  Both she and her daughter have decided to have her move into a senior center with assisted living to be closer to her daughter  She will be living independently, however will have someone help her with her medications  She will continue to come here for her medical care  Pneumonia has improved  She denies any cough or difficulty with her breathing  She has been using her nebulizer at bedtime  She has not had any chest pains since her cardiac catheterization  She has not yet made an appt with cardiology, but plans to do so         The following portions of the patient's history were reviewed and updated as appropriate: allergies, current medications, past family history, past medical history, past social history, past surgical history and problem list     Review of Systems   Constitutional: Negative for chills, fatigue and fever  Respiratory: Negative for cough, shortness of breath and wheezing  Cardiovascular: Negative for chest pain, palpitations and leg swelling  Gastrointestinal: Negative for abdominal pain, diarrhea, nausea and vomiting  Skin: Negative for rash  Neurological: Negative for dizziness and headaches  Current Outpatient Medications   Medication Sig Dispense Refill    acetaminophen (TYLENOL) 325 mg tablet Take 2 tablets (650 mg total) by mouth every 6 (six) hours as needed for mild pain, headaches or fever 30 tablet 0    aspirin 81 MG tablet Take 81 mg by mouth every morning        Cholecalciferol (VITAMIN D3) 125 MCG (5000 UT) CAPS Take by mouth      clopidogrel (PLAVIX) 75 mg tablet Take 1 tablet (75 mg total) by mouth daily 30 tablet 0    diltiazem (CARDIZEM CD) 180 mg 24 hr capsule TAKE 1 CAPSULE DAILY 90 capsule 3    exemestane (AROMASIN) 25 MG tablet Take 1 tablet (25 mg total) by mouth daily  0    ezetimibe-simvastatin (VYTORIN) 10-40 mg per tablet Take 1 tablet by mouth daily at bedtime        fluticasone (FLONASE) 50 mcg/act nasal spray       hydroxyurea (HYDREA) 500 mg capsule Take 500 mg by mouth daily m w f      isosorbide mononitrate (IMDUR) 30 mg 24 hr tablet Take 1 tablet (30 mg total) by mouth daily 30 tablet 0    levothyroxine 50 mcg tablet Take 1 tablet (50 mcg total) by mouth every morning 90 tablet 0    melatonin 3 mg Take 1 tablet (3 mg total) by mouth daily at bedtime  0    metoprolol tartrate (LOPRESSOR) 25 mg tablet Take 1 tablet (25 mg total) by mouth every 12 (twelve) hours 60 tablet 0    Multiple Vitamins-Minerals (CENTRUM SILVER ULTRA WOMENS) TABS Take by mouth      levalbuterol (XOPENEX) 0 63 mg/3 mL nebulizer solution Take 1 vial (0 63 mg total) by nebulization 3 (three) times a day (Patient not taking: Reported on 2/18/2020)  0    omeprazole (PriLOSEC) 40 MG capsule Take 1 capsule (40 mg total) by mouth daily 90 capsule 0     No current facility-administered medications for this visit  Objective:    /60   Pulse 71   Temp 98 2 °F (36 8 °C)   Resp 18   Ht 5' 2" (1 575 m)   Wt 70 3 kg (155 lb)   SpO2 94%   BMI 28 35 kg/m²        Physical Exam   Constitutional: She appears well-developed and well-nourished  HENT:   Head: Normocephalic and atraumatic  Right Ear: Tympanic membrane, external ear and ear canal normal    Left Ear: Tympanic membrane, external ear and ear canal normal    Nose: No mucosal edema or rhinorrhea  Mouth/Throat: Uvula is midline, oropharynx is clear and moist and mucous membranes are normal    Eyes: Conjunctivae are normal    Neck: Neck supple  No edema present  No thyromegaly present  Cardiovascular: Normal rate, regular rhythm, normal heart sounds and intact distal pulses  No murmur heard  Pulmonary/Chest: Effort normal and breath sounds normal    Abdominal: Bowel sounds are normal  She exhibits no distension  There is no splenomegaly or hepatomegaly  There is no tenderness  Lymphadenopathy:        Right cervical: No superficial cervical adenopathy present  Left cervical: No superficial cervical adenopathy present  Skin: Skin is warm, dry and intact  No rash noted  Psychiatric: She has a normal mood and affect  Nursing note and vitals reviewed               Rihcard Jj

## 2020-02-19 ENCOUNTER — PATIENT OUTREACH (OUTPATIENT)
Dept: CASE MANAGEMENT | Facility: OTHER | Age: 85
End: 2020-02-19
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
## 2020-02-19 DIAGNOSIS — J18.9 HCAP (HEALTHCARE-ASSOCIATED PNEUMONIA): Primary | ICD-10-CM

## 2020-02-19 DIAGNOSIS — J18.9 HOSPITAL-ACQUIRED PNEUMONIA: ICD-10-CM

## 2020-02-19 NOTE — PROGRESS NOTES
Patient denies SOB, cough, chest pain, fever, aches, chills, swelling, weight gain, lightheadedness, or dizziness  She states she is in the process of packing to move to [facility] by the end of March & it is very overwhelming  Per the patient her daughter is providing a lot of assistance  The patient confirms [facility] started care & she expects them back today  Encouraged the patient to rest & follow safety precautions as she states her apartment is full of boxes & "stuff" & its hard to move around  Forgetfulness noted throughout the conversation with the patient repeating the same question or repeating the same topic & information  She states she is not aware of the repeat CXR  She gives permission to contact her daughter  She also agrees to continued outreach  Spoke to patient's daughter, Abby Cuevas  She states patient has short term memory loss, but has had an increase in forgetfulness & confusion due to current stressors in her life  Abby Cuevas states the patient will be moving in 2 weeks & [facility] is across the street from her home  Abby Cuevas currently manages the patient's meds in pill boxes & calls her twice a day to ensure she is taking them  The patient still manages her colostomy independently, but Abby Cuevas was also educated on care  She will be taking the patient next week for the CXR & lab work  Dr Nikia Davenport, her hematologist/oncologist, also ordered lab work  Abby Cuevas states periodic phone calls to the patient would be fine to assess symptoms & check in, but Abby Cuevas is the best contact for everything else

## 2020-02-19 NOTE — TELEPHONE ENCOUNTER
Spoke to [facility] at intake and  I faxed over 2 office notes, med list and demographics    They need to assess and treat  Ricky Ramachandranelor, MA

## 2020-02-21 ENCOUNTER — APPOINTMENT (OUTPATIENT)
Dept: LAB | Facility: HOSPITAL | Age: 85
End: 2020-02-21
Attending: INTERNAL MEDICINE
Payer: MEDICARE

## 2020-02-21 ENCOUNTER — TRANSCRIBE ORDERS (OUTPATIENT)
Dept: ADMINISTRATIVE | Facility: HOSPITAL | Age: 85
End: 2020-02-21

## 2020-02-21 ENCOUNTER — HOSPITAL ENCOUNTER (OUTPATIENT)
Dept: RADIOLOGY | Facility: HOSPITAL | Age: 85
Discharge: HOME | End: 2020-02-21
Payer: MEDICARE

## 2020-02-21 DIAGNOSIS — J18.9 HCAP (HEALTHCARE-ASSOCIATED PNEUMONIA): ICD-10-CM

## 2020-02-21 DIAGNOSIS — C50.919 POSTMASTECTOMY LYMPHANGIOSARCOMA SYNDROME, UNSPECIFIED LATERALITY (HCC): Primary | ICD-10-CM

## 2020-02-21 DIAGNOSIS — Z90.10 POSTMASTECTOMY LYMPHANGIOSARCOMA SYNDROME, UNSPECIFIED LATERALITY (HCC): Primary | ICD-10-CM

## 2020-02-21 LAB
ALBUMIN SERPL BCP-MCNC: 3.6 G/DL (ref 3.5–5)
ALP SERPL-CCNC: 92 U/L (ref 46–116)
ALT SERPL W P-5'-P-CCNC: 23 U/L (ref 12–78)
ANION GAP SERPL CALCULATED.3IONS-SCNC: 5 MMOL/L (ref 4–13)
AST SERPL W P-5'-P-CCNC: 14 U/L (ref 5–45)
BASOPHILS # BLD AUTO: 0.03 THOUSANDS/ΜL (ref 0–0.1)
BASOPHILS NFR BLD AUTO: 1 % (ref 0–1)
BILIRUB SERPL-MCNC: 0.6 MG/DL (ref 0.2–1)
BUN SERPL-MCNC: 15 MG/DL (ref 5–25)
CALCIUM SERPL-MCNC: 8.8 MG/DL (ref 8.3–10.1)
CHLORIDE SERPL-SCNC: 104 MMOL/L (ref 100–108)
CO2 SERPL-SCNC: 31 MMOL/L (ref 21–32)
CREAT SERPL-MCNC: 1.1 MG/DL (ref 0.6–1.3)
EOSINOPHIL # BLD AUTO: 0.2 THOUSAND/ΜL (ref 0–0.61)
EOSINOPHIL NFR BLD AUTO: 4 % (ref 0–6)
ERYTHROCYTE [DISTWIDTH] IN BLOOD BY AUTOMATED COUNT: 16.3 % (ref 11.6–15.1)
GFR SERPL CREATININE-BSD FRML MDRD: 44 ML/MIN/1.73SQ M
GLUCOSE SERPL-MCNC: 180 MG/DL (ref 65–140)
HCT VFR BLD AUTO: 40.6 % (ref 34.8–46.1)
HGB BLD-MCNC: 13.1 G/DL (ref 11.5–15.4)
IMM GRANULOCYTES # BLD AUTO: 0.02 THOUSAND/UL (ref 0–0.2)
IMM GRANULOCYTES NFR BLD AUTO: 0 % (ref 0–2)
LYMPHOCYTES # BLD AUTO: 1.19 THOUSANDS/ΜL (ref 0.6–4.47)
LYMPHOCYTES NFR BLD AUTO: 24 % (ref 14–44)
MCH RBC QN AUTO: 31.5 PG (ref 26.8–34.3)
MCHC RBC AUTO-ENTMCNC: 32.3 G/DL (ref 31.4–37.4)
MCV RBC AUTO: 98 FL (ref 82–98)
MONOCYTES # BLD AUTO: 0.36 THOUSAND/ΜL (ref 0.17–1.22)
MONOCYTES NFR BLD AUTO: 7 % (ref 4–12)
NEUTROPHILS # BLD AUTO: 3.15 THOUSANDS/ΜL (ref 1.85–7.62)
NEUTS SEG NFR BLD AUTO: 64 % (ref 43–75)
NRBC BLD AUTO-RTO: 0 /100 WBCS
PLATELET # BLD AUTO: 218 THOUSANDS/UL (ref 149–390)
PMV BLD AUTO: 10.9 FL (ref 8.9–12.7)
POTASSIUM SERPL-SCNC: 4.2 MMOL/L (ref 3.5–5.3)
PROT SERPL-MCNC: 6.8 G/DL (ref 6.4–8.2)
RBC # BLD AUTO: 4.16 MILLION/UL (ref 3.81–5.12)
SODIUM SERPL-SCNC: 140 MMOL/L (ref 136–145)
WBC # BLD AUTO: 4.95 THOUSAND/UL (ref 4.31–10.16)

## 2020-02-21 PROCEDURE — 85025 COMPLETE CBC W/AUTO DIFF WBC: CPT | Performed by: INTERNAL MEDICINE

## 2020-02-21 PROCEDURE — 71046 X-RAY EXAM CHEST 2 VIEWS: CPT

## 2020-02-21 PROCEDURE — 36415 COLL VENOUS BLD VENIPUNCTURE: CPT | Performed by: INTERNAL MEDICINE

## 2020-02-21 PROCEDURE — 80053 COMPREHEN METABOLIC PANEL: CPT | Performed by: INTERNAL MEDICINE

## 2020-02-24 ENCOUNTER — TELEPHONE (OUTPATIENT)
Dept: FAMILY MEDICINE CLINIC | Facility: CLINIC | Age: 85
End: 2020-02-24

## 2020-02-27 ENCOUNTER — TELEPHONE (OUTPATIENT)
Dept: FAMILY MEDICINE CLINIC | Facility: CLINIC | Age: 85
End: 2020-02-27

## 2020-02-27 NOTE — TELEPHONE ENCOUNTER
Pt daughter dropped off forms to be filled out by Dr Sasika Armstrong  Her mother is going into assisted living  Pls fax the forms to [facility]endez  Also her entire med list must be fax along with these documents  The med list must have Dr Taylor Covarrubias signature on it   Place in nurse pending

## 2020-02-28 ENCOUNTER — PATIENT OUTREACH (OUTPATIENT)
Dept: CASE MANAGEMENT | Facility: OTHER | Age: 85
End: 2020-02-28

## 2020-02-28 NOTE — PROGRESS NOTES
Per [facility], the patient is now current for SN and PT services  Her [facility]NA RN is Margarita MCFARLANE     Patient's creatinine was 0 91 on 2/13/20  On 2/21/20 it was 1 1, less than a 0 3 increase  Per SNF YAW/CKD , no further action necessary  Spoke to briefly to the patient  She states she is in the middle of packing  It has been very emotional and stressful  She denies SOB, chest pain, edema, aches, chills, cough, n/v, or diarrhea

## 2020-03-03 ENCOUNTER — TELEPHONE (OUTPATIENT)
Dept: FAMILY MEDICINE CLINIC | Facility: CLINIC | Age: 85
End: 2020-03-03

## 2020-03-03 DIAGNOSIS — C50.919 ADENOCARCINOMA OF BREAST, UNSPECIFIED LATERALITY (HCC): ICD-10-CM

## 2020-03-03 DIAGNOSIS — I25.118 CORONARY ARTERY DISEASE OF NATIVE ARTERY OF NATIVE HEART WITH STABLE ANGINA PECTORIS (HCC): Primary | ICD-10-CM

## 2020-03-03 RX ORDER — EZETIMIBE AND SIMVASTATIN 10; 40 MG/1; MG/1
1 TABLET ORAL
Status: CANCELLED | OUTPATIENT
Start: 2020-03-03

## 2020-03-03 RX ORDER — EZETIMIBE AND SIMVASTATIN 10; 40 MG/1; MG/1
1 TABLET ORAL
Qty: 90 TABLET | Refills: 3 | Status: SHIPPED | OUTPATIENT
Start: 2020-03-03 | End: 2020-03-13

## 2020-03-03 RX ORDER — EXEMESTANE 25 MG/1
25 TABLET ORAL DAILY
Refills: 0 | Status: CANCELLED
Start: 2020-03-03

## 2020-03-03 RX ORDER — EXEMESTANE 25 MG/1
25 TABLET ORAL DAILY
Qty: 90 TABLET | Refills: 3 | Status: SHIPPED | OUTPATIENT
Start: 2020-03-03 | End: 2020-03-16 | Stop reason: SDUPTHER

## 2020-03-03 NOTE — TELEPHONE ENCOUNTER
Received fax from [facility] for a refill on her exemestane 25 mg and vytorin 10-40 mg   Shonda Girdwood, [place]

## 2020-03-03 NOTE — TELEPHONE ENCOUNTER
Date & Time: 12/13/2021, 10:24 AM  Patient: [employer]  Encounter Provider(s):    FACUNDO Churchill       To Whom It May Concern:    Jason Sherriejasmine was seen and treated in our department on 12/13/2021.  He should not return to work until 12/15/20 Requested medication(s) are due for refill today: Yes  Patient has already received a courtesy refill: No  Other reason request has been forwarded to provider: failed protocol

## 2020-03-03 NOTE — TELEPHONE ENCOUNTER
Daughter called stating she would like P/T and O/T ordered for her mother, she is at [facility] in [place]    Fax to [phone]

## 2020-03-04 ENCOUNTER — TELEPHONE (OUTPATIENT)
Dept: FAMILY MEDICINE CLINIC | Facility: CLINIC | Age: 85
End: 2020-03-04

## 2020-03-04 DIAGNOSIS — J31.0 CHRONIC RHINITIS: Primary | ICD-10-CM

## 2020-03-04 RX ORDER — FLUTICASONE PROPIONATE 50 MCG
2 SPRAY, SUSPENSION (ML) NASAL DAILY
Qty: 3 BOTTLE | Refills: 3 | Status: SHIPPED | OUTPATIENT
Start: 2020-03-04 | End: 2020-04-14 | Stop reason: SDUPTHER

## 2020-03-04 NOTE — TELEPHONE ENCOUNTER
Left message with  who will  Take message to nursing to call back with info--she tried to transfer me to nursing but went to dead  Air--lj

## 2020-03-04 NOTE — TELEPHONE ENCOUNTER
Pharmacy called -patient has flonse on med list but no directions -please fill in & order if you agree- new pharmacy is [facility] listed below--lj

## 2020-03-05 ENCOUNTER — TELEPHONE (OUTPATIENT)
Dept: FAMILY MEDICINE CLINIC | Facility: CLINIC | Age: 85
End: 2020-03-05

## 2020-03-05 NOTE — TELEPHONE ENCOUNTER
Spoke with daughter, stated mother is off balance , unsteady on feet, almost falling and is deconditioned  I made several attempts to reach nursing at [facility] and was unsuccessful  Abby Cuevas pt's daughter will be going there today and will have [phone] Mallory Kim RN call us back with complete diagnosis  Pt will be using PT that [facility] provides     FAX order to [facility]

## 2020-03-06 ENCOUNTER — PATIENT OUTREACH (OUTPATIENT)
Dept: CASE MANAGEMENT | Facility: OTHER | Age: 85
End: 2020-03-06

## 2020-03-06 NOTE — PROGRESS NOTES
[facility] updated their records to show that the patient was discharged from PT on 2/24/20 and SN services and [facility] on 2/25/20 even though fax received 2/26/20 indicated she was current for services

## 2020-03-11 NOTE — TELEPHONE ENCOUNTER
Spoke with Cuco Rizo pt's daughter   Pt is in OT/ PT  At [facility]  Task complete       Krysten Duran MA

## 2020-03-13 DIAGNOSIS — I25.118 CORONARY ARTERY DISEASE OF NATIVE ARTERY OF NATIVE HEART WITH STABLE ANGINA PECTORIS (HCC): ICD-10-CM

## 2020-03-13 RX ORDER — EZETIMIBE AND SIMVASTATIN 10; 40 MG/1; MG/1
TABLET ORAL
Qty: 7 TABLET | Refills: 4 | Status: SHIPPED | OUTPATIENT
Start: 2020-03-13 | End: 2020-03-16 | Stop reason: SDUPTHER

## 2020-03-16 DIAGNOSIS — C50.919 ADENOCARCINOMA OF BREAST, UNSPECIFIED LATERALITY (HCC): ICD-10-CM

## 2020-03-16 DIAGNOSIS — I25.118 CORONARY ARTERY DISEASE OF NATIVE ARTERY OF NATIVE HEART WITH STABLE ANGINA PECTORIS (HCC): ICD-10-CM

## 2020-03-16 RX ORDER — EZETIMIBE AND SIMVASTATIN 10; 40 MG/1; MG/1
1 TABLET ORAL
Qty: 90 TABLET | Refills: 3 | Status: SHIPPED | OUTPATIENT
Start: 2020-03-16 | End: 2020-03-30 | Stop reason: SDUPTHER

## 2020-03-16 RX ORDER — EXEMESTANE 25 MG/1
25 TABLET ORAL DAILY
Qty: 90 TABLET | Refills: 3 | Status: SHIPPED | OUTPATIENT
Start: 2020-03-16

## 2020-03-20 ENCOUNTER — TELEPHONE (OUTPATIENT)
Dept: FAMILY MEDICINE CLINIC | Facility: CLINIC | Age: 85
End: 2020-03-20

## 2020-03-20 NOTE — TELEPHONE ENCOUNTER
Patients daughter dropped off forms to the office to be filled out for assistance from  the [facility] to help with her with assisted living      Please call [facility] when forms are done    Forms placed in nurse pending in back

## 2020-03-25 ENCOUNTER — PATIENT OUTREACH (OUTPATIENT)
Dept: CASE MANAGEMENT | Facility: OTHER | Age: 85
End: 2020-03-25

## 2020-03-25 NOTE — PROGRESS NOTES
Patient states she is doing well  She is enjoying her new home  She is trying to avoid people  She denies cough, SOB, fever, aches, chills, or chest discomfort  She states she will let the staff at assisted living know if there is any change in her symptoms

## 2020-03-30 DIAGNOSIS — I25.118 CORONARY ARTERY DISEASE OF NATIVE ARTERY OF NATIVE HEART WITH STABLE ANGINA PECTORIS (HCC): ICD-10-CM

## 2020-03-30 RX ORDER — EZETIMIBE AND SIMVASTATIN 10; 40 MG/1; MG/1
1 TABLET ORAL
Qty: 90 TABLET | Refills: 3 | Status: SHIPPED | OUTPATIENT
Start: 2020-03-30 | End: 2020-04-14 | Stop reason: SDUPTHER

## 2020-04-07 ENCOUNTER — PATIENT OUTREACH (OUTPATIENT)
Dept: CASE MANAGEMENT | Facility: OTHER | Age: 85
End: 2020-04-07

## 2020-04-10 ENCOUNTER — TELEPHONE (OUTPATIENT)
Dept: FAMILY MEDICINE CLINIC | Facility: CLINIC | Age: 85
End: 2020-04-10

## 2020-04-14 DIAGNOSIS — I25.118 CORONARY ARTERY DISEASE OF NATIVE ARTERY OF NATIVE HEART WITH STABLE ANGINA PECTORIS (HCC): ICD-10-CM

## 2020-04-14 DIAGNOSIS — J31.0 CHRONIC RHINITIS: ICD-10-CM

## 2020-04-14 RX ORDER — EZETIMIBE AND SIMVASTATIN 10; 40 MG/1; MG/1
1 TABLET ORAL
Qty: 90 TABLET | Refills: 3 | Status: SHIPPED | OUTPATIENT
Start: 2020-04-14 | End: 2020-04-14 | Stop reason: SDUPTHER

## 2020-04-14 RX ORDER — FLUTICASONE PROPIONATE 50 MCG
2 SPRAY, SUSPENSION (ML) NASAL DAILY
Qty: 3 BOTTLE | Refills: 3 | Status: SHIPPED | OUTPATIENT
Start: 2020-04-14

## 2020-04-14 RX ORDER — EZETIMIBE AND SIMVASTATIN 10; 40 MG/1; MG/1
1 TABLET ORAL
Qty: 90 TABLET | Refills: 3 | Status: SHIPPED | OUTPATIENT
Start: 2020-04-14 | End: 2021-02-04

## 2020-04-15 DIAGNOSIS — I10 BENIGN ESSENTIAL HYPERTENSION: ICD-10-CM

## 2020-04-15 RX ORDER — ISOSORBIDE MONONITRATE 30 MG/1
30 TABLET, EXTENDED RELEASE ORAL DAILY
Qty: 30 TABLET | Refills: 5 | Status: SHIPPED | OUTPATIENT
Start: 2020-04-15 | End: 2021-02-15 | Stop reason: HOSPADM

## 2020-04-16 ENCOUNTER — TELEPHONE (OUTPATIENT)
Dept: FAMILY MEDICINE CLINIC | Facility: CLINIC | Age: 85
End: 2020-04-16

## 2020-04-16 DIAGNOSIS — I10 BENIGN ESSENTIAL HYPERTENSION: ICD-10-CM

## 2020-04-17 DIAGNOSIS — I25.118 CORONARY ARTERY DISEASE OF NATIVE ARTERY OF NATIVE HEART WITH STABLE ANGINA PECTORIS (HCC): ICD-10-CM

## 2020-04-17 RX ORDER — EZETIMIBE AND SIMVASTATIN 10; 40 MG/1; MG/1
TABLET ORAL
Qty: 7 TABLET | Refills: 4 | OUTPATIENT
Start: 2020-04-17

## 2020-06-19 DIAGNOSIS — I10 BENIGN ESSENTIAL HYPERTENSION: ICD-10-CM

## 2020-06-19 RX ORDER — DILTIAZEM HYDROCHLORIDE 180 MG/1
CAPSULE, COATED, EXTENDED RELEASE ORAL
Qty: 90 CAPSULE | Refills: 3 | Status: SHIPPED | OUTPATIENT
Start: 2020-06-19

## 2020-07-22 ENCOUNTER — TELEPHONE (OUTPATIENT)
Dept: FAMILY MEDICINE CLINIC | Facility: CLINIC | Age: 85
End: 2020-07-22

## 2020-07-30 NOTE — TELEPHONE ENCOUNTER
They had called for patient to get a covid order? I dont think the message was ever completed  It also looks like it was closed  Please advise

## 2020-09-15 ENCOUNTER — TELEPHONE (OUTPATIENT)
Dept: FAMILY MEDICINE CLINIC | Facility: CLINIC | Age: 85
End: 2020-09-15

## 2020-09-15 NOTE — TELEPHONE ENCOUNTER
DR Concha Solares    Patients daughter is calling about her mother in assisted living  She said her mother has been OVERLY anxious lately  Please call back on weds to discuss

## 2020-09-16 NOTE — TELEPHONE ENCOUNTER
Called [facility], spoke with [facility]  She took our information and said the person who scheduled the virtual appts will call us back  When she calls back please schedule for virtual appt    Joshua David MA

## 2020-09-16 NOTE — TELEPHONE ENCOUNTER
I spoke with Kian Mondragon and she stated pt is unable to come in or do a virtual visit because of the assisted living has a policy  Kian Mondragon did give me the phone number for  [facility] in [place]- [phone] Maybe we can call them and set up a virtual call through the facility? Kian Mondragon states the pt is very anxious and they are looking for a medication that will help as needed   Pls advise

## 2020-09-29 NOTE — TELEPHONE ENCOUNTER
Spoke with a nurse from [facility]  She was currently busy at the moment  When she calls back please schedule pt for a virtual visit  Thank you   Isaias Carolina

## 2020-09-29 NOTE — TELEPHONE ENCOUNTER
I SCHEDULED A VIRTUAL WITH RAYNE FOR Friday  The senior living facilitis really have a hard time with virtuals and patients are not allowed out bc of covid  If Ludivina Cano is not at work on Friday there wont be a virtual     FYI

## 2020-10-05 ENCOUNTER — TELEMEDICINE (OUTPATIENT)
Dept: FAMILY MEDICINE CLINIC | Facility: CLINIC | Age: 85
End: 2020-10-05
Payer: MEDICARE

## 2020-10-05 DIAGNOSIS — F41.9 ANXIETY: Primary | ICD-10-CM

## 2020-10-05 DIAGNOSIS — I87.2 CHRONIC VENOUS INSUFFICIENCY: ICD-10-CM

## 2020-10-05 DIAGNOSIS — I10 BENIGN ESSENTIAL HYPERTENSION: ICD-10-CM

## 2020-10-05 DIAGNOSIS — E03.9 HYPOTHYROIDISM, UNSPECIFIED TYPE: ICD-10-CM

## 2020-10-05 PROCEDURE — 99214 OFFICE O/P EST MOD 30 MIN: CPT | Performed by: NURSE PRACTITIONER

## 2020-10-05 RX ORDER — HYDROXYZINE HYDROCHLORIDE 25 MG/1
25 TABLET, FILM COATED ORAL 3 TIMES DAILY PRN
Qty: 90 TABLET | Refills: 1 | Status: SHIPPED | OUTPATIENT
Start: 2020-10-05

## 2020-10-08 ENCOUNTER — TELEPHONE (OUTPATIENT)
Dept: FAMILY MEDICINE CLINIC | Facility: CLINIC | Age: 85
End: 2020-10-08

## 2020-10-09 ENCOUNTER — TELEPHONE (OUTPATIENT)
Dept: FAMILY MEDICINE CLINIC | Facility: CLINIC | Age: 85
End: 2020-10-09

## 2020-11-09 ENCOUNTER — TELEPHONE (OUTPATIENT)
Dept: FAMILY MEDICINE CLINIC | Facility: CLINIC | Age: 85
End: 2020-11-09

## 2020-11-09 DIAGNOSIS — N30.00 ACUTE CYSTITIS WITHOUT HEMATURIA: Primary | ICD-10-CM

## 2020-11-09 RX ORDER — CEPHALEXIN 500 MG/1
500 CAPSULE ORAL EVERY 12 HOURS SCHEDULED
Qty: 14 CAPSULE | Refills: 0 | Status: SHIPPED | OUTPATIENT
Start: 2020-11-09 | End: 2020-11-16

## 2020-11-17 ENCOUNTER — TELEPHONE (OUTPATIENT)
Dept: FAMILY MEDICINE CLINIC | Facility: CLINIC | Age: 85
End: 2020-11-17

## 2020-11-25 DIAGNOSIS — K21.9 GASTROESOPHAGEAL REFLUX DISEASE WITHOUT ESOPHAGITIS: ICD-10-CM

## 2020-11-25 RX ORDER — OMEPRAZOLE 40 MG/1
CAPSULE, DELAYED RELEASE ORAL
Qty: 90 CAPSULE | Refills: 3 | Status: SHIPPED | OUTPATIENT
Start: 2020-11-25 | End: 2021-01-20 | Stop reason: SDUPTHER

## 2020-12-05 DIAGNOSIS — E03.9 HYPOTHYROIDISM, UNSPECIFIED TYPE: ICD-10-CM

## 2020-12-06 RX ORDER — LEVOTHYROXINE SODIUM 0.05 MG/1
TABLET ORAL
Qty: 90 TABLET | Refills: 3 | Status: SHIPPED | OUTPATIENT
Start: 2020-12-06

## 2020-12-11 DIAGNOSIS — R19.7 DIARRHEA, UNSPECIFIED TYPE: Primary | ICD-10-CM

## 2020-12-11 RX ORDER — LOPERAMIDE HYDROCHLORIDE 2 MG/1
2 CAPSULE ORAL 4 TIMES DAILY PRN
Qty: 30 CAPSULE | Refills: 0 | Status: SHIPPED | OUTPATIENT
Start: 2020-12-11 | End: 2020-12-11 | Stop reason: SDUPTHER

## 2020-12-11 RX ORDER — LOPERAMIDE HYDROCHLORIDE 2 MG/1
2 CAPSULE ORAL 4 TIMES DAILY PRN
Qty: 30 CAPSULE | Refills: 1 | Status: SHIPPED | OUTPATIENT
Start: 2020-12-11

## 2021-01-18 ENCOUNTER — TELEMEDICINE (OUTPATIENT)
Dept: FAMILY MEDICINE CLINIC | Facility: CLINIC | Age: 86
End: 2021-01-18
Payer: MEDICARE

## 2021-01-18 DIAGNOSIS — K30 INDIGESTION: Primary | ICD-10-CM

## 2021-01-18 PROCEDURE — 99213 OFFICE O/P EST LOW 20 MIN: CPT | Performed by: FAMILY MEDICINE

## 2021-01-18 RX ORDER — ALUMINA, MAGNESIA, AND SIMETHICONE 2400; 2400; 240 MG/30ML; MG/30ML; MG/30ML
10 SUSPENSION ORAL EVERY 6 HOURS PRN
Qty: 355 ML | Refills: 0 | Status: SHIPPED | OUTPATIENT
Start: 2021-01-18

## 2021-01-18 NOTE — PROGRESS NOTES
Virtual Regular Visit      Assessment/Plan:    Problem List Items Addressed This Visit     None      Visit Diagnoses     Indigestion    -  Primary    Relevant Medications    aluminum-magnesium hydroxide-simethicone (MAALOX MAX) 410-159-48 MG/5ML suspension      Continue prilosec  Aware to call back if symptoms persist or worsen  Script for Maalox to be faxed to [phone]  Reason for visit is   Chief Complaint   Patient presents with    Virtual Regular Visit        Encounter provider Court Lopez MD    Provider located at [address] [address]      Recent Visits  No visits were found meeting these conditions  Showing recent visits within past 7 days and meeting all other requirements     Today's Visits  Date Type Provider Dept   01/18/21 Telemedicine Court Lopez, [facility] today's visits and meeting all other requirements     Future Appointments  No visits were found meeting these conditions  Showing future appointments within next 150 days and meeting all other requirements       The patient was identified by name and date of birth  Leo Mcfarland was informed that this is a telemedicine visit and that the visit is being conducted through telephone  My office door was closed  No one else was in the room  She acknowledged consent and understanding of privacy and security of the video platform  The patient has agreed to participate and understands they can discontinue the visit at any time  Patient is aware this is a billable service  Subjective  Leo Mcfarland is a 80 y o  female   HPI   Valentina Calderai is a 81 yo female who has visit today for c/o indigestion  Feels indigestion after meals which has been bothering her  Denies fevers, chills, nausea, vomiting, constipation, diarrhea, abdominal pain, SOB, chest pain  Her symptoms are chronic and intermittent, but worsening recently   She is unsure what brings it on, but does drink tea daily  She gets tested for COVID-19 twice weekly and her results have been negative  Last swab was collected today, results pending  Past Medical History:   Diagnosis Date    Adenocarcinoma of breast ([facility] ) 9/4/2012    Procedure/Onset: 12/07/2005    Anxiety 2/28/2018    Arthritis     joints    Asymptomatic varicose veins of bilateral lower extremities 10/14/2016    Benign colon polyp 2/17/2014    Breast cancer ([place] [address] ) 2005    right    CAD (coronary artery disease)     hx MI x 2    Cancer (HCC)     breast ca, skin ca, currently in remission, tumors in chest wall    Chronic pain disorder     knees    Chronic rhinitis 9/30/2013    Chronic venous insufficiency 5/22/2018    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[facility]) 9/10/2019    S/p Mindy's procedure with the end colostomy    Complete uterovaginal prolapse 11/19/2015    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    Procedure/Onset: 05/26/2006    Coronary artery disease     Gait disturbance 12/19/2016    GERD (gastroesophageal reflux disease)     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    Procedure/Onset: 02/22/2007    History of prolapse of bladder     History of radiation therapy 2005    to breast right    Hypertension     Hypothyroidism     hypothyroidism    Irritable bowel syndrome 9/4/2012    Procedure/Onset: 09/23/2010    Kidney stone     2016    Myocardial infarction ([place] [address] )     1990's x2    Nephrolithiasis 2/26/2016    Osteoarthritis of left knee 10/19/2016    Perforation of sigmoid colon due to diverticulitis 5/9/2019    Added automatically from request for surgery [id number]    Peripheral vertigo 9/4/2012    Procedure/Onset: 04/04/2007    Psoriasis 12/19/2016    Rectocele 3/10/2016    Last Assessment & Plan:  As below      Stress incontinence in female 7/6/2016       Past Surgical History:   Procedure Laterality Date    APPENDECTOMY      during BREN    BLADDER SURGERY      suspension surgery no sling, used fiberglass suspension technique    BREAST LUMPECTOMY Right 2005    BREAST SURGERY Right     lumpectomy    CHEST WALL TUMOR EXCISION  2005    COLONOSCOPY      CYSTOSCOPY      HARTMANS PROCEDURE N/A 5/9/2019    Procedure: Danna Mari;  Surgeon: Shirley Alvarez MD;  Location: [facility] MAIN OR;  Service: General    HYSTERECTOMY Bilateral     hamlet w/removal of both ovaries    KNEE ARTHROSCOPY      Last assessed: 7/30/15    IL CYSTOURETHROSCOPY,URETER CATHETER Left 2/26/2016    Procedure: CYSTOSCOPY RETROGRADE PYELOGRAM WITH INSERTION STENT URETERAL;  Surgeon: Coleen Price MD;  Location: [address] [place] [address];  Service: Urology    IL XCAPSL CTRC RMVL INSJ IO LENS PROSTH W/O ECP Left 4/10/2017    Procedure: EXTRACTION EXTRACAPSULAR CATARACT PHACO INTRAOCULAR LENS (IOL);   Surgeon: Cielo Cam MD;  Location: [facility] MAIN OR;  Service: Ophthalmology    SKIN BIOPSY Left     Leg for [facility]       Current Outpatient Medications   Medication Sig Dispense Refill    acetaminophen (TYLENOL) 325 mg tablet Take 2 tablets (650 mg total) by mouth every 6 (six) hours as needed for mild pain, headaches or fever 30 tablet 0    aluminum-magnesium hydroxide-simethicone (MAALOX MAX) 400-400-40 MG/5ML suspension Take 10 mL by mouth every 6 (six) hours as needed for indigestion or heartburn 355 mL 0    aspirin 81 MG tablet Take 81 mg by mouth every morning        Cholecalciferol (VITAMIN D3) 125 MCG (5000 UT) CAPS Take by mouth      clopidogrel (PLAVIX) 75 mg tablet Take 1 tablet (75 mg total) by mouth daily 30 tablet 0    diltiazem (CARDIZEM CD) 180 mg 24 hr capsule TAKE 1 CAPSULE DAILY 90 capsule 3    exemestane (AROMASIN) 25 MG tablet Take 1 tablet (25 mg total) by mouth daily 90 tablet 3    ezetimibe-simvastatin (VYTORIN) 10-40 mg per tablet Take 1 tablet by mouth daily at bedtime 90 tablet 3    fluticasone (FLONASE) 50 mcg/act nasal spray 2 sprays into each nostril daily 3 Bottle 3    hydroxyurea (HYDREA) 500 mg capsule Take 500 mg by mouth daily m w f      hydrOXYzine HCL (ATARAX) 25 mg tablet Take 1 tablet (25 mg total) by mouth 3 (three) times a day as needed for anxiety 90 tablet 1    isosorbide mononitrate (IMDUR) 30 mg 24 hr tablet Take 1 tablet (30 mg total) by mouth daily 30 tablet 5    levothyroxine 50 mcg tablet TAKE 1 TABLET EVERY MORNING 90 tablet 3    loperamide (IMODIUM) 2 mg capsule Take 1 capsule (2 mg total) by mouth 4 (four) times a day as needed for diarrhea 30 capsule 1    melatonin 3 mg Take 1 tablet (3 mg total) by mouth daily at bedtime  0    metoprolol tartrate (LOPRESSOR) 25 mg tablet Take 1 tablet (25 mg total) by mouth every 12 (twelve) hours 180 tablet 3    Multiple Vitamins-Minerals (CENTRUM SILVER ULTRA WOMENS) TABS Take by mouth      omeprazole (PriLOSEC) 40 MG capsule TAKE 1 CAPSULE DAILY 90 capsule 3     No current facility-administered medications for this visit  Allergies   Allergen Reactions    Other Anaphylaxis     Blue Dye during ultrasound      Ciprofloxacin Hives and Itching     rash and itching    Clindamycin Hives and Itching     Redness      Levaquin [Levofloxacin] Hives and Itching     Rash and itching    Lincomycin     Penicillins Hives and Itching     Tolerated Pip/tazo 5/10/2019  Tolerated cefepime 1/2020    Sulfa Antibiotics Hives and Itching     Redness      Sulfacetamide     Sulfasalazine     Ceftriaxone Rash     Tolerates Cephalexin     Iv Contrast  [Iodinated Diagnostic Agents] Itching and Rash     Other reaction(s): Anaphylaxis    Linezolid Rash     Rash,flushed face after 3rd day of taking this ABX, started benadryl subsided by the end of the night   Nitrofurantoin Hives, Itching, Nausea Only and Rash       Review of Systems   Constitutional: Negative  HENT: Negative  Eyes: Negative  Respiratory: Negative  Cardiovascular: Negative  Gastrointestinal: Negative  Indigestion   Endocrine: Negative  Genitourinary: Negative      Musculoskeletal: Negative  Skin: Negative  Allergic/Immunologic: Negative  Neurological: Negative  Hematological: Negative  Psychiatric/Behavioral: Negative  Video Exam    There were no vitals filed for this visit  Physical Exam   It was my intent to perform this visit via video technology but the patient was not able to do a video connection so the visit was completed via audio telephone only  I spent 15 minutes directly with the patient during this visit      VIRTUAL VISIT [address] acknowledges that she has consented to an online visit or consultation  She understands that the online visit is based solely on information provided by her, and that, in the absence of a face-to-face physical evaluation by the physician, the diagnosis she receives is both limited and provisional in terms of accuracy and completeness  This is not intended to replace a full medical face-to-face evaluation by the physician  Fabricio Lees understands and accepts these terms

## 2021-01-20 DIAGNOSIS — K21.9 GASTROESOPHAGEAL REFLUX DISEASE WITHOUT ESOPHAGITIS: ICD-10-CM

## 2021-01-20 RX ORDER — OMEPRAZOLE 40 MG/1
40 CAPSULE, DELAYED RELEASE ORAL DAILY
Qty: 90 CAPSULE | Refills: 3 | Status: SHIPPED | OUTPATIENT
Start: 2021-01-20 | End: 2021-01-28 | Stop reason: HOSPADM

## 2021-01-22 ENCOUNTER — HOSPITAL ENCOUNTER (INPATIENT)
Facility: HOSPITAL | Age: 86
LOS: 6 days | Discharge: NURSING, REHAB OR LONG-TERM CARE | DRG: 309 | End: 2021-01-28
Admitting: INTERNAL MEDICINE
Payer: MEDICARE

## 2021-01-22 ENCOUNTER — APPOINTMENT (EMERGENCY)
Dept: RADIOLOGY | Facility: HOSPITAL | Age: 86
DRG: 309 | End: 2021-01-22
Payer: MEDICARE

## 2021-01-22 ENCOUNTER — APPOINTMENT (EMERGENCY)
Dept: CT IMAGING | Facility: HOSPITAL | Age: 86
DRG: 309 | End: 2021-01-22
Payer: MEDICARE

## 2021-01-22 DIAGNOSIS — I48.3 TYPICAL ATRIAL FLUTTER (HCC): ICD-10-CM

## 2021-01-22 DIAGNOSIS — N17.9 ACUTE KIDNEY INJURY (HCC): ICD-10-CM

## 2021-01-22 DIAGNOSIS — K21.9 GERD (GASTROESOPHAGEAL REFLUX DISEASE): ICD-10-CM

## 2021-01-22 DIAGNOSIS — I48.92 ATRIAL FLUTTER, UNSPECIFIED TYPE (HCC): Primary | ICD-10-CM

## 2021-01-22 PROBLEM — C50.919 BREAST CANCER (HCC): Status: ACTIVE | Noted: 2021-01-22

## 2021-01-22 LAB
ALBUMIN SERPL BCP-MCNC: 4.3 G/DL (ref 3.4–4.8)
ALP SERPL-CCNC: 76.5 U/L (ref 35–140)
ALT SERPL W P-5'-P-CCNC: 21 U/L (ref 5–54)
ANION GAP SERPL CALCULATED.3IONS-SCNC: 8 MMOL/L (ref 4–13)
AST SERPL W P-5'-P-CCNC: 35 U/L (ref 15–41)
BASOPHILS # BLD AUTO: 0.06 THOUSANDS/ΜL (ref 0–0.1)
BASOPHILS NFR BLD AUTO: 1 % (ref 0–1)
BILIRUB SERPL-MCNC: 0.99 MG/DL (ref 0.3–1.2)
BNP SERPL-MCNC: 324.6 PG/ML (ref 1–100)
BUN SERPL-MCNC: 25 MG/DL (ref 6–20)
CALCIUM SERPL-MCNC: 9.3 MG/DL (ref 8.4–10.2)
CHLORIDE SERPL-SCNC: 102 MMOL/L (ref 96–108)
CO2 SERPL-SCNC: 28 MMOL/L (ref 22–33)
CREAT SERPL-MCNC: 1.3 MG/DL (ref 0.4–1.1)
D DIMER PPP FEU-MCNC: 1.04 MG/L FEU (ref 0.19–0.49)
EOSINOPHIL # BLD AUTO: 0.14 THOUSAND/ΜL (ref 0–0.61)
EOSINOPHIL NFR BLD AUTO: 1 % (ref 0–6)
ERYTHROCYTE [DISTWIDTH] IN BLOOD BY AUTOMATED COUNT: 18.1 % (ref 11.6–15.1)
GFR SERPL CREATININE-BSD FRML MDRD: 36 ML/MIN/1.73SQ M
GLUCOSE SERPL-MCNC: 155 MG/DL (ref 65–140)
HCT VFR BLD AUTO: 42.5 % (ref 34.8–46.1)
HGB BLD-MCNC: 13.9 G/DL (ref 11.5–15.4)
IMM GRANULOCYTES # BLD AUTO: 0.02 THOUSAND/UL (ref 0–0.2)
IMM GRANULOCYTES NFR BLD AUTO: 0 % (ref 0–2)
LYMPHOCYTES # BLD AUTO: 1.37 THOUSANDS/ΜL (ref 0.6–4.47)
LYMPHOCYTES NFR BLD AUTO: 14 % (ref 14–44)
MAGNESIUM SERPL-MCNC: 1.7 MG/DL (ref 1.6–2.6)
MCH RBC QN AUTO: 27.4 PG (ref 26.8–34.3)
MCHC RBC AUTO-ENTMCNC: 32.7 G/DL (ref 31.4–37.4)
MCV RBC AUTO: 84 FL (ref 82–98)
MONOCYTES # BLD AUTO: 0.53 THOUSAND/ΜL (ref 0.17–1.22)
MONOCYTES NFR BLD AUTO: 5 % (ref 4–12)
NEUTROPHILS # BLD AUTO: 7.73 THOUSANDS/ΜL (ref 1.85–7.62)
NEUTS SEG NFR BLD AUTO: 79 % (ref 43–75)
PHOSPHATE SERPL-MCNC: 3.1 MG/DL (ref 2.5–5)
PLATELET # BLD AUTO: 507 THOUSANDS/UL (ref 149–390)
PMV BLD AUTO: 11.9 FL (ref 8.9–12.7)
POTASSIUM SERPL-SCNC: 5 MMOL/L (ref 3.5–5)
PROT SERPL-MCNC: 6.7 G/DL (ref 6.4–8.3)
RBC # BLD AUTO: 5.08 MILLION/UL (ref 3.81–5.12)
SODIUM SERPL-SCNC: 138 MMOL/L (ref 133–145)
TROPONIN I SERPL-MCNC: <0.03 NG/ML (ref 0–0.07)
WBC # BLD AUTO: 9.85 THOUSAND/UL (ref 4.31–10.16)

## 2021-01-22 PROCEDURE — 36415 COLL VENOUS BLD VENIPUNCTURE: CPT | Performed by: PHYSICIAN ASSISTANT

## 2021-01-22 PROCEDURE — 96376 TX/PRO/DX INJ SAME DRUG ADON: CPT

## 2021-01-22 PROCEDURE — 84100 ASSAY OF PHOSPHORUS: CPT | Performed by: INTERNAL MEDICINE

## 2021-01-22 PROCEDURE — 71045 X-RAY EXAM CHEST 1 VIEW: CPT

## 2021-01-22 PROCEDURE — 96365 THER/PROPH/DIAG IV INF INIT: CPT

## 2021-01-22 PROCEDURE — 85025 COMPLETE CBC W/AUTO DIFF WBC: CPT | Performed by: PHYSICIAN ASSISTANT

## 2021-01-22 PROCEDURE — 83735 ASSAY OF MAGNESIUM: CPT | Performed by: INTERNAL MEDICINE

## 2021-01-22 PROCEDURE — 93005 ELECTROCARDIOGRAM TRACING: CPT

## 2021-01-22 PROCEDURE — 80053 COMPREHEN METABOLIC PANEL: CPT | Performed by: PHYSICIAN ASSISTANT

## 2021-01-22 PROCEDURE — 83880 ASSAY OF NATRIURETIC PEPTIDE: CPT | Performed by: PHYSICIAN ASSISTANT

## 2021-01-22 PROCEDURE — 84484 ASSAY OF TROPONIN QUANT: CPT | Performed by: PHYSICIAN ASSISTANT

## 2021-01-22 PROCEDURE — 71275 CT ANGIOGRAPHY CHEST: CPT

## 2021-01-22 PROCEDURE — 85379 FIBRIN DEGRADATION QUANT: CPT | Performed by: PHYSICIAN ASSISTANT

## 2021-01-22 PROCEDURE — 99285 EMERGENCY DEPT VISIT HI MDM: CPT | Performed by: PHYSICIAN ASSISTANT

## 2021-01-22 PROCEDURE — 99285 EMERGENCY DEPT VISIT HI MDM: CPT

## 2021-01-22 PROCEDURE — G1004 CDSM NDSC: HCPCS

## 2021-01-22 RX ORDER — LANOLIN ALCOHOL/MO/W.PET/CERES
3 CREAM (GRAM) TOPICAL
Status: DISCONTINUED | OUTPATIENT
Start: 2021-01-22 | End: 2021-01-28 | Stop reason: HOSPADM

## 2021-01-22 RX ORDER — DILTIAZEM HYDROCHLORIDE 5 MG/ML
10 INJECTION INTRAVENOUS ONCE
Status: COMPLETED | OUTPATIENT
Start: 2021-01-22 | End: 2021-01-22

## 2021-01-22 RX ORDER — HYDROXYZINE HYDROCHLORIDE 25 MG/1
25 TABLET, FILM COATED ORAL 3 TIMES DAILY PRN
Status: DISCONTINUED | OUTPATIENT
Start: 2021-01-22 | End: 2021-01-28 | Stop reason: HOSPADM

## 2021-01-22 RX ORDER — METOPROLOL TARTRATE 5 MG/5ML
5 INJECTION INTRAVENOUS ONCE
Status: DISCONTINUED | OUTPATIENT
Start: 2021-01-22 | End: 2021-01-22

## 2021-01-22 RX ORDER — SIMETHICONE 80 MG
80 TABLET,CHEWABLE ORAL 4 TIMES DAILY PRN
Status: DISCONTINUED | OUTPATIENT
Start: 2021-01-22 | End: 2021-01-28 | Stop reason: HOSPADM

## 2021-01-22 RX ORDER — HEPARIN SODIUM 10000 [USP'U]/100ML
3-20 INJECTION, SOLUTION INTRAVENOUS
Status: DISCONTINUED | OUTPATIENT
Start: 2021-01-22 | End: 2021-01-23

## 2021-01-22 RX ORDER — ACETAMINOPHEN 325 MG/1
650 TABLET ORAL EVERY 6 HOURS PRN
Status: DISCONTINUED | OUTPATIENT
Start: 2021-01-22 | End: 2021-01-28 | Stop reason: HOSPADM

## 2021-01-22 RX ORDER — FUROSEMIDE 10 MG/ML
20 INJECTION INTRAMUSCULAR; INTRAVENOUS ONCE
Status: COMPLETED | OUTPATIENT
Start: 2021-01-22 | End: 2021-01-22

## 2021-01-22 RX ORDER — CLOPIDOGREL BISULFATE 75 MG/1
75 TABLET ORAL DAILY
Status: DISCONTINUED | OUTPATIENT
Start: 2021-01-23 | End: 2021-01-23

## 2021-01-22 RX ORDER — PANTOPRAZOLE SODIUM 40 MG/1
40 TABLET, DELAYED RELEASE ORAL
Status: DISCONTINUED | OUTPATIENT
Start: 2021-01-23 | End: 2021-01-24

## 2021-01-22 RX ORDER — DIPHENHYDRAMINE HYDROCHLORIDE 50 MG/ML
25 INJECTION INTRAMUSCULAR; INTRAVENOUS ONCE
Status: COMPLETED | OUTPATIENT
Start: 2021-01-22 | End: 2021-01-22

## 2021-01-22 RX ORDER — LEVOTHYROXINE SODIUM 0.05 MG/1
50 TABLET ORAL EVERY MORNING
Status: DISCONTINUED | OUTPATIENT
Start: 2021-01-23 | End: 2021-01-28 | Stop reason: HOSPADM

## 2021-01-22 RX ORDER — METHYLPREDNISOLONE SODIUM SUCCINATE 125 MG/2ML
125 INJECTION, POWDER, LYOPHILIZED, FOR SOLUTION INTRAMUSCULAR; INTRAVENOUS ONCE
Status: COMPLETED | OUTPATIENT
Start: 2021-01-22 | End: 2021-01-22

## 2021-01-22 RX ORDER — ISOSORBIDE MONONITRATE 30 MG/1
30 TABLET, EXTENDED RELEASE ORAL DAILY
Status: DISCONTINUED | OUTPATIENT
Start: 2021-01-23 | End: 2021-01-28 | Stop reason: HOSPADM

## 2021-01-22 RX ORDER — SODIUM CHLORIDE 9 MG/ML
3 INJECTION INTRAVENOUS
Status: DISCONTINUED | OUTPATIENT
Start: 2021-01-22 | End: 2021-01-28 | Stop reason: HOSPADM

## 2021-01-22 RX ORDER — HYDROXYUREA 500 MG/1
500 CAPSULE ORAL DAILY
Status: DISCONTINUED | OUTPATIENT
Start: 2021-01-23 | End: 2021-01-22

## 2021-01-22 RX ORDER — OXYCODONE HYDROCHLORIDE AND ACETAMINOPHEN 5; 325 MG/1; MG/1
1 TABLET ORAL ONCE
Status: DISCONTINUED | OUTPATIENT
Start: 2021-01-22 | End: 2021-01-22

## 2021-01-22 RX ORDER — BISACODYL 10 MG
10 SUPPOSITORY, RECTAL RECTAL DAILY PRN
Status: DISCONTINUED | OUTPATIENT
Start: 2021-01-22 | End: 2021-01-28 | Stop reason: HOSPADM

## 2021-01-22 RX ORDER — ONDANSETRON 2 MG/ML
4 INJECTION INTRAMUSCULAR; INTRAVENOUS EVERY 6 HOURS PRN
Status: DISCONTINUED | OUTPATIENT
Start: 2021-01-22 | End: 2021-01-28 | Stop reason: HOSPADM

## 2021-01-22 RX ORDER — ASPIRIN 81 MG/1
81 TABLET, CHEWABLE ORAL DAILY
Status: DISCONTINUED | OUTPATIENT
Start: 2021-01-23 | End: 2021-01-25

## 2021-01-22 RX ORDER — HYDROXYUREA 500 MG/1
500 CAPSULE ORAL
Status: DISCONTINUED | OUTPATIENT
Start: 2021-01-22 | End: 2021-01-28 | Stop reason: HOSPADM

## 2021-01-22 RX ADMIN — MELATONIN 3 MG: 3 TAB ORAL at 22:01

## 2021-01-22 RX ADMIN — METHYLPREDNISOLONE SODIUM SUCCINATE 125 MG: 125 INJECTION, POWDER, FOR SOLUTION INTRAMUSCULAR; INTRAVENOUS at 18:00

## 2021-01-22 RX ADMIN — IOHEXOL 100 ML: 350 INJECTION, SOLUTION INTRAVENOUS at 18:21

## 2021-01-22 RX ADMIN — HYDROXYUREA 500 MG: 500 CAPSULE ORAL at 23:06

## 2021-01-22 RX ADMIN — HEPARIN SODIUM 12 UNITS/KG/HR: 10000 INJECTION, SOLUTION INTRAVENOUS at 19:39

## 2021-01-22 RX ADMIN — DILTIAZEM HYDROCHLORIDE 10 MG/HR: 5 INJECTION INTRAVENOUS at 17:03

## 2021-01-22 RX ADMIN — DILTIAZEM HYDROCHLORIDE 10 MG: 5 INJECTION, SOLUTION INTRAVENOUS at 16:32

## 2021-01-22 RX ADMIN — FUROSEMIDE 20 MG: 10 INJECTION, SOLUTION INTRAMUSCULAR; INTRAVENOUS at 18:25

## 2021-01-22 RX ADMIN — ENOXAPARIN SODIUM 70 MG: 80 INJECTION SUBCUTANEOUS at 18:23

## 2021-01-22 RX ADMIN — PRAVASTATIN SODIUM: 80 TABLET ORAL at 22:02

## 2021-01-22 RX ADMIN — DILTIAZEM HYDROCHLORIDE 5 MG/HR: 5 INJECTION INTRAVENOUS at 21:50

## 2021-01-22 RX ADMIN — DIPHENHYDRAMINE HYDROCHLORIDE 25 MG: 50 INJECTION, SOLUTION INTRAMUSCULAR; INTRAVENOUS at 18:00

## 2021-01-22 NOTE — H&P
H&P- Vickey You 3/7/1928, 80 y o  female MRN: [id number]    Unit/Bed#: ED 05 Encounter: [id number]    Primary Care Provider: Bethanie Ventura MD   Date and time admitted to hospital: 1/22/2021  4:13 PM    * Atrial flutter [facility])  Assessment & Plan  Patient presented with palpitations and shortness of breath  EKG show atrial flutter  [id number]  is 5   1st troponin negative, trend troponin  Patient started on Cardizem drip, continue the same  Patient takes oral Cardizem, metoprolol and  Imdur   For now continue IV Cardizem drip  Once the heart rate is controlled, transition to oral rate control medicines  Patient received 1 dose of therapeutic Lovenox  As patient has Raudel, switch to IV heparin drip     Echocardiogram from January 2020 showed EF 55%  Repeat echocardiogram   One dose of 20 mg IV Lasix given in ED  Patient had elevated D-dimer, CTA chest is done, PE is ruled out  Patient has history of MI and underwent cardiac catheterization on 01/20/2020 which showed significant stenosis   Consult cardiology  Monitor on telemetry    Essential hypertension  Assessment & Plan  Continue on Cardizem drip for now  Transition to oral antihypertensive tomorrow  Breast cancer [facility])  Assessment & Plan  She has history of breast cancer, outpatient follow-up  She follows up with Dr Waqar Chavarria  Hypothyroidism  Assessment & Plan  Continue levothyroxine    GERD (gastroesophageal reflux disease)  Assessment & Plan  Continue omeprazole    VTE Prophylaxis: Heparin  / sequential compression device   Code Status: DNR/DNI    Discussion with family:  I talked to the daughter in detail  Daughter said that patient is DNR DNI  She has dementia, and she forgets the things  But she is alert awake oriented x3  Anticipated Length of Stay:  Patient will be admitted on an Inpatient basis with an anticipated length of stay of   2 midnights     Justification for Hospital Stay:  Atrial flutter    Total Time for Visit, including Counseling / Coordination of Care: 1 hour  Greater than 50% of this total time spent on direct patient counseling and coordination of care  Chief Complaint:   Shortness of breath and palpitation    History of Present Illness:    Irina Macario is a 80 y o  female with a past medical history of CAD status post catheterization in jan 2019, status post colostomy in 2019, hypertension, hypothyroidism, breast cancer, GERD, dementia presented to emergency department with a complaint of shortness of breath and palpitations  Patient is a resident of [facility]  Patient denied chest pain, nausea, vomiting, abdominal pain or urinary complaints  She denied headache, dizziness, lightheadedness  Patient is breathing on room air, after couple of hours her complaint of shortness of breath is resolved  EKG show atrial flutter  Patient had elevated D-dimer, CTA was done, PE is ruled out  Patient has creatinine 1 3, baseline creatinine 1  She had recent uterine and per daughter     Review of Systems:    Review of Systems    Past Medical and Surgical History:     Past Medical History:   Diagnosis Date    Adenocarcinoma of breast ([facility] ) 9/4/2012    Procedure/Onset: 12/07/2005    Anxiety 2/28/2018    Arthritis     joints    Asymptomatic varicose veins of bilateral lower extremities 10/14/2016    Benign colon polyp 2/17/2014    Breast cancer ([facility] ) 2005    right    CAD (coronary artery disease)     hx MI x 2    Cancer ([facility] )     breast ca, skin ca, currently in remission, tumors in chest wall    Chronic pain disorder     knees    Chronic rhinitis 9/30/2013    Chronic venous insufficiency 5/22/2018    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[facility]) 9/10/2019    S/p Mindy's procedure with the end colostomy    Complete uterovaginal prolapse 11/19/2015    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    Procedure/Onset: 05/26/2006    Coronary artery disease     Gait disturbance 12/19/2016    GERD (gastroesophageal reflux disease)     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    Procedure/Onset: 02/22/2007    History of prolapse of bladder     History of radiation therapy 2005    to breast right    Hypertension     Hypothyroidism     hypothyroidism    Irritable bowel syndrome 9/4/2012    Procedure/Onset: 09/23/2010    Kidney stone     2016    Myocardial infarction ([facility] )     1990's x2    Nephrolithiasis 2/26/2016    Osteoarthritis of left knee 10/19/2016    Perforation of sigmoid colon due to diverticulitis 5/9/2019    Added automatically from request for surgery [id number]    Peripheral vertigo 9/4/2012    Procedure/Onset: 04/04/2007    Psoriasis 12/19/2016    Rectocele 3/10/2016    Last Assessment & Plan:  As below   Stress incontinence in female 7/6/2016       Past Surgical History:   Procedure Laterality Date    APPENDECTOMY      during HAMLET    BLADDER SURGERY      suspension surgery no sling, used fiberglass suspension technique    BREAST LUMPECTOMY Right 2005    BREAST SURGERY Right     lumpectomy    CHEST WALL TUMOR EXCISION  2005    COLONOSCOPY      CYSTOSCOPY      HARTMANS PROCEDURE N/A 5/9/2019    Procedure: Marlon Zhu;  Surgeon: Marisela Zavala MD;  Location: [facility] MAIN OR;  Service: General    HYSTERECTOMY Bilateral     hamlet w/removal of both ovaries    KNEE ARTHROSCOPY      Last assessed: 7/30/15    NV CYSTOURETHROSCOPY,URETER CATHETER Left 2/26/2016    Procedure: CYSTOSCOPY RETROGRADE PYELOGRAM WITH INSERTION STENT URETERAL;  Surgeon: Cecy Banks MD;  Location: [address] [place] [address];  Service: Urology    NV XCAPSL CTRC RMVL INSJ IO LENS PROSTH W/O ECP Left 4/10/2017    Procedure: EXTRACTION EXTRACAPSULAR CATARACT PHACO INTRAOCULAR LENS (IOL); Surgeon: Miranda Houston MD;  Location: [facility] MAIN OR;  Service: Ophthalmology    SKIN BIOPSY Left     Leg for [facility]       Meds/Allergies:    Prior to Admission medications    Medication Sig Start Date End Date Taking?  Authorizing Provider acetaminophen (TYLENOL) 325 mg tablet Take 2 tablets (650 mg total) by mouth every 6 (six) hours as needed for mild pain, headaches or fever 5/15/19   Peng Carpenter MD   aluminum-magnesium hydroxide-simethicone (MAALOX MAX) 504-969-78 MG/5ML suspension Take 10 mL by mouth every 6 (six) hours as needed for indigestion or heartburn 1/18/21   Moises Nunez MD   aspirin 81 MG tablet Take 81 mg by mouth every morning      Historical Provider, MD   Cholecalciferol (VITAMIN D3) 125 MCG (5000 UT) CAPS Take by mouth    Historical Provider, MD   clopidogrel (PLAVIX) 75 mg tablet Take 1 tablet (75 mg total) by mouth daily 1/22/20 2/21/20  Caretha Nyhan, CRNP   diltiazem (CARDIZEM CD) 180 mg 24 hr capsule TAKE 1 CAPSULE DAILY 6/19/20   Zoe Toussaint MD   exemestane (AROMASIN) 25 MG tablet Take 1 tablet (25 mg total) by mouth daily 3/16/20   Zoe Toussaint MD   ezetimibe-simvastatin (VYTORIN) 10-40 mg per tablet Take 1 tablet by mouth daily at bedtime 4/14/20   Zoe Toussaint MD   fluticasone [facility]) 50 mcg/act nasal spray 2 sprays into each nostril daily 4/14/20   Zoe Toussaint MD   hydroxyurea (HYDREA) 500 mg capsule Take 500 mg by mouth daily m w f    Historical Provider, MD   hydrOXYzine HCL (ATARAX) 25 mg tablet Take 1 tablet (25 mg total) by mouth 3 (three) times a day as needed for anxiety 10/5/20   GLORIA Arevalo   isosorbide mononitrate (IMDUR) 30 mg 24 hr tablet Take 1 tablet (30 mg total) by mouth daily 4/15/20 10/12/20  Zoe Toussaint MD   levothyroxine 50 mcg tablet TAKE 1 TABLET EVERY MORNING 12/6/20   Zoe Toussaint MD   loperamide (IMODIUM) 2 mg capsule Take 1 capsule (2 mg total) by mouth 4 (four) times a day as needed for diarrhea 12/11/20   Moises Nunez MD   melatonin 3 mg Take 1 tablet (3 mg total) by mouth daily at bedtime 2/1/20   GLORIA Saleh   metoprolol tartrate (LOPRESSOR) 25 mg tablet Take 1 tablet (25 mg total) by mouth every 12 (twelve) hours 4/16/20 21  Fuentes Aggarwal MD   Multiple Vitamins-Minerals (CENTRUM SILVER ULTRA WOMENS) TABS Take by mouth    Historical Provider, MD   omeprazole (PriLOSEC) 40 MG capsule Take 1 capsule (40 mg total) by mouth daily 21   Fuentes Aggarwal MD   levothyroxine 50 mcg tablet Take 1 tablet (50 mcg total) by mouth every morning 12/15/19   Fuentes Aggarwal MD     I have reviewed home medications with patient personally  Allergies: Allergies   Allergen Reactions    Other Anaphylaxis     Blue Dye during ultrasound      Ciprofloxacin Hives and Itching     rash and itching    Clindamycin Hives and Itching     Redness      Levaquin [Levofloxacin] Hives and Itching     Rash and itching    Lincomycin     Penicillins Hives and Itching     Tolerated Pip/tazo 5/10/2019  Tolerated cefepime 2020    Sulfa Antibiotics Hives and Itching     Redness      Sulfacetamide     Sulfasalazine     Ceftriaxone Rash     Tolerates Cephalexin     Iv Contrast  [Iodinated Diagnostic Agents] Itching and Rash     Other reaction(s): Anaphylaxis    Linezolid Rash     Rash,flushed face after 3rd day of taking this ABX, started benadryl subsided by the end of the night   Nitrofurantoin Hives, Itching, Nausea Only and Rash       Social History:     Marital Status:       Substance Use History:   Social History     Substance and Sexual Activity   Alcohol Use Yes    Alcohol/week: 1 0 standard drinks    Types: 1 Glasses of wine per week    Frequency: 4 or more times a week    Drinks per session: 1 or 2    Comment: 1 glass of wine before bed     Social History     Tobacco Use   Smoking Status Former Smoker    Packs/day: 0 10    Quit date: 5    Years since quittin 0   Smokeless Tobacco Never Used     Social History     Substance and Sexual Activity   Drug Use Never       Family History:    non-contributory    Physical Exam:     Vitals:   Blood Pressure: 136/84 (21 1648)  Pulse: 96 (21 1822)  Temperature: (!) 96 6 °F (35 9 °C) (01/22/21 1648)  Temp Source: Tympanic (01/22/21 1648)  Respirations: (!) 30 (01/22/21 1648)  SpO2: 91 % (01/22/21 1818)    Physical Exam  Vitals signs and nursing note reviewed  Constitutional:       General: She is not in acute distress  Appearance: She is well-developed  She is obese  HENT:      Head: Normocephalic and atraumatic  Eyes:      Conjunctiva/sclera: Conjunctivae normal    Neck:      Musculoskeletal: Neck supple  Cardiovascular:      Rate and Rhythm: Tachycardia present  Heart sounds: No murmur  Pulmonary:      Effort: Pulmonary effort is normal  No respiratory distress  Breath sounds: Normal breath sounds  Abdominal:      Palpations: Abdomen is soft  Tenderness: There is no abdominal tenderness  Comments: Colostomy bag in place   Musculoskeletal:      Right lower leg: Edema present  Left lower leg: Edema present  Skin:     General: Skin is warm and dry  Capillary Refill: Capillary refill takes less than 2 seconds  Neurological:      General: No focal deficit present  Mental Status: She is alert and oriented to person, place, and time  Psychiatric:         Mood and Affect: Mood normal              Additional Data:     Lab Results: I have personally reviewed pertinent reports  Results from last 7 days   Lab Units 01/22/21  1630   WBC Thousand/uL 9 85   HEMOGLOBIN g/dL 13 9   HEMATOCRIT % 42 5   PLATELETS Thousands/uL 507*   NEUTROS PCT % 79*   LYMPHS PCT % 14   MONOS PCT % 5   EOS PCT % 1     Results from last 7 days   Lab Units 01/22/21  1630   SODIUM mmol/L 138   POTASSIUM mmol/L 5 0   CHLORIDE mmol/L 102   CO2 mmol/L 28   BUN mg/dL 25*   CREATININE mg/dL 1 30*   ANION GAP mmol/L 8   CALCIUM mg/dL 9 3   ALBUMIN g/dL 4 3   TOTAL BILIRUBIN mg/dL 0 99   ALK PHOS U/L 76 5   ALT U/L 21   AST U/L 35   GLUCOSE RANDOM mg/dL 155*                       Imaging: I have personally reviewed pertinent reports        CTA ED chest PE study Final Result by Galdino Dia MD (01/22 1928)      No pulmonary embolism  Cardiomegaly with bilateral diffuse groundglass opacities and moderate-sized bilateral pleural effusions in keeping with CHF  Probably reactive lymphadenopathy in the mediastinum  Workstation performed: [id number]         X-ray chest 1 view portable    (Results Pending)       EKG, Pathology, and Other Studies Reviewed on Admission:   · EKG:  Atrial flutter    Allscripts / Epic Records Reviewed: Yes     ** Please Note: This note has been constructed using a voice recognition system   **

## 2021-01-22 NOTE — ED PROVIDER NOTES
History  Chief Complaint   Patient presents with    Rapid Heart Rate    Shortness of Breath     Madeline Montalvo is a 80 y o  female presented to the ER via EMS complaining of rapid heart rate and SOB  Denies any recent fevers, sweats, chills, chest pain, nausea, vomiting, diarrhea, URI symptoms or any other complaints  Patient does reside in a nursing home  History provided by:  Patient   used: No    Rapid Heart Rate  Palpitations quality:  Fast  Onset quality:  Sudden  Timing:  Constant  Progression:  Unchanged  Chronicity:  New  Relieved by:  Nothing  Worsened by:  Nothing  Ineffective treatments: Beta blockers, cardizem  Associated symptoms: shortness of breath    Associated symptoms: no back pain, no chest pain, no cough, no diaphoresis, no dizziness, no hemoptysis, no leg pain, no lower extremity edema, no nausea, no numbness, no syncope and no vomiting    Shortness of breath:     Severity:  Moderate    Onset quality:  Sudden    Timing:  Constant    Progression:  Unchanged  Shortness of Breath  Severity:  Moderate  Onset quality:  Sudden  Timing:  Constant  Progression:  Unchanged  Chronicity:  New  Context: not URI    Relieved by:  Nothing  Worsened by:  Nothing  Ineffective treatments:  None tried  Associated symptoms: no abdominal pain, no chest pain, no cough, no diaphoresis, no ear pain, no fever, no headaches, no hemoptysis, no neck pain, no rash, no sore throat, no sputum production, no syncope, no vomiting and no wheezing        Prior to Admission Medications   Prescriptions Last Dose Informant Patient Reported? Taking?    Cholecalciferol (VITAMIN D3) 125 MCG (5000 UT) CAPS  Child Yes No   Sig: Take by mouth   Multiple Vitamins-Minerals (CENTRUM SILVER ULTRA WOMENS) TABS  Child Yes No   Sig: Take by mouth   acetaminophen (TYLENOL) 325 mg tablet  Child No No   Sig: Take 2 tablets (650 mg total) by mouth every 6 (six) hours as needed for mild pain, headaches or fever aluminum-magnesium hydroxide-simethicone (MAALOX MAX) 400-400-40 MG/5ML suspension   No No   Sig: Take 10 mL by mouth every 6 (six) hours as needed for indigestion or heartburn   aspirin 81 MG tablet  Child Yes No   Sig: Take 81 mg by mouth every morning     clopidogrel (PLAVIX) 75 mg tablet  Child No No   Sig: Take 1 tablet (75 mg total) by mouth daily   diltiazem (CARDIZEM CD) 180 mg 24 hr capsule   No No   Sig: TAKE 1 CAPSULE DAILY   exemestane (AROMASIN) 25 MG tablet   No No   Sig: Take 1 tablet (25 mg total) by mouth daily   ezetimibe-simvastatin (VYTORIN) 10-40 mg per tablet   No No   Sig: Take 1 tablet by mouth daily at bedtime   fluticasone (FLONASE) 50 mcg/act nasal spray   No No   Si sprays into each nostril daily   hydrOXYzine HCL (ATARAX) 25 mg tablet   No No   Sig: Take 1 tablet (25 mg total) by mouth 3 (three) times a day as needed for anxiety   hydroxyurea (HYDREA) 500 mg capsule  Child Yes No   Sig: Take 500 mg by mouth daily    isosorbide mononitrate (IMDUR) 30 mg 24 hr tablet   No No   Sig: Take 1 tablet (30 mg total) by mouth daily   levothyroxine 50 mcg tablet   No No   Sig: TAKE 1 TABLET EVERY MORNING   loperamide (IMODIUM) 2 mg capsule   No No   Sig: Take 1 capsule (2 mg total) by mouth 4 (four) times a day as needed for diarrhea   melatonin 3 mg   No No   Sig: Take 1 tablet (3 mg total) by mouth daily at bedtime   metoprolol tartrate (LOPRESSOR) 25 mg tablet   No No   Sig: Take 1 tablet (25 mg total) by mouth every 12 (twelve) hours   omeprazole (PriLOSEC) 40 MG capsule   No No   Sig: Take 1 capsule (40 mg total) by mouth daily      Facility-Administered Medications: None       Past Medical History:   Diagnosis Date    [facility] 2012    Procedure/Onset: 2005    Anxiety 2018    Arthritis     joints    Asymptomatic varicose veins of bilateral lower extremities 10/14/2016    Benign colon polyp 2014    Breast cancer ([place] [address] ) 2005    right    CAD (coronary artery disease)     hx MI x 2    Cancer ([facility] )     breast ca, skin ca, currently in remission, tumors in chest wall    Chronic pain disorder     knees    Chronic rhinitis 9/30/2013    Chronic venous insufficiency 5/22/2018    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    S/p Mindy's procedure with the end colostomy    Complete uterovaginal prolapse 11/19/2015    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    Procedure/Onset: 05/26/2006    Coronary artery disease     Gait disturbance 12/19/2016    GERD (gastroesophageal reflux disease)     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    Procedure/Onset: 02/22/2007    History of prolapse of bladder     History of radiation therapy 2005    to breast right    Hypertension     Hypothyroidism     hypothyroidism    Irritable bowel syndrome 9/4/2012    Procedure/Onset: 09/23/2010    Kidney stone     2016    [facility]     1990's x2    Nephrolithiasis 2/26/2016    Osteoarthritis of left knee 10/19/2016    Perforation of sigmoid colon due to diverticulitis 5/9/2019    Added automatically from request for surgery [id number]    Peripheral vertigo 9/4/2012    Procedure/Onset: 04/04/2007    Psoriasis 12/19/2016    Rectocele 3/10/2016    Last Assessment & Plan:  As below      Stress incontinence in female 7/6/2016       Past Surgical History:   Procedure Laterality Date    APPENDECTOMY      during HAMLET    BLADDER SURGERY      suspension surgery no sling, used fiberglass suspension technique    BREAST LUMPECTOMY Right 2005    BREAST SURGERY Right     lumpectomy    CHEST WALL TUMOR EXCISION  2005    COLONOSCOPY      CYSTOSCOPY      HARTMANS PROCEDURE N/A 5/9/2019    Procedure: Andreas Crowe PROCEDURE;  Surgeon: Alberta Stiles MD;  Location: [facility];  Service: General    HYSTERECTOMY Bilateral     hamlet w/removal of both ovaries    KNEE ARTHROSCOPY      Last assessed: 7/30/15    VA CYSTOURETHROSCOPY,URETER CATHETER Left 2/26/2016 Procedure: CYSTOSCOPY RETROGRADE PYELOGRAM WITH INSERTION STENT URETERAL;  Surgeon: Coleen Price MD;  Location: [address] [place] [address];  Service: Urology    [place] XCAPSL CTRC RMVL INSJ IO LENS PROSTH W/O ECP Left 4/10/2017    Procedure: EXTRACTION EXTRACAPSULAR CATARACT PHACO INTRAOCULAR LENS (IOL); Surgeon: Cielo Cam MD;  Location: [facility] MAIN OR;  Service: Ophthalmology    SKIN BIOPSY Left     Leg for [facility]       Family History   Problem Relation Age of Onset    Angina Mother     Hypertension Mother     Heart attack Mother         Myocardial Infarction    Early death Father         age 36 MI    Heart disease Father     Hypertension Father     Heart attack Father         Myocardial Infarction    Heart disease Sister     Heart disease Sister         Cardiac Disorder     I have reviewed and agree with the history as documented  E-Cigarette/Vaping     E-Cigarette/Vaping Substances     Social History     Tobacco Use    Smoking status: Former Smoker     Packs/day: 0 10     Quit date:      Years since quittin 0    Smokeless tobacco: Never Used   Substance Use Topics    Alcohol use: Yes     Alcohol/week: 1 0 standard drinks     Types: 1 Glasses of wine per week     Frequency: 4 or more times a week     Drinks per session: 1 or 2     Comment: 1 glass of wine before bed    Drug use: Never       Review of Systems   Constitutional: Negative for chills, diaphoresis and fever  HENT: Negative for ear pain, postnasal drip, rhinorrhea, sinus pain and sore throat  Eyes: Negative for pain and visual disturbance  Respiratory: Positive for shortness of breath  Negative for cough, hemoptysis, sputum production and wheezing  Cardiovascular: Positive for palpitations  Negative for chest pain and syncope  Gastrointestinal: Negative for abdominal pain, diarrhea, nausea and vomiting  Genitourinary: Negative for dysuria, flank pain, frequency, hematuria and urgency     Musculoskeletal: Negative for arthralgias, back pain and neck pain  Skin: Negative for color change and rash  Neurological: Negative for dizziness, syncope, light-headedness, numbness and headaches  All other systems reviewed and are negative  Physical Exam  Physical Exam  Vitals signs and nursing note reviewed  Constitutional:       General: She is not in acute distress  Appearance: She is well-developed and normal weight  HENT:      Head: Normocephalic and atraumatic  Eyes:      Conjunctiva/sclera: Conjunctivae normal    Neck:      Musculoskeletal: Neck supple  Cardiovascular:      Rate and Rhythm: Tachycardia present  Rhythm regularly irregular  Heart sounds: No murmur  Comments: Negative calf tenderness bilaterally, negative Pito's sign bilaterally   Pulmonary:      Effort: Tachypnea and accessory muscle usage present  No respiratory distress  Breath sounds: Normal breath sounds  No wheezing, rhonchi or rales  Abdominal:      Palpations: Abdomen is soft  Tenderness: There is no abdominal tenderness  There is no right CVA tenderness or left CVA tenderness  Musculoskeletal:      Comments: Venous stasis bilateral lower extremities   Skin:     General: Skin is warm and dry  Neurological:      Mental Status: She is alert           Vital Signs  ED Triage Vitals   Temperature Pulse Respirations Blood Pressure SpO2   01/22/21 1648 01/22/21 1616 01/22/21 1616 01/22/21 1616 01/22/21 1616   (!) 96 6 °F (35 9 °C) (!) 145 22 (!) 166/111 98 %      Temp Source Heart Rate Source Patient Position - Orthostatic VS BP Location FiO2 (%)   01/22/21 1648 01/22/21 1616 01/22/21 1616 01/22/21 1616 --   Tympanic Monitor Sitting Left arm       Pain Score       --                  Vitals:    01/22/21 1616 01/22/21 1630 01/22/21 1648 01/22/21 1822   BP: (!) 166/111 (!) 155/106 136/84    Pulse: (!) 145 (!) 145 101 96   Patient Position - Orthostatic VS: Sitting Lying           Visual Acuity      ED Medications  Medications   sodium chloride (PF) 0 9 % injection 3 mL (has no administration in time range)   acetaminophen (TYLENOL) tablet 650 mg (has no administration in time range)   bisacodyl (DULCOLAX) rectal suppository 10 mg (has no administration in time range)   ondansetron (ZOFRAN) injection 4 mg (has no administration in time range)   simethicone (MYLICON) chewable tablet 80 mg (has no administration in time range)   heparin (porcine) 25,000 units in 0 45% NaCl 250 mL infusion (premix) (12 Units/kg/hr × 70 kg (Order-Specific) Intravenous New Bag 1/22/21 1939)   diltiazem (CARDIZEM) injection 10 mg (10 mg Intravenous Given 1/22/21 1632)   diltiazem (CARDIZEM) 125 mg in sodium chloride 0 9 % 125 mL infusion (15 mg/hr Intravenous Rate/Dose Change 1/22/21 1755)   methylPREDNISolone sodium succinate (Solu-MEDROL) injection 125 mg (125 mg Intravenous Given 1/22/21 1800)   diphenhydrAMINE (BENADRYL) injection 25 mg (25 mg Intravenous Given 1/22/21 1800)   enoxaparin (LOVENOX) subcutaneous injection 70 mg (70 mg Subcutaneous Given 1/22/21 1823)   furosemide (LASIX) injection 20 mg (20 mg Intravenous Given 1/22/21 1825)   iohexol (OMNIPAQUE) 350 MG/ML injection (SINGLE-DOSE) 100 mL (100 mL Intravenous Given 1/22/21 1821)       Diagnostic Studies  Results Reviewed     Procedure Component Value Units Date/Time    APTT six (6) hours after Heparin bolus/drip initiation or dosing change [[id number]]     Lab Status: No result Specimen: Blood     Protime-INR [[id number]]     Lab Status: No result Specimen: Blood     Magnesium [[id number]]  (Normal) Collected: 01/22/21 1630    Lab Status: Final result Specimen: Blood from Arm, Left Updated: 01/22/21 1854     Magnesium 1 7 mg/dL     Phosphorus [[id number]]  (Normal) Collected: 01/22/21 1630    Lab Status: Final result Specimen: Blood from Arm, Left Updated: 01/22/21 1854     Phosphorus 3 1 mg/dL     Comprehensive metabolic panel [[id number]]  (Abnormal) Collected: 01/22/21 1630    Lab Status: Final result Specimen: Blood from Arm, Left Updated: 01/22/21 1718     Sodium 138 mmol/L      Potassium 5 0 mmol/L      Chloride 102 mmol/L      CO2 28 mmol/L      ANION GAP 8 mmol/L      BUN 25 mg/dL      Creatinine 1 30 mg/dL      Glucose 155 mg/dL      Calcium 9 3 mg/dL      AST 35 U/L      ALT 21 U/L      Alkaline Phosphatase 76 5 U/L      Total Protein 6 7 g/dL      Albumin 4 3 g/dL      Total Bilirubin 0 99 mg/dL      eGFR 36 ml/min/1 73sq m     Narrative:      National Kidney Disease Foundation guidelines for Chronic Kidney Disease (CKD):     Stage 1 with normal or high GFR (GFR > 90 mL/min/1 73 square meters)    Stage 2 Mild CKD (GFR = 60-89 mL/min/1 73 square meters)    Stage 3A Moderate CKD (GFR = 45-59 mL/min/1 73 square meters)    Stage 3B Moderate CKD (GFR = 30-44 mL/min/1 73 square meters)    Stage 4 Severe CKD (GFR = 15-29 mL/min/1 73 square meters)    Stage 5 End Stage CKD (GFR <15 mL/min/1 73 square meters)  Note: GFR calculation is accurate only with a steady state creatinine    B-Type Natriuretic Peptide [facility] ONLY) [[id number]]  (Abnormal) Collected: 01/22/21 1630    Lab Status: Final result Specimen: Blood from Arm, Left Updated: 01/22/21 1717      6 pg/mL     CBC and differential [[id number]]  (Abnormal) Collected: 01/22/21 1630    Lab Status: Final result Specimen: Blood from Arm, Left Updated: 01/22/21 1711     WBC 9 85 Thousand/uL      RBC 5 08 Million/uL      Hemoglobin 13 9 g/dL      Hematocrit 42 5 %      MCV 84 fL      MCH 27 4 pg      MCHC 32 7 g/dL      RDW 18 1 %      MPV 11 9 fL      Platelets 870 Thousands/uL      Neutrophils Relative 79 %      Immat GRANS % 0 %      Lymphocytes Relative 14 %      Monocytes Relative 5 %      Eosinophils Relative 1 %      Basophils Relative 1 %      Neutrophils Absolute 7 73 Thousands/µL      Immature Grans Absolute 0 02 Thousand/uL      Lymphocytes Absolute 1 37 Thousands/µL      Monocytes Absolute 0 53 Thousand/µL      Eosinophils Absolute 0 14 Thousand/µL      Basophils Absolute 0 06 Thousands/µL     Troponin I [[id number]]  (Normal) Collected: 01/22/21 1630    Lab Status: Final result Specimen: Blood from Arm, Left Updated: 01/22/21 1700     Troponin I <0 03 ng/mL     D-dimer, quantitative [[id number]]  (Abnormal) Collected: 01/22/21 1630    Lab Status: Final result Specimen: Blood from Arm, Left Updated: 01/22/21 1649     D-Dimer, Quant  1 04 mg/L FEU                  CTA ED chest PE study   Final Result by Isis Jordan MD (01/22 1928)      No pulmonary embolism  Cardiomegaly with bilateral diffuse groundglass opacities and moderate-sized bilateral pleural effusions in keeping with CHF  Probably reactive lymphadenopathy in the mediastinum  Workstation performed: [id number]         X-ray chest 1 view portable    (Results Pending)              Procedures  ECG 12 Lead Documentation Only    Date/Time: 1/22/2021 4:36 PM  Performed by: Denise Dupree PA-C  Authorized by: Denise Dupree PA-C     Indications / Diagnosis:  Shortness of breath, rapid heart rate  ECG reviewed by me, the ED Provider: yes    Patient location:  ED  Previous ECG:     Previous ECG:  Compared to current    Comparison ECG info:  01/27/2020    Similarity:  Changes noted    Comparison to cardiac monitor: Yes    Interpretation:     Interpretation: abnormal    Rate:     ECG rate:  144    ECG rate assessment: tachycardic    Rhythm:     Rhythm: atrial flutter    Ectopy:     Ectopy: none    QRS:     QRS axis:  Normal  Conduction:     Conduction: normal    ST segments:     ST segments:  Normal  T waves:     T waves: normal               ED Course  ED Course as of Jan 22 1953 Fri Jan 22, 2021   1744 X-ray chest 1 view portable   1749 Have contacted hospitalist for admission  CT PE scan pending  As per patient she has a history of hives with contrast dye, solumedrol and benadryl ordered   Started on Lovenox      1802 Patient is tachycardic, tachypneic, given Cardizem with improvement of rate  Started on Cardizem drip  Evidence of acute kidney injury with BUN of 25, creatinine of 1 30, BNP is  342 6, patient to be admitted to hospitalist service for a flutter, acute kidney injury  Hospitalist contacted, Pt counseled, all questions answered, agreeable to admission  46 Spoke with hospitalist on call - requests that Pt be put put on heparin drip without bolus as she has already received Lovenox  MDM    Disposition  Final diagnoses:   Atrial flutter, unspecified type ([facility] )   [facility]     Time reflects when diagnosis was documented in both MDM as applicable and the Disposition within this note     Time User Action Codes Description Comment    1/22/2021  5:52 PM Kike-AliRondaum Add [I48 92] Atrial flutter, unspecified type ([facility] )     1/22/2021  5:52 PM Kike-AliRondaum Add [N17 9] Acute kidney injury [facility])       ED Disposition     ED Disposition Condition Date/Time Comment    Admit Stable Fri Jan 22, 2021  5:52 PM Case was discussed with hospitalist and the patient's admission status was agreed to be Admission Status: inpatient status to the service of Dr Lucretia Marcano   Follow-up Information    None         Patient's Medications   Discharge Prescriptions    No medications on file     No discharge procedures on file      PDMP Review     None          ED Provider  Electronically Signed by           Jennifer Rae PA-C  01/22/21 7613       Jennifer Rae PA-C  01/22/21 9084

## 2021-01-22 NOTE — ASSESSMENT & PLAN NOTE
Patient presented with palpitations and shortness of breath  EKG show atrial flutter  [id number]  is 5   1st troponin negative, trend troponin  Patient started on Cardizem drip, continue the same  Patient takes oral Cardizem, metoprolol and  Imdur   For now continue IV Cardizem drip  Once the heart rate is controlled, transition to oral rate control medicines  Patient received 1 dose of therapeutic Lovenox  As patient has Raudel, switch to IV heparin drip     Echocardiogram from January 2020 showed EF 55%  Repeat echocardiogram   One dose of 20 mg IV Lasix given in ED  Patient had elevated D-dimer, CTA chest is done, PE is ruled out    Patient has history of MI and underwent cardiac catheterization on 01/20/2020 which showed significant stenosis   Consult cardiology  Monitor on telemetry

## 2021-01-23 PROBLEM — Z85.3 HISTORY OF BREAST CANCER: Status: ACTIVE | Noted: 2021-01-22

## 2021-01-23 LAB
ALBUMIN SERPL BCP-MCNC: 4.1 G/DL (ref 3.4–4.8)
ALP SERPL-CCNC: 84.9 U/L (ref 35–140)
ALT SERPL W P-5'-P-CCNC: 19 U/L (ref 5–54)
ANION GAP SERPL CALCULATED.3IONS-SCNC: 12 MMOL/L (ref 4–13)
APTT PPP: 56 SECONDS (ref 23–37)
AST SERPL W P-5'-P-CCNC: 16 U/L (ref 15–41)
BASOPHILS # BLD AUTO: 0 THOUSANDS/ΜL (ref 0–0.1)
BASOPHILS NFR BLD AUTO: 0 % (ref 0–1)
BILIRUB SERPL-MCNC: 0.85 MG/DL (ref 0.3–1.2)
BUN SERPL-MCNC: 24 MG/DL (ref 6–20)
CALCIUM SERPL-MCNC: 9 MG/DL (ref 8.4–10.2)
CHLORIDE SERPL-SCNC: 103 MMOL/L (ref 96–108)
CO2 SERPL-SCNC: 24 MMOL/L (ref 22–33)
CREAT SERPL-MCNC: 1.11 MG/DL (ref 0.4–1.1)
EOSINOPHIL # BLD AUTO: 0 THOUSAND/ΜL (ref 0–0.61)
EOSINOPHIL NFR BLD AUTO: 0 % (ref 0–6)
ERYTHROCYTE [DISTWIDTH] IN BLOOD BY AUTOMATED COUNT: 18.1 % (ref 11.6–15.1)
GFR SERPL CREATININE-BSD FRML MDRD: 43 ML/MIN/1.73SQ M
GLUCOSE SERPL-MCNC: 250 MG/DL (ref 65–140)
HCT VFR BLD AUTO: 41.8 % (ref 34.8–46.1)
HGB BLD-MCNC: 13.6 G/DL (ref 11.5–15.4)
IMM GRANULOCYTES # BLD AUTO: 0.02 THOUSAND/UL (ref 0–0.2)
IMM GRANULOCYTES NFR BLD AUTO: 0 % (ref 0–2)
LYMPHOCYTES # BLD AUTO: 0.44 THOUSANDS/ΜL (ref 0.6–4.47)
LYMPHOCYTES NFR BLD AUTO: 7 % (ref 14–44)
MAGNESIUM SERPL-MCNC: 1.6 MG/DL (ref 1.6–2.6)
MCH RBC QN AUTO: 27.2 PG (ref 26.8–34.3)
MCHC RBC AUTO-ENTMCNC: 32.5 G/DL (ref 31.4–37.4)
MCV RBC AUTO: 84 FL (ref 82–98)
MONOCYTES # BLD AUTO: 0.04 THOUSAND/ΜL (ref 0.17–1.22)
MONOCYTES NFR BLD AUTO: 1 % (ref 4–12)
NEUTROPHILS # BLD AUTO: 6.21 THOUSANDS/ΜL (ref 1.85–7.62)
NEUTS SEG NFR BLD AUTO: 92 % (ref 43–75)
PLATELET # BLD AUTO: 445 THOUSANDS/UL (ref 149–390)
PMV BLD AUTO: 11.8 FL (ref 8.9–12.7)
POTASSIUM SERPL-SCNC: 4.5 MMOL/L (ref 3.5–5)
PROT SERPL-MCNC: 6.3 G/DL (ref 6.4–8.3)
RBC # BLD AUTO: 5 MILLION/UL (ref 3.81–5.12)
SODIUM SERPL-SCNC: 139 MMOL/L (ref 133–145)
TROPONIN I SERPL-MCNC: <0.03 NG/ML (ref 0–0.07)
WBC # BLD AUTO: 6.71 THOUSAND/UL (ref 4.31–10.16)

## 2021-01-23 PROCEDURE — 99232 SBSQ HOSP IP/OBS MODERATE 35: CPT | Performed by: FAMILY MEDICINE

## 2021-01-23 PROCEDURE — 80053 COMPREHEN METABOLIC PANEL: CPT | Performed by: INTERNAL MEDICINE

## 2021-01-23 PROCEDURE — 85730 THROMBOPLASTIN TIME PARTIAL: CPT | Performed by: INTERNAL MEDICINE

## 2021-01-23 PROCEDURE — 85025 COMPLETE CBC W/AUTO DIFF WBC: CPT | Performed by: INTERNAL MEDICINE

## 2021-01-23 PROCEDURE — 84484 ASSAY OF TROPONIN QUANT: CPT | Performed by: INTERNAL MEDICINE

## 2021-01-23 PROCEDURE — 83735 ASSAY OF MAGNESIUM: CPT | Performed by: INTERNAL MEDICINE

## 2021-01-23 RX ORDER — DILTIAZEM HYDROCHLORIDE 60 MG/1
60 TABLET, FILM COATED ORAL EVERY 8 HOURS SCHEDULED
Status: DISCONTINUED | OUTPATIENT
Start: 2021-01-23 | End: 2021-01-28

## 2021-01-23 RX ADMIN — MELATONIN 3 MG: 3 TAB ORAL at 21:37

## 2021-01-23 RX ADMIN — PANTOPRAZOLE SODIUM 40 MG: 40 TABLET, DELAYED RELEASE ORAL at 06:37

## 2021-01-23 RX ADMIN — DILTIAZEM HYDROCHLORIDE 60 MG: 60 TABLET, FILM COATED ORAL at 15:51

## 2021-01-23 RX ADMIN — PRAVASTATIN SODIUM: 80 TABLET ORAL at 21:37

## 2021-01-23 RX ADMIN — ASPIRIN 81 MG: 81 TABLET, CHEWABLE ORAL at 09:55

## 2021-01-23 RX ADMIN — METOPROLOL TARTRATE 25 MG: 25 TABLET, FILM COATED ORAL at 15:54

## 2021-01-23 RX ADMIN — LEVOTHYROXINE SODIUM 50 MCG: 50 TABLET ORAL at 09:55

## 2021-01-23 RX ADMIN — APIXABAN 5 MG: 5 TABLET, FILM COATED ORAL at 18:40

## 2021-01-23 RX ADMIN — ISOSORBIDE MONONITRATE 30 MG: 30 TABLET, EXTENDED RELEASE ORAL at 09:55

## 2021-01-23 RX ADMIN — METOPROLOL TARTRATE 25 MG: 25 TABLET, FILM COATED ORAL at 09:55

## 2021-01-23 RX ADMIN — DILTIAZEM HYDROCHLORIDE 2.5 MG/HR: 5 INJECTION INTRAVENOUS at 03:25

## 2021-01-23 RX ADMIN — DILTIAZEM HYDROCHLORIDE 60 MG: 60 TABLET, FILM COATED ORAL at 09:55

## 2021-01-23 RX ADMIN — APIXABAN 5 MG: 5 TABLET, FILM COATED ORAL at 09:55

## 2021-01-23 NOTE — PLAN OF CARE
Problem: Potential for Falls  Goal: Patient will remain free of falls  Description: INTERVENTIONS:  - Assess patient frequently for physical needs  -  Identify cognitive and physical deficits and behaviors that affect risk of falls  -  [place] fall precautions as indicated by assessment   - Educate patient/family on patient safety including physical limitations  - Instruct patient to call for assistance with activity based on assessment  - Modify environment to reduce risk of injury  - Consider OT/PT consult to assist with strengthening/mobility  Outcome: Not Progressing     Problem: SAFETY ADULT  Goal: Patient will remain free of falls  Description: INTERVENTIONS:  - Assess patient frequently for physical needs  -  Identify cognitive and physical deficits and behaviors that affect risk of falls    -  [place] fall precautions as indicated by assessment   - Educate patient/family on patient safety including physical limitations  - Instruct patient to call for assistance with activity based on assessment  - Modify environment to reduce risk of injury  - Consider OT/PT consult to assist with strengthening/mobility  Outcome: Not Progressing  Goal: Maintain or return to baseline ADL function  Description: INTERVENTIONS:  -  Assess patient's ability to carry out ADLs; assess patient's baseline for ADL function and identify physical deficits which impact ability to perform ADLs (bathing, care of mouth/teeth, toileting, grooming, dressing, etc )  - Assess/evaluate cause of self-care deficits   - Assess range of motion  - Assess patient's mobility; develop plan if impaired  - Assess patient's need for assistive devices and provide as appropriate  - Encourage maximum independence but intervene and supervise when necessary  - Involve family in performance of ADLs  - Assess for home care needs following discharge   - Consider OT consult to assist with ADL evaluation and planning for discharge  - Provide patient education as appropriate  Outcome: Not Progressing  Goal: Maintain or return mobility status to optimal level  Description: INTERVENTIONS:  - Assess patient's baseline mobility status (ambulation, transfers, stairs, etc )    - Identify cognitive and physical deficits and behaviors that affect mobility  - Identify mobility aids required to assist with transfers and/or ambulation (gait belt, sit-to-stand, lift, walker, cane, etc )  - [place] fall precautions as indicated by assessment  - Record patient progress and toleration of activity level on Mobility SBAR; progress patient to next Phase/Stage  - Instruct patient to call for assistance with activity based on assessment  - Consider rehabilitation consult to assist with strengthening/weightbearing, etc   Outcome: Not Progressing     Problem: DISCHARGE PLANNING  Goal: Discharge to home or other facility with appropriate resources  Description: INTERVENTIONS:  - Identify barriers to discharge w/patient and caregiver  - Arrange for needed discharge resources and transportation as appropriate  - Identify discharge learning needs (meds, wound care, etc )  - Arrange for interpretive services to assist at discharge as needed  - Refer to Case Management Department for coordinating discharge planning if the patient needs post-hospital services based on physician/advanced practitioner order or complex needs related to functional status, cognitive ability, or social support system  Outcome: Not Progressing     Problem: Knowledge Deficit  Goal: Patient/family/caregiver demonstrates understanding of disease process, treatment plan, medications, and discharge instructions  Description: Complete learning assessment and assess knowledge base    Interventions:  - Provide teaching at level of understanding  - Provide teaching via preferred learning methods  Outcome: Not Progressing

## 2021-01-23 NOTE — NURSING NOTE
HR-40's to low 50's,pt is sleeping,asymptomatic  Cadrizem drip stopped  DENYS Curran  is aware of it  MD said  To restarted Cardizem drip only when HR is above 100's steady  If HR -keep it off

## 2021-01-23 NOTE — ASSESSMENT & PLAN NOTE
Continue levothyroxine Received request for benazepril (LOTENSIN) 10 MG tablet  Take 0.5 tablets by mouth daily.   Eprescribe, Disp-15 tablet, R-3    Medication refilled per Dr Null's signed protocol. Last appointment and appropriate labs reviewed.

## 2021-01-23 NOTE — ASSESSMENT & PLAN NOTE
Patient initiated on heparin infusion along with Cardizem infusion on admission  Discontinue heparin infusion and start Eliquis 5 mg p o  B i d   Start metoprolol 25 mg p o  Q 6 (patient previously on metoprolol 25 mg p o  Q 12)  Start Cardizem 60 mg p o  Q 8 (patient previously on Cardizem 180 mg p o   Daily)  Hold Imdur for now  Cardiology input pending

## 2021-01-23 NOTE — PROGRESS NOTES
Progress Note - Deborah Sever 3/7/1928, 80 y o  female MRN: [id number]    Unit/Bed#: -01 Encounter: [id number]    Primary Care Provider: Emilio Sheriff MD   Date and time admitted to hospital: 1/22/2021  4:13 PM        * Atrial flutter [facility])  Assessment & Plan  Patient initiated on heparin infusion along with Cardizem infusion on admission  Discontinue heparin infusion and start Eliquis 5 mg p o  B i d   Start metoprolol 25 mg p o  Q 6 (patient previously on metoprolol 25 mg p o  Q 12)  Start Cardizem 60 mg p o  Q 8 (patient previously on Cardizem 180 mg p o  Daily)  Hold Imdur for now  Cardiology input pending    Coronary artery disease involving native coronary artery of native heart without angina pectoris  Assessment & Plan  Cardiac catheterization 1/2020 noted triple-vessel disease with 100% occlusion of the mid RCA, 95% occlusion of the ostial left circumflex and 45-50% mid LAD stenosis  At that time optimal medical therapy was recommended  DC plavix in lieu of eliquis      Hypothyroidism  Assessment & Plan  Check TSH with reflex T4      GERD (gastroesophageal reflux disease)  Assessment & Plan  PPI therapy          Progress Note - Deborah Sever 80 y o  female MRN: [id number]    Unit/Bed#: -01 Encounter: [id number]        Subjective:   Patient seen examined at bedside, states she feels better than yesterday    Objective:     Vitals:   Vitals:    01/23/21 0735   BP: 126/75   Pulse: 73   Resp: 16   Temp: (!) 97 °F (36 1 °C)   SpO2: 97%     Body mass index is 30 4 kg/m²      Intake/Output Summary (Last 24 hours) at 1/23/2021 0837  Last data filed at 1/23/2021 0256  Gross per 24 hour   Intake 480 ml   Output    Net 480 ml       Physical Exam:   /75 (BP Location: Right arm)   Pulse 73   Temp (!) 97 °F (36 1 °C) (Tympanic)   Resp 16   Ht 5' 2" (1 575 m)   Wt 75 4 kg (166 lb 3 6 oz)   SpO2 97%   BMI 30 40 kg/m²   General appearance: alert and oriented, in no acute distress  Head: Normocephalic, without obvious abnormality, atraumatic  Lungs: clear to auscultation bilaterally  Heart:  Irregularly irregular  Abdomen: soft, non-tender; bowel sounds normal; no masses,  no organomegaly  Extremities:  Trace edema  Pulses: 2+ and symmetric  Neurologic: Grossly normal     Invasive Devices     Peripheral Intravenous Line            Peripheral IV 01/22/21 Left Antecubital less than 1 day    Peripheral IV 01/22/21 Right Wrist less than 1 day          Drain            Closed/Suction Drain Right; Inferior Abdomen Other (Comment) 10 Fr  624 days    Colostomy Descending/sigmoid  days                Results from last 7 days   Lab Units 01/23/21  0231 01/22/21  1630   WBC Thousand/uL 6 71 9 85   HEMOGLOBIN g/dL 13 6 13 9   HEMATOCRIT % 41 8 42 5   PLATELETS Thousands/uL 445* 507*       Results from last 7 days   Lab Units 01/23/21  0231 01/22/21  1630   POTASSIUM mmol/L 4 5 5 0   CHLORIDE mmol/L 103 102   CO2 mmol/L 24 28   BUN mg/dL 24* 25*   CREATININE mg/dL 1 11* 1 30*   CALCIUM mg/dL 9 0 9 3   ALK PHOS U/L 84 9 76 5   ALT U/L 19 21   AST U/L 16 35       Medication Administration - last 24 hours from 01/22/2021 0837 to 01/23/2021 7741       Date/Time Order Dose Route Action Action by     01/22/2021 1632 diltiazem (CARDIZEM) injection 10 mg 10 mg Intravenous Given Georgina Doherty RN     01/22/2021 1755 diltiazem (CARDIZEM) 125 mg in sodium chloride 0 9 % 125 mL infusion 15 mg/hr Intravenous Rate/Dose Change Georgina Doherty RN     01/22/2021 1725 diltiazem (CARDIZEM) 125 mg in sodium chloride 0 9 % 125 mL infusion 12 5 mg/hr Intravenous Rate/Dose Change Georgina Doherty RN     01/22/2021 1703 diltiazem (CARDIZEM) 125 mg in sodium chloride 0 9 % 125 mL infusion 10 mg/hr Intravenous New Bag Georgina Doherty RN     01/22/2021 1800 methylPREDNISolone sodium succinate (Solu-MEDROL) injection 125 mg 125 mg Intravenous Given Georgina Doherty RN     01/22/2021 1800 diphenhydrAMINE (BENADRYL) injection 25 mg 25 mg Intravenous Given Georgina Parikh RN     01/22/2021 1823 enoxaparin (LOVENOX) subcutaneous injection 70 mg 70 mg Subcutaneous Given Georgina Parikh RN     01/22/2021 1825 furosemide (LASIX) injection 20 mg 20 mg Intravenous Given Georgina Parikh RN     01/22/2021 1821 iohexol (OMNIPAQUE) 350 MG/ML injection (SINGLE-DOSE) 100 mL 100 mL Intravenous Given Branda Link     01/22/2021 2202 ezetimibe 10 mg-pravastatin 80 mg combo dose   Oral Given Jorge Scott RN     01/22/2021 2201 melatonin tablet 3 mg 3 mg Oral Given Jorge Scott RN     01/23/2021 8920 pantoprazole (PROTONIX) EC tablet 40 mg 40 mg Oral Given Jorge Scott RN     01/23/2021 0430 heparin (porcine) 25,000 units in 0 45% NaCl 250 mL infusion (premix) 14 Units/kg/hr Intravenous Rate/Dose Change Jorge Scott RN     01/22/2021 1939 heparin (porcine) 25,000 units in 0 45% NaCl 250 mL infusion (premix) 12 Units/kg/hr Intravenous New Bag Georgina Parikh RN     01/23/2021 0400 diltiazem (CARDIZEM) 125 mg in sodium chloride 0 9 % 125 mL infusion 7 5 mg/hr Intravenous Rate/Dose Change Jorge Scott RN     01/23/2021 0343 diltiazem (CARDIZEM) 125 mg in sodium chloride 0 9 % 125 mL infusion 5 mg/hr Intravenous Rate/Dose Change Jorge Scott RN     01/23/2021 0325 diltiazem (CARDIZEM) 125 mg in sodium chloride 0 9 % 125 mL infusion 2 5 mg/hr Intravenous New Bag Jorge Scott RN     01/23/2021 0004 diltiazem (CARDIZEM) 125 mg in sodium chloride 0 9 % 125 mL infusion 0 mg/hr Intravenous Stopped Bharti Cedillo RN     01/23/2021 0002 diltiazem (CARDIZEM) 125 mg in sodium chloride 0 9 % 125 mL infusion 7 5 mg/hr Intravenous Rate/Dose Change Bharti Cedillo RN     01/22/2021 2254 diltiazem (CARDIZEM) 125 mg in sodium chloride 0 9 % 125 mL infusion 10 mg/hr Intravenous Rate/Dose Change Jorge Scott RN     01/22/2021 2205 diltiazem (CARDIZEM) 125 mg in sodium chloride 0 9 % 125 mL infusion 7 5 mg/hr Intravenous Rate/Dose Change Jorge Scott RN     01/22/2021 2150 diltiazem (CARDIZEM) 125 mg in sodium chloride 0 9 % 125 mL infusion 5 mg/hr Intravenous New Bag Aarti Ayala RN     01/22/2021 2306 hydroxyurea (HYDREA) capsule 500 mg 500 mg Oral Given Aarti Ayala RN            Lab, Imaging and other studies: I have personally reviewed pertinent reports      VTE Pharmacologic Prophylaxis:  Eliquis  VTE Mechanical Prophylaxis: sequential compression device     Primitivo Coffey MD  1/23/2021,8:37 AM

## 2021-01-23 NOTE — ASSESSMENT & PLAN NOTE
Cardiac catheterization 1/2020 noted triple-vessel disease with 100% occlusion of the mid RCA, 95% occlusion of the ostial left circumflex and 45-50% mid LAD stenosis  At that time optimal medical therapy was recommended      DC plavix in lieu of eliquis

## 2021-01-24 ENCOUNTER — APPOINTMENT (INPATIENT)
Dept: ULTRASOUND IMAGING | Facility: HOSPITAL | Age: 86
DRG: 309 | End: 2021-01-24
Payer: MEDICARE

## 2021-01-24 ENCOUNTER — APPOINTMENT (INPATIENT)
Dept: RADIOLOGY | Facility: HOSPITAL | Age: 86
DRG: 309 | End: 2021-01-24
Payer: MEDICARE

## 2021-01-24 PROBLEM — J90 PLEURAL EFFUSION: Status: ACTIVE | Noted: 2021-01-24

## 2021-01-24 PROBLEM — R53.1 GENERALIZED WEAKNESS: Status: ACTIVE | Noted: 2021-01-24

## 2021-01-24 PROBLEM — N17.9 ACUTE RENAL FAILURE (ARF) (HCC): Status: ACTIVE | Noted: 2021-01-24

## 2021-01-24 LAB
ANION GAP SERPL CALCULATED.3IONS-SCNC: 9 MMOL/L (ref 4–13)
BACTERIA UR QL AUTO: ABNORMAL /HPF
BASOPHILS # BLD AUTO: 0.02 THOUSANDS/ΜL (ref 0–0.1)
BASOPHILS NFR BLD AUTO: 0 % (ref 0–1)
BILIRUB UR QL STRIP: NEGATIVE
BUN SERPL-MCNC: 40 MG/DL (ref 6–20)
CALCIUM SERPL-MCNC: 8.8 MG/DL (ref 8.4–10.2)
CHLORIDE SERPL-SCNC: 101 MMOL/L (ref 96–108)
CLARITY UR: ABNORMAL
CO2 SERPL-SCNC: 26 MMOL/L (ref 22–33)
COLOR UR: YELLOW
CREAT SERPL-MCNC: 1.5 MG/DL (ref 0.4–1.1)
EOSINOPHIL # BLD AUTO: 0.04 THOUSAND/ΜL (ref 0–0.61)
EOSINOPHIL NFR BLD AUTO: 0 % (ref 0–6)
ERYTHROCYTE [DISTWIDTH] IN BLOOD BY AUTOMATED COUNT: 18.1 % (ref 11.6–15.1)
GFR SERPL CREATININE-BSD FRML MDRD: 30 ML/MIN/1.73SQ M
GLUCOSE SERPL-MCNC: 135 MG/DL (ref 65–140)
GLUCOSE UR STRIP-MCNC: NEGATIVE MG/DL
HCT VFR BLD AUTO: 40.9 % (ref 34.8–46.1)
HGB BLD-MCNC: 13 G/DL (ref 11.5–15.4)
HGB UR QL STRIP.AUTO: NEGATIVE
HYALINE CASTS #/AREA URNS LPF: ABNORMAL /LPF
IMM GRANULOCYTES # BLD AUTO: 0.05 THOUSAND/UL (ref 0–0.2)
IMM GRANULOCYTES NFR BLD AUTO: 1 % (ref 0–2)
KETONES UR STRIP-MCNC: NEGATIVE MG/DL
LEUKOCYTE ESTERASE UR QL STRIP: ABNORMAL
LYMPHOCYTES # BLD AUTO: 1.23 THOUSANDS/ΜL (ref 0.6–4.47)
LYMPHOCYTES NFR BLD AUTO: 12 % (ref 14–44)
MAGNESIUM SERPL-MCNC: 1.6 MG/DL (ref 1.6–2.6)
MCH RBC QN AUTO: 26.5 PG (ref 26.8–34.3)
MCHC RBC AUTO-ENTMCNC: 31.8 G/DL (ref 31.4–37.4)
MCV RBC AUTO: 83 FL (ref 82–98)
MONOCYTES # BLD AUTO: 0.66 THOUSAND/ΜL (ref 0.17–1.22)
MONOCYTES NFR BLD AUTO: 6 % (ref 4–12)
NEUTROPHILS # BLD AUTO: 8.65 THOUSANDS/ΜL (ref 1.85–7.62)
NEUTS SEG NFR BLD AUTO: 81 % (ref 43–75)
NITRITE UR QL STRIP: NEGATIVE
NON-SQ EPI CELLS URNS QL MICRO: ABNORMAL /HPF
PH UR STRIP.AUTO: 5.5 [PH]
PLATELET # BLD AUTO: 459 THOUSANDS/UL (ref 149–390)
PMV BLD AUTO: 11.7 FL (ref 8.9–12.7)
POTASSIUM SERPL-SCNC: 4.8 MMOL/L (ref 3.5–5)
PROCALCITONIN SERPL-MCNC: <0.05 NG/ML
PROT UR STRIP-MCNC: NEGATIVE MG/DL
RBC # BLD AUTO: 4.91 MILLION/UL (ref 3.81–5.12)
RBC #/AREA URNS AUTO: ABNORMAL /HPF
SODIUM SERPL-SCNC: 136 MMOL/L (ref 133–145)
SP GR UR STRIP.AUTO: 1.02 (ref 1–1.03)
TSH SERPL DL<=0.05 MIU/L-ACNC: 3.95 UIU/ML (ref 0.34–5.6)
UROBILINOGEN UR QL STRIP.AUTO: 0.2 E.U./DL
WBC # BLD AUTO: 10.65 THOUSAND/UL (ref 4.31–10.16)
WBC #/AREA URNS AUTO: ABNORMAL /HPF

## 2021-01-24 PROCEDURE — 80048 BASIC METABOLIC PNL TOTAL CA: CPT | Performed by: FAMILY MEDICINE

## 2021-01-24 PROCEDURE — 85025 COMPLETE CBC W/AUTO DIFF WBC: CPT | Performed by: FAMILY MEDICINE

## 2021-01-24 PROCEDURE — 84443 ASSAY THYROID STIM HORMONE: CPT | Performed by: FAMILY MEDICINE

## 2021-01-24 PROCEDURE — 81001 URINALYSIS AUTO W/SCOPE: CPT | Performed by: PHYSICIAN ASSISTANT

## 2021-01-24 PROCEDURE — 84145 PROCALCITONIN (PCT): CPT | Performed by: PHYSICIAN ASSISTANT

## 2021-01-24 PROCEDURE — 83735 ASSAY OF MAGNESIUM: CPT | Performed by: FAMILY MEDICINE

## 2021-01-24 PROCEDURE — 51798 US URINE CAPACITY MEASURE: CPT

## 2021-01-24 PROCEDURE — 83935 ASSAY OF URINE OSMOLALITY: CPT | Performed by: PHYSICIAN ASSISTANT

## 2021-01-24 PROCEDURE — 99232 SBSQ HOSP IP/OBS MODERATE 35: CPT | Performed by: PHYSICIAN ASSISTANT

## 2021-01-24 PROCEDURE — 84300 ASSAY OF URINE SODIUM: CPT | Performed by: PHYSICIAN ASSISTANT

## 2021-01-24 PROCEDURE — 71045 X-RAY EXAM CHEST 1 VIEW: CPT

## 2021-01-24 PROCEDURE — 87081 CULTURE SCREEN ONLY: CPT | Performed by: INTERNAL MEDICINE

## 2021-01-24 RX ORDER — MAGNESIUM SULFATE 1 G/100ML
1 INJECTION INTRAVENOUS ONCE
Status: COMPLETED | OUTPATIENT
Start: 2021-01-24 | End: 2021-01-24

## 2021-01-24 RX ADMIN — DILTIAZEM HYDROCHLORIDE 60 MG: 60 TABLET, FILM COATED ORAL at 04:41

## 2021-01-24 RX ADMIN — METOPROLOL TARTRATE 25 MG: 25 TABLET, FILM COATED ORAL at 21:47

## 2021-01-24 RX ADMIN — MAGNESIUM SULFATE HEPTAHYDRATE 1 G: 1 INJECTION, SOLUTION INTRAVENOUS at 11:35

## 2021-01-24 RX ADMIN — DILTIAZEM HYDROCHLORIDE 60 MG: 60 TABLET, FILM COATED ORAL at 15:43

## 2021-01-24 RX ADMIN — MELATONIN 3 MG: 3 TAB ORAL at 21:47

## 2021-01-24 RX ADMIN — APIXABAN 2.5 MG: 2.5 TABLET, FILM COATED ORAL at 18:04

## 2021-01-24 RX ADMIN — APIXABAN 5 MG: 5 TABLET, FILM COATED ORAL at 08:33

## 2021-01-24 RX ADMIN — METOPROLOL TARTRATE 25 MG: 25 TABLET, FILM COATED ORAL at 04:41

## 2021-01-24 RX ADMIN — PRAVASTATIN SODIUM: 80 TABLET ORAL at 21:47

## 2021-01-24 RX ADMIN — METOPROLOL TARTRATE 25 MG: 25 TABLET, FILM COATED ORAL at 15:43

## 2021-01-24 RX ADMIN — PANTOPRAZOLE SODIUM 40 MG: 40 TABLET, DELAYED RELEASE ORAL at 04:42

## 2021-01-24 RX ADMIN — ASPIRIN 81 MG: 81 TABLET, CHEWABLE ORAL at 08:32

## 2021-01-24 RX ADMIN — LEVOTHYROXINE SODIUM 50 MCG: 50 TABLET ORAL at 08:32

## 2021-01-24 RX ADMIN — DILTIAZEM HYDROCHLORIDE 60 MG: 60 TABLET, FILM COATED ORAL at 21:47

## 2021-01-24 NOTE — ASSESSMENT & PLAN NOTE
Patient initiated on heparin infusion along with Cardizem infusion initially on admission  Discontinued heparin infusion 1/23/21 and started patient on Eliquis 5 mg p o  B i d , now renally dosed at 2 5mg BID  Started metoprolol 25 mg p o  Q 6 (patient previously on metoprolol 25 mg p o  Q 12) on 1/23/21  Started Cardizem 60 mg p o  Q 8 (patient previously on Cardizem 180 mg p o  Daily) on 1/23/21    Hold Imdur for now  Cardiology input pending

## 2021-01-24 NOTE — ASSESSMENT & PLAN NOTE
Creatinine increased since admission 1 1 --> 1 5 today  Possibly due to ATN / contrast induced nephropathy given recent CTA, but also in the setting of possible cardiorenal syndrome given elevated BNP, pleural effusions  Has some findings consistent with volume overload - mild LE edema, pleural effusion, but also with no history of CHF, weight is down from baseline  Will obtain further workup to include: renal ultrasound, urinary retention protocol, urine and serum osmolality  Received 1 time dose of IV lasix 1/22/21  Hold PPI, renally dose eliquis for now  Consider nephrology consultation when available  Follow BMP

## 2021-01-24 NOTE — PROGRESS NOTES
Progress Note - Deborah Sever 3/7/1928, 80 y o  female MRN: [id number]    Unit/Bed#: -01 Encounter: [id number]    Primary Care Provider: Emilio Sheriff MD   Date and time admitted to hospital: 1/22/2021  4:13 PM      Acute renal failure (ARF) ([facility] [address] )  Assessment & Plan  Creatinine increased since admission 1 1 --> 1 5 today  Possibly due to ATN / contrast induced nephropathy given recent CTA, but also in the setting of possible cardiorenal syndrome given elevated BNP, pleural effusions  Has some findings consistent with volume overload - mild LE edema, pleural effusion, but also with no history of CHF, weight is down from baseline  Will obtain further workup to include: renal ultrasound, urinary retention protocol, urine and serum osmolality  Received 1 time dose of IV lasix 1/22/21  Hold PPI, renally dose eliquis for now  Consider nephrology consultation when available  Follow BMP  * Atrial flutter [facility])  Assessment & Plan  Patient initiated on heparin infusion along with Cardizem infusion initially on admission  Discontinued heparin infusion 1/23/21 and started patient on Eliquis 5 mg p o  B i d , now renally dosed at 2 5mg BID  Started metoprolol 25 mg p o  Q 6 (patient previously on metoprolol 25 mg p o  Q 12) on 1/23/21  Started Cardizem 60 mg p o  Q 8 (patient previously on Cardizem 180 mg p o  Daily) on 1/23/21  Hold Imdur for now  Cardiology input pending    Pleural effusion  Assessment & Plan  Bilateral pleural effusions, present on admission in the setting of a patient with rapid a fib, history of breast cancer  Patient received 1 time dose of IV Lasix on admission  Initially requiring oxygen supplementation, now discontinued  Will follow up with a portable chest x-ray today  Consider pulmonology or IR consultation for possible diagnostic / therapeutic thoracentesis  Echocardiogram pending      Generalized weakness  Assessment & Plan  Likely multifactorial  PT/OT consultation pending  History of breast cancer  Assessment & Plan  Outpatient follow up appropriate  Hypothyroidism  Assessment & Plan   TSH with reflex T4 - WNL  GERD (gastroesophageal reflux disease)  Assessment & Plan  PPI therapy on hold due to YAW  Coronary artery disease involving native coronary artery of native heart without angina pectoris  Assessment & Plan  Cardiac catheterization 2020 noted triple-vessel disease with 100% occlusion of the mid RCA, 95% occlusion of the ostial left circumflex and 45-50% mid LAD stenosis  At that time optimal medical therapy was recommended  DC plavix in lieu of eliquis      Essential hypertension  Assessment & Plan  Stable  Continue lopressor  VTE Pharmacologic Prophylaxis:   Pharmacologic: Apixaban (Eliquis)  Mechanical VTE Prophylaxis in Place: Yes      Time Spent for Care: 30 minutes  More than 50% of total time spent on counseling and coordination of care as described above  Current Length of Stay: 2 day(s)    Current Patient Status: Inpatient   Certification Statement: The patient will continue to require additional inpatient hospital stay due to need for further workup with labs, imaging, specialty consultation pending  Discharge Plan / Estimated Discharge Date: TBD      Code Status: Level 3 - DNAR and DNI      Subjective:   Patient is not feeling well today  She is a vague historian  Notes feeling some abdominal discomfort and generalized weakness but cannot further describe  She notes she just feels "lousy"  Appetite fair  Questionable increasing shortness of breath  Not sure if she has lower extremity swelling  Objective:   Vitals:   Temp (24hrs), Av °F (36 7 °C), Min:96 7 °F (35 9 °C), Max:98 8 °F (37 1 °C)    Temp:  [96 7 °F (35 9 °C)-98 8 °F (37 1 °C)] 98 °F (36 7 °C)  HR:  [69-93] 86  Resp:  [18-20] 20  BP: (101-134)/(52-80) 133/80  SpO2:  [92 %-97 %] 94 %  Body mass index is 30 4 kg/m²       Input and Output Summary (last 24 hours): Intake/Output Summary (Last 24 hours) at 1/24/2021 1101  Last data filed at 1/24/2021 0443  Gross per 24 hour   Intake 250 ml   Output    Net 250 ml       Physical Exam:   Physical Exam  Vitals signs and nursing note reviewed  Constitutional:       Appearance: Normal appearance  HENT:      Mouth/Throat:      Mouth: Mucous membranes are moist    Neck:      Musculoskeletal: Neck supple  Cardiovascular:      Rate and Rhythm: Normal rate and regular rhythm  Heart sounds: No murmur  Pulmonary:      Effort: Pulmonary effort is normal       Breath sounds: Normal breath sounds  Comments: Decreased at the bilateral bases, otherwise clear  Abdominal:      General: There is no distension  Musculoskeletal:      Right lower leg: Edema (1+ pitting edema of the BLE  ) present  Left lower leg: Edema present  Skin:     General: Skin is warm and dry  Neurological:      General: No focal deficit present  Mental Status: She is alert and oriented to person, place, and time  Psychiatric:         Mood and Affect: Mood normal            Additional Data:   Labs:  Results from last 7 days   Lab Units 01/24/21  0520   WBC Thousand/uL 10 65*   HEMOGLOBIN g/dL 13 0   HEMATOCRIT % 40 9   PLATELETS Thousands/uL 459*   NEUTROS PCT % 81*   LYMPHS PCT % 12*   MONOS PCT % 6   EOS PCT % 0     Results from last 7 days   Lab Units 01/24/21  0520 01/23/21  0231   POTASSIUM mmol/L 4 8 4 5   CHLORIDE mmol/L 101 103   CO2 mmol/L 26 24   BUN mg/dL 40* 24*   CREATININE mg/dL 1 50* 1 11*   CALCIUM mg/dL 8 8 9 0   ALK PHOS U/L  --  84 9   ALT U/L  --  19   AST U/L  --  16           * I Have Reviewed All Lab Data Listed Above  * Additional Pertinent Lab Tests Reviewed: All Labs Within Last 24 Hours Reviewed    Imaging:  Imaging Reports Reviewed Today Include: no new imaging to review          Recent Cultures (last 7 days):         Last 24 Hours Medication List:   Current Facility-Administered Medications Medication Dose Route Frequency Provider Last Rate    acetaminophen  650 mg Oral Q6H PRN Pillo Moralez MD      apixaban  2 5 mg Oral BID Wilberto Arreaga PA-C      aspirin  81 mg Oral Daily Pillo Moralez MD      bisacodyl  10 mg Rectal Daily PRN Pillo Moralez MD      diltiazem  60 mg Oral [facility] Yobani Kennedy MD      ezetimibe 10 mg-pravastatin 80 mg combo dose   Oral HS Pillo Moralez MD      hydroxyurea  500 mg Oral Once per day on Mon Wed Fri Pillo Moralez MD      hydrOXYzine HCL  25 mg Oral TID PRN Pillo Moralez MD      isosorbide mononitrate  30 mg Oral Daily Pillo Moralez MD      levothyroxine  50 mcg Oral QAM Pillo Moralez MD      magnesium sulfate  1 g Intravenous Once Veronica Steward PA-C      melatonin  3 mg Oral HS Pillo Moralez MD      metoprolol tartrate  25 mg Oral Q6H Catalina Woods MD      ondansetron  4 mg Intravenous Q6H PRN Pillo Moralez MD      simethicone  80 mg Oral 4x Daily PRN Pillo Moralez MD      sodium chloride (PF)  3 mL Intravenous Q1H PRN Meli Elizondo PA-C          Today, Patient Was Seen By: Wilberto Arreaga PA-C    ** Please Note: Dragon 360 Dictation voice to text software may have been used in the creation of this document   **

## 2021-01-24 NOTE — ASSESSMENT & PLAN NOTE
Bilateral small to moderate pleural effusions, present on admission in the setting of a patient with rapid a fib, history of breast cancer  Patient received 1 time dose of IV Lasix on admission  Initially requiring oxygen supplementation, now discontinued  Follow up with a portable chest x-ray today - persistent moderate bilateral pleural  Consider pulmonology or IR consultation for possible diagnostic / therapeutic thoracentesis if oxygen needs escalate  Echocardiogram pending

## 2021-01-24 NOTE — NURSING NOTE
Pt woke agitated  She wanted to get out of the hospital gown  Pt stating that she has 2 other outfits here and said that the day nurse sent her clothes out to [facility]  I explained that we go not launder pt clothing here  Pt insisted that she had 2 other out fits here a pajama and a jameson pant set  I looked throughout room and no other clothing was documented in chart other then the clothes that the patient came to the hospital in  Pt apologized for being upset and kept repeating that she knows where she is that she is in the hospital  Pt ostomy bag needed changing, Nursing assisted pt in changing ostomy equipment  Pt stated she was done with laying around and wanted to stay up  Pt given cup of tea, stating she will watch TV for awhile  Safety measures in place  Will continue to monitor

## 2021-01-25 ENCOUNTER — APPOINTMENT (INPATIENT)
Dept: NON INVASIVE DIAGNOSTICS | Facility: HOSPITAL | Age: 86
DRG: 309 | End: 2021-01-25
Payer: MEDICARE

## 2021-01-25 LAB
ANION GAP SERPL CALCULATED.3IONS-SCNC: 8 MMOL/L (ref 4–13)
ATRIAL RATE: 0 BPM
ATRIAL RATE: 97 BPM
BASOPHILS # BLD AUTO: 0.04 THOUSANDS/ΜL (ref 0–0.1)
BASOPHILS NFR BLD AUTO: 0 % (ref 0–1)
BUN SERPL-MCNC: 49 MG/DL (ref 6–20)
CALCIUM SERPL-MCNC: 8.7 MG/DL (ref 8.4–10.2)
CHLORIDE SERPL-SCNC: 99 MMOL/L (ref 96–108)
CO2 SERPL-SCNC: 30 MMOL/L (ref 22–33)
CREAT SERPL-MCNC: 1.63 MG/DL (ref 0.4–1.1)
EOSINOPHIL # BLD AUTO: 0.18 THOUSAND/ΜL (ref 0–0.61)
EOSINOPHIL NFR BLD AUTO: 2 % (ref 0–6)
ERYTHROCYTE [DISTWIDTH] IN BLOOD BY AUTOMATED COUNT: 18.5 % (ref 11.6–15.1)
GFR SERPL CREATININE-BSD FRML MDRD: 27 ML/MIN/1.73SQ M
GLUCOSE SERPL-MCNC: 147 MG/DL (ref 65–140)
HCT VFR BLD AUTO: 43.8 % (ref 34.8–46.1)
HGB BLD-MCNC: 14 G/DL (ref 11.5–15.4)
IMM GRANULOCYTES # BLD AUTO: 0.03 THOUSAND/UL (ref 0–0.2)
IMM GRANULOCYTES NFR BLD AUTO: 0 % (ref 0–2)
LYMPHOCYTES # BLD AUTO: 1.51 THOUSANDS/ΜL (ref 0.6–4.47)
LYMPHOCYTES NFR BLD AUTO: 15 % (ref 14–44)
MCH RBC QN AUTO: 26.5 PG (ref 26.8–34.3)
MCHC RBC AUTO-ENTMCNC: 32 G/DL (ref 31.4–37.4)
MCV RBC AUTO: 83 FL (ref 82–98)
MONOCYTES # BLD AUTO: 0.48 THOUSAND/ΜL (ref 0.17–1.22)
MONOCYTES NFR BLD AUTO: 5 % (ref 4–12)
MRSA NOSE QL CULT: NORMAL
NEUTROPHILS # BLD AUTO: 7.66 THOUSANDS/ΜL (ref 1.85–7.62)
NEUTS SEG NFR BLD AUTO: 78 % (ref 43–75)
OSMOLALITY UR: 681 MMOL/KG
P AXIS: 38 DEGREES
P AXIS: 63 DEGREES
PLATELET # BLD AUTO: 508 THOUSANDS/UL (ref 149–390)
PMV BLD AUTO: 11.8 FL (ref 8.9–12.7)
POTASSIUM SERPL-SCNC: 4.5 MMOL/L (ref 3.5–5)
PR INTERVAL: 37 MS
PR INTERVAL: 61 MS
QRS AXIS: 60 DEGREES
QRS AXIS: 75 DEGREES
QRSD INTERVAL: 94 MS
QRSD INTERVAL: 96 MS
QT INTERVAL: 312 MS
QT INTERVAL: 319 MS
QTC INTERVAL: 401 MS
QTC INTERVAL: 494 MS
RBC # BLD AUTO: 5.28 MILLION/UL (ref 3.81–5.12)
SODIUM 24H UR-SCNC: 15 MOL/L
SODIUM SERPL-SCNC: 137 MMOL/L (ref 133–145)
T WAVE AXIS: 25 DEGREES
T WAVE AXIS: 69 DEGREES
VENTRICULAR RATE: 144 BPM
VENTRICULAR RATE: 99 BPM
WBC # BLD AUTO: 9.9 THOUSAND/UL (ref 4.31–10.16)

## 2021-01-25 PROCEDURE — 93010 ELECTROCARDIOGRAM REPORT: CPT | Performed by: INTERNAL MEDICINE

## 2021-01-25 PROCEDURE — 80048 BASIC METABOLIC PNL TOTAL CA: CPT | Performed by: PHYSICIAN ASSISTANT

## 2021-01-25 PROCEDURE — 99232 SBSQ HOSP IP/OBS MODERATE 35: CPT | Performed by: INTERNAL MEDICINE

## 2021-01-25 PROCEDURE — 85025 COMPLETE CBC W/AUTO DIFF WBC: CPT | Performed by: PHYSICIAN ASSISTANT

## 2021-01-25 PROCEDURE — 97167 OT EVAL HIGH COMPLEX 60 MIN: CPT

## 2021-01-25 PROCEDURE — 93306 TTE W/DOPPLER COMPLETE: CPT | Performed by: INTERNAL MEDICINE

## 2021-01-25 PROCEDURE — 99222 1ST HOSP IP/OBS MODERATE 55: CPT | Performed by: INTERNAL MEDICINE

## 2021-01-25 PROCEDURE — 93306 TTE W/DOPPLER COMPLETE: CPT

## 2021-01-25 PROCEDURE — 99223 1ST HOSP IP/OBS HIGH 75: CPT | Performed by: INTERNAL MEDICINE

## 2021-01-25 PROCEDURE — 84145 PROCALCITONIN (PCT): CPT | Performed by: PHYSICIAN ASSISTANT

## 2021-01-25 PROCEDURE — 97163 PT EVAL HIGH COMPLEX 45 MIN: CPT

## 2021-01-25 RX ADMIN — ISOSORBIDE MONONITRATE 30 MG: 30 TABLET, EXTENDED RELEASE ORAL at 08:36

## 2021-01-25 RX ADMIN — METOPROLOL TARTRATE 25 MG: 25 TABLET, FILM COATED ORAL at 22:28

## 2021-01-25 RX ADMIN — DILTIAZEM HYDROCHLORIDE 60 MG: 60 TABLET, FILM COATED ORAL at 13:01

## 2021-01-25 RX ADMIN — HYDROXYUREA 500 MG: 500 CAPSULE ORAL at 22:29

## 2021-01-25 RX ADMIN — METOPROLOL TARTRATE 25 MG: 25 TABLET, FILM COATED ORAL at 08:36

## 2021-01-25 RX ADMIN — DILTIAZEM HYDROCHLORIDE 60 MG: 60 TABLET, FILM COATED ORAL at 06:39

## 2021-01-25 RX ADMIN — PRAVASTATIN SODIUM: 80 TABLET ORAL at 22:32

## 2021-01-25 RX ADMIN — APIXABAN 2.5 MG: 2.5 TABLET, FILM COATED ORAL at 08:36

## 2021-01-25 RX ADMIN — MELATONIN 3 MG: 3 TAB ORAL at 22:28

## 2021-01-25 RX ADMIN — APIXABAN 2.5 MG: 2.5 TABLET, FILM COATED ORAL at 18:17

## 2021-01-25 RX ADMIN — METOPROLOL TARTRATE 25 MG: 25 TABLET, FILM COATED ORAL at 14:45

## 2021-01-25 RX ADMIN — DILTIAZEM HYDROCHLORIDE 60 MG: 60 TABLET, FILM COATED ORAL at 22:29

## 2021-01-25 RX ADMIN — LEVOTHYROXINE SODIUM 50 MCG: 50 TABLET ORAL at 08:36

## 2021-01-25 RX ADMIN — METOPROLOL TARTRATE 25 MG: 25 TABLET, FILM COATED ORAL at 03:56

## 2021-01-25 NOTE — PLAN OF CARE
Problem: Potential for Falls  Goal: Patient will remain free of falls  Description: INTERVENTIONS:  - Assess patient frequently for physical needs  -  Identify cognitive and physical deficits and behaviors that affect risk of falls    -  [place] fall precautions as indicated by assessment   - Educate patient/family on patient safety including physical limitations  - Instruct patient to call for assistance with activity based on assessment  - Modify environment to reduce risk of injury  - Consider OT/PT consult to assist with strengthening/mobility  Outcome: Progressing     Problem: PAIN - ADULT  Goal: Verbalizes/displays adequate comfort level or baseline comfort level  Description: Interventions:  - Encourage patient to monitor pain and request assistance  - Assess pain using appropriate pain scale  - Administer analgesics based on type and severity of pain and evaluate response  - Implement non-pharmacological measures as appropriate and evaluate response  - Consider cultural and social influences on pain and pain management  - Notify physician/advanced practitioner if interventions unsuccessful or patient reports new pain  Outcome: Progressing     Problem: INFECTION - ADULT  Goal: Absence or prevention of progression during hospitalization  Description: INTERVENTIONS:  - Assess and monitor for signs and symptoms of infection  - Monitor lab/diagnostic results  - Monitor all insertion sites, i e  indwelling lines, tubes, and drains  - Monitor endotracheal if appropriate and nasal secretions for changes in amount and color  - [place] appropriate cooling/warming therapies per order  - Administer medications as ordered  - Instruct and encourage patient and family to use good hand hygiene technique  - Identify and instruct in appropriate isolation precautions for identified infection/condition  Outcome: Progressing  Goal: Absence of fever/infection during neutropenic period  Description: INTERVENTIONS:  - Monitor WBC    Outcome: Progressing     Problem: SAFETY ADULT  Goal: Patient will remain free of falls  Description: INTERVENTIONS:  - Assess patient frequently for physical needs  -  Identify cognitive and physical deficits and behaviors that affect risk of falls    -  [place] fall precautions as indicated by assessment   - Educate patient/family on patient safety including physical limitations  - Instruct patient to call for assistance with activity based on assessment  - Modify environment to reduce risk of injury  - Consider OT/PT consult to assist with strengthening/mobility  Outcome: Progressing  Goal: Maintain or return to baseline ADL function  Description: INTERVENTIONS:  -  Assess patient's ability to carry out ADLs; assess patient's baseline for ADL function and identify physical deficits which impact ability to perform ADLs (bathing, care of mouth/teeth, toileting, grooming, dressing, etc )  - Assess/evaluate cause of self-care deficits   - Assess range of motion  - Assess patient's mobility; develop plan if impaired  - Assess patient's need for assistive devices and provide as appropriate  - Encourage maximum independence but intervene and supervise when necessary  - Involve family in performance of ADLs  - Assess for home care needs following discharge   - Consider OT consult to assist with ADL evaluation and planning for discharge  - Provide patient education as appropriate  Outcome: Progressing  Goal: Maintain or return mobility status to optimal level  Description: INTERVENTIONS:  - Assess patient's baseline mobility status (ambulation, transfers, stairs, etc )    - Identify cognitive and physical deficits and behaviors that affect mobility  - Identify mobility aids required to assist with transfers and/or ambulation (gait belt, sit-to-stand, lift, walker, cane, etc )  - [place] fall precautions as indicated by assessment  - Record patient progress and toleration of activity level on Mobility SBAR; progress patient to next Phase/Stage  - Instruct patient to call for assistance with activity based on assessment  - Consider rehabilitation consult to assist with strengthening/weightbearing, etc   Outcome: Progressing     Problem: DISCHARGE PLANNING  Goal: Discharge to home or other facility with appropriate resources  Description: INTERVENTIONS:  - Identify barriers to discharge w/patient and caregiver  - Arrange for needed discharge resources and transportation as appropriate  - Identify discharge learning needs (meds, wound care, etc )  - Arrange for interpretive services to assist at discharge as needed  - Refer to Case Management Department for coordinating discharge planning if the patient needs post-hospital services based on physician/advanced practitioner order or complex needs related to functional status, cognitive ability, or social support system  Outcome: Progressing     Problem: Knowledge Deficit  Goal: Patient/family/caregiver demonstrates understanding of disease process, treatment plan, medications, and discharge instructions  Description: Complete learning assessment and assess knowledge base    Interventions:  - Provide teaching at level of understanding  - Provide teaching via preferred learning methods  Outcome: Progressing     Problem: Prexisting or High Potential for Compromised Skin Integrity  Goal: Skin integrity is maintained or improved  Description: INTERVENTIONS:  - Identify patients at risk for skin breakdown  - Assess and monitor skin integrity  - Assess and monitor nutrition and hydration status  - Monitor labs   - Assess for incontinence   - Turn and reposition patient  - Assist with mobility/ambulation  - Relieve pressure over bony prominences  - Avoid friction and shearing  - Provide appropriate hygiene as needed including keeping skin clean and dry  - Evaluate need for skin moisturizer/barrier cream  - Collaborate with interdisciplinary team   - Patient/family teaching  - Consider wound care consult   Outcome: Progressing

## 2021-01-25 NOTE — OCCUPATIONAL THERAPY NOTE
Occupational Therapy Evaluation       01/25/21 0950   Note Type   Note type Evaluation   Restrictions/Precautions   Other Precautions Cognitive; Chair Alarm; Bed Alarm; Fall Risk   Pain Assessment   Pain Assessment Tool 0-10   Pain Score No Pain   Home Living   Type of Home Apartment   Home Layout One level;Stairs to enter with rails   Bathroom Shower/Tub Tub/shower unit   Bathroom Toilet Standard   Bathroom Equipment Other (Comment)  (None)   Home Equipment Walker;Cane   Additional Comments Patient is a very questionable historian  Per chart patient lives at [address] [facility], however patient reporting living in an apartment in [place]  Patient reports living alone, being independent in all ADLs and mobility however unsure of accuracy of patient information   Prior Function   Level of [place] Independent with ADLs and functional mobility   Lives With Alone   Receives Help From Family   ADL Assistance Independent   IADLs Independent   ADL   Eating Assistance 5  Supervision/Setup   Grooming Assistance 5  Supervision/Setup   UB Bathing Assistance 4  Minimal Assistance   LB Bathing Assistance 3  Moderate Assistance   UB Dressing Assistance 4  Minimal Assistance   LB Dressing Assistance 3  Moderate [address]  3  Moderate Assistance   Bed Mobility   Additional Comments Not assessed, patient received seated in bedside chair   Transfers   Sit to Stand 4  Minimal assistance   Additional items Assist x 1   Stand to Sit 4  Minimal assistance   Additional items Assist x 1   Stand pivot 4  Minimal assistance   Additional items Assist x 1; Impulsive  (Hand held assist)   Toilet transfer 4  Minimal assistance   Additional items Assist x 1;Standard toilet  (Ambulatory transfer with hand held assist)   Functional Mobility   Functional Mobility 4  Minimal assistance   Additional Comments Patient ambulated short household distance in room to/from bathroom with hand held assist, mostly min assist, occassional mod assist due to LOB, patient with poor safety awareness and limited insight into current functional status   Balance   Static Sitting Fair +   Dynamic Sitting Fair +   Static Standing Fair   Dynamic Standing Poor +   Ambulatory Poor +   Activity Tolerance   Activity Tolerance Patient limited by fatigue  (Limited by cognition)   RUE Assessment   RUE Assessment WFL   LUE Assessment   LUE Assessment WFL   Cognition   Overall Cognitive Status Impaired   Arousal/Participation Alert; Cooperative  (Forgetful)   Attention Attends with cues to redirect   Orientation Level Oriented to person;Oriented to place;Oriented to time   Following Commands Follows one step commands with increased time or repetition   Comments Patient awake and alert, however forgetful and a poor historian  Poor highler level cognition such as problem solving, very poor insight into current functional limitations and poor safety awareness   Assessment   Limitation Decreased ADL status; Decreased UE strength;Decreased Safe judgement during ADL;Decreased endurance;Decreased self-care trans;Decreased high-level ADLs   Prognosis Good   Assessment Patient evaluated by Occupational Therapy  Patient admitted with Atrial flutter ([facility] [address] )  The patients occupational profile, medical and therapy history includes a extensive additional review of physical, cognitive, or psychosocial history related to current functional performance  Comorbidities affecting functional mobility and ADLS include: HTN, GERD, MI, CAD, chronic pain disorder, cancer, arthritis, anxiety  Prior to admission, patient was independent with functional mobility without assistive device, independent with ADLS and requiring assist for IADLS    The evaluation identifies the following performance deficits: weakness, impaired balance, decreased endurance, increased fall risk, new onset of impairment of functional mobility, decreased ADLS, decreased IADLS, decreased activity tolerance, decreased safety awareness, impaired judgement, decreased cognition and decreased strength, that result in activity limitations and/or participation restrictions  This evaluation requires clinical decision making of high complexity, because the patient presents with comorbidites that affect occupational performance and required significant modification of tasks or assistance with consideration of multiple treatment options  The Barthel Index was used as a functional outcome tool presenting with a score of 45, indicating marked limitations of functional mobility and ADLS  The patient's raw score on the -PAC Daily Activity inpatient short form is 16, standardized score is 35 96, less than 39 4  Patients at this level are likely to benefit from DC to post-acute rehabilitation services  Please refer to the recommendation of the Occupational Therapist for safe DC planning  Patient will benefit from skilled Occupational Therapy services to address above deficits and facilitate a safe return to prior level of function  Goals   Patient Goals None stated due to poor cognition   STG Time Frame   (1-7 days)   Short Term Goal  Patient will increase standing tolerance to 5 minutes during ADL task to decrease assistance level and decrease fall risk; Patient will increase bed mobility to min assist in preparation for ADLS and transfers;  Patient will increase functional mobility to and from bathroom with rolling walker with min assist to increase performance with ADLS and to use a toilet; Patient will tolerate 5 minutes of UE ROM/strengthening to increase general activity tolerance and performance in ADLS/IADLS; Patient will improve functional activity tolerance to 5 minutes of sustained functional tasks to increase participation in basic self-care and decrease assistance level;  Patient will be able to to verbalize understanding and perform energy conservation/proper body mechanics during ADLS and functional mobility at least 50% of the time with moderate cueing to decrease signs of fatigue and increase stamina to return to prior level of function; Patient will increase static/dynamic sitting balance to good to improve the ability to sit at edge of bed or on a chair for ADLS;  Patient will increase dynamic standing balance to fair- to improve postural stability and decrease fall risk during standing ADLS and transfers  LTG Time Frame   (8-14 days)   Long Term Goal Patient will increase standing tolerance to 10 minutes during ADL task to decrease assistance level and decrease fall risk; Patient will increase bed mobility to supervision in preparation for ADLS and transfers; Patient will increase functional mobility to and from bathroom with rolling walker with supervision to increase performance with ADLS and to use a toilet; Patient will tolerate 10 minutes of UE ROM/strengthening to increase general activity tolerance and performance in ADLS/IADLS; Patient will improve functional activity tolerance to 15 minutes of sustained functional tasks to increase participation in basic self-care and decrease assistance level;  Patient will be able to to verbalize understanding and perform energy conservation/proper body mechanics during ADLS and functional mobility at least 75% of the time with minimalcueing to decrease signs of fatigue and increase stamina to return to prior level of function; Patient will increase static/dynamic standing balance to fair to improve postural stability and decrease fall risk during standing ADLS and transfers     Functional Transfer Goals   Pt Will Perform All Functional Transfers   (STG supervision, LTG independent)   ADL Goals   Pt Will Perform Eating   (LTG independent)   Pt Will Perform Grooming   (LTG independent)   Pt Will Perform Bathing   (STG min assist, LTG supervision)   Pt Will Perform UE Dressing   (STG supervision, LTG independent)   Pt Will Perform LE Dressing   (STG min assist, LTG supervision) Pt Will Perform Toileting   (STG min assist, LTG supervision)   Plan   Treatment Interventions ADL retraining;Functional transfer training;UE strengthening/ROM; Endurance training;Patient/family training;Equipment evaluation/education; Compensatory technique education;Continued evaluation; Energy conservation; Activityengagement   Goal Expiration Date 02/08/21   OT Frequency 1-2x/wk   Recommendation   OT Discharge Recommendation Post-Acute Rehabilitation Services   AM-PAC Daily Activity Inpatient   Lower Body Dressing 2   Bathing 2   Toileting 2   Upper Body Dressing 3   Grooming 3   Eating 4   Daily Activity Raw Score 16   Daily Activity Standardized Score (Calc for Raw Score >=11) 35 96   Barthel Index   Feeding 5   Bathing 0   Grooming Score 0   Dressing Score 5   Bladder Score 10   Bowels Score 10   Toilet Use Score 5   Transfers (Bed/Chair) Score 10   Mobility (Level Surface) Score 0   Stairs Score 0   Barthel Index Score 45     Oscar Apple, OTR/L

## 2021-01-25 NOTE — ASSESSMENT & PLAN NOTE
Creatinine increased since admission 1 1 --> 1 5---> 1 6  Today    possibly due to ATN / contrast induced nephropathy given recent CTA, but also in the setting of possible cardiorenal syndrome given elevated BNP, pleural effusions  Has some findings consistent with volume overload - mild LE edema, pleural effusion, but also with no history of CHF, weight is down from baseline     Received 1 time dose of IV lasix 1/22/21  Hold PPI, renally dose eliquis for now  Follow BMP  Discussed with the Nephrology, no role for IV fluids at this time, encouraged oral fluids  Monitor BMP in a m   If remains stable or improved patient may be discharged back to assisted living facility

## 2021-01-25 NOTE — PHYSICAL THERAPY NOTE
PHYSICAL THERAPY EVALUATION  Time: 4115-4362    NAME:  Cyrus Davis  DATE: 01/25/21    AGE:   80 y o  Mrn:   [id number]  Length Of Stay: 3    ADMIT DX:  SOB (shortness of breath) [R06 02]  [facility] [N17 9]  Atrial flutter, unspecified type ([place] [address] ) [I48 92]    Past Medical History:   Diagnosis Date    Adenocarcinoma of breast ([place] [address] ) 9/4/2012    Procedure/Onset: 12/07/2005    Anxiety 2/28/2018    Arthritis     joints    Asymptomatic varicose veins of bilateral lower extremities 10/14/2016    Benign colon polyp 2/17/2014    Breast cancer ([place] [address] ) 2005    right    CAD (coronary artery disease)     hx MI x 2    Cancer (HCC)     breast ca, skin ca, currently in remission, tumors in chest wall    Chronic pain disorder     knees    Chronic rhinitis 9/30/2013    Chronic venous insufficiency 5/22/2018    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    S/p Mindy's procedure with the end colostomy    Complete uterovaginal prolapse 11/19/2015    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    Procedure/Onset: 05/26/2006    Coronary artery disease     Gait disturbance 12/19/2016    GERD (gastroesophageal reflux disease)     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    Procedure/Onset: 02/22/2007    History of prolapse of bladder     History of radiation therapy 2005    to breast right    Hypertension     Hypothyroidism     hypothyroidism    Irritable bowel syndrome 9/4/2012    Procedure/Onset: 09/23/2010    Kidney stone     2016    Myocardial infarction ([place] [address] )     1990's x2    Nephrolithiasis 2/26/2016    Osteoarthritis of left knee 10/19/2016    Perforation of sigmoid colon due to diverticulitis 5/9/2019    Added automatically from request for surgery [id number]    Peripheral vertigo 9/4/2012    Procedure/Onset: 04/04/2007    Psoriasis 12/19/2016    Rectocele 3/10/2016    Last Assessment & Plan:  As below      Stress incontinence in female 7/6/2016     Past Surgical History:   Procedure Laterality Date    APPENDECTOMY      during HAMLET    BLADDER SURGERY      suspension surgery no sling, used fiberglass suspension technique    BREAST LUMPECTOMY Right 2005    BREAST SURGERY Right     lumpectomy    CHEST WALL TUMOR EXCISION  2005    COLONOSCOPY      CYSTOSCOPY      HARTMANS PROCEDURE N/A 5/9/2019    Procedure: Mickeal Pion;  Surgeon: Shirley Alvarez MD;  Location: [facility] MAIN OR;  Service: General    HYSTERECTOMY Bilateral     hamlet w/removal of both ovaries    KNEE ARTHROSCOPY      Last assessed: 7/30/15    AL CYSTOURETHROSCOPY,URETER CATHETER Left 2/26/2016    Procedure: CYSTOSCOPY RETROGRADE PYELOGRAM WITH INSERTION STENT URETERAL;  Surgeon: Coleen Price MD;  Location: [address] [place] [address];  Service: Urology    AL XCAPSL CTRC RMVL INSJ IO LENS PROSTH W/O ECP Left 4/10/2017    Procedure: EXTRACTION EXTRACAPSULAR CATARACT PHACO INTRAOCULAR LENS (IOL); Surgeon: Cielo Cam MD;  Location: [facility] MAIN OR;  Service: Ophthalmology    SKIN BIOPSY Left     Leg for SCC       Performed at least 2 patient identifiers during session: Name, Consuelo Meyer and ID bracelet        01/25/21 0947   PT Last Visit   PT Visit Date 01/25/21   Note Type   Note type Evaluation   Pain Assessment   Pain Assessment Tool 0-10   Pain Score No Pain   Effect of Pain on Daily Activities n/a   Hospital Pain Intervention(s) Ambulation/increased activity;Repositioned   Multiple Pain Sites No   Home Living   Type of [address] One level;Stairs to enter with rails  (3-4 CHRISTY)   Bathroom Shower/Tub Tub/shower unit   [address] Walker;Cane   Additional Comments PATIENT IS A QUESTIONABLE/UNRELIABLE HISTORIAN, H/O COGNITIVE IMPAIRMENT  PER EMR PATIENT COMES FROM [facility]  PER PATIENT REPORT SHE LIVES HOME ALONE IN HER OWN APARTMENT, 3-4STE  PATIENT REPORTS BEING INDEP WITH ALL ADLS AND MOBILITY WITH NO AD, HAS ASSIST FOR TRANPORTATION TO COMMUNITY NEEDS      Prior Function Level of [place] Independent with ADLs and functional mobility; Needs assistance with IADLs   Lives With Alone   Receives Help From Family   ADL Assistance Independent   IADLs Needs assistance   Falls in the last 6 months 0  (patient denies)   Vocational Retired   Restrictions/Precautions   [facility] Bearing Precautions Per Order No   Other Precautions Cognitive; Chair Alarm; Bed Alarm; Fall Risk;Impulsive   General   Additional Pertinent History pt admitted 1/22/2021 with rapid HR and SOB  Dx: Atrial flutter   Family/Caregiver Present No   Cognition   Overall Cognitive Status Impaired   Arousal/Participation Alert   Orientation Level Oriented X4  (however forgetful and unreliable history at times)   Memory Decreased recall of recent events   Following Commands Follows one step commands without difficulty   RUE Assessment   RUE Assessment WFL   LUE Assessment   LUE Assessment WFL   RLE Assessment   RLE Assessment WFL  (4-/5 MMT grossly )   LLE Assessment   LLE Assessment WFL  (4-/5 MMT grossly )   Coordination   Movements are Fluid and Coordinated 1   Sensation WFL   Light Touch   RLE Light Touch Grossly intact   LLE Light Touch Grossly intact   Bed Mobility   Additional Comments Bed mobility not assessed as pt presents and was left seated in bedside recliner chair    Transfers   Sit to Stand 4  Minimal assistance   Additional items Assist x 1; Impulsive;Verbal cues   Stand to Sit 4  Minimal assistance   Additional items Assist x 1; Impulsive;Verbal cues   Stand pivot 4  Minimal assistance   Additional items Assist x 1; Impulsive;Verbal cues   Additional Comments No AD use for transfers, pt refused need  Ambulation/Elevation   Gait pattern Decreased foot clearance; Improper Weight shift;Narrow YOLANDA; Short stride; Excessively slow; Inconsistent laura  (+LOB)   Gait Assistance 4  Minimal assist   Additional items Assist x 1;Verbal cues; Tactile cues   Assistive Device None  (as pt refused use )   Distance 18ft x2 (including functional transfer on toilet half way )   Stair Management Assistance Not tested   Balance   Static Sitting Normal   Dynamic Sitting Good   Static Standing Fair   Dynamic Standing Poor +   Ambulatory Poor +   Endurance Deficit   Endurance Deficit Yes   Endurance Deficit Description During minimal mobility/activity, pt c/o "wobbly" feeling, feeling as if she were "drunk"  Denies dizziness or lightheadedness  Pt also noted with self limiting behaviors on this date  Activity Tolerance   Activity Tolerance Patient limited by fatigue   Nurse Made Aware Spoke with RUDOLPH Steele    Assessment   Prognosis Fair   Problem List Decreased endurance; Impaired balance;Decreased mobility; Impaired judgement;Decreased safety awareness   Assessment Pt seen for PT evaluation, cleared by RUDOLPH Steele, activity orders of "up with assist"  Pt admitted 1/22/2021 w/ rapid HR and SOB, dx Atrial flutter ([facility] [address] )  Comorbidities affecting pt's fnxl performance include: anxiety, arthritis, CAD, chronic pain, dementia, GERD, HTN, hypothyroid, MI, vertigo, osteoarthritis, breast CA, IBS, venous insufficiency  Personal factors affecting pt at time of PT IE include: inability to ambulate household distances, inability to navigate community distances, inability to navigate level surfaces without external assistance, decreased cognition, limited home support, impulsivity, limited insight into impairments, inability to perform ADLS, inability to perform IADLS  and inability to live alone  PTA, pt was independent w/ functional mobility w/o AD, independent with ADLs, requiring assist for IADLs, living alone in a 1SH w/ 3-4 CHRISTY, ambulating HH and community distances  Pt demonstrates fnxl impairments identified in objective findings from Elderly Mobility Scale assessment, scoring 5/20, indicating high dependency on others for completion of ADLs/mobility  Pt scores 17/24 on AM-PAC objective tool, indicating need for further rehab services   The Barthel Index outcome tool was used & pt scores 50 indicating severe limitations of fnxl mobility/ADLS  Pt is at risk of falls d/t multiple comorbidities, impaired balance, impaired cognition, impaired insight/safety awareness, advanced age, acuity of medical illness and ongoing medical treatment of primary diagnosis  Pt's clinical presentation is currently evolving and unstable/unpredictable seen in pt's presentation of vital sign response, varying levels of cognitive performance, increased fall risk, new onset of impairment of functional mobility and decreased endurance  This PT IE requires high complexity clinical decision making, based on multiple medical/physiological/fnxl/social factors which affect pt's performance w/ evaluation & therapist judgement for disposition recommendations  Pt will benefit from continued PT treatment in order to address impairments, decrease risk of falls, maximize independence w/ fnxl mobility, & ensure safety w/ mobility for transition to next level of care  Based on pt presentation & impairments, pt would most appropriately benefit from post acute STR upon d/c     Barriers to Discharge Decreased caregiver support; Inaccessible home environment   Goals   Patient Goals none stated at time of IE    STG Expiration Date 02/04/21   Short Term Goal #1 1  Pt will complete all bed mobility in flat bed independently, in order to promote increased OOB functional mobility to improve overall activity tolerance  2  Pt will complete all transfers with LRAD at MALCOLM level, in order to increase safety with functional mobility  3  Pt will ambulate >100ft with LRAD at MALCOLM level in order to increase safety with housheold and short community distance functional mobility  4  Pt will negotiate 3-5 stairs with HR assist and S in order to access her home  5  Pt will improve B LE strength to >/= 4+/5 MMT throughout, in order to increase safety with functional mobility and decrease risk of falls   6  Pt will improve AM-PAC score to >/= 22/24 in order to increase independence with mobility and decrease burden of care  7  Pt will improve Barthel Index score to >/= 60/100 in order to increase independence and decrease risk of falls  8  Pt will improve ambulatory balance by >/= 1/2 grade in order to promote safety and increased independence with mobility  9  Pt will demonstrate understanding and independence with LE strengthening HEP  10  Pt will tolerate >3hrs OOB in upright position, in order to improve muscular endurance and respiratory status  11  Pt will improve Elderly Mobility Scale score to >/= 10/20 in order to decrease burden of care and increase indep with ADLs/mobility  PT Treatment Day 0   Plan   Treatment/Interventions ADL retraining;Functional transfer training;LE strengthening/ROM; Elevations; Therapeutic exercise; Endurance training;Patient/family training;Equipment eval/education; Bed mobility;Gait training;Spoke to nursing;OT   PT Frequency Other (Comment)  (3-5x/wk )   Recommendation   PT Discharge Recommendation Post-Acute Rehabilitation Services   Equipment Recommended Other (Comment)  (TBD pending progress, possibly RW vs SPC )   Additional Comments Next session, trial use of RW vs SPC in order to improve stability and endurance with ambulation      AM-PAC Basic Mobility Inpatient   Turning in Bed Without Bedrails 3   Lying on Back to Sitting on Edge of Flat Bed 3   Moving Bed to Chair 3   Standing Up From Chair 3   Walk in Room 3   Climb 3-5 Stairs 2   Basic Mobility Inpatient Raw Score 17   Basic Mobility Standardized Score 39 67   Modified Kamaljit Scale   Modified Bamberg Scale 4   Barthel Index   Feeding 10   Bathing 0   Grooming Score 0   Dressing Score 5   Bladder Score 10   Bowels Score 10   Toilet Use Score 5   Transfers (Bed/Chair) Score 10   Mobility (Level Surface) Score 0   Stairs Score 0   Barthel Index Score 50       The patient's AM-[facility] Basic Mobility Inpatient Short Form Raw Score is 17, Standardized Score is 39 67  A standardized score less than 42 9 suggests the patient may benefit from discharge to post-acute rehabilitation services  Please also refer to the recommendation of the Physical Therapist for safe discharge planning          Karlee Berry PT, DPT   1/25/2021

## 2021-01-25 NOTE — ASSESSMENT & PLAN NOTE
Cardiac catheterization 1/2020 noted triple-vessel disease with 100% occlusion of the mid RCA, 95% occlusion of the ostial left circumflex and 45-50% mid LAD stenosis  At that time optimal medical therapy was recommended      DC plavix and asa in lieu of eliquis

## 2021-01-25 NOTE — CONSULTS
[address] 80 y o  female MRN: [id number]  Unit/Bed#: -01 Encounter: [id number]    ASSESSMENT and PLAN:    66-year-old female with a history of coronary artery disease, hypertension, breast cancer, who presents for shortness of breath  Nephrology consult for acute kidney injury  Acute Kidney Injury  -- Present on admission (POA) ; admission creatinine: 1 3 mg/dL (baseline creatinine 1-1 2 mg/dL)  -- Urinalysis:  4-10 wbc's, 1-2 RBC, moderate bacteria, negative blood, negative protein  -- Imaging:  Right and left kidney both measure 10 cm  Mild diffuse bladder wall thickening with no evidence of hydronephrosis  There is some cortical scarring noted of the upper pole adjacent to the minimal complex cyst   -- Serologies:  Not clinically indicated  -- Etiology:  She presented with a creatinine that was slightly at the higher end of her baseline but then received a CTA of the chest to rule out pulmonary embolism and her renal function has subsequently worsened during the course of hospitalization  Is 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 given that she had a lot of risk factors  -- Status:  Creatinine trending up hopefully will start to plateau soon  Creatinine today 1 63 mg/dL  Electrolytes are stable    -- Plan:   · Avoid any further contrast studies  · Avoid NSAIDs  · Daily basic metabolic panel  · Maintain mean arterial pressure greater than 65    Hypertension  --blood pressure acceptable, continue current management    Atrial flutter  --management as per Cardiology    Coronary artery disease  --cardiac catheterization last year showing multivessel disease with 100% occlusion of the mid RCA, 95% occlusion of the ostial left circumflex and approximately 50% of the mid LAD  --management as per Cardiology      SUMMARY OF RECOMMENDATIONS:  Please see above    HISTORY OF PRESENT ILLNESS:  Requesting Physician: Rodney Yap MD  Reason for Consult:  Acute kidney injury    Vickey You is a 80 y o  female who was admitted to [facility] after presenting with shortness of breath  A renal consultation is requested today for assistance in the management of acute kidney injury  Patient has had a history of acute kidney injury in the past   Her baseline creatinine appears to be anywhere from 1-1 2 mg/dL but primarily usually around 1 mg/dL  She may have some underlying loss of renal function due to her advanced age an episode of acute kidney injury  She presented with a creatinine of 1 3 mg/dL that was at the higher end of her baseline  She underwent a CTA of the chest to rule out pulmonary embolism  Since that time renal function has worsened creatinine up to 1 63 mg/dL  She currently reports no chest pain or shortness of breath she reports a good appetite      PAST MEDICAL HISTORY:  Past Medical History:   Diagnosis Date    [facility]2012    Procedure/Onset: 12/07/2005    Anxiety 2/28/2018    Arthritis     joints    Asymptomatic varicose veins of bilateral lower extremities 10/14/2016    Benign colon polyp 2/17/2014    [facility] 2005    right    CAD (coronary artery disease)     hx MI x 2    Cancer (HCC)     breast ca, skin ca, currently in remission, tumors in chest wall    Chronic pain disorder     knees    Chronic rhinitis 9/30/2013    Chronic venous insufficiency 5/22/2018    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    S/p Mindy's procedure with the end colostomy    Complete uterovaginal prolapse 11/19/2015    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    Procedure/Onset: 05/26/2006    Coronary artery disease     Gait disturbance 12/19/2016    GERD (gastroesophageal reflux disease)     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    Procedure/Onset: 02/22/2007    History of prolapse of bladder     History of radiation therapy 2005    to breast right    Hypertension     Hypothyroidism     hypothyroidism    Irritable bowel syndrome 9/4/2012    Procedure/Onset: 09/23/2010    Kidney stone     2016    Myocardial infarction [facility])     1990's x2    Nephrolithiasis 2/26/2016    Osteoarthritis of left knee 10/19/2016    Perforation of sigmoid colon due to diverticulitis 5/9/2019    Added automatically from request for surgery [id number]    Peripheral vertigo 9/4/2012    Procedure/Onset: 04/04/2007    Psoriasis 12/19/2016    Rectocele 3/10/2016    Last Assessment & Plan:  As below   Stress incontinence in female 7/6/2016       PAST SURGICAL HISTORY:  Past Surgical History:   Procedure Laterality Date    APPENDECTOMY      during HAMLET    BLADDER SURGERY      suspension surgery no sling, used fiberglass suspension technique    BREAST LUMPECTOMY Right 2005    BREAST SURGERY Right     lumpectomy    CHEST WALL TUMOR EXCISION  2005    COLONOSCOPY      CYSTOSCOPY      HARTMANS PROCEDURE N/A 5/9/2019    Procedure: Love Reveal;  Surgeon: Jermaine Schrader MD;  Location: [facility] MAIN OR;  Service: General    HYSTERECTOMY Bilateral     hamlet w/removal of both ovaries    KNEE ARTHROSCOPY      Last assessed: 7/30/15    ME CYSTOURETHROSCOPY,URETER CATHETER Left 2/26/2016    Procedure: CYSTOSCOPY RETROGRADE PYELOGRAM WITH INSERTION STENT URETERAL;  Surgeon: Milford Hammans, MD;  Location: [address] [place] [address];  Service: Urology    ME XCAPSL CTRC RMVL INSJ IO LENS PROSTH W/O ECP Left 4/10/2017    Procedure: EXTRACTION EXTRACAPSULAR CATARACT PHACO INTRAOCULAR LENS (IOL);   Surgeon: Surinder Beasley MD;  Location: [facility] MAIN OR;  Service: Ophthalmology    SKIN BIOPSY Left     Leg for SCC       ALLERGIES:  Allergies   Allergen Reactions    Other Anaphylaxis     Blue Dye during ultrasound      Ciprofloxacin Hives and Itching     rash and itching    Clindamycin Hives and Itching     Redness      Levaquin [Levofloxacin] Hives and Itching     Rash and itching    Lincomycin     Penicillins Hives and Itching     Tolerated Pip/tazo 5/10/2019  Tolerated cefepime 2020    Sulfa Antibiotics Hives and Itching     Redness      Sulfacetamide     Sulfasalazine     Ceftriaxone Rash     Tolerates Cephalexin     Iv Contrast  [Iodinated Diagnostic Agents] Itching and Rash     Other reaction(s): Anaphylaxis    Linezolid Rash     Rash,flushed face after 3rd day of taking this ABX, started benadryl subsided by the end of the night      Nitrofurantoin Hives, Itching, Nausea Only and Rash       SOCIAL HISTORY:  Social History     Substance and Sexual Activity   Alcohol Use Yes    Alcohol/week: 1 0 standard drinks    Types: 1 Glasses of wine per week    Frequency: 4 or more times a week    Drinks per session: 1 or 2    Comment: 1 glass of wine before bed     Social History     Substance and Sexual Activity   Drug Use Never     Social History     Tobacco Use   Smoking Status Former Smoker    Packs/day: 0 10    Quit date: 5    Years since quittin 1   Smokeless Tobacco Never Used       FAMILY HISTORY:  Family History   Problem Relation Age of Onset    Angina Mother     Hypertension Mother     Heart attack Mother         Myocardial Infarction    Early death Father         age 36 MI    Heart disease Father     Hypertension Father     Heart attack Father         Myocardial Infarction    Heart disease Sister     Heart disease Sister         Cardiac Disorder       MEDICATIONS:    Current Facility-Administered Medications:     acetaminophen (TYLENOL) tablet 650 mg, 650 mg, Oral, Q6H PRN, Rakan Stewart MD    apixaban (ELIQUIS) tablet 2 5 mg, 2 5 mg, Oral, BID, Veronica Steward PA-C, 2 5 mg at 21 3707    bisacodyl (DULCOLAX) rectal suppository 10 mg, 10 mg, Rectal, Daily PRN, Rakan Stewart MD    diltiazem (CARDIZEM) tablet 60 mg, 60 mg, Oral, Q8H [facility], Catalina Woods MD, 60 mg at 21 0639    ezetimibe 10 mg-pravastatin 80 mg combo dose, , Oral, HS, Rakan Stewart MD, Given at 21 0996   hydroxyurea (HYDREA) capsule 500 mg, 500 mg, Oral, Once per day on Mon Wed Fri, Chayito Clayton MD, 500 mg at 01/22/21 2306    hydrOXYzine HCL (ATARAX) tablet 25 mg, 25 mg, Oral, TID PRN, Chayito Clayton MD    isosorbide mononitrate (IMDUR) 24 hr tablet 30 mg, 30 mg, Oral, Daily, Chayito Clayton MD, 30 mg at 01/25/21 0836    levothyroxine tablet 50 mcg, 50 mcg, Oral, QAM, Chayito Clayton MD, 50 mcg at 01/25/21 0836    melatonin tablet 3 mg, 3 mg, Oral, HS, Chayito Clayton MD, 3 mg at 01/24/21 2147    metoprolol tartrate (LOPRESSOR) tablet 25 mg, 25 mg, Oral, Q6H, Catalina Woods MD, 25 mg at 01/25/21 0836    ondansetron (ZOFRAN) injection 4 mg, 4 mg, Intravenous, Q6H PRN, Chayito Clayton MD    simethicone (MYLICON) chewable tablet 80 mg, 80 mg, Oral, 4x Daily PRN, Chayito Clayton MD    Insert peripheral IV, , , Once **AND** sodium chloride (PF) 0 9 % injection 3 mL, 3 mL, Intravenous, Q1H PRN, Meli Elizondo PA-C    REVIEW OF SYSTEMS:  Constitutional: Negative for fatigue, anorexia, fever, chills, diaphoresis  HENT: Negative for postnasal drip  Eyes: Negative for visual disturbance  Respiratory: Negative for cough, shortness of breath and wheezing  Cardiovascular: Negative for chest pain, palpitations and leg swelling  Gastrointestinal: Negative for abdominal pain, constipation, diarrhea, nausea and vomiting  Genitourinary: No dysuria, hematuria  Endocrine: Negative for polyuria  Musculoskeletal: Negative for arthralgias, back pain and joint swelling  Skin: Negative for rash  Neurological: Negative for focal weakness, headaches, dizziness  Hematological: Negative for easy bruising or bleeding  Psychiatric/Behavioral: Negative for confusion and sleep disturbance  All the systems were reviewed and were negative except as documented on the HPI      PHYSICAL EXAM:  Current Weight: Weight - Scale: 77 1 kg (170 lb)  First Weight: Weight - Scale: 76 7 kg (169 lb 1 5 oz)  Vitals:    01/25/21 0403 01/25/21 0600 01/25/21 0636 01/25/21 0700   BP:   121/78 120/76   BP Location:    Left arm   Pulse:   90 85   Resp:   20 17   Temp:   97 6 °F (36 4 °C) (!) 96 8 °F (36 °C)   TempSrc:   Tympanic Tympanic   SpO2: 98%  98% 96%   Weight:  77 1 kg (170 lb)     Height:         No intake or output data in the 24 hours ending 01/25/21 1033  Physical Exam  Vitals signs and nursing note reviewed  Constitutional:       General: She is not in acute distress  Appearance: She is well-developed  HENT:      Head: Normocephalic and atraumatic  Eyes:      General: No scleral icterus  Conjunctiva/sclera: Conjunctivae normal       Pupils: Pupils are equal, round, and reactive to light  Neck:      Musculoskeletal: Normal range of motion and neck supple  Cardiovascular:      Rate and Rhythm: Normal rate  Rhythm irregular  Heart sounds: S1 normal and S2 normal  No murmur  No friction rub  No gallop  Pulmonary:      Effort: Pulmonary effort is normal  No respiratory distress  Breath sounds: Normal breath sounds  No wheezing or rales  Abdominal:      General: Bowel sounds are normal       Palpations: Abdomen is soft  Tenderness: There is no abdominal tenderness  There is no rebound  Musculoskeletal: Normal range of motion  Right lower leg: Edema present  Left lower leg: Edema present  Skin:     Findings: No rash  Neurological:      Mental Status: She is alert and oriented to person, place, and time     Psychiatric:         Behavior: Behavior normal            Invasive Devices:      Lab Results:   Results from last 7 days   Lab Units 01/25/21  0655 01/24/21  0520 01/23/21  0231 01/22/21  1630   WBC Thousand/uL 9 90 10 65* 6 71 9 85   HEMOGLOBIN g/dL 14 0 13 0 13 6 13 9   HEMATOCRIT % 43 8 40 9 41 8 42 5   PLATELETS Thousands/uL 508* 459* 445* 507*   POTASSIUM mmol/L 4 5 4 8 4 5 5 0   CHLORIDE mmol/L 99 101 103 102   CO2 mmol/L 30 26 24 28   BUN mg/dL 49* 40* 24* 25*   CREATININE mg/dL 1 63* 1 50* 1 11* 1 30* CALCIUM mg/dL 8 7 8 8 9 0 9 3   MAGNESIUM mg/dL  --  1 6 1 6 1 7   PHOSPHORUS mg/dL  --   --   --  3 1   ALK PHOS U/L  --   --  84 9 76 5   ALT U/L  --   --  19 21   AST U/L  --   --  16 35

## 2021-01-25 NOTE — CONSULTS
Consultation - Cardiology   Norberto Hilton 80 y o  female MRN: [id number]  Unit/Bed#: -01 Encounter: [id number]  Physician Requesting Consult: Genesis Treviño MD  Reason for Consult / Principal Problem: Atrial fibrillation / Atrial Flutter     Assessment:  Principal Problem:    Atrial flutter ([address] )  Active Problems:    Essential hypertension    Coronary artery disease involving native coronary artery of native heart without angina pectoris    GERD (gastroesophageal reflux disease)    Hypothyroidism    History of breast cancer    Acute renal failure (ARF) (HCC)    Pleural effusion    Generalized weakness      Plan:  She has a very active and healthy  conduction system with HRs in the 140s with her atrial fibrillation and now requiring higher doses of Metoprolol and Cardizem to control her HR  I agree with Eliquis but would d/c ASA  ON d/c, switch to Cardizem  mg daily and Metoprolol 50 mg BID  If she develops bradycardia, I would first cut back on her Cardizem dose  She needs f/up with her regular cardiologist and would recommend a 48 hour holter monitor after discharge  History of Present Illness     HPI: Norberto Hilton is a 80y o  year old female with cardiac history of CAD, HTN  She was admitted 1/22/2021 with palpitations and SOB  ECG showed SVT with  BPM ( probable atrial flutter ) She was treated with a Cardizem gtt and beta blocker and her telemetry no shows AF with HR in the 70s  She feels well and denies CP, SOB, palpitations  /70  Troponin negative  BUN 40  Creatinine 1 5    ECHO is pending    ECHO 1/2020 - EF 55 - 60 %  Mild to moderate MR    She has been started on Eliquis 2 5 mg BID  She was on Metoprolol 25 mg BID as an outpt  Presently HR is controlled on  Cardizem 60 mg TID and Metoprolol 25 mg QID        Review of Systems:    Alert awake oriented, comfortable, denies any complaints  No fevers chills nausea vomiting  No weakness, dizziness, seizures  no cough, shortness of breath, or wheezing  Denies any palpitations, chest pain, diaphoresis  Denies leg edema, pain or paresthesias  Denies any skin rashes  Denies abdominal pain, bloody stools, masses  Denies any depression or suicidal ideations      Historical Information   Past Medical History:   Diagnosis Date    Adenocarcinoma of breast ([place] [address] ) 9/4/2012    Procedure/Onset: 12/07/2005    Anxiety 2/28/2018    Arthritis     joints    Asymptomatic varicose veins of bilateral lower extremities 10/14/2016    Benign colon polyp 2/17/2014    Breast cancer ([place] [address] ) 2005    right    CAD (coronary artery disease)     hx MI x 2    Cancer ([place] [address] )     breast ca, skin ca, currently in remission, tumors in chest wall    Chronic pain disorder     knees    Chronic rhinitis 9/30/2013    Chronic venous insufficiency 5/22/2018    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    S/p Mindy's procedure with the end colostomy    Complete uterovaginal prolapse 11/19/2015    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    Procedure/Onset: 05/26/2006    Coronary artery disease     Gait disturbance 12/19/2016    GERD (gastroesophageal reflux disease)     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    Procedure/Onset: 02/22/2007    History of prolapse of bladder     History of radiation therapy 2005    to breast right    Hypertension     Hypothyroidism     hypothyroidism    Irritable bowel syndrome 9/4/2012    Procedure/Onset: 09/23/2010    Kidney stone     2016    Myocardial infarction ([place] [address] )     1990's x2    Nephrolithiasis 2/26/2016    Osteoarthritis of left knee 10/19/2016    Perforation of sigmoid colon due to diverticulitis 5/9/2019    Added automatically from request for surgery [id number]    Peripheral vertigo 9/4/2012    Procedure/Onset: 04/04/2007    Psoriasis 12/19/2016    Rectocele 3/10/2016    Last Assessment & Plan:  As below      Stress incontinence in female 7/6/2016     Past Surgical History:   Procedure Laterality Date    APPENDECTOMY      during HAMLET    BLADDER SURGERY      suspension surgery no sling, used fiberglass suspension technique    BREAST LUMPECTOMY Right 2005    BREAST SURGERY Right     lumpectomy    CHEST WALL TUMOR EXCISION      COLONOSCOPY      CYSTOSCOPY      HARTMANS PROCEDURE N/A 2019    Procedure: Marlon Zhu;  Surgeon: Marisela Zavala MD;  Location: [facility] MAIN OR;  Service: General    HYSTERECTOMY Bilateral     hamlet w/removal of both ovaries    KNEE ARTHROSCOPY      Last assessed: 7/30/15    ND CYSTOURETHROSCOPY,URETER CATHETER Left 2016    Procedure: CYSTOSCOPY RETROGRADE PYELOGRAM WITH INSERTION STENT URETERAL;  Surgeon: Cecy Banks MD;  Location: [address] [place] [address];  Service: Urology    ND XCAPSL CTRC RMVL INSJ IO LENS PROSTH W/O ECP Left 4/10/2017    Procedure: EXTRACTION EXTRACAPSULAR CATARACT PHACO INTRAOCULAR LENS (IOL);   Surgeon: Miranda Houston MD;  Location[facility] MAIN OR;  Service: Ophthalmology    SKIN BIOPSY Left     Leg for [facility]     Social History     Substance and Sexual Activity   Alcohol Use Yes    Alcohol/week: 1 0 standard drinks    Types: 1 Glasses of wine per week    Frequency: 4 or more times a week    Drinks per session: 1 or 2    Comment: 1 glass of wine before bed     Social History     Substance and Sexual Activity   Drug Use Never     Social History     Tobacco Use   Smoking Status Former Smoker    Packs/day: 0 10    Quit date: 5    Years since quittin 1   Smokeless Tobacco Never Used     Family History: non-contributory    Meds/Allergies   all current active meds have been reviewed  Allergies   Allergen Reactions    Other Anaphylaxis     Blue Dye during ultrasound      Ciprofloxacin Hives and Itching     rash and itching    Clindamycin Hives and Itching     Redness      Levaquin [Levofloxacin] Hives and Itching     Rash and itching    Lincomycin     Penicillins Hives and Itching     Tolerated Pip/tazo 5/10/2019  Tolerated cefepime 1/2020    Sulfa Antibiotics Hives and Itching     Redness      Sulfacetamide     Sulfasalazine     Ceftriaxone Rash     Tolerates Cephalexin     Iv Contrast  [Iodinated Diagnostic Agents] Itching and Rash     Other reaction(s): Anaphylaxis    Linezolid Rash     Rash,flushed face after 3rd day of taking this ABX, started benadryl subsided by the end of the night   Nitrofurantoin Hives, Itching, Nausea Only and Rash       Objective   Vitals: Blood pressure 121/78, pulse 90, temperature 97 6 °F (36 4 °C), temperature source Tympanic, resp   rate 20, height 5' 2" (1 575 m), weight 77 1 kg (170 lb), SpO2 98 %, not currently breastfeeding , Body mass index is 31 09 kg/m² ,   Orthostatic Blood Pressures      Most Recent Value   Blood Pressure  121/78 filed at 01/25/2021 0636   Patient Position - Orthostatic VS  Sitting filed at 01/25/2021 0352          No intake or output data in the 24 hours ending 01/25/21 0741            Physical Exam:  GEN: Shiv Collins appears well, alert and oriented x 3, pleasant and cooperative   HEENT: pupils equal, round, and reactive to light; extraocular muscles intact  NECK: supple, no carotid bruits   HEART: regular rhythm, normal S1 and S2, no murmurs, clicks, gallops or rubs   LUNGS: clear to auscultation bilaterally; no wheezes, rales, or rhonchi   ABDOMEN: normal bowel sounds, soft, no tenderness, no distention  EXTREMITIES: peripheral pulses normal; no clubbing, cyanosis, or edema  NEURO: no focal findings   SKIN: normal without suspicious lesions on exposed skin    Lab Results:   Admission on 01/22/2021   Component Date Value Ref Range Status    WBC 01/22/2021 9 85  4 31 - 10 16 Thousand/uL Final    RBC 01/22/2021 5 08  3 81 - 5 12 Million/uL Final    Hemoglobin 01/22/2021 13 9  11 5 - 15 4 g/dL Final    Hematocrit 01/22/2021 42 5  34 8 - 46 1 % Final    MCV 01/22/2021 84  82 - 98 fL Final    MCH 01/22/2021 27 4  26 8 - 34 3 pg Final    MCHC 01/22/2021 32 7 31 4 - 37 4 g/dL Final    RDW 01/22/2021 18 1* 11 6 - 15 1 % Final    MPV 01/22/2021 11 9  8 9 - 12 7 fL Final    Platelets 90/77/4138 507* 149 - 390 Thousands/uL Final    Neutrophils Relative 01/22/2021 79* 43 - 75 % Final    Immat GRANS % 01/22/2021 0  0 - 2 % Final    Lymphocytes Relative 01/22/2021 14  14 - 44 % Final    Monocytes Relative 01/22/2021 5  4 - 12 % Final    Eosinophils Relative 01/22/2021 1  0 - 6 % Final    Basophils Relative 01/22/2021 1  0 - 1 % Final    Neutrophils Absolute 01/22/2021 7 73* 1 85 - 7 62 Thousands/µL Final    Immature Grans Absolute 01/22/2021 0 02  0 00 - 0 20 Thousand/uL Final    Lymphocytes Absolute 01/22/2021 1 37  0 60 - 4 47 Thousands/µL Final    Monocytes Absolute 01/22/2021 0 53  0 17 - 1 22 Thousand/µL Final    Eosinophils Absolute 01/22/2021 0 14  0 00 - 0 61 Thousand/µL Final    Basophils Absolute 01/22/2021 0 06  0 00 - 0 10 Thousands/µL Final    Troponin I 01/22/2021 <0 03  0 00 - 0 07 ng/mL Final    The [employer] Chemistry analyzer 99% cutoff is > 0 03ng/mL    o cTnl 99% cutoff is useful only when applied to patients in the clinical setting of myocardial ischemia  o cTnl 99% cutoff should be interpreted in the context of clinical history, ECG findings and possibly cardiac imaging to establish correct diagnosis  o cTnl 99% cutoff may be suggestive but clearly not indicative of a coronary event without the clinical setting of myocardial ischemia   BNP 01/22/2021 324 6* 1 - 100 pg/mL Final    Sodium 01/22/2021 138  133 - 145 mmol/L Final    Potassium 01/22/2021 5 0  3 5 - 5 0 mmol/L Final    R-Specimen slightly hemolyzed  Interpret results with caution      Chloride 01/22/2021 102  96 - 108 mmol/L Final    CO2 01/22/2021 28  22 - 33 mmol/L Final    ANION GAP 01/22/2021 8  4 - 13 mmol/L Final    BUN 01/22/2021 25* 6 - 20 mg/dL Final    Creatinine 01/22/2021 1 30* 0 40 - 1 10 mg/dL Final    Standardized to IDMS reference method    Glucose 01/22/2021 155* 65 - 140 mg/dL Final    If the patient is fasting, the ADA then defines impaired fasting glucose as > 100 mg/dL and diabetes as > or equal to 123 mg/dL  Specimen collection should occur prior to Sulfasalazine administration due to the potential for falsely depressed results  Specimen collection should occur prior to Sulfapyridine administration due to the potential for falsely elevated results   Calcium 01/22/2021 9 3  8 4 - 10 2 mg/dL Final    AST 01/22/2021 35  15 - 41 U/L Final    Specimen collection should occur prior to Sulfasalazine administration due to the potential for falsely depressed results   ALT 01/22/2021 21  5 - 54 U/L Final    Specimen collection should occur prior to Sulfasalazine administration due to the potential for falsely depressed results   Alkaline Phosphatase 01/22/2021 76 5  35 - 140 U/L Final    Total Protein 01/22/2021 6 7  6 4 - 8 3 g/dL Final    Albumin 01/22/2021 4 3  3 4 - 4 8 g/dL Final    Total Bilirubin 01/22/2021 0 99  0 30 - 1 20 mg/dL Final    eGFR 01/22/2021 36  ml/min/1 73sq m Final    D-Dimer, Quant  01/22/2021 1 04* 0 19 - 0 49 mg/L FEU Final    Reference and upper limits to exclude DVT and PE are the same  Do not use to exclude if clinical symptoms are present      Magnesium 01/22/2021 1 7  1 6 - 2 6 mg/dL Final    Phosphorus 01/22/2021 3 1  2 5 - 5 0 mg/dL Final    PTT 01/23/2021 56* 23 - 37 seconds Final    Therapeutic Heparin Range =  60-90 seconds    WBC 01/23/2021 6 71  4 31 - 10 16 Thousand/uL Final    RBC 01/23/2021 5 00  3 81 - 5 12 Million/uL Final    Hemoglobin 01/23/2021 13 6  11 5 - 15 4 g/dL Final    Hematocrit 01/23/2021 41 8  34 8 - 46 1 % Final    MCV 01/23/2021 84  82 - 98 fL Final    MCH 01/23/2021 27 2  26 8 - 34 3 pg Final    MCHC 01/23/2021 32 5  31 4 - 37 4 g/dL Final    RDW 01/23/2021 18 1* 11 6 - 15 1 % Final    MPV 01/23/2021 11 8  8 9 - 12 7 fL Final    Platelets 39/00/7191 445* 149 - 390 Thousands/uL Final    Neutrophils Relative 01/23/2021 92* 43 - 75 % Final    Immat GRANS % 01/23/2021 0  0 - 2 % Final    Lymphocytes Relative 01/23/2021 7* 14 - 44 % Final    Monocytes Relative 01/23/2021 1* 4 - 12 % Final    Eosinophils Relative 01/23/2021 0  0 - 6 % Final    Basophils Relative 01/23/2021 0  0 - 1 % Final    Neutrophils Absolute 01/23/2021 6 21  1 85 - 7 62 Thousands/µL Final    Immature Grans Absolute 01/23/2021 0 02  0 00 - 0 20 Thousand/uL Final    Lymphocytes Absolute 01/23/2021 0 44* 0 60 - 4 47 Thousands/µL Final    Monocytes Absolute 01/23/2021 0 04* 0 17 - 1 22 Thousand/µL Final    Eosinophils Absolute 01/23/2021 0 00  0 00 - 0 61 Thousand/µL Final    Basophils Absolute 01/23/2021 0 00  0 00 - 0 10 Thousands/µL Final    Sodium 01/23/2021 139  133 - 145 mmol/L Final    Potassium 01/23/2021 4 5  3 5 - 5 0 mmol/L Final    Chloride 01/23/2021 103  96 - 108 mmol/L Final    CO2 01/23/2021 24  22 - 33 mmol/L Final    ANION GAP 01/23/2021 12  4 - 13 mmol/L Final    BUN 01/23/2021 24* 6 - 20 mg/dL Final    Creatinine 01/23/2021 1 11* 0 40 - 1 10 mg/dL Final    Standardized to IDMS reference method    Glucose 01/23/2021 250* 65 - 140 mg/dL Final    If the patient is fasting, the ADA then defines impaired fasting glucose as > 100 mg/dL and diabetes as > or equal to 123 mg/dL  Specimen collection should occur prior to Sulfasalazine administration due to the potential for falsely depressed results  Specimen collection should occur prior to Sulfapyridine administration due to the potential for falsely elevated results   Calcium 01/23/2021 9 0  8 4 - 10 2 mg/dL Final    AST 01/23/2021 16  15 - 41 U/L Final    Specimen collection should occur prior to Sulfasalazine administration due to the potential for falsely depressed results       ALT 01/23/2021 19  5 - 54 U/L Final    Specimen collection should occur prior to Sulfasalazine administration due to the potential for falsely depressed results   Alkaline Phosphatase 01/23/2021 84 9  35 - 140 U/L Final    Total Protein 01/23/2021 6 3* 6 4 - 8 3 g/dL Final    Albumin 01/23/2021 4 1  3 4 - 4 8 g/dL Final    Total Bilirubin 01/23/2021 0 85  0 30 - 1 20 mg/dL Final    eGFR 01/23/2021 43  ml/min/1 73sq m Final    Magnesium 01/23/2021 1 6  1 6 - 2 6 mg/dL Final    Troponin I 01/23/2021 <0 03  0 00 - 0 07 ng/mL Final    [employer] Chemistry analyzer 99% cutoff is > 0 03ng/mL    o cTnl 99% cutoff is useful only when applied to patients in the clinical setting of myocardial ischemia  o cTnl 99% cutoff should be interpreted in the context of clinical history, ECG findings and possibly cardiac imaging to establish correct diagnosis  o cTnl 99% cutoff may be suggestive but clearly not indicative of a coronary event without the clinical setting of myocardial ischemia      WBC 01/24/2021 10 65* 4 31 - 10 16 Thousand/uL Final    RBC 01/24/2021 4 91  3 81 - 5 12 Million/uL Final    Hemoglobin 01/24/2021 13 0  11 5 - 15 4 g/dL Final    Hematocrit 01/24/2021 40 9  34 8 - 46 1 % Final    MCV 01/24/2021 83  82 - 98 fL Final    MCH 01/24/2021 26 5* 26 8 - 34 3 pg Final    MCHC 01/24/2021 31 8  31 4 - 37 4 g/dL Final    RDW 01/24/2021 18 1* 11 6 - 15 1 % Final    MPV 01/24/2021 11 7  8 9 - 12 7 fL Final    Platelets 46/51/9715 459* 149 - 390 Thousands/uL Final    Neutrophils Relative 01/24/2021 81* 43 - 75 % Final    Immat GRANS % 01/24/2021 1  0 - 2 % Final    Lymphocytes Relative 01/24/2021 12* 14 - 44 % Final    Monocytes Relative 01/24/2021 6  4 - 12 % Final    Eosinophils Relative 01/24/2021 0  0 - 6 % Final    Basophils Relative 01/24/2021 0  0 - 1 % Final    Neutrophils Absolute 01/24/2021 8 65* 1 85 - 7 62 Thousands/µL Final    Immature Grans Absolute 01/24/2021 0 05  0 00 - 0 20 Thousand/uL Final    Lymphocytes Absolute 01/24/2021 1 23  0 60 - 4 47 Thousands/µL Final    Monocytes Absolute 01/24/2021 0 66  0 17 - 1 22 Thousand/µL Final    Eosinophils Absolute 01/24/2021 0 04  0 00 - 0 61 Thousand/µL Final    Basophils Absolute 01/24/2021 0 02  0 00 - 0 10 Thousands/µL Final    Sodium 01/24/2021 136  133 - 145 mmol/L Final    Potassium 01/24/2021 4 8  3 5 - 5 0 mmol/L Final    Chloride 01/24/2021 101  96 - 108 mmol/L Final    CO2 01/24/2021 26  22 - 33 mmol/L Final    ANION GAP 01/24/2021 9  4 - 13 mmol/L Final    BUN 01/24/2021 40* 6 - 20 mg/dL Final    Creatinine 01/24/2021 1 50* 0 40 - 1 10 mg/dL Final    Standardized to IDMS reference method    Glucose 01/24/2021 135  65 - 140 mg/dL Final    If the patient is fasting, the ADA then defines impaired fasting glucose as > 100 mg/dL and diabetes as > or equal to 123 mg/dL  Specimen collection should occur prior to Sulfasalazine administration due to the potential for falsely depressed results  Specimen collection should occur prior to Sulfapyridine administration due to the potential for falsely elevated results   Calcium 01/24/2021 8 8  8 4 - 10 2 mg/dL Final    eGFR 01/24/2021 30  ml/min/1 73sq m Final    Magnesium 01/24/2021 1 6  1 6 - 2 6 mg/dL Final    TSH 3RD GENERATON 01/24/2021 3 952  0 340 - 5 600 uIU/mL Final    The recommended reference ranges for TSH during pregnancy are as follows:   First trimester   0 05-3 7   Second trimester  0 31-4 35   Third trimester   0 41-5 18    Note: Normal ranges may not apply to patients who are transgender, non-binary, or whose legal sex, sex at birth, and gender identity differ      Color, UA 01/24/2021 Yellow  Yellow Final    Clarity, UA 01/24/2021 Slightly Cloudy* Clear Final    Specific Gravity, UA 01/24/2021 1 025  1 001 - 1 030 Final    pH, UA 01/24/2021 5 5  5 0, 5 5, 6 0, 6 5, 7 0, 7 5, 8 0 Final    Leukocytes, UA 01/24/2021 Trace* Negative Final    Nitrite, UA 01/24/2021 Negative  Negative Final    Protein, UA 01/24/2021 Negative  Negative, Interference- unable to analyze mg/dl Final    Glucose, UA 01/24/2021 Negative  Negative mg/dl Final    Ketones, UA 01/24/2021 Negative  Negative mg/dl Final    Urobilinogen, UA 01/24/2021 0 2  0 2, 1 0 E U /dl E U /dl Final    Bilirubin, UA 01/24/2021 Negative  Negative Final    Blood, UA 01/24/2021 Negative  Negative Final    Procalcitonin 01/24/2021 <0 05  <=0 25 ng/ml Final    Comment: Suspected Lower Respiratory Tract Infection (LRTI):  - LESS than or EQUAL to 0 25 ng/mL:   low likelihood for bacterial LRTI; antibiotics DISCOURAGED   - GREATER than 0 25 ng/mL:   increased likelihood for bacterial LRTI; antibiotics ENCOURAGED  Suspected Sepsis:  - Strongly consider initiating antibiotics in ALL UNSTABLE patients  - LESS than or EQUAL to 0 5 ng/mL:   low likelihood for bacterial sepsis; antibiotics DISCOURAGED   - GREATER than 0 5 ng/mL:   increased likelihood for bacterial sepsis; antibiotics ENCOURAGED   - GREATER than 2 ng/mL:   high risk for severe sepsis / septic shock; antibiotics strongly ENCOURAGED  Decisions on antibiotic use should not be based solely on Procalcitonin (PCT) levels  If PCT is low but uncertainty exists with stopping antibiotics, repeat PCT in 6-24 hours to confirm the low level  If antibiotics are administered (regardless if initial PCT was high or low), repeat PCT every 1-2 days to consider early antibiotic cessation (when GREATER                            than 80% decrease from the peak OR when PCT drops below designated cutoffs, whichever comes first), so long as the infection is NOT one that typically requires prolonged treatment durations (e g , bone/joint infections, endocarditis, Staph  aureus bacteremia)      Situations of FALSE-POSITIVE Procalcitonin values:  1) Newborns < 67 hours old  2) Massive stress from severe trauma / burns, major surgery, acute pancreatitis, cardiogenic / hemorrhagic shock, sickle cell crisis, or other organ perfusion abnormalities  3) Malaria and some Candidal infections  4) Treatment with agents that stimulate cytokines (e g , OKT3, anti-lymphocyte globulins, alemtuzumab, IL-2, granulocyte transfusion [NOT GCSFs])  5) Chronic renal disease causes elevated baseline levels (consider GREATER than 0 75 ng/mL as an abnormal cut-off); initiating HD/CRRT may cause transient decreases  6) Paraneoplastic syndromes from medullary thyroid or SCLC, some forms of vasculitis, and acute [id number]                            disease    Situations of FALSE-NEGATIVE Procalcitonin values:  1) Too early in clinical course for PCT to have reached its peak (may repeat in 6-24 hours to confirm low level)  2) Localized infection WITHOUT systemic (SIRS / sepsis) response (e g , an abscess, osteomyelitis, cystitis)  3) Mycobacteria (e g , Tuberculosis, MAC)  4) Cystic fibrosis exacerbations      Osmolality, Ur 01/24/2021 681  250 - 900 mmol/KG Final    Sodium, Ur 01/24/2021 15   Final    RBC, UA 01/24/2021 1-2  None Seen, 0-1, 1-2, 2-4, 0-5 /hpf Final    WBC, UA 01/24/2021 4-10* None Seen, 0-1, 1-2, 0-5, 2-4 /hpf Final    Epithelial Cells 01/24/2021 Innumerable* None Seen, Occasional /hpf Final    Bacteria, UA 01/24/2021 Moderate* None Seen, Occasional /hpf Final    Hyaline Casts, UA 01/24/2021 0-1* (none) /lpf Final    Ventricular Rate 01/22/2021 99  BPM Final    Atrial Rate 01/22/2021 97  BPM Final    DE Interval 01/22/2021 61  ms Final    QRSD Interval 01/22/2021 94  ms Final    QT Interval 01/22/2021 312  ms Final    QTC Interval 01/22/2021 401  ms Final    P Axis 01/22/2021 63  degrees Final    QRS Axis 01/22/2021 75  degrees Final    T Wave Axis 01/22/2021 69  degrees Final    Ventricular Rate 01/22/2021 144  BPM Final    Atrial Rate 01/22/2021 0  BPM Final    DE Interval 01/22/2021 37  ms Final    QRSD Interval 01/22/2021 96  ms Final    QT Interval 01/22/2021 319  ms Final    QTC Interval 01/22/2021 494  ms Final    P Axis 01/22/2021 38  degrees Final    QRS Axis 01/22/2021 60  degrees Final    T Wave Axis 01/22/2021 25  degrees Final       Results from last 7 days   Lab Units 01/23/21  0230 01/22/21  1630   TROPONIN I ng/mL <0 03 <0 03     Results from last 7 days   Lab Units 01/24/21  0520 01/23/21  0231 01/22/21  1630   WBC Thousand/uL 10 65* 6 71 9 85   HEMOGLOBIN g/dL 13 0 13 6 13 9   HEMATOCRIT % 40 9 41 8 42 5   PLATELETS Thousands/uL 459* 445* 507*         Results from last 7 days   Lab Units 01/24/21  0520 01/23/21  0231 01/22/21  1630   POTASSIUM mmol/L 4 8 4 5 5 0   CHLORIDE mmol/L 101 103 102   CO2 mmol/L 26 24 28   BUN mg/dL 40* 24* 25*   CREATININE mg/dL 1 50* 1 11* 1 30*   CALCIUM mg/dL 8 8 9 0 9 3   ALK PHOS U/L  --  84 9 76 5   ALT U/L  --  19 21   AST U/L  --  16 35             Imaging: I have personally reviewed pertinent reports  Echo: pending                Counseling / Coordination of Care  Total floor / unit time spent today 60 minutes  Greater than 50% of total time was spent with the patient and / or family counseling and / or coordination of care

## 2021-01-25 NOTE — ASSESSMENT & PLAN NOTE
Currently heart rate better controlled, on metoprolol and Cardizem and Eliquis  Telemetry reviewed personally by me shows atrial fibrillation, rate control  Patient seen by Cardiology as well reviewed notes

## 2021-01-25 NOTE — PROGRESS NOTES
Progress Note - Vickey You 3/7/1928, 80 y o  female MRN: [id number]    Unit/Bed#: -01 Encounter: [id number]    Primary Care Provider: Bethanie Ventura MD   Date and time admitted to hospital: 1/22/2021  4:13 PM        * Atrial flutter [facility])  Assessment & Plan  Currently heart rate better controlled, on metoprolol and Cardizem and Eliquis  Telemetry reviewed personally by me shows atrial fibrillation, rate control  Patient seen by Cardiology as well reviewed notes  Acute renal failure (ARF) (HCC)  Assessment & Plan  Creatinine increased since admission 1 1 --> 1 5---> 1 6  Today    possibly due to ATN / contrast induced nephropathy given recent CTA, but also in the setting of possible cardiorenal syndrome given elevated BNP, pleural effusions  Has some findings consistent with volume overload - mild LE edema, pleural effusion, but also with no history of CHF, weight is down from baseline     Received 1 time dose of IV lasix 1/22/21  Hold PPI, renally dose eliquis for now  Follow BMP  Discussed with the Nephrology, no role for IV fluids at this time, encouraged oral fluids  Monitor BMP in a m  If remains stable or improved patient may be discharged back to assisted living facility    Generalized weakness  Assessment & Plan  Likely multifactorial  PT/OT       Pleural effusion  Assessment & Plan  Bilateral small to moderate pleural effusions, present on admission in the setting of a patient with rapid a fib, history of breast cancer  Patient received 1 time dose of IV Lasix on admission  Initially requiring oxygen supplementation, now discontinued  Follow up with a portable chest x-ray today - persistent moderate bilateral pleural  Consider pulmonology or IR consultation for possible diagnostic / therapeutic thoracentesis if oxygen needs escalate  Echocardiogram pending  Hypothyroidism  Assessment & Plan   TSH with reflex T4 - WNL        GERD (gastroesophageal reflux disease)  Assessment & Plan  PPI therapy on hold due to YAW  Coronary artery disease involving native coronary artery of native heart without angina pectoris  Assessment & Plan  Cardiac catheterization 2020 noted triple-vessel disease with 100% occlusion of the mid RCA, 95% occlusion of the ostial left circumflex and 45-50% mid LAD stenosis  At that time optimal medical therapy was recommended  DC plavix and asa in lieu of eliquis      Essential hypertension  Assessment & Plan  Stable  Continue lopressor  VTE Pharmacologic Prophylaxis:   Pharmacologic: Apixaban (Eliquis)  Mechanical VTE Prophylaxis in Place: Yes    Patient Centered Rounds: I have performed bedside rounds with nursing staff today  Discussions with Specialists or Other Care Team Provider:  Nephrology    Education and Discussions with Family / Patient:  Patient, patient's daughter Dixon Luna        Current Length of Stay: 3 day(s)    Current Patient Status: Inpatient   Certification Statement: The patient will continue to require additional inpatient hospital stay due to YAW    Discharge Plan:  back to assisted living facility    Code Status: Level 3 - DNAR and DNI      Subjective:   Denies any headache, dizziness, chest pain, shortness of breath    Objective:     Vitals:   Temp (24hrs), Av °F (36 1 °C), Min:96 7 °F (35 9 °C), Max:97 6 °F (36 4 °C)    Temp:  [96 7 °F (35 9 °C)-97 6 °F (36 4 °C)] 96 8 °F (36 °C)  HR:  [61-97] 85  Resp:  [17-20] 17  BP: (120-146)/() 120/76  SpO2:  [86 %-98 %] 96 %  Body mass index is 31 09 kg/m²       Input and Output Summary (last 24 hours):     No intake or output data in the 24 hours ending 21 1309    Physical Exam:     Physical Exam  (  General appearance:  Patient not in acute distress  Eyes:  Pupils equal reacting to light  ENT:  Moist oral mucous membranes  CVS:  S1-S2 heard, regular rate and rhythm, no pedal edema  Chest:  Bilateral air entry present, clear to auscultation  Abdomen:  Soft, nontender, bowel sounds present  CNS:  No focal neurological deficits  Genitourinary: deferred  Skin:  No acute rash   psychiatric:  No psychosis  Musculoskeletal:  No joint deformities      Additional Data:     Labs:    Results from last 7 days   Lab Units 01/25/21  0655   WBC Thousand/uL 9 90   HEMOGLOBIN g/dL 14 0   HEMATOCRIT % 43 8   PLATELETS Thousands/uL 508*   NEUTROS PCT % 78*   LYMPHS PCT % 15   MONOS PCT % 5   EOS PCT % 2     Results from last 7 days   Lab Units 01/25/21  0655  01/23/21  0231   SODIUM mmol/L 137   < > 139   POTASSIUM mmol/L 4 5   < > 4 5   CHLORIDE mmol/L 99   < > 103   CO2 mmol/L 30   < > 24   BUN mg/dL 49*   < > 24*   CREATININE mg/dL 1 63*   < > 1 11*   ANION GAP mmol/L 8   < > 12   CALCIUM mg/dL 8 7   < > 9 0   ALBUMIN g/dL  --   --  4 1   TOTAL BILIRUBIN mg/dL  --   --  0 85   ALK PHOS U/L  --   --  84 9   ALT U/L  --   --  19   AST U/L  --   --  16   GLUCOSE RANDOM mg/dL 147*   < > 250*    < > = values in this interval not displayed  Results from last 7 days   Lab Units 01/24/21  1206   PROCALCITONIN ng/ml <0 05           * I Have Reviewed All Lab Data Listed Above  * Additional Pertinent Lab Tests Reviewed:  [address] Admission Reviewed      Recent Cultures (last 7 days):           Last 24 Hours Medication List:   Current Facility-Administered Medications   Medication Dose Route Frequency Provider Last Rate    acetaminophen  650 mg Oral Q6H PRN Mat Porter MD      apixaban  2 5 mg Oral BID Marii Mehta PA-C      bisacodyl  10 mg Rectal Daily PRN Mat Porter MD      diltiazem  60 mg Oral [facility] Prabhu Stephen MD      ezetimibe 10 mg-pravastatin 80 mg combo dose   Oral HS Mat Porter MD      hydroxyurea  500 mg Oral Once per day on Mon Wed Fri Mat Porter MD      hydrOXYzine HCL  25 mg Oral TID PRN Mat Porter MD      isosorbide mononitrate  30 mg Oral Daily Mat Porter MD      levothyroxine  50 mcg Oral QAM Mat Porter MD      melatonin  3 mg Oral HS Andrew Humphrey MD      metoprolol tartrate  25 mg Oral Q6H Catalina Woods MD      ondansetron  4 mg Intravenous Q6H PRN Andrew Humphrey MD      simethicone  80 mg Oral 4x Daily PRN Andrew Humphrey MD      sodium chloride (PF)  3 mL Intravenous Q1H PRN Meli Elizondo PA-C          Today, Patient Was Seen By: Tejas Patel MD    ** Please Note: Dictation voice to text software may have been used in the creation of this document   **

## 2021-01-26 PROBLEM — R41.89 COGNITIVE DECLINE: Status: ACTIVE | Noted: 2021-01-26

## 2021-01-26 LAB
ANION GAP SERPL CALCULATED.3IONS-SCNC: 8 MMOL/L (ref 4–13)
ATRIAL RATE: 273 BPM
BUN SERPL-MCNC: 45 MG/DL (ref 6–20)
CALCIUM SERPL-MCNC: 8.6 MG/DL (ref 8.4–10.2)
CHLORIDE SERPL-SCNC: 100 MMOL/L (ref 96–108)
CO2 SERPL-SCNC: 27 MMOL/L (ref 22–33)
CREAT SERPL-MCNC: 1.37 MG/DL (ref 0.4–1.1)
FLUAV RNA RESP QL NAA+PROBE: NEGATIVE
FLUBV RNA RESP QL NAA+PROBE: NEGATIVE
GFR SERPL CREATININE-BSD FRML MDRD: 34 ML/MIN/1.73SQ M
GLUCOSE SERPL-MCNC: 141 MG/DL (ref 65–140)
POTASSIUM SERPL-SCNC: 5.3 MMOL/L (ref 3.5–5)
PR INTERVAL: 90 MS
PROCALCITONIN SERPL-MCNC: 0.07 NG/ML
QRS AXIS: 55 DEGREES
QRSD INTERVAL: 92 MS
QT INTERVAL: 356 MS
QTC INTERVAL: 403 MS
RSV RNA RESP QL NAA+PROBE: NEGATIVE
SARS-COV-2 RNA RESP QL NAA+PROBE: NEGATIVE
SODIUM SERPL-SCNC: 135 MMOL/L (ref 133–145)
T WAVE AXIS: 14 DEGREES
VENTRICULAR RATE: 77 BPM

## 2021-01-26 PROCEDURE — 99232 SBSQ HOSP IP/OBS MODERATE 35: CPT | Performed by: PHYSICIAN ASSISTANT

## 2021-01-26 PROCEDURE — 93010 ELECTROCARDIOGRAM REPORT: CPT | Performed by: INTERNAL MEDICINE

## 2021-01-26 PROCEDURE — 99233 SBSQ HOSP IP/OBS HIGH 50: CPT | Performed by: INTERNAL MEDICINE

## 2021-01-26 PROCEDURE — 93005 ELECTROCARDIOGRAM TRACING: CPT

## 2021-01-26 PROCEDURE — 80048 BASIC METABOLIC PNL TOTAL CA: CPT | Performed by: INTERNAL MEDICINE

## 2021-01-26 PROCEDURE — 0241U HB NFCT DS VIR RESP RNA 4 TRGT: CPT | Performed by: PHYSICIAN ASSISTANT

## 2021-01-26 RX ORDER — MAGNESIUM HYDROXIDE/ALUMINUM HYDROXICE/SIMETHICONE 120; 1200; 1200 MG/30ML; MG/30ML; MG/30ML
30 SUSPENSION ORAL EVERY 4 HOURS PRN
Status: DISCONTINUED | OUTPATIENT
Start: 2021-01-26 | End: 2021-01-28 | Stop reason: HOSPADM

## 2021-01-26 RX ORDER — MAGNESIUM HYDROXIDE/ALUMINUM HYDROXICE/SIMETHICONE 120; 1200; 1200 MG/30ML; MG/30ML; MG/30ML
30 SUSPENSION ORAL EVERY 4 HOURS PRN
Status: DISCONTINUED | OUTPATIENT
Start: 2021-01-26 | End: 2021-01-26

## 2021-01-26 RX ORDER — CALCIUM CARBONATE 200(500)MG
500 TABLET,CHEWABLE ORAL DAILY PRN
Status: DISCONTINUED | OUTPATIENT
Start: 2021-01-26 | End: 2021-01-28 | Stop reason: HOSPADM

## 2021-01-26 RX ORDER — CALCIUM CARBONATE 200(500)MG
500 TABLET,CHEWABLE ORAL DAILY PRN
Status: DISCONTINUED | OUTPATIENT
Start: 2021-01-26 | End: 2021-01-26

## 2021-01-26 RX ADMIN — MELATONIN 3 MG: 3 TAB ORAL at 21:06

## 2021-01-26 RX ADMIN — SIMETHICONE 80 MG: 80 TABLET, CHEWABLE ORAL at 10:01

## 2021-01-26 RX ADMIN — DILTIAZEM HYDROCHLORIDE 60 MG: 60 TABLET, FILM COATED ORAL at 21:07

## 2021-01-26 RX ADMIN — METOPROLOL TARTRATE 25 MG: 25 TABLET, FILM COATED ORAL at 03:49

## 2021-01-26 RX ADMIN — APIXABAN 2.5 MG: 2.5 TABLET, FILM COATED ORAL at 10:02

## 2021-01-26 RX ADMIN — APIXABAN 2.5 MG: 2.5 TABLET, FILM COATED ORAL at 17:56

## 2021-01-26 RX ADMIN — METOPROLOL TARTRATE 25 MG: 25 TABLET, FILM COATED ORAL at 21:07

## 2021-01-26 RX ADMIN — LEVOTHYROXINE SODIUM 50 MCG: 50 TABLET ORAL at 10:02

## 2021-01-26 RX ADMIN — DILTIAZEM HYDROCHLORIDE 60 MG: 60 TABLET, FILM COATED ORAL at 14:26

## 2021-01-26 RX ADMIN — PRAVASTATIN SODIUM: 80 TABLET ORAL at 21:06

## 2021-01-26 RX ADMIN — ISOSORBIDE MONONITRATE 30 MG: 30 TABLET, EXTENDED RELEASE ORAL at 10:02

## 2021-01-26 RX ADMIN — ALUMINA, MAGNESIA, AND SIMETHICONE ORAL SUSPENSION REGULAR STRENGTH 30 ML: 1200; 1200; 120 SUSPENSION ORAL at 14:29

## 2021-01-26 RX ADMIN — DILTIAZEM HYDROCHLORIDE 60 MG: 60 TABLET, FILM COATED ORAL at 06:44

## 2021-01-26 RX ADMIN — METOPROLOL TARTRATE 25 MG: 25 TABLET, FILM COATED ORAL at 10:02

## 2021-01-26 RX ADMIN — CALCIUM CARBONATE (ANTACID) CHEW TAB 500 MG 500 MG: 500 CHEW TAB at 14:29

## 2021-01-26 RX ADMIN — METOPROLOL TARTRATE 25 MG: 25 TABLET, FILM COATED ORAL at 17:56

## 2021-01-26 RX ADMIN — ONDANSETRON 4 MG: 2 INJECTION INTRAMUSCULAR; INTRAVENOUS at 10:02

## 2021-01-26 NOTE — PLAN OF CARE
Problem: Potential for Falls  Goal: Patient will remain free of falls  Description: INTERVENTIONS:  - Assess patient frequently for physical needs  -  Identify cognitive and physical deficits and behaviors that affect risk of falls  -  [place] fall precautions as indicated by assessment   - Educate patient/family on patient safety including physical limitations  - Instruct patient to call for assistance with activity based on assessment  - Modify environment to reduce risk of injury  - Consider OT/PT consult to assist with strengthening/mobility  Outcome: Progressing     Problem: SAFETY ADULT  Goal: Patient will remain free of falls  Description: INTERVENTIONS:  - Assess patient frequently for physical needs  -  Identify cognitive and physical deficits and behaviors that affect risk of falls    -  [place] fall precautions as indicated by assessment   - Educate patient/family on patient safety including physical limitations  - Instruct patient to call for assistance with activity based on assessment  - Modify environment to reduce risk of injury  - Consider OT/PT consult to assist with strengthening/mobility  Outcome: Progressing  Goal: Maintain or return to baseline ADL function  Description: INTERVENTIONS:  -  Assess patient's ability to carry out ADLs; assess patient's baseline for ADL function and identify physical deficits which impact ability to perform ADLs (bathing, care of mouth/teeth, toileting, grooming, dressing, etc )  - Assess/evaluate cause of self-care deficits   - Assess range of motion  - Assess patient's mobility; develop plan if impaired  - Assess patient's need for assistive devices and provide as appropriate  - Encourage maximum independence but intervene and supervise when necessary  - Involve family in performance of ADLs  - Assess for home care needs following discharge   - Consider OT consult to assist with ADL evaluation and planning for discharge  - Provide patient education as appropriate  Outcome: Progressing  Goal: Maintain or return mobility status to optimal level  Description: INTERVENTIONS:  - Assess patient's baseline mobility status (ambulation, transfers, stairs, etc )    - Identify cognitive and physical deficits and behaviors that affect mobility  - Identify mobility aids required to assist with transfers and/or ambulation (gait belt, sit-to-stand, lift, walker, cane, etc )  - [place] fall precautions as indicated by assessment  - Record patient progress and toleration of activity level on Mobility SBAR; progress patient to next Phase/Stage  - Instruct patient to call for assistance with activity based on assessment  - Consider rehabilitation consult to assist with strengthening/weightbearing, etc   Outcome: Progressing     Problem: Knowledge Deficit  Goal: Patient/family/caregiver demonstrates understanding of disease process, treatment plan, medications, and discharge instructions  Description: Complete learning assessment and assess knowledge base    Interventions:  - Provide teaching at level of understanding  - Provide teaching via preferred learning methods  Outcome: Progressing     Problem: Prexisting or High Potential for Compromised Skin Integrity  Goal: Skin integrity is maintained or improved  Description: INTERVENTIONS:  - Identify patients at risk for skin breakdown  - Assess and monitor skin integrity  - Assess and monitor nutrition and hydration status  - Monitor labs   - Assess for incontinence   - Turn and reposition patient  - Assist with mobility/ambulation  - Relieve pressure over bony prominences  - Avoid friction and shearing  - Provide appropriate hygiene as needed including keeping skin clean and dry  - Evaluate need for skin moisturizer/barrier cream  - Collaborate with interdisciplinary team   - Patient/family teaching  - Consider wound care consult   Outcome: Progressing

## 2021-01-26 NOTE — PROGRESS NOTES
Progress Note - Robby Sommer 3/7/1928, 80 y o  female MRN: [id number]  Unit/Bed#: -01 Encounter: [id number]  Primary Care Provider: Hoa Hernandez MD   Date and time admitted to hospital: 1/22/2021  4:13 PM    * Atrial flutter ([facility] [address] )  Assessment & Plan  · Pt admitted with palpitations, SOB, EKG showed SVT with HR 140s, suspicious for A flutter   · Appreciate cardiology input   · Weaned off cardizem drip   · Started eliquis   · On d/c will switch to cardizem CD 240mg daily and Metoprolol 50mg BID   · Echocardiogram: LVEF 50%, dyskinesis of basal inferoseptal and basal inferior wall, moderately dilated atria b/l, mild to mod mitral regurg, moderate tricuspid regurg, dilated IVC, large left pleural effusion  · Outpatient follow up with cardiology     Generalized weakness  Assessment & Plan  · Likely multifactorial  · PT/OT rec'd STR   · Had episode of LH and dizziness while ambulating this AM, vitals stable at that time   · C/o cough and given SOB on admission, would screen for COVID19 especially  · Family prefers she resume therapy at [facility], possibly tomorrow if more steady     Acute renal failure (ARF) ([facility] [address] )  Assessment & Plan  · POA, creatinine on admission 1 3, BL 1-1 2   · Received Iv lasix and CTA chest on admission and has had worsening renal function since, plateau at 8 82 on 8/93  · No role for IV fluids at this time, encouraged oral fluids  · Creatinine today 1 37     Coronary artery disease involving native coronary artery of native heart without angina pectoris  Assessment & Plan  · Cardiac catheterization 1/2020 noted triple-vessel disease with 100% occlusion of the mid RCA, 95% occlusion of the ostial left circumflex and 45-50% mid LAD stenosis  At that time optimal medical therapy was recommended      · D/C plavix and asa in lieu of eliquis    Cognitive decline  Assessment & Plan  · Forgetfulness normal per patients dtr, usually only oriented to self and location  · Supportive care 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[facility])  Assessment & Plan  · Routine care    Hypothyroidism  Assessment & Plan  · Continue synthroid  · TSH normal     VTE Pharmacologic Prophylaxis:   Pharmacologic: Apixaban (Eliquis)  Mechanical VTE Prophylaxis in Place: Yes    Patient Centered Rounds: I have performed bedside rounds with nursing staff today  Discussions with Specialists or Other Care Team Provider: nursing     Education and Discussions with Family / Patient: patient, called dtr tone     Time Spent for Care: 30 minutes  More than 50% of total time spent on counseling and coordination of care as described above  Current Length of Stay: 4 day(s)    Current Patient Status: Inpatient   Certification Statement: The patient will continue to require additional inpatient hospital stay due to pending improvement in ambulatory dysfunction, dizziness, safe d/c planning, monitoritng renal function     Discharge Plan: pending safe d/c planning, weak and dizzy this morning when ambulating to the bathroom     Code Status: Level 3 - DNAR and DNI    Subjective:   Pt seen and examined at bedside  Forgetful  Says she feels like she was hit by a bus  No cp or sob since admission  Unsure about rehab  Objective:     Vitals:   Temp (24hrs), Av 2 °F (36 2 °C), Min:97 °F (36 1 °C), Max:97 5 °F (36 4 °C)    Temp:  [97 °F (36 1 °C)-97 5 °F (36 4 °C)] 97 2 °F (36 2 °C)  HR:  [67-87] 82  Resp:  [16-18] 17  BP: (110-140)/(58-78) 110/58  SpO2:  [91 %-97 %] 93 %  Body mass index is 30 24 kg/m²  Input and Output Summary (last 24 hours):     No intake or output data in the 24 hours ending 21 0908    Physical Exam:     Physical Exam  Constitutional:       Appearance: Normal appearance  HENT:      Head: Normocephalic and atraumatic  Mouth/Throat:      Mouth: Mucous membranes are moist    Eyes:      Extraocular Movements: Extraocular movements intact  Neck:      Musculoskeletal: Normal range of motion and neck supple  Cardiovascular:      Rate and Rhythm: Normal rate  Rhythm irregular  Pulmonary:      Effort: Pulmonary effort is normal       Breath sounds: Normal breath sounds  Comments: Dry cough noted   Abdominal:      General: Abdomen is flat  Musculoskeletal: Normal range of motion  Skin:     General: Skin is warm  Neurological:      General: No focal deficit present  Mental Status: She is alert  Comments: Oriented to self and location    Psychiatric:         Mood and Affect: Mood normal          Behavior: Behavior normal       Comments: Forgetful        Additional Data:     Labs:    Results from last 7 days   Lab Units 01/25/21  0655   WBC Thousand/uL 9 90   HEMOGLOBIN g/dL 14 0   HEMATOCRIT % 43 8   PLATELETS Thousands/uL 508*   NEUTROS PCT % 78*   LYMPHS PCT % 15   MONOS PCT % 5   EOS PCT % 2     Results from last 7 days   Lab Units 01/26/21  0649  01/23/21  0231   SODIUM mmol/L 135   < > 139   POTASSIUM mmol/L 5 3*   < > 4 5   CHLORIDE mmol/L 100   < > 103   CO2 mmol/L 27   < > 24   BUN mg/dL 45*   < > 24*   CREATININE mg/dL 1 37*   < > 1 11*   ANION GAP mmol/L 8   < > 12   CALCIUM mg/dL 8 6   < > 9 0   ALBUMIN g/dL  --   --  4 1   TOTAL BILIRUBIN mg/dL  --   --  0 85   ALK PHOS U/L  --   --  84 9   ALT U/L  --   --  19   AST U/L  --   --  16   GLUCOSE RANDOM mg/dL 141*   < > 250*    < > = values in this interval not displayed  Results from last 7 days   Lab Units 01/25/21  0655 01/24/21  1206   PROCALCITONIN ng/ml 0 07 <0 05     * I Have Reviewed All Lab Data Listed Above  * Additional Pertinent Lab Tests Reviewed:  [address] Admission Reviewed    Imaging:    Imaging Reports Reviewed Today Include: all  Imaging Personally Reviewed by Myself Includes:  none    Recent Cultures (last 7 days):           Last 24 Hours Medication List:   Current Facility-Administered Medications   Medication Dose Route Frequency Provider Last Rate    acetaminophen  650 mg Oral Q6H PRN Stef Stoner MD      apixaban  2 5 mg Oral BID Rosa Isela Mendes PA-C      bisacodyl  10 mg Rectal Daily PRN Stef Stoner MD      calcium carbonate  500 mg Oral Daily PRN Pa Huang PA-C      diltiazem  60 mg Oral Q8H [facility] Cynthia Nair MD      ezetimibe 10 mg-pravastatin 80 mg combo dose   Oral HS Stef Stoner MD      hydroxyurea  500 mg Oral Once per day on Mon Wed Fri Stef Stoner MD      hydrOXYzine HCL  25 mg Oral TID PRN Stef Stoner MD      isosorbide mononitrate  30 mg Oral Daily Stef Stoner MD      levothyroxine  50 mcg Oral QAM Stef Stoner MD      melatonin  3 mg Oral HS Stef Stoner MD      metoprolol tartrate  25 mg Oral Q6H Catalina Woods MD      ondansetron  4 mg Intravenous Q6H PRN Stef Stoner MD      simethicone  80 mg Oral 4x Daily PRN Stef Stoner MD      sodium chloride (PF)  3 mL Intravenous Q1H PRN Meli Elizondo PA-C          Today, Patient Was Seen By: Angelique Serrano PA-C    ** Please Note: Dictation voice to text software may have been used in the creation of this document   **

## 2021-01-26 NOTE — PROGRESS NOTES
NEPHROLOGY PROGRESS NOTE   Sohail Vitale 80 y o  female MRN: [id number]  Unit/Bed#: -01 Encounter: [id number]  Reason for Consult:  Acute kidney injury      SUMMARY:    70-year-old female with a history of coronary artery disease, hypertension, breast cancer, who presents for shortness of breath  Nephrology consult for acute kidney injury  ASSESSMENT and PLAN:    Acute Kidney Injury  -- Present on admission (POA) ; admission creatinine: 1 3 mg/dL (baseline creatinine 1-1 2 mg/dL)  -- Urinalysis:  4-10 wbc's, 1-2 RBC, moderate bacteria, negative blood, negative protein  -- Imaging:  Right and left kidney both measure 10 cm  Mild diffuse bladder wall thickening with no evidence of hydronephrosis  There is some cortical scarring noted of the upper pole adjacent to the minimal complex cyst   -- Serologies:  Not clinically indicated  -- Etiology:  She presented with a creatinine that was slightly at the higher end of her baseline but then received a CTA of the chest to rule out pulmonary embolism and her renal function has subsequently worsened during the course of hospitalization  Is 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 given that she had a lot of risk factors  -- Status:  renal function is improving, creatinine down to 1 37 mg/dL and starting to approach her usual baseline  Potassium 5 3 but noted to be hemolyzed    -- Plan:   · Avoid any further contrast studies  · Avoid NSAIDs  · Maintain mean arterial pressure greater than 65  · Stable from a renal standpoint for discharge  · Discussed with medicine team  · Nothing further to add from renal standpoint we will sign off call for any questions or concerns      Hypertension  --blood pressure acceptable, continue current management  --currently normotensive and examined euvolemic     Atrial flutter  --management as per Cardiology     Coronary artery disease  --cardiac catheterization last year showing multivessel disease with 100% occlusion of the mid RCA, 95% occlusion of the ostial left circumflex and approximately 50% of the mid LAD  --management as per Cardiology     Hyperkalemia  --mild, noted to be hemolyzed specimen    SUBJECTIVE / INTERVAL HISTORY:    No chest pain or shortness of breath  No overnight events    OBJECTIVE:  Current Weight: Weight - Scale: 75 kg (165 lb 5 5 oz)(unable to do standing scale patient not feeling well)  Vitals:    01/26/21 0600 01/26/21 0643 01/26/21 0734 01/26/21 0800   BP:  140/74 110/58    BP Location:   Right arm    Pulse:  84 82    Resp:   17    Temp:   (!) 97 2 °F (36 2 °C)    TempSrc:   Tympanic    SpO2:   93%    Weight: 75 kg (165 lb 5 5 oz)   75 kg (165 lb 5 5 oz)   Height:         No intake or output data in the 24 hours ending 01/26/21 1048    Review of Systems:    12 point ROS has been reviewed  Physical Exam  Vitals signs and nursing note reviewed  Constitutional:       General: She is not in acute distress  Appearance: She is well-developed  HENT:      Head: Normocephalic and atraumatic  Eyes:      General: No scleral icterus  Conjunctiva/sclera: Conjunctivae normal       Pupils: Pupils are equal, round, and reactive to light  Neck:      Musculoskeletal: Normal range of motion and neck supple  Cardiovascular:      Rate and Rhythm: Normal rate and regular rhythm  Heart sounds: S1 normal and S2 normal  No murmur  No friction rub  No gallop  Pulmonary:      Effort: Pulmonary effort is normal  No respiratory distress  Breath sounds: Normal breath sounds  No wheezing or rales  Abdominal:      Palpations: Abdomen is soft  Tenderness: There is no abdominal tenderness  There is no rebound  Musculoskeletal: Normal range of motion  Skin:     Findings: No rash  Neurological:      Mental Status: She is alert and oriented to person, place, and time     Psychiatric:         Behavior: Behavior normal          Medications:    Current Facility-Administered Medications:     acetaminophen (TYLENOL) tablet 650 mg, 650 mg, Oral, Q6H PRN, Davida Olszewski, MD    aluminum-magnesium hydroxide-simethicone (MYLANTA) oral suspension 30 mL, 30 mL, Oral, Q4H PRN, Jung Gibbons MD    apixaban (ELIQUIS) tablet 2 5 mg, 2 5 mg, Oral, BID, Veronica Steward PA-C, 2 5 mg at 01/26/21 1002    bisacodyl (DULCOLAX) rectal suppository 10 mg, 10 mg, Rectal, Daily PRN, Davida Olszewski, MD    calcium carbonate (TUMS) chewable tablet 500 mg, 500 mg, Oral, Daily PRN, Pa Huang PA-C    diltiazem (CARDIZEM) tablet 60 mg, 60 mg, Oral, Q8H [address], Catalina Woods MD, 60 mg at 01/26/21 0644    ezetimibe 10 mg-pravastatin 80 mg combo dose, , Oral, HS, Davida Olszewski, MD, Given at 01/25/21 2232    hydroxyurea (HYDREA) capsule 500 mg, 500 mg, Oral, Once per day on Mon Wed Fri, Davida Olszewski, MD, 500 mg at 01/25/21 2229    hydrOXYzine HCL (ATARAX) tablet 25 mg, 25 mg, Oral, TID PRN, Davida Olszewski, MD    isosorbide mononitrate (IMDUR) 24 hr tablet 30 mg, 30 mg, Oral, Daily, Davida Olszewski, MD, 30 mg at 01/26/21 1002    levothyroxine tablet 50 mcg, 50 mcg, Oral, QAM, Davida Olszewski, MD, 50 mcg at 01/26/21 1002    melatonin tablet 3 mg, 3 mg, Oral, HS, Davida Olszewski, MD, 3 mg at 01/25/21 2228    metoprolol tartrate (LOPRESSOR) tablet 25 mg, 25 mg, Oral, Q6H, Catalina Woods MD, 25 mg at 01/26/21 1002    ondansetron (ZOFRAN) injection 4 mg, 4 mg, Intravenous, Q6H PRN, Davida Olszewski, MD, 4 mg at 01/26/21 1002    simethicone (MYLICON) chewable tablet 80 mg, 80 mg, Oral, 4x Daily PRN, Davida Olszewski, MD, 80 mg at 01/26/21 1001    Insert peripheral IV, , , Once **AND** sodium chloride (PF) 0 9 % injection 3 mL, 3 mL, Intravenous, Q1H PRN, Meli Elizondo PA-C    Laboratory Results:  Results from last 7 days   Lab Units 01/26/21  0649 01/25/21  0655 01/24/21  0520 01/23/21  0231 01/22/21  1630   WBC Thousand/uL  --  9 90 10 65* 6 71 9 85   HEMOGLOBIN g/dL  --  14 0 13 0 13 6 13 9   HEMATOCRIT %  --  43 8 40 9 41 8 42 5 PLATELETS Thousands/uL  --  508* 459* 445* 507*   POTASSIUM mmol/L 5 3* 4 5 4 8 4 5 5 0   CHLORIDE mmol/L 100 99 101 103 102   CO2 mmol/L 27 30 26 24 28   BUN mg/dL 45* 49* 40* 24* 25*   CREATININE mg/dL 1 37* 1 63* 1 50* 1 11* 1 30*   CALCIUM mg/dL 8 6 8 7 8 8 9 0 9 3   MAGNESIUM mg/dL  --   --  1 6 1 6 1 7   PHOSPHORUS mg/dL  --   --   --   --  3 1

## 2021-01-26 NOTE — ASSESSMENT & PLAN NOTE
· Forgetfulness normal per patients dtr, usually only oriented to self and location  · Supportive care

## 2021-01-26 NOTE — ASSESSMENT & PLAN NOTE
· Likely multifactorial  · PT/OT rec'd STR   · Had episode of LH and dizziness while ambulating this AM, vitals stable at that time   · C/o cough and given SOB on admission, would screen for COVID19 especially  · Family prefers she resume therapy at [facility], possibly tomorrow if more steady

## 2021-01-26 NOTE — PROGRESS NOTES
Pt reports dizziness and states "I feel myself going backwards" when ambulating to bathroom  Pt v/s obtained bp 109/65, hr 74, 02 95%  Pt says she feels like a dishrag and has indigestion  Pt only had a half of toast and egg with tea    Placed back in bed, pt states she feels better now  Call bell in hand  [facility] TIMOTHY

## 2021-01-26 NOTE — CASE MANAGEMENT
LOS: 4 GMLOS: 2 4    JIMENEZ contacted pt's dtr Jonatan Ramirez ([phone]) to discuss discharge planning  Pt is a resident of [facility] in [place]  Abby Cuevas confirmed family's preference for pt to return on discharge with in-house therapy if possible  Abby Cuevas expressed concern for pt's continued dizziness  Per physician, pt will stay tonight with plan to discharge tomorrow pending progress  JIMENEZ called [facility] and spoke with nurse Derek GAONA informed her of pt's progress with therapy  Derek Larios confirmed they can accommodate pt at current level and requested script for PT/OT for their in-house therapists  COVID test ordered and pending  Derek Larios confirmed all Rxs can be faxed to [phone]  Pt can return at any time  Scripts must be sent by 1600 for pharmacy to fill  Dtr Abby Cuevas aware of above  She confirmed that SW will need to schedule EMS transport back to facility when medically cleared

## 2021-01-26 NOTE — ASSESSMENT & PLAN NOTE
· POA, creatinine on admission 1 3, BL 1-1 2   · Received Iv lasix and CTA chest on admission and has had worsening renal function since, plateau at 3 16 on 6/14     · No role for IV fluids at this time, encouraged oral fluids  · Creatinine today 1 37

## 2021-01-26 NOTE — ASSESSMENT & PLAN NOTE
· Cardiac catheterization 1/2020 noted triple-vessel disease with 100% occlusion of the mid RCA, 95% occlusion of the ostial left circumflex and 45-50% mid LAD stenosis  At that time optimal medical therapy was recommended      · D/C plavix and asa in lieu of eliquis

## 2021-01-26 NOTE — PLAN OF CARE
Problem: Potential for Falls  Goal: Patient will remain free of falls  Description: INTERVENTIONS:  - Assess patient frequently for physical needs  -  Identify cognitive and physical deficits and behaviors that affect risk of falls    -  [place] fall precautions as indicated by assessment   - Educate patient/family on patient safety including physical limitations  - Instruct patient to call for assistance with activity based on assessment  - Modify environment to reduce risk of injury  - Consider OT/PT consult to assist with strengthening/mobility  Outcome: Progressing     Problem: PAIN - ADULT  Goal: Verbalizes/displays adequate comfort level or baseline comfort level  Description: Interventions:  - Encourage patient to monitor pain and request assistance  - Assess pain using appropriate pain scale  - Administer analgesics based on type and severity of pain and evaluate response  - Implement non-pharmacological measures as appropriate and evaluate response  - Consider cultural and social influences on pain and pain management  - Notify physician/advanced practitioner if interventions unsuccessful or patient reports new pain  Outcome: Progressing     Problem: INFECTION - ADULT  Goal: Absence or prevention of progression during hospitalization  Description: INTERVENTIONS:  - Assess and monitor for signs and symptoms of infection  - Monitor lab/diagnostic results  - Monitor all insertion sites, i e  indwelling lines, tubes, and drains  - Monitor endotracheal if appropriate and nasal secretions for changes in amount and color  - [place] appropriate cooling/warming therapies per order  - Administer medications as ordered  - Instruct and encourage patient and family to use good hand hygiene technique  - Identify and instruct in appropriate isolation precautions for identified infection/condition  Outcome: Progressing  Goal: Absence of fever/infection during neutropenic period  Description: INTERVENTIONS:  - Monitor WBC    Outcome: Progressing     Problem: SAFETY ADULT  Goal: Patient will remain free of falls  Description: INTERVENTIONS:  - Assess patient frequently for physical needs  -  Identify cognitive and physical deficits and behaviors that affect risk of falls    -  [place] fall precautions as indicated by assessment   - Educate patient/family on patient safety including physical limitations  - Instruct patient to call for assistance with activity based on assessment  - Modify environment to reduce risk of injury  - Consider OT/PT consult to assist with strengthening/mobility  Outcome: Progressing  Goal: Maintain or return to baseline ADL function  Description: INTERVENTIONS:  -  Assess patient's ability to carry out ADLs; assess patient's baseline for ADL function and identify physical deficits which impact ability to perform ADLs (bathing, care of mouth/teeth, toileting, grooming, dressing, etc )  - Assess/evaluate cause of self-care deficits   - Assess range of motion  - Assess patient's mobility; develop plan if impaired  - Assess patient's need for assistive devices and provide as appropriate  - Encourage maximum independence but intervene and supervise when necessary  - Involve family in performance of ADLs  - Assess for home care needs following discharge   - Consider OT consult to assist with ADL evaluation and planning for discharge  - Provide patient education as appropriate  Outcome: Progressing  Goal: Maintain or return mobility status to optimal level  Description: INTERVENTIONS:  - Assess patient's baseline mobility status (ambulation, transfers, stairs, etc )    - Identify cognitive and physical deficits and behaviors that affect mobility  - Identify mobility aids required to assist with transfers and/or ambulation (gait belt, sit-to-stand, lift, walker, cane, etc )  - [place] fall precautions as indicated by assessment  - Record patient progress and toleration of activity level on Mobility SBAR; progress patient to next Phase/Stage  - Instruct patient to call for assistance with activity based on assessment  - Consider rehabilitation consult to assist with strengthening/weightbearing, etc   Outcome: Progressing     Problem: DISCHARGE PLANNING  Goal: Discharge to home or other facility with appropriate resources  Description: INTERVENTIONS:  - Identify barriers to discharge w/patient and caregiver  - Arrange for needed discharge resources and transportation as appropriate  - Identify discharge learning needs (meds, wound care, etc )  - Arrange for interpretive services to assist at discharge as needed  - Refer to Case Management Department for coordinating discharge planning if the patient needs post-hospital services based on physician/advanced practitioner order or complex needs related to functional status, cognitive ability, or social support system  Outcome: Progressing     Problem: Knowledge Deficit  Goal: Patient/family/caregiver demonstrates understanding of disease process, treatment plan, medications, and discharge instructions  Description: Complete learning assessment and assess knowledge base    Interventions:  - Provide teaching at level of understanding  - Provide teaching via preferred learning methods  Outcome: Progressing     Problem: Prexisting or High Potential for Compromised Skin Integrity  Goal: Skin integrity is maintained or improved  Description: INTERVENTIONS:  - Identify patients at risk for skin breakdown  - Assess and monitor skin integrity  - Assess and monitor nutrition and hydration status  - Monitor labs   - Assess for incontinence   - Turn and reposition patient  - Assist with mobility/ambulation  - Relieve pressure over bony prominences  - Avoid friction and shearing  - Provide appropriate hygiene as needed including keeping skin clean and dry  - Evaluate need for skin moisturizer/barrier cream  - Collaborate with interdisciplinary team   - Patient/family teaching  - Consider wound care consult   Outcome: Progressing

## 2021-01-27 LAB
ANION GAP SERPL CALCULATED.3IONS-SCNC: 8 MMOL/L (ref 4–13)
ATRIAL RATE: 268 BPM
BUN SERPL-MCNC: 43 MG/DL (ref 6–20)
CALCIUM SERPL-MCNC: 9 MG/DL (ref 8.4–10.2)
CHLORIDE SERPL-SCNC: 99 MMOL/L (ref 96–108)
CO2 SERPL-SCNC: 29 MMOL/L (ref 22–33)
CREAT SERPL-MCNC: 1.37 MG/DL (ref 0.4–1.1)
GFR SERPL CREATININE-BSD FRML MDRD: 34 ML/MIN/1.73SQ M
GLUCOSE SERPL-MCNC: 131 MG/DL (ref 65–140)
P AXIS: 56 DEGREES
POTASSIUM SERPL-SCNC: 4.6 MMOL/L (ref 3.5–5)
PR INTERVAL: 169 MS
QRS AXIS: 36 DEGREES
QRSD INTERVAL: 107 MS
QT INTERVAL: 384 MS
QTC INTERVAL: 421 MS
SODIUM SERPL-SCNC: 136 MMOL/L (ref 133–145)
T WAVE AXIS: 31 DEGREES
TROPONIN I SERPL-MCNC: <0.03 NG/ML (ref 0–0.07)
VENTRICULAR RATE: 72 BPM

## 2021-01-27 PROCEDURE — 84484 ASSAY OF TROPONIN QUANT: CPT | Performed by: INTERNAL MEDICINE

## 2021-01-27 PROCEDURE — 80048 BASIC METABOLIC PNL TOTAL CA: CPT | Performed by: PHYSICIAN ASSISTANT

## 2021-01-27 PROCEDURE — 93010 ELECTROCARDIOGRAM REPORT: CPT | Performed by: INTERNAL MEDICINE

## 2021-01-27 PROCEDURE — 97116 GAIT TRAINING THERAPY: CPT

## 2021-01-27 PROCEDURE — 97110 THERAPEUTIC EXERCISES: CPT

## 2021-01-27 PROCEDURE — 99233 SBSQ HOSP IP/OBS HIGH 50: CPT | Performed by: INTERNAL MEDICINE

## 2021-01-27 PROCEDURE — 93005 ELECTROCARDIOGRAM TRACING: CPT

## 2021-01-27 RX ADMIN — PRAVASTATIN SODIUM: 80 TABLET ORAL at 22:05

## 2021-01-27 RX ADMIN — METOPROLOL TARTRATE 25 MG: 25 TABLET, FILM COATED ORAL at 09:14

## 2021-01-27 RX ADMIN — ISOSORBIDE MONONITRATE 30 MG: 30 TABLET, EXTENDED RELEASE ORAL at 09:12

## 2021-01-27 RX ADMIN — METOPROLOL TARTRATE 25 MG: 25 TABLET, FILM COATED ORAL at 15:06

## 2021-01-27 RX ADMIN — APIXABAN 2.5 MG: 2.5 TABLET, FILM COATED ORAL at 17:16

## 2021-01-27 RX ADMIN — METOPROLOL TARTRATE 25 MG: 25 TABLET, FILM COATED ORAL at 03:04

## 2021-01-27 RX ADMIN — DILTIAZEM HYDROCHLORIDE 60 MG: 60 TABLET, FILM COATED ORAL at 15:06

## 2021-01-27 RX ADMIN — DILTIAZEM HYDROCHLORIDE 60 MG: 60 TABLET, FILM COATED ORAL at 06:17

## 2021-01-27 RX ADMIN — MELATONIN 3 MG: 3 TAB ORAL at 22:06

## 2021-01-27 RX ADMIN — APIXABAN 2.5 MG: 2.5 TABLET, FILM COATED ORAL at 09:12

## 2021-01-27 RX ADMIN — HYDROXYUREA 500 MG: 500 CAPSULE ORAL at 22:08

## 2021-01-27 RX ADMIN — LEVOTHYROXINE SODIUM 50 MCG: 50 TABLET ORAL at 09:12

## 2021-01-27 NOTE — CASE MANAGEMENT
SW advised by Attending that he spoke with pt's dtr Prince Clayton this morning  Dtr Prince Clayton is in agreement that pt should go to [facility] first  Pt is complaining of chest pain today  Discharge held today  Pending progress, anticipate clearance tomorrow  SW called Prince Clayton to discuss Freedom of Choice  Her #1 preference is [facility]  Second choice is [facility]  She does not want referrals to [facility] or [facility]  SW to f/u with referrals and bed availability

## 2021-01-27 NOTE — ASSESSMENT & PLAN NOTE
PPI therapy on hold due to YAW    Will start on Pepcid    Results from last 7 days   Lab Units 01/27/21  0444 01/26/21  0649 01/25/21  0655 01/24/21  0520 01/23/21  0231 01/22/21  1630   BUN mg/dL 43* 45* 49* 40* 24* 25*   CREATININE mg/dL 1 37* 1 37* 1 63* 1 50* 1 11* 1 30*

## 2021-01-27 NOTE — PLAN OF CARE
Problem: PHYSICAL THERAPY ADULT  Goal: Performs mobility at highest level of function for planned discharge setting  See evaluation for individualized goals  Description: Treatment/Interventions: ADL retraining, Functional transfer training, LE strengthening/ROM, Elevations, Therapeutic exercise, Endurance training, Patient/family training, Equipment eval/education, Bed mobility, Gait training, Spoke to nursing, OT  Equipment Recommended: Other (Comment)(TBD pending progress, possibly RW vs SPC )    See flowsheet documentation for full assessment, interventions and recommendations  Outcome: Progressing  Note: Prognosis: Fair  Problem List: Decreased strength, Decreased endurance, Impaired balance, Decreased mobility, Decreased cognition, Impaired judgement, Decreased safety awareness, Pain  Assessment: Pt seen for PT treatment today with focus on gait training  Per SLIM team and CM, pt planned for possible d/c today  Pt presents seated in bedside recliner chair, denies pain and is agreeable to participate in session after much encouragement from therapist  Pt reports feeling "like a dirty dish rag" and states "my legs aren't working"  And "i'm not ready to go home like this, I can't care for myself " Therapist discussed recommendation of STR prior to return to home, pt refusing "need" for STR despite her previous comment about not being able to care for herself  Pt completes transfers with CGA, + instability upon standing  Pt reports feeling too weak to walk w/o AD (baseline ambulates with no AD), therapist provided RW and pt ambulates 30ft with RW and KERON  B knees buckling during ambulation, + instability requiring increased therapist assistance to MODA  Upon return to room, pt requires approx 4 minutes of functional seated rest for SOB and fatigue recovery  Therapist educated pt on B LE AROM exercises for improved strength and endurance of LEs  Ankle circles, LAQs, hip flexion x15 reps bilaterally   Pt demonstrates fair application, appears to have little desire to participate  Pt c/o midsternal/epigastric pain, sharp in nature, attributes to indigestion however reports she has not felt this before - MD and RN notified immediately At end of session pt was left seated in bedside recliner chair with all needs w/in reach, chair alarm engaged, care returned to RN  Pt remains UNSAFE for return to home upon d/c, will benefit from STR upon d/c in order to maximize indep and safety with mobility and return to PLOF  If d/c home, will likely result in readmission d/t falls  Barriers to Discharge: Decreased caregiver support, Inaccessible home environment     PT Discharge Recommendation: [id number]     PT - OK to Discharge: Yes(to rehab facility once medically cleared )    See flowsheet documentation for full assessment

## 2021-01-27 NOTE — ASSESSMENT & PLAN NOTE
· Pt admitted with palpitations, SOB, EKG showed SVT with HR 140s, suspicious for A flutter   · Appreciate cardiology input   · Weaned off cardizem drip   · Started eliquis   · On d/c will switch to cardizem CD 240mg daily and Metoprolol 50mg BID   · Echocardiogram: LVEF 50%, dyskinesis of basal inferoseptal and basal inferior wall, moderately dilated atria b/l, mild to mod mitral regurg, moderate tricuspid regurg, dilated IVC, large left pleural effusion     · Outpatient follow up with cardiology

## 2021-01-27 NOTE — ASSESSMENT & PLAN NOTE
· POA, creatinine on admission 1 3, BL 1-1 2   · Received Iv lasix and CTA chest on admission and has had worsening renal function since, plateau at 1 92 on 4/83     · No role for IV fluids at this time, encouraged oral fluids  · Creatinine today 1 37

## 2021-01-27 NOTE — PLAN OF CARE
Problem: Potential for Falls  Goal: Patient will remain free of falls  Description: INTERVENTIONS:  - Assess patient frequently for physical needs  -  Identify cognitive and physical deficits and behaviors that affect risk of falls  -  [place] fall precautions as indicated by assessment   - Educate patient/family on patient safety including physical limitations  - Instruct patient to call for assistance with activity based on assessment  - Modify environment to reduce risk of injury  - Consider OT/PT consult to assist with strengthening/mobility  Outcome: Progressing     Problem: INFECTION - ADULT  Goal: Absence or prevention of progression during hospitalization  Description: INTERVENTIONS:  - Assess and monitor for signs and symptoms of infection  - Monitor lab/diagnostic results  - Monitor all insertion sites, i e  indwelling lines, tubes, and drains  - Monitor endotracheal if appropriate and nasal secretions for changes in amount and color  - [place] appropriate cooling/warming therapies per order  - Administer medications as ordered  - Instruct and encourage patient and family to use good hand hygiene technique  - Identify and instruct in appropriate isolation precautions for identified infection/condition  Outcome: Progressing  Goal: Absence of fever/infection during neutropenic period  Description: INTERVENTIONS:  - Monitor WBC    Outcome: Progressing     Problem: SAFETY ADULT  Goal: Patient will remain free of falls  Description: INTERVENTIONS:  - Assess patient frequently for physical needs  -  Identify cognitive and physical deficits and behaviors that affect risk of falls    -  [place] fall precautions as indicated by assessment   - Educate patient/family on patient safety including physical limitations  - Instruct patient to call for assistance with activity based on assessment  - Modify environment to reduce risk of injury  - Consider OT/PT consult to assist with strengthening/mobility  Outcome: Progressing  Goal: Maintain or return to baseline ADL function  Description: INTERVENTIONS:  -  Assess patient's ability to carry out ADLs; assess patient's baseline for ADL function and identify physical deficits which impact ability to perform ADLs (bathing, care of mouth/teeth, toileting, grooming, dressing, etc )  - Assess/evaluate cause of self-care deficits   - Assess range of motion  - Assess patient's mobility; develop plan if impaired  - Assess patient's need for assistive devices and provide as appropriate  - Encourage maximum independence but intervene and supervise when necessary  - Involve family in performance of ADLs  - Assess for home care needs following discharge   - Consider OT consult to assist with ADL evaluation and planning for discharge  - Provide patient education as appropriate  Outcome: Progressing  Goal: Maintain or return mobility status to optimal level  Description: INTERVENTIONS:  - Assess patient's baseline mobility status (ambulation, transfers, stairs, etc )    - Identify cognitive and physical deficits and behaviors that affect mobility  - Identify mobility aids required to assist with transfers and/or ambulation (gait belt, sit-to-stand, lift, walker, cane, etc )  - [place] fall precautions as indicated by assessment  - Record patient progress and toleration of activity level on Mobility SBAR; progress patient to next Phase/Stage  - Instruct patient to call for assistance with activity based on assessment  - Consider rehabilitation consult to assist with strengthening/weightbearing, etc   Outcome: Progressing     Problem: DISCHARGE PLANNING  Goal: Discharge to home or other facility with appropriate resources  Description: INTERVENTIONS:  - Identify barriers to discharge w/patient and caregiver  - Arrange for needed discharge resources and transportation as appropriate  - Identify discharge learning needs (meds, wound care, etc )  - Arrange for interpretive services to assist at discharge as needed  - Refer to Case Management Department for coordinating discharge planning if the patient needs post-hospital services based on physician/advanced practitioner order or complex needs related to functional status, cognitive ability, or social support system  Outcome: Progressing

## 2021-01-27 NOTE — ASSESSMENT & PLAN NOTE
Bilateral small to moderate pleural effusions, present on admission in the setting of a patient with rapid a fib, history of breast cancer  Patient received 1 time dose of IV Lasix on admission  Initially requiring oxygen supplementation, now discontinued  Follow up with a portable chest x-ray today - persistent moderate bilateral pleural  Consider pulmonology or IR consultation for possible diagnostic / therapeutic thoracentesis if oxygen needs escalate

## 2021-01-27 NOTE — ASSESSMENT & PLAN NOTE
· Cardiac catheterization 1/2020 noted triple-vessel disease with 100% occlusion of the mid RCA, 95% occlusion of the ostial left circumflex and 45-50% mid LAD stenosis  At that time optimal medical therapy was recommended  · D/C plavix and asa in lieu of eliquis  · Cardiology input appreciated  Patient did have catheterization in January of 2020 that showed at least 2 vessel disease    Patient likely has chronic angina because of the blockages  · EKG reviewed and shows atrial flutter  · Continue metoprolol and Imdur

## 2021-01-27 NOTE — ASSESSMENT & PLAN NOTE
· Likely multifactorial  · PT/OT rec'd [facility]   · Had episode of LH and dizziness while ambulating this AM, vitals stable at that time   · C/o cough and given SOB on admission, would screen for COVID19 especially  · Family prefers she resume therapy at [facility], possibly tomorrow if more steady   · However she is a high risk for falls and potential readmission    Will discuss with family and

## 2021-01-27 NOTE — PHYSICAL THERAPY NOTE
PHYSICAL THERAPY TREATMENT  TIME: 5216-8686    NAME:  Cyrus Davis  DATE: 01/27/21    AGE:   80 y o  Mrn:   [id number]  Length Of Stay: 5    ADMIT DX:  SOB (shortness of breath) [R06 02]  Acute kidney injury ([facility] ) [N17 9]  Atrial flutter, unspecified type ([place] [address] ) [I48 92]    Past Medical History:   Diagnosis Date    Adenocarcinoma of breast ([place] [address] ) 9/4/2012    Procedure/Onset: 12/07/2005    Anxiety 2/28/2018    Arthritis     joints    Asymptomatic varicose veins of bilateral lower extremities 10/14/2016    Benign colon polyp 2/17/2014    Breast cancer ([place] [address] ) 2005    right    CAD (coronary artery disease)     hx MI x 2    Cancer (HCC)     breast ca, skin ca, currently in remission, tumors in chest wall    Chronic pain disorder     knees    Chronic rhinitis 9/30/2013    Chronic venous insufficiency 5/22/2018    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[facility]) 9/10/2019    S/p Mindy's procedure with the end colostomy    Complete uterovaginal prolapse 11/19/2015    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    Procedure/Onset: 05/26/2006    Coronary artery disease     Gait disturbance 12/19/2016    GERD (gastroesophageal reflux disease)     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    Procedure/Onset: 02/22/2007    History of prolapse of bladder     History of radiation therapy 2005    to breast right    Hypertension     Hypothyroidism     hypothyroidism    Irritable bowel syndrome 9/4/2012    Procedure/Onset: 09/23/2010    Kidney stone     2016    Myocardial infarction ([place] [address] )     1990's x2    Nephrolithiasis 2/26/2016    Osteoarthritis of left knee 10/19/2016    Perforation of sigmoid colon due to diverticulitis 5/9/2019    Added automatically from request for surgery [id number]    Peripheral vertigo 9/4/2012    Procedure/Onset: 04/04/2007    Psoriasis 12/19/2016    Rectocele 3/10/2016    Last Assessment & Plan:  As below      Stress incontinence in female 7/6/2016     Past Surgical History:   Procedure Laterality Date    APPENDECTOMY      during HAMLET    BLADDER SURGERY      suspension surgery no sling, used fiberglass suspension technique    BREAST LUMPECTOMY Right 2005    BREAST SURGERY Right     lumpectomy    CHEST WALL TUMOR EXCISION  2005    COLONOSCOPY      CYSTOSCOPY      HARTMANS PROCEDURE N/A 5/9/2019    Procedure: Re Fuentes;  Surgeon: Kierra Villatla MD;  Location: [facility] MAIN OR;  Service: General    HYSTERECTOMY Bilateral     hamlet w/removal of both ovaries    KNEE ARTHROSCOPY      Last assessed: 7/30/15    HI CYSTOURETHROSCOPY,URETER CATHETER Left 2/26/2016    Procedure: CYSTOSCOPY RETROGRADE PYELOGRAM WITH INSERTION STENT URETERAL;  Surgeon: Carter Barone MD;  Location: [address] [place] [address];  Service: Urology    HI XCAPSL CTRC RMVL INSJ IO LENS PROSTH W/O ECP Left 4/10/2017    Procedure: EXTRACTION EXTRACAPSULAR CATARACT PHACO INTRAOCULAR LENS (IOL); Surgeon: Izaiah Clemons MD;  Location: [facility] MAIN OR;  Service: Ophthalmology    SKIN BIOPSY Left     Leg for SCC       Performed at least 2 patient identifiers during session: Name and ID bracelet        01/27/21 0858   PT Last Visit   PT Visit Date 01/27/21   Note Type   Note Type Treatment   Pain Assessment   Pain Assessment Tool 0-10   Pain Score No Pain   Effect of Pain on Daily Activities n/a   Hospital Pain Intervention(s) Ambulation/increased activity   Multiple Pain Sites No   Restrictions/Precautions   Weight Bearing Precautions Per Order No   Other Precautions Cognitive; Chair Alarm; Bed Alarm;Telemetry; Fall Risk;Pain   General   Chart Reviewed Yes   Additional Pertinent History no significant change since previous session   Response to Previous Treatment Patient with no complaints from previous session  Family/Caregiver Present No   Cognition   Overall Cognitive Status Impaired   Arousal/Participation Alert   Attention Attends with cues to redirect   Orientation Level Oriented to person;Oriented to place; Disoriented to time;Disoriented to situation   Memory Decreased short term memory;Decreased recall of recent events   Following Commands Follows one step commands with increased time or repetition   Subjective   Subjective "I can't go home like this "   Bed Mobility   Additional Comments Bed mobility not completed on this date    Transfers   Sit to Stand 4  Minimal assistance   Additional items Assist x 1;Verbal cues   Stand to Sit 4  Minimal assistance   Additional items Assist x 1;Verbal cues   Stand pivot 4  Minimal assistance   Additional items Assist x 1;Verbal cues   Ambulation/Elevation   Gait pattern R Knee Clifford;L Knee Clifford; Excessively slow; Short stride; Inconsistent laura;Decreased foot clearance   Gait Assistance 4  Minimal assist  (primarily [address] [place], few episodes of MODA)   Additional items Assist x 1;Verbal cues; Tactile cues   Assistive Device Rolling walker   Distance 30ft x1 including change in direction - further unable due to weakness, fatigue, SOB, instability    Stair Management Assistance Not tested   Balance   Static Sitting Good   Dynamic Sitting Fair +   Static Standing Fair -   Dynamic Standing Poor +   Ambulatory Poor +   Endurance Deficit   Endurance Deficit Yes   Activity Tolerance   Activity Tolerance Patient limited by fatigue   Nurse Made Aware Spoke with RN [facility] pre/post session, Spoke with SLIM post session    Exercises   Hip Flexion Sitting;15 reps;AAROM; Bilateral   Knee AROM Long Arc Quad Sitting;15 reps;AAROM; Bilateral   Ankle Pumps Sitting;15 reps;AAROM; Bilateral   Assessment   Prognosis Fair   Problem List Decreased strength;Decreased endurance; Impaired balance;Decreased mobility; Decreased cognition; Impaired judgement;Decreased safety awareness;Pain   Assessment Pt seen for PT treatment today with focus on gait training  Per SLIM team and CM, pt planned for possible d/c today   Pt presents seated in bedside recliner chair, denies pain and is agreeable to participate in session after much encouragement from therapist  Pt reports feeling "like a dirty dish rag" and states "my legs aren't working"  And "i'm not ready to go home like this, I can't care for myself " Therapist discussed recommendation of STR prior to return to home, pt refusing "need" for STR despite her previous comment about not being able to care for herself  Pt completes transfers with CGA, + instability upon standing  Pt reports feeling too weak to walk w/o AD (baseline ambulates with no AD), therapist provided RW and pt ambulates 30ft with RW and KERON  B knees buckling during ambulation, + instability requiring increased therapist assistance to MODA  Upon return to room, pt requires approx 4 minutes of functional seated rest for SOB and fatigue recovery  Therapist educated pt on B LE AROM exercises for improved strength and endurance of LEs  Ankle circles, LAQs, hip flexion x15 reps bilaterally  Pt demonstrates fair application, appears to have little desire to participate  Pt c/o midsternal/epigastric pain, sharp in nature, attributes to indigestion however reports she has not felt this before - MD and RN notified immediately At end of session pt was left seated in bedside recliner chair with all needs w/in reach, chair alarm engaged, care returned to RN  Pt remains UNSAFE for return to home upon d/c, will benefit from STR upon d/c in order to maximize indep and safety with mobility and return to PLOF  If d/c home, will likely result in readmission d/t falls  Barriers to Discharge Decreased caregiver support; Inaccessible home environment   Goals   Patient Goals "to feel like myself again"    STG Expiration Date 02/04/21   Short Term Goal #1 1  Pt will complete all bed mobility in flat bed independently, in order to promote increased OOB functional mobility to improve overall activity tolerance  2  Pt will complete all transfers with LRAD at MALCOLM level, in order to increase safety with functional mobility   3  Pt will ambulate >100ft with LRAD at MALCOLM level in order to increase safety with housheold and short community distance functional mobility  4  Pt will negotiate 3-5 stairs with HR assist and S in order to access her home  5  Pt will improve B LE strength to >/= 4+/5 MMT throughout, in order to increase safety with functional mobility and decrease risk of falls  6  Pt will improve AM-PAC score to >/= 22/24 in order to increase independence with mobility and decrease burden of care  7  Pt will improve Barthel Index score to >/= 60/100 in order to increase independence and decrease risk of falls  8  Pt will improve ambulatory balance by >/= 1/2 grade in order to promote safety and increased independence with mobility  9  Pt will demonstrate understanding and independence with LE strengthening HEP  10  Pt will tolerate >3hrs OOB in upright position, in order to improve muscular endurance and respiratory status  11  Pt will improve Elderly Mobility Scale score to >/= 10/20 in order to decrease burden of care and increase indep with ADLs/mobility  PT Treatment Day 1   Plan   Treatment/Interventions ADL retraining;Functional transfer training;LE strengthening/ROM; Therapeutic exercise; Endurance training;Cognitive reorientation;Patient/family training;Equipment eval/education; Bed mobility;Gait training;Spoke to MD;Spoke to case management;Spoke to nursing;OT   Progress Slow progress, decreased activity tolerance   PT Frequency Other (Comment)  (3-5x/wk )   Recommendation   PT Discharge Recommendation Post-Acute Rehabilitation Services   Equipment Recommended   (TBD pending progress - likely RW )   PT - OK to Discharge Yes  (to rehab facility once medically cleared )   AM-PAC Basic Mobility Inpatient   Turning in Bed Without Bedrails 3   Lying on Back to Sitting on Edge of Flat Bed 3   Moving Bed to Chair 3   Standing Up From Chair 3   Walk in Room 2   Climb 3-5 Stairs 2   Basic Mobility Inpatient Raw Score 16   Basic Mobility Standardized Score 38 32 The patient's AM-PAC Basic Mobility Inpatient Short Form Raw Score is 17, Standardized Score is 39 67  A standardized score less than 42 9 suggests the patient may benefit from discharge to post-acute rehabilitation services  Please also refer to the recommendation of the Physical Therapist for safe discharge planning          Shelton Hernandez PT, DPT   1/27/2021

## 2021-01-27 NOTE — PROGRESS NOTES
Progress Note - Vickey You 3/7/1928, 80 y o  female MRN: [id number]    Unit/Bed#: -Charli Encounter: [id number]    Primary Care Provider: Bethanie Ventura MD   Date and time admitted to hospital: 1/22/2021  4:13 PM        Chest pain  Assessment & Plan  Patient did developed chest pain today earlier this morning  EKG done showed atrial flutter  Patient did have a cardiac catheterization done in January of 2020 that showed multivessel disease  Cardiology suggested medical management for now  Spoke to daughter who agrees with the same and does not want any aggressive management of the cardiac disease and would follow Cardiology recommendations  Spoke to patient's daughter who is now agreeable for rehab- rather than assisted living  [facility]    Cognitive decline  Assessment & Plan  · Forgetfulness normal per patients dtr, usually only oriented to self and location  · Supportive care     Generalized weakness  Assessment & Plan  · Likely multifactorial  · PT/OT rec'd STR   · Had episode of LH and dizziness while ambulating this AM, vitals stable at that time   · C/o cough and given SOB on admission, would screen for COVID19 especially  · Family prefers she resume therapy at [facility], possibly tomorrow if more steady   · However she is a high risk for falls and potential readmission  Will discuss with family and     Pleural effusion  Assessment & Plan  Bilateral small to moderate pleural effusions, present on admission in the setting of a patient with rapid a fib, history of breast cancer  Patient received 1 time dose of IV Lasix on admission  Initially requiring oxygen supplementation, now discontinued  Follow up with a portable chest x-ray today - persistent moderate bilateral pleural  Consider pulmonology or IR consultation for possible diagnostic / therapeutic thoracentesis if oxygen needs escalate      Acute renal failure (ARF) (HCC)  Assessment & Plan  · POA, creatinine on admission 1 3, BL 1-1 2   · Received Iv lasix and CTA chest on admission and has had worsening renal function since, plateau at 4 53 on 2/92  · No role for IV fluids at this time, encouraged oral fluids  · Creatinine today 1 37     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[facility])  Assessment & Plan  · Routine care    Hypothyroidism  Assessment & Plan  · Continue synthroid  · TSH normal     GERD (gastroesophageal reflux disease)  Assessment & Plan  PPI therapy on hold due to YAW  Will start on Pepcid    Results from last 7 days   Lab Units 01/27/21  0444 01/26/21  0649 01/25/21  0655 01/24/21  0520 01/23/21  0231 01/22/21  1630   BUN mg/dL 43* 45* 49* 40* 24* 25*   CREATININE mg/dL 1 37* 1 37* 1 63* 1 50* 1 11* 1 30*          Coronary artery disease involving native coronary artery of native heart without angina pectoris  Assessment & Plan  · Cardiac catheterization 1/2020 noted triple-vessel disease with 100% occlusion of the mid RCA, 95% occlusion of the ostial left circumflex and 45-50% mid LAD stenosis  At that time optimal medical therapy was recommended  · D/C plavix and asa in lieu of eliquis  · Cardiology input appreciated  Patient did have catheterization in January of 2020 that showed at least 2 vessel disease  Patient likely has chronic angina because of the blockages  · EKG reviewed and shows atrial flutter  · Continue metoprolol and Imdur    Essential hypertension  Assessment & Plan  Stable  Continue lopressor  * Atrial flutter ([address] )  Assessment & Plan  · Pt admitted with palpitations, SOB, EKG showed SVT with HR 140s, suspicious for A flutter   · Appreciate cardiology input   · Weaned off cardizem drip   · Started eliquis   · On d/c will switch to cardizem CD 240mg daily and Metoprolol 50mg BID   · Echocardiogram: LVEF 50%, dyskinesis of basal inferoseptal and basal inferior wall, moderately dilated atria b/l, mild to mod mitral regurg, moderate tricuspid regurg, dilated IVC, large left pleural effusion  · Outpatient follow up with cardiology       TE Pharmacologic Prophylaxis: Apixaban (Eliquis)    Patient Centered Rounds: I have performed bedside rounds with nursing staff today  Discussions with Specialists or Other Care Team Provider:  Spoke with daughter and Cardiology  Education and Discussions with Family / Patient:  Spoke with patient and daughter  Current Length of Stay: 5 day(s)  Current Patient Status: Inpatient     Certification Statement: The patient will continue to require additional inpatient hospital stay due to Atrial flutter [facility])  Discharge Plan / Estimated Discharge Date:  1-2 days to SNF    Code Status: Level 3 - DNAR and DNI  ______________________________________________________________________________    Subjective:   Patient seen and examined by me  Patient said that she is having some chest pain and feeling weak today  Weakness is generalized  No nausea or vomiting at this point of time  Patient does not have any fever, chills or rigors  Objective:   Vitals: Blood pressure 127/61, pulse 70, temperature (!) 96 4 °F (35 8 °C), temperature source Tympanic, resp  rate 20, height 5' 2" (1 575 m), weight 76 5 kg (168 lb 10 4 oz), SpO2 92 %, not currently breastfeeding      Physical Exam:   General appearance: alert, appears stated age and cooperative  Constitutional- looks a little weak  HEENT - atraumatic and normocephalic  Neck- supple  Skin - no fresh rash  Extremities no fresh focal deformities  Cardiovascular- S1-S2 heard  Respiratory- bilateral air entry present, no crackles or rhonchi  Skin - no fresh rash  Abdomen - normal bowel sounds present, no rebound tenderness  CNS- No fresh focal deficits but extremely weak  Psych- no acute psychosis     Additional Data:   Labs:  Results from last 7 days   Lab Units 01/25/21  0655 01/24/21  0520 01/23/21  0231 01/22/21  1630   WBC Thousand/uL 9 90 10 65* 6 71 9 85   HEMOGLOBIN g/dL 14 0 13 0 13 6 13 9   HEMATOCRIT % 43 8 40 9 41 8 42 5 MCV fL 83 83 84 84   PLATELETS Thousands/uL 508* 459* 445* 507*     Results from last 7 days   Lab Units 01/27/21  0444 01/26/21  0649 01/25/21  0655 01/24/21  0520 01/23/21  0231 01/22/21  1630   SODIUM mmol/L 136 135 137 136 139 138   POTASSIUM mmol/L 4 6 5 3* 4 5 4 8 4 5 5 0   CHLORIDE mmol/L 99 100 99 101 103 102   CO2 mmol/L 29 27 30 26 24 28   ANION GAP mmol/L 8 8 8 9 12 8   BUN mg/dL 43* 45* 49* 40* 24* 25*   CREATININE mg/dL 1 37* 1 37* 1 63* 1 50* 1 11* 1 30*   CALCIUM mg/dL 9 0 8 6 8 7 8 8 9 0 9 3   ALBUMIN g/dL  --   --   --   --  4 1 4 3   TOTAL BILIRUBIN mg/dL  --   --   --   --  0 85 0 99   ALK PHOS U/L  --   --   --   --  84 9 76 5   ALT U/L  --   --   --   --  19 21   AST U/L  --   --   --   --  16 35   EGFR ml/min/1 73sq m 34 34 27 30 43 36   GLUCOSE RANDOM mg/dL 131 141* 147* 135 250* 155*     Results from last 7 days   Lab Units 01/24/21  0520 01/23/21  0231 01/22/21  1630   MAGNESIUM mg/dL 1 6 1 6 1 7   PHOSPHORUS mg/dL  --   --  3 1     Results from last 7 days   Lab Units 01/23/21  0230 01/22/21  1630   TROPONIN I ng/mL <0 03 <0 03          Results from last 7 days   Lab Units 01/25/21  0655   PROCALCITONIN ng/ml 0 07             Results from last 7 days   Lab Units 01/24/21  0520   TSH 3RD GENERATON uIU/mL 3 952     * I Have Reviewed All Lab Data Listed Above  Cultures:   Results from last 7 days   Lab Units 01/26/21  1018   INFLUENZA A PCR  Negative     Results from last 7 days   Lab Units 01/26/21  1018   INFLUENZA A PCR  Negative   INFLUENZA B PCR  Negative   RSV PCR  Negative         Imaging:  Imaging Reports Reviewed Today Include:   Xr Chest Portable    Result Date: 1/24/2021  Impression: Persistent small effusions and bibasilar atelectasis  Workstation performed: [id number]     X-ray Chest 1 View Portable    Result Date: 1/23/2021  Impression: Bilateral interstitial densities with bilateral perihilar and basilar airspace opacities and small to moderate pleural effusions    This may be due to congestive changes  Underlying infiltrate/pneumonia not excluded  Workstation performed: [id number]     Cta Ed Chest Pe Study    Result Date: 1/22/2021  Impression: No pulmonary embolism  Cardiomegaly with bilateral diffuse groundglass opacities and moderate-sized bilateral pleural effusions in keeping with CHF  Probably reactive lymphadenopathy in the mediastinum  Workstation performed: [id number]     Us Kidney And Bladder With Pvr    Result Date: 1/25/2021  Impression: No hydronephrosis or perinephric collection  Bilateral renal minimally complex cysts, left larger than right  These could be followed up with ultrasound in 12 months  Mild diffuse bladder wall thickening attributed to relative under distention  Minimal layering urinary sediment   No significant post void bladder residual  Workstation performed: [id number]     Scheduled Meds:  Current Facility-Administered Medications   Medication Dose Route Frequency Provider Last Rate    acetaminophen  650 mg Oral Q6H PRN Mat Porter MD      aluminum-magnesium hydroxide-simethicone  30 mL Oral Q4H PRN Brandon MD Marycruz      apixaban  2 5 mg Oral BID Marii Mehta PA-C      bisacodyl  10 mg Rectal Daily PRN Mat Porter MD      calcium carbonate  500 mg Oral Daily PRN Brandon MD Marycruz      diltiazem  60 mg Oral [facility] Prabhu Stephen MD      ezetimibe 10 mg-pravastatin 80 mg combo dose   Oral HS Mat Porter MD      hydroxyurea  500 mg Oral Once per day on Mon Wed Fri Mat Porter MD      hydrOXYzine HCL  25 mg Oral TID PRN Mat Changing, MD      isosorbide mononitrate  30 mg Oral Daily Mat PillingMD Jaycee levothyroxine  50 mcg Oral QAM Mat Porter MD      melatonin  3 mg Oral HS Mat Porter MD      metoprolol tartrate  25 mg Oral Q6H Catalina Woods MD      ondansetron  4 mg Intravenous Q6H PRN Mat Porter MD      simethicone  80 mg Oral 4x Daily PRN Mat Porter MD      sodium chloride (PF)  3 mL Intravenous Q1H PRN Meli Elizondo PA-C MD [facility]    ** Please Note: This note has been constructed using a voice recognition system   **

## 2021-01-27 NOTE — ASSESSMENT & PLAN NOTE
Patient did developed chest pain today earlier this morning    EKG done showed atrial flutter  Patient did have a cardiac catheterization done in January of 2020 that showed multivessel disease  Cardiology suggested medical management for now  Spoke to daughter who agrees with the same and does not want any aggressive management of the cardiac disease and would follow Cardiology recommendations  Spoke to patient's daughter who is now agreeable for rehab- rather than assisted living  [facility]

## 2021-01-28 VITALS
TEMPERATURE: 97.7 F | WEIGHT: 172.2 LBS | HEART RATE: 75 BPM | DIASTOLIC BLOOD PRESSURE: 48 MMHG | SYSTOLIC BLOOD PRESSURE: 120 MMHG | HEIGHT: 62 IN | OXYGEN SATURATION: 93 % | BODY MASS INDEX: 31.69 KG/M2 | RESPIRATION RATE: 16 BRPM

## 2021-01-28 PROCEDURE — 99239 HOSP IP/OBS DSCHRG MGMT >30: CPT | Performed by: INTERNAL MEDICINE

## 2021-01-28 RX ORDER — SIMETHICONE 80 MG
80 TABLET,CHEWABLE ORAL 4 TIMES DAILY PRN
Qty: 30 TABLET | Refills: 0 | Status: SHIPPED | OUTPATIENT
Start: 2021-01-28

## 2021-01-28 RX ORDER — METOPROLOL TARTRATE 50 MG/1
50 TABLET, FILM COATED ORAL EVERY 12 HOURS SCHEDULED
Qty: 60 TABLET | Refills: 0
Start: 2021-01-28

## 2021-01-28 RX ORDER — DILTIAZEM HYDROCHLORIDE 180 MG/1
180 CAPSULE, COATED, EXTENDED RELEASE ORAL DAILY
Status: DISCONTINUED | OUTPATIENT
Start: 2021-01-28 | End: 2021-01-28 | Stop reason: HOSPADM

## 2021-01-28 RX ORDER — FAMOTIDINE 20 MG/1
10 TABLET, FILM COATED ORAL DAILY
Status: DISCONTINUED | OUTPATIENT
Start: 2021-01-28 | End: 2021-01-28 | Stop reason: HOSPADM

## 2021-01-28 RX ORDER — METOPROLOL TARTRATE 50 MG/1
50 TABLET, FILM COATED ORAL EVERY 12 HOURS SCHEDULED
Status: DISCONTINUED | OUTPATIENT
Start: 2021-01-28 | End: 2021-01-28 | Stop reason: HOSPADM

## 2021-01-28 RX ADMIN — DILTIAZEM HYDROCHLORIDE 180 MG: 180 CAPSULE, COATED, EXTENDED RELEASE ORAL at 09:40

## 2021-01-28 RX ADMIN — METOPROLOL TARTRATE 25 MG: 25 TABLET, FILM COATED ORAL at 05:24

## 2021-01-28 RX ADMIN — METOPROLOL TARTRATE 50 MG: 50 TABLET, FILM COATED ORAL at 09:41

## 2021-01-28 RX ADMIN — ISOSORBIDE MONONITRATE 30 MG: 30 TABLET, EXTENDED RELEASE ORAL at 09:39

## 2021-01-28 RX ADMIN — DILTIAZEM HYDROCHLORIDE 60 MG: 60 TABLET, FILM COATED ORAL at 05:24

## 2021-01-28 RX ADMIN — LEVOTHYROXINE SODIUM 50 MCG: 50 TABLET ORAL at 09:39

## 2021-01-28 RX ADMIN — FAMOTIDINE 10 MG: 20 TABLET ORAL at 09:38

## 2021-01-28 RX ADMIN — APIXABAN 2.5 MG: 2.5 TABLET, FILM COATED ORAL at 09:39

## 2021-01-28 NOTE — ASSESSMENT & PLAN NOTE
· Pt admitted with palpitations, SOB, EKG showed SVT with HR 140s, showing A flutter   · Appreciate cardiology input   · Started eliquis   · On d/c will switch to cardizem CD 240mg daily and Metoprolol 50mg BID   · Echocardiogram: LVEF 50%, dyskinesis of basal inferoseptal and basal inferior wall, moderately dilated atria b/l, mild to mod mitral regurg, moderate tricuspid regurg, dilated IVC, large left pleural effusion     · Outpatient follow up with cardiology

## 2021-01-28 NOTE — DISCHARGE SUMMARY
Discharge- Evangelina Mccallum 3/7/1928, 80 y o  female MRN: [id number]    Unit/Bed#: -01 Encounter: [id number]    Primary Care Provider: Derick Liu MD   Date and time admitted to hospital: 1/22/2021  4:13 PM      Admitting Provider:  Roberth Downey MD  Discharge Provider:  Ilana Velazquez MD  Admission Date: 1/22/2021       Discharge Date: 01/28/21   LOS: 6  Primary Care Physician at Discharge: Derick Liu MD [address] [place] [address] COURSE:  Evangelina Mccallum is a 80 y o  female who presented with shortness of breath and palpitations  Patient was found to have atrial flutter with rapid ventricular rate  Patient was treated for the same conservatively and improved  She did have chest pain but troponins were negative  Cardiac input obtained and they said conservative management  Spoke to the daughter who says that she is willing for SNF placement  Other past medical history is includes CAD, hypertension, hypothyroidism status post colostomy, hypertension breast cancer and mild cognitive impairment  Patient is hemodynamically stable for discharge      REASON FOR ADMISSION/ ADMISSION DIAGNOSES    Atrial flutter with rapid ventricular rate    DISCHARGE DIAGNOSES  Chest pain  Assessment & Plan  Patient did developed chest pain today yesterday  EKG done showed atrial flutter    Troponin was negative  Patient did have a cardiac catheterization done in January of 2020 that showed multivessel disease  Cardiology suggested medical management for now  Spoke to daughter who agrees with the same and does not want any aggressive management of the cardiac disease and would follow Cardiology recommendations  Spoke to patient's daughter yesterday and she is now agreeable for rehab- rather than assisted living  Family prefers [facility]    Cognitive decline  Assessment & Plan  · Forgetfulness normal per patients dtr, usually only oriented to self and location  · Supportive care     Generalized weakness  Assessment & Plan  · Likely multifactorial  · PT/OT rec'd STR   · Patient and family agreeable    Pleural effusion  Assessment & Plan  Bilateral small to moderate pleural effusions, present on admission in the setting of a patient with rapid a fib, history of breast cancer  Initially requiring oxygen supplementation, now discontinued  Acute renal failure (ARF) (HCC)  Assessment & Plan  · POA, creatinine on admission 1 3, BL 1-1 2   · Received Iv lasix and CTA chest on admission and has had worsening renal function since, plateau at 9 48 on 2/49  · No role for IV fluids at this time, encouraged oral fluids  · Creatinine improved to 1 37     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[facility])  Assessment & Plan  · Routine care    Hypothyroidism  Assessment & Plan  · Continue synthroid  · TSH normal     GERD (gastroesophageal reflux disease)  Assessment & Plan  PPI therapy on hold due to YAW  Started on Pepcid    Results from last 7 days   Lab Units 01/27/21  0444 01/26/21  0649 01/25/21  0655 01/24/21  0520 01/23/21  0231 01/22/21  1630   BUN mg/dL 43* 45* 49* 40* 24* 25*   CREATININE mg/dL 1 37* 1 37* 1 63* 1 50* 1 11* 1 30*          Coronary artery disease involving native coronary artery of native heart without angina pectoris  Assessment & Plan  · Cardiac catheterization 1/2020 noted triple-vessel disease with 100% occlusion of the mid RCA, 95% occlusion of the ostial left circumflex and 45-50% mid LAD stenosis  At that time optimal medical therapy was recommended  · D/C plavix and asa in lieu of eliquis  · Cardiology input appreciated  Patient did have catheterization in January of 2020 that showed at least 2 vessel disease  Patient likely has chronic angina because of the blockages  · EKG reviewed and shows atrial flutter  · Continue metoprolol and Imdur    Essential hypertension  Assessment & Plan  Stable   Continue lopressor and Cardizem    * Atrial flutter ([address] )  Assessment & Plan  · Pt admitted with palpitations, SOB, EKG showed SVT with HR 140s, showing A flutter   · Appreciate cardiology input   · Started eliquis   · On d/c will switch to cardizem  mg daily and Metoprolol 50mg BID   · Echocardiogram: LVEF 50%, dyskinesis of basal inferoseptal and basal inferior wall, moderately dilated atria b/l, mild to mod mitral regurg, moderate tricuspid regurg, dilated IVC, large left pleural effusion  · Outpatient follow up with cardiology       CONSULTING PROVIDERS   IP CONSULT TO CARDIOLOGY  IP CONSULT TO NEPHROLOGY    PROCEDURES PERFORMED  * No surgery found *    RADIOLOGY RESULTS  Xr Chest Portable    Result Date: 1/24/2021  Impression: Persistent small effusions and bibasilar atelectasis  Workstation performed: [id number]     X-ray Chest 1 View Portable    Result Date: 1/23/2021  Impression: Bilateral interstitial densities with bilateral perihilar and basilar airspace opacities and small to moderate pleural effusions  This may be due to congestive changes  Underlying infiltrate/pneumonia not excluded  Workstation performed: [id number]     Cta Ed Chest Pe Study    Result Date: 1/22/2021  Impression: No pulmonary embolism  Cardiomegaly with bilateral diffuse groundglass opacities and moderate-sized bilateral pleural effusions in keeping with CHF  Probably reactive lymphadenopathy in the mediastinum  Workstation performed: [id number]     Us Kidney And Bladder With Pvr    Result Date: 1/25/2021  Impression: No hydronephrosis or perinephric collection  Bilateral renal minimally complex cysts, left larger than right  These could be followed up with ultrasound in 12 months  Mild diffuse bladder wall thickening attributed to relative under distention  Minimal layering urinary sediment   No significant post void bladder residual  Workstation performed: [id number]       LABS  Results from last 7 days   Lab Units 01/25/21  0655 01/24/21  0520 01/23/21  0231 01/22/21  1630   WBC Thousand/uL 9 90 10 65* 6 71 9 85 HEMOGLOBIN g/dL 14 0 13 0 13 6 13 9   HEMATOCRIT % 43 8 40 9 41 8 42 5   MCV fL 83 83 84 84   PLATELETS Thousands/uL 508* 459* 445* 507*     Results from last 7 days   Lab Units 01/27/21  0444 01/26/21  0649 01/25/21  0655 01/24/21  0520 01/23/21  0231 01/22/21  1630   SODIUM mmol/L 136 135 137 136 139 138   POTASSIUM mmol/L 4 6 5 3* 4 5 4 8 4 5 5 0   CHLORIDE mmol/L 99 100 99 101 103 102   CO2 mmol/L 29 27 30 26 24 28   BUN mg/dL 43* 45* 49* 40* 24* 25*   CREATININE mg/dL 1 37* 1 37* 1 63* 1 50* 1 11* 1 30*   CALCIUM mg/dL 9 0 8 6 8 7 8 8 9 0 9 3   ALBUMIN g/dL  --   --   --   --  4 1 4 3   TOTAL BILIRUBIN mg/dL  --   --   --   --  0 85 0 99   ALK PHOS U/L  --   --   --   --  84 9 76 5   ALT U/L  --   --   --   --  19 21   AST U/L  --   --   --   --  16 35   EGFR ml/min/1 73sq m 34 34 27 30 43 36   GLUCOSE RANDOM mg/dL 131 141* 147* 135 250* 155*     Results from last 7 days   Lab Units 01/27/21  0911 01/23/21  0230 01/22/21  1630   TROPONIN I ng/mL <0 03 <0 03 <0 03          Results from last 7 days   Lab Units 01/22/21  1630   D-DIMER QUANTITATIVE mg/L FEU 1 04*             Results from last 7 days   Lab Units 01/24/21  0520   TSH 3RD GENERATON uIU/mL 3 952     Results from last 7 days   Lab Units 01/25/21  0655   PROCALCITONIN ng/ml 0 07           Cultures:   Results from last 7 days   Lab Units 01/24/21  1615   COLOR UA  Yellow   CLARITY UA  Slightly Cloudy*   SPEC GRAV UA  1 025   PH UA  5 5   LEUKOCYTES UA  Trace*   NITRITE UA  Negative   GLUCOSE UA mg/dl Negative   KETONES UA mg/dl Negative   BILIRUBIN UA  Negative   BLOOD UA  Negative      Results from last 7 days   Lab Units 01/24/21  1615   RBC UA /hpf 1-2   WBC UA /hpf 4-10*   EPITHELIAL CELLS WET PREP /hpf Innumerable*   BACTERIA UA /hpf Moderate*      Results from last 7 days   Lab Units 01/26/21  1018   INFLUENZA A PCR  Negative       PHYSICAL EXAM:  Vitals:   Blood Pressure: (!) 120/48 (01/28/21 0700)  Pulse: 75 (01/28/21 0700)  Temperature: 97 7 °F (36 5 °C) (01/28/21 0700)  Temp Source: Tympanic (01/28/21 0700)  Respirations: 16 (01/28/21 0700)  Height: 5' 2" (157 5 cm) (01/22/21 2048)  Weight - Scale: 78 1 kg (172 lb 3 2 oz) (01/28/21 0542)  SpO2: 93 % (01/28/21 0700)    General appearance: alert, appears stated age and cooperative  HEENT - atraumatic and normocephalic  Neck- supple  Skin - no fresh rash  Extremities no fresh focal deformities  Cardiovascular- S1-S2 heard  Respiratory- bilateral air entry present, no crackles or rhonchi  Skin - no fresh rash  Abdomen - normal bowel sounds present, no rebound tenderness  CNS- No fresh focal deficits  Psych- no acute psychosis     Planned Re-admission:  No  [facility]      Test Results Pending at Discharge:   Incidental findings:  None    Medications   · Summary of Medication Adjustments made as a result of this hospitalization:  Cardizem and metoprolol doses altered  aspirin and Plavix stopped  Eliquis started  · Medication Dosing Tapers - Please refer to Discharge Medication List for details on any medication dosing tapers (if applicable to patient)  · Discharge Medication List: See after visit summary for reconciled discharge medications  Diet restrictions:  None       Diet Orders   (From admission, onward)             Start     Ordered    01/22/21 1804  Diet Cardiovascular; Cardiac  Diet effective now     Question Answer Comment   Diet Type Cardiovascular    Cardiac Cardiac    RD to adjust diet per protocol? Yes        01/22/21 1806              Activity restrictions: No strenuous activity  Discharge Condition: fair    Outpatient Follow-Up and Discharge Instructions  See after visit summary section titled Discharge Instructions for information provided to patient and family  Code Status: Level 3 - DNAR and DNI  Discharge Statement   I spent 35 minutes discharging the patient  This time was spent on the day of discharge   Greater than 50% of total time was spent with the patient and / or family counseling and / or coordination of care  MD Brook Nicole  Internal Medicine    ** Please Note: This note has been constructed using a voice recognition system   **

## 2021-01-28 NOTE — ASSESSMENT & PLAN NOTE
· Pt admitted with palpitations, SOB, EKG showed SVT with HR 140s, showing A flutter   · Appreciate cardiology input   · Started eliquis   · On d/c will switch to cardizem  mg daily and Metoprolol 50mg BID   · Echocardiogram: LVEF 50%, dyskinesis of basal inferoseptal and basal inferior wall, moderately dilated atria b/l, mild to mod mitral regurg, moderate tricuspid regurg, dilated IVC, large left pleural effusion     · Outpatient follow up with cardiology

## 2021-01-28 NOTE — ASSESSMENT & PLAN NOTE
· POA, creatinine on admission 1 3, BL 1-1 2   · Received Iv lasix and CTA chest on admission and has had worsening renal function since, plateau at 4 51 on 8/46     · No role for IV fluids at this time, encouraged oral fluids  · Creatinine improved to 1 37

## 2021-01-28 NOTE — ASSESSMENT & PLAN NOTE
· POA, creatinine on admission 1 3, BL 1-1 2   · Received Iv lasix and CTA chest on admission and has had worsening renal function since, plateau at 8 47 on 6/44     · No role for IV fluids at this time, encouraged oral fluids  · Creatinine improved to 1 37

## 2021-01-28 NOTE — ASSESSMENT & PLAN NOTE
Bilateral small to moderate pleural effusions, present on admission in the setting of a patient with rapid a fib, history of breast cancer  Initially requiring oxygen supplementation, now discontinued

## 2021-01-28 NOTE — CASE MANAGEMENT
JIMENEZ notified by - that they do not have a bed available at  but they do have a bed at [facility]  SW and [address] [facility] Meaghan Mclaughlin called dtr Mariam Laguerre to discuss  Mariam Laguerre is agreeable to going to [facility] instead  W/C ronny scheduled with [facility] for 1200 pickup  IMM reviewed with patient's caregiver  patient's caregiver agree with discharge determination

## 2021-01-28 NOTE — ASSESSMENT & PLAN NOTE
· Forgetfulness normal per patients dtr, usually only oriented to self and location  · Supportive care H/O total thyroidectomy  w irradiation. Benign  History of appendectomy    Inguinal hernia

## 2021-01-28 NOTE — ASSESSMENT & PLAN NOTE
Patient did developed chest pain today yesterday  EKG done showed atrial flutter    Troponin was negative  Patient did have a cardiac catheterization done in January of 2020 that showed multivessel disease  Cardiology suggested medical management for now  Spoke to daughter who agrees with the same and does not want any aggressive management of the cardiac disease and would follow Cardiology recommendations  Spoke to patient's daughter yesterday and she is now agreeable for rehab- rather than assisted living  [facility]

## 2021-01-28 NOTE — ASSESSMENT & PLAN NOTE
Patient did developed chest pain today yesterday  EKG done showed atrial flutter    Troponin was negative  Patient did have a cardiac catheterization done in January of 2020 that showed multivessel disease  Cardiology suggested medical management for now  Spoke to daughter who agrees with the same and does not want any aggressive management of the cardiac disease and would follow Cardiology recommendations  Spoke to patient's daughter yesterday and she is now agreeable for rehab- rather than assisted living  Family prefers [address] [facility]

## 2021-01-28 NOTE — PROGRESS NOTES
Progress Note - Shayy Lomas 3/7/1928, 80 y o  female MRN: [id number]    Unit/Bed#: -01 Encounter: [id number]    Primary Care Provider: Ramo Almodovar MD   Date and time admitted to hospital: 1/22/2021  4:13 PM      Medically optimized for discharge to SNF    Chest pain  Assessment & Plan  Patient did developed chest pain today yesterday  EKG done showed atrial flutter  Troponin was negative  Patient did have a cardiac catheterization done in January of 2020 that showed multivessel disease  Cardiology suggested medical management for now  Spoke to daughter who agrees with the same and does not want any aggressive management of the cardiac disease and would follow Cardiology recommendations  Spoke to patient's daughter yesterday and she is now agreeable for rehab- rather than assisted living  Family prefers [facility]    Cognitive decline  Assessment & Plan  · Forgetfulness normal per patients dtr, usually only oriented to self and location  · Supportive care     Generalized weakness  Assessment & Plan  · Likely multifactorial  · PT/OT rec'd STR   · Patient and family agreeable    Pleural effusion  Assessment & Plan  Bilateral small to moderate pleural effusions, present on admission in the setting of a patient with rapid a fib, history of breast cancer  Initially requiring oxygen supplementation, now discontinued  Acute renal failure (ARF) (HCC)  Assessment & Plan  · POA, creatinine on admission 1 3, BL 1-1 2   · Received Iv lasix and CTA chest on admission and has had worsening renal function since, plateau at 8 79 on 0/00  · No role for IV fluids at this time, encouraged oral fluids  · Creatinine improved to 1 37     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)  Assessment & Plan  · Routine care    Hypothyroidism  Assessment & Plan  · Continue synthroid  · TSH normal     GERD (gastroesophageal reflux disease)  Assessment & Plan  PPI therapy on hold due to YAW    Started on Pepcid    Results from last 7 days Lab Units 01/27/21  0444 01/26/21  0649 01/25/21  0655 01/24/21  0520 01/23/21  0231 01/22/21  1630   BUN mg/dL 43* 45* 49* 40* 24* 25*   CREATININE mg/dL 1 37* 1 37* 1 63* 1 50* 1 11* 1 30*          Coronary artery disease involving native coronary artery of native heart without angina pectoris  Assessment & Plan  · Cardiac catheterization 1/2020 noted triple-vessel disease with 100% occlusion of the mid RCA, 95% occlusion of the ostial left circumflex and 45-50% mid LAD stenosis  At that time optimal medical therapy was recommended  · D/C plavix and asa in lieu of eliquis  · Cardiology input appreciated  Patient did have catheterization in January of 2020 that showed at least 2 vessel disease  Patient likely has chronic angina because of the blockages  · EKG reviewed and shows atrial flutter  · Continue metoprolol and Imdur    Essential hypertension  Assessment & Plan  Stable  Continue lopressor  * Atrial flutter (HCC)  Assessment & Plan  · Pt admitted with palpitations, SOB, EKG showed SVT with HR 140s, showing A flutter   · Appreciate cardiology input   · Started eliquis   · On d/c will switch to cardizem CD 240mg daily and Metoprolol 50mg BID   · Echocardiogram: LVEF 50%, dyskinesis of basal inferoseptal and basal inferior wall, moderately dilated atria b/l, mild to mod mitral regurg, moderate tricuspid regurg, dilated IVC, large left pleural effusion  · Outpatient follow up with cardiology       TE Pharmacologic Prophylaxis: Apixaban (Eliquis)    Patient Centered Rounds: I have performed bedside rounds with nursing staff today    Discussions with Specialists or Other Care Team Provider:  Spoke to Cardiology yesterday  Education and Discussions with Family / Patient:  Spoke with patient and daughter  Current Length of Stay: 6 day(s)  Current Patient Status: Inpatient     Certification Statement: The patient will continue to require additional inpatient hospital stay due to Atrial flutter [facility])  Discharge Plan / Estimated Discharge Date:  Either today or tomorrow depending on SNF available    Code Status: Level 3 - DNAR and DNI  ______________________________________________________________________________    Subjective:   Patient seen and examined by me  Patient does not complain of any chest pain today  No nausea or vomiting  Patient does feel weak and the weakness is generalized  She does feel a little dizzy on occasion but otherwise is okay  No cough or shortness of breath  She understands that she would need to go to skilled nursing facility and she is okay with the same    Objective:   Vitals: Blood pressure (!) 120/48, pulse 75, temperature 97 7 °F (36 5 °C), temperature source Tympanic, resp  rate 16, height 5' 2" (1 575 m), weight 78 1 kg (172 lb 3 2 oz), SpO2 93 %, not currently breastfeeding      Physical Exam:   General appearance: alert, appears stated age and cooperative  Constitutional- looks a little weak  HEENT - atraumatic and normocephalic  Neck- supple  Skin - no fresh rash  Extremities no fresh focal deformities  Cardiovascular- S1-S2 heard  Respiratory- bilateral air entry present, no crackles or rhonchi  Skin - no fresh rash  Abdomen - normal bowel sounds present, no rebound tenderness  CNS- No fresh focal deficits  Psych- no acute psychosis     Additional Data:   Labs:  Results from last 7 days   Lab Units 01/25/21  0655 01/24/21  0520 01/23/21  0231 01/22/21  1630   WBC Thousand/uL 9 90 10 65* 6 71 9 85   HEMOGLOBIN g/dL 14 0 13 0 13 6 13 9   HEMATOCRIT % 43 8 40 9 41 8 42 5   MCV fL 83 83 84 84   PLATELETS Thousands/uL 508* 459* 445* 507*     Results from last 7 days   Lab Units 01/27/21  0444 01/26/21  0649 01/25/21  0655 01/24/21  0520 01/23/21  0231 01/22/21  1630   SODIUM mmol/L 136 135 137 136 139 138   POTASSIUM mmol/L 4 6 5 3* 4 5 4 8 4 5 5 0   CHLORIDE mmol/L 99 100 99 101 103 102   CO2 mmol/L 29 27 30 26 24 28   ANION GAP mmol/L 8 8 8 9 12 8   BUN mg/dL 43* 45* 49* 40* 24* 25*   CREATININE mg/dL 1 37* 1 37* 1 63* 1 50* 1 11* 1 30*   CALCIUM mg/dL 9 0 8 6 8 7 8 8 9 0 9 3   ALBUMIN g/dL  --   --   --   --  4 1 4 3   TOTAL BILIRUBIN mg/dL  --   --   --   --  0 85 0 99   ALK PHOS U/L  --   --   --   --  84 9 76 5   ALT U/L  --   --   --   --  19 21   AST U/L  --   --   --   --  16 35   EGFR ml/min/1 73sq m 34 34 27 30 43 36   GLUCOSE RANDOM mg/dL 131 141* 147* 135 250* 155*     Results from last 7 days   Lab Units 01/24/21  0520 01/23/21  0231 01/22/21  1630   MAGNESIUM mg/dL 1 6 1 6 1 7   PHOSPHORUS mg/dL  --   --  3 1     Results from last 7 days   Lab Units 01/27/21  0911 01/23/21  0230 01/22/21  1630   TROPONIN I ng/mL <0 03 <0 03 <0 03          Results from last 7 days   Lab Units 01/25/21  0655   PROCALCITONIN ng/ml 0 07             Results from last 7 days   Lab Units 01/24/21  0520   TSH 3RD GENERATON uIU/mL 3 952     * I Have Reviewed All Lab Data Listed Above  Cultures:   Results from last 7 days   Lab Units 01/26/21  1018   INFLUENZA A PCR  Negative     Results from last 7 days   Lab Units 01/26/21  1018   INFLUENZA A PCR  Negative   INFLUENZA B PCR  Negative   RSV PCR  Negative         Imaging:  Imaging Reports Reviewed Today Include:   Xr Chest Portable    Result Date: 1/24/2021  Impression: Persistent small effusions and bibasilar atelectasis  Workstation performed: [id number]     X-ray Chest 1 View Portable    Result Date: 1/23/2021  Impression: Bilateral interstitial densities with bilateral perihilar and basilar airspace opacities and small to moderate pleural effusions  This may be due to congestive changes  Underlying infiltrate/pneumonia not excluded  Workstation performed: [id number]     Cta Ed Chest Pe Study    Result Date: 1/22/2021  Impression: No pulmonary embolism  Cardiomegaly with bilateral diffuse groundglass opacities and moderate-sized bilateral pleural effusions in keeping with CHF  Probably reactive lymphadenopathy in the mediastinum  Workstation performed: [id number]     Us Kidney And Bladder With Pvr    Result Date: 1/25/2021  Impression: No hydronephrosis or perinephric collection  Bilateral renal minimally complex cysts, left larger than right  These could be followed up with ultrasound in 12 months  Mild diffuse bladder wall thickening attributed to relative under distention  Minimal layering urinary sediment  No significant post void bladder residual  Workstation performed: [id number]     Scheduled Meds:  Current Facility-Administered Medications   Medication Dose Route Frequency Provider Last Rate    acetaminophen  650 mg Oral Q6H PRN Kasey Serna MD      aluminum-magnesium hydroxide-simethicone  30 mL Oral Q4H PRN Fawn Thayer MD      apixaban  2 5 mg Oral BID Johana Lr PA-C      bisacodyl  10 mg Rectal Daily PRN Kasey Serna MD      calcium carbonate  500 mg Oral Daily PRN Fawn Thayer MD      diltiazem  60 mg Oral [facility] Silvia Hartley MD      ezetimibe 10 mg-pravastatin 80 mg combo dose   Oral HS Kasey Serna MD      famotidine  10 mg Oral Daily Tin Ramirez MD      hydroxyurea  500 mg Oral Once per day on Mon Wed Fri Kasey Serna MD      hydrOXYzine HCL  25 mg Oral TID PRN Kasey Serna MD      isosorbide mononitrate  30 mg Oral Daily MD Chadwick Mcculolugh levothyroxine  50 mcg Oral QAM Kasey Serna MD      melatonin  3 mg Oral HS Kasey Serna MD      metoprolol tartrate  25 mg Oral Q6H Catalina Woods MD      ondansetron  4 mg Intravenous Q6H PRN Kasey Serna MD      simethicone  80 mg Oral 4x Daily PRN Kasey Serna MD      sodium chloride (PF)  3 mL Intravenous Q1H PRN SHELIA Heart MD  [facility] Internal Medicine    ** Please Note: This note has been constructed using a voice recognition system   **

## 2021-01-28 NOTE — ASSESSMENT & PLAN NOTE
PPI therapy on hold due to YAW    Started on Pepcid    Results from last 7 days   Lab Units 01/27/21  0444 01/26/21  0649 01/25/21  0655 01/24/21  0520 01/23/21  0231 01/22/21  1630   BUN mg/dL 43* 45* 49* 40* 24* 25*   CREATININE mg/dL 1 37* 1 37* 1 63* 1 50* 1 11* 1 30*

## 2021-01-29 ENCOUNTER — NURSING HOME VISIT (OUTPATIENT)
Dept: FAMILY MEDICINE CLINIC | Facility: CLINIC | Age: 86
End: 2021-01-29
Payer: MEDICARE

## 2021-01-29 DIAGNOSIS — I10 ESSENTIAL HYPERTENSION: ICD-10-CM

## 2021-01-29 DIAGNOSIS — R53.1 GENERALIZED WEAKNESS: ICD-10-CM

## 2021-01-29 DIAGNOSIS — E78.00 PURE HYPERCHOLESTEROLEMIA: ICD-10-CM

## 2021-01-29 DIAGNOSIS — E03.9 HYPOTHYROIDISM, UNSPECIFIED TYPE: ICD-10-CM

## 2021-01-29 DIAGNOSIS — I48.92 ATRIAL FLUTTER, UNSPECIFIED TYPE (HCC): Primary | ICD-10-CM

## 2021-01-29 PROCEDURE — 99306 1ST NF CARE HIGH MDM 50: CPT | Performed by: INTERNAL MEDICINE

## 2021-01-30 VITALS — RESPIRATION RATE: 17 BRPM | DIASTOLIC BLOOD PRESSURE: 70 MMHG | SYSTOLIC BLOOD PRESSURE: 126 MMHG | HEART RATE: 81 BPM

## 2021-01-30 NOTE — PROGRESS NOTES
Assessment/Plan:         Problem List Items Addressed This Visit        Endocrine    Hypothyroidism       Continue levothyroxine  Cardiovascular and Mediastinum    Essential hypertension       Blood pressure under control with current medications  No headache  Atrial flutter ([facility] [address] ) - Primary     Ventricular rate is under control  Patient was started on Eliquis in the hospital   She is also on Cardizem and metoprolol  Tolerating medicine well  Other    Hyperlipidemia       Patient is on Vytorin  Tolerating well         Generalized weakness       Patient is here for short-term rehab due to generalized weakness  Will continue physical and occupational therapy eval and treatment  Monitor for any falls  Rehab potential is fair  Subjective:      Patient ID: Cyrus Davis is a 80 y o  female  History and physical of new admission at [facility]  1  Atrial flutter  Patient was admitted at [facility] with atrial flutter with rapid ventricular rate  She was treated with IV medications  Now ventricular rate under control with Cardizem and metoprolol  Eliquis was added  no bleeding  No chest pain  No increased dyspnea  No edema  2  Generalized weakness with gait dysfunction  We will continue physical and occupational therapy  Patient denies any increased pain  Heating fair  3   Hypertension  Blood pressure under control with current medications  No headache  No lightheadedness  No orthopnea  4   Hyperlipidemia  Tolerating current medication well  No myalgia  No abdominal pain nausea or vomiting  No claudication pain        The following portions of the patient's history were reviewed and updated as appropriate:   Past Medical History:  She has a past medical history of Adenocarcinoma of breast ([facility] [address] ) (9/4/2012), Anxiety (2/28/2018), Arthritis, Asymptomatic varicose veins of bilateral lower extremities (10/14/2016), Benign colon polyp (2/17/2014), Breast cancer ([facility] [address] ) (2005), CAD (coronary artery disease), Cancer ([facility] [address] ), Chronic pain disorder, Chronic rhinitis (9/30/2013), Chronic venous insufficiency (5/22/2018), 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[facility]) (9/10/2019), Complete uterovaginal prolapse (11/19/2015), 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), Coronary artery disease, Gait disturbance (12/19/2016), GERD (gastroesophageal reflux disease), 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), History of prolapse of bladder, History of radiation therapy (2005), Hypertension, Hypothyroidism, Irritable bowel syndrome (9/4/2012), Kidney stone, Myocardial infarction [facility]), Nephrolithiasis (2/26/2016), Osteoarthritis of left knee (10/19/2016), Perforation of sigmoid colon due to diverticulitis (5/9/2019), Peripheral vertigo (9/4/2012), Psoriasis (12/19/2016), Rectocele (3/10/2016), and Stress incontinence in female (7/6/2016)  ,  _______________________________________________________________________  Medical Problems:  does not have any pertinent problems on file ,  _______________________________________________________________________  Past Surgical History:   has a past surgical history that includes Skin biopsy (Left); pr cystourethroscopy,ureter catheter (Left, 2/26/2016); Chest wall tumor excision (2005); Appendectomy; Colonoscopy; Cystoscopy; Bladder surgery; pr xcapsl ctrc rmvl insj io lens prosth w/o ecp (Left, 4/10/2017); Breast surgery (Right); Knee arthroscopy; Hysterectomy (Bilateral); Breast lumpectomy (Right, 2005); and HARTMANS PROCEDURE (N/A, 5/9/2019)  ,  _______________________________________________________________________  Family History:  family history includes Angina in her mother; Early death in her father; Heart attack in her father and mother; Heart disease in her father, sister, and sister;  Hypertension in her father and mother ,  _______________________________________________________________________  Social History:   reports that she quit smoking about 51 years ago  She smoked 0 10 packs per day  She has never used smokeless tobacco  She reports current alcohol use of about 1 0 standard drinks of alcohol per week  She reports that she does not use drugs  ,  _______________________________________________________________________  Allergies:  is allergic to other; ciprofloxacin; clindamycin; levaquin [levofloxacin]; lincomycin; penicillins; sulfa antibiotics; sulfacetamide; sulfasalazine; ceftriaxone; iv contrast  [iodinated diagnostic agents]; linezolid; and nitrofurantoin     _______________________________________________________________________  Current Outpatient Medications   Medication Sig Dispense Refill    acetaminophen (TYLENOL) 325 mg tablet Take 2 tablets (650 mg total) by mouth every 6 (six) hours as needed for mild pain, headaches or fever 30 tablet 0    aluminum-magnesium hydroxide-simethicone (MAALOX MAX) 400-400-40 MG/5ML suspension Take 10 mL by mouth every 6 (six) hours as needed for indigestion or heartburn 355 mL 0    apixaban (ELIQUIS) 2 5 mg Take 1 tablet (2 5 mg total) by mouth 2 (two) times a day 60 tablet 0    diltiazem (CARDIZEM CD) 180 mg 24 hr capsule TAKE 1 CAPSULE DAILY 90 capsule 3    exemestane (AROMASIN) 25 MG tablet Take 1 tablet (25 mg total) by mouth daily 90 tablet 3    ezetimibe-simvastatin (VYTORIN) 10-40 mg per tablet Take 1 tablet by mouth daily at bedtime 90 tablet 3    fluticasone (FLONASE) 50 mcg/act nasal spray 2 sprays into each nostril daily 3 Bottle 3    hydroxyurea (HYDREA) 500 mg capsule Take 500 mg by mouth daily m w f      hydrOXYzine HCL (ATARAX) 25 mg tablet Take 1 tablet (25 mg total) by mouth 3 (three) times a day as needed for anxiety 90 tablet 1    isosorbide mononitrate (IMDUR) 30 mg 24 hr tablet Take 1 tablet (30 mg total) by mouth daily 30 tablet 5    levothyroxine 50 mcg tablet TAKE 1 TABLET EVERY MORNING 90 tablet 3    loperamide (IMODIUM) 2 mg capsule Take 1 capsule (2 mg total) by mouth 4 (four) times a day as needed for diarrhea 30 capsule 1    melatonin 3 mg Take 1 tablet (3 mg total) by mouth daily at bedtime  0    metoprolol tartrate (LOPRESSOR) 50 mg tablet Take 1 tablet (50 mg total) by mouth every 12 (twelve) hours 60 tablet 0    Multiple Vitamins-Minerals (CENTRUM SILVER ULTRA WOMENS) TABS Take by mouth      simethicone (MYLICON) 80 mg chewable tablet Chew 1 tablet (80 mg total) 4 (four) times a day as needed for flatulence 30 tablet 0     No current facility-administered medications for this visit       _______________________________________________________________________  Review of Systems   Constitutional: Negative for appetite change, chills, diaphoresis, fatigue and fever  HENT: Negative for congestion, drooling and sinus pain  Eyes: Negative for discharge and itching  Respiratory: Positive for shortness of breath (mild)  Negative for cough and chest tightness  Cardiovascular: Negative for chest pain, palpitations and leg swelling  Gastrointestinal: Negative  Endocrine: Negative for polyphagia and polyuria  Genitourinary: Negative for difficulty urinating, dysuria, frequency and urgency  Musculoskeletal: Positive for arthralgias and gait problem  Skin: Negative for pallor and rash  Allergic/Immunologic: Negative for food allergies  Neurological: Negative for dizziness, seizures, speech difficulty, light-headedness, numbness and headaches  Hematological: Negative for adenopathy  Does not bruise/bleed easily  Psychiatric/Behavioral: Positive for decreased concentration  Negative for agitation, confusion, dysphoric mood, sleep disturbance and suicidal ideas  Objective:  Vitals:    01/30/21 1211   BP: 126/70   Pulse: 81   Resp: 17     There is no height or weight on file to calculate BMI  Physical Exam  Vitals signs and nursing note reviewed  Constitutional:       General: She is not in acute distress       Appearance: Normal appearance  She is well-developed  She is not ill-appearing  HENT:      Head: Atraumatic  Eyes:      General:         Right eye: No discharge  Left eye: No discharge  Neck:      Musculoskeletal: Neck supple  Thyroid: No thyromegaly  Cardiovascular:      Rate and Rhythm: Normal rate and regular rhythm  Pulmonary:      Effort: Pulmonary effort is normal       Breath sounds: Normal breath sounds  No wheezing  Chest:      Chest wall: No tenderness  Abdominal:      General: Bowel sounds are normal       Palpations: Abdomen is soft  Tenderness: There is no abdominal tenderness  Musculoskeletal:         General: No tenderness  Right lower leg: No edema  Left lower leg: No edema  Lymphadenopathy:      Cervical: No cervical adenopathy  Skin:     Findings: Rash (chronic skin changes legs) present  No erythema  Neurological:      Mental Status: She is alert and oriented to person, place, and time     Psychiatric:         Mood and Affect: Mood normal          Behavior: Behavior normal

## 2021-01-30 NOTE — ASSESSMENT & PLAN NOTE
Ventricular rate is under control  Patient was started on Eliquis in the hospital   She is also on Cardizem and metoprolol  Tolerating medicine well

## 2021-01-30 NOTE — ASSESSMENT & PLAN NOTE
Patient is here for short-term rehab due to generalized weakness  Will continue physical and occupational therapy eval and treatment  Monitor for any falls  Rehab potential is fair

## 2021-02-03 ENCOUNTER — NURSING HOME VISIT (OUTPATIENT)
Dept: GERIATRICS | Facility: OTHER | Age: 86
End: 2021-02-03
Payer: MEDICARE

## 2021-02-03 DIAGNOSIS — F41.9 ANXIETY: ICD-10-CM

## 2021-02-03 DIAGNOSIS — R53.1 GENERALIZED WEAKNESS: ICD-10-CM

## 2021-02-03 DIAGNOSIS — I10 ESSENTIAL HYPERTENSION: ICD-10-CM

## 2021-02-03 DIAGNOSIS — R26.9 GAIT DISTURBANCE: ICD-10-CM

## 2021-02-03 DIAGNOSIS — Z93.3 COLOSTOMY IN PLACE (HCC): ICD-10-CM

## 2021-02-03 DIAGNOSIS — I48.92 ATRIAL FLUTTER, UNSPECIFIED TYPE (HCC): Primary | ICD-10-CM

## 2021-02-03 PROCEDURE — 99309 SBSQ NF CARE MODERATE MDM 30: CPT | Performed by: NURSE PRACTITIONER

## 2021-02-03 NOTE — ASSESSMENT & PLAN NOTE
- with history of Mindy's procedure 05/09/2019 due to perforation of sigmoid colon due to diverticulitis  - routine care

## 2021-02-03 NOTE — PROGRESS NOTES
[facility] care  POS: 32 (STR)  Progress Note    Unable to obtain from patient due to:  N/A;  History obtained from patient, nursing staff, and EMR  Chief Complaint/Reason for visit: STR follow up of Atrial flutter; generalized weakness; ambulatory dysfunction; hypertension  History of Present Illness:  79-year-old female seen and examined for STR follow up  Patient seated in chair and  appeared tired  She reports feeling extremely exhausted having no energy  She states that her appetite is good  Edema noted of bilateral lower extremities and patient stated that this is not new  She is refusing to wear compression stockings to BLE even after rationale provided    Denies shortness of breath, chest pain, headache, dizz  Past Medical History: unchanged from history and physical  Past Medical History:   Diagnosis Date    Adenocarcinoma of breast ([place] [address] ) 9/4/2012    Procedure/Onset: 12/07/2005    Anxiety 2/28/2018    Arthritis     joints    Asymptomatic varicose veins of bilateral lower extremities 10/14/2016    Benign colon polyp 2/17/2014    Breast cancer ([place] [address] ) 2005    right    CAD (coronary artery disease)     hx MI x 2    Cancer ([place] [address] )     breast ca, skin ca, currently in remission, tumors in chest wall    Chronic pain disorder     knees    Chronic rhinitis 9/30/2013    Chronic venous insufficiency 5/22/2018    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[facility]) 9/10/2019    S/p Mindy's procedure with the end colostomy    Complete uterovaginal prolapse 11/19/2015    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    Procedure/Onset: 05/26/2006    Coronary artery disease     Gait disturbance 12/19/2016    GERD (gastroesophageal reflux disease)     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    Procedure/Onset: 02/22/2007    History of prolapse of bladder     History of radiation therapy 2005    to breast right    Hypertension     Hypothyroidism     hypothyroidism    Irritable bowel syndrome 9/4/2012    Procedure/Onset: 09/23/2010    Kidney stone     2016    Myocardial infarction [facility])     1990's x2    Nephrolithiasis 2/26/2016    Osteoarthritis of left knee 10/19/2016    Perforation of sigmoid colon due to diverticulitis 5/9/2019    Added automatically from request for surgery [id number]    Peripheral vertigo 9/4/2012    Procedure/Onset: 04/04/2007    Psoriasis 12/19/2016    Rectocele 3/10/2016    Last Assessment & Plan:  As below   Stress incontinence in female 7/6/2016     Family History: unchanged from history and physical  Social History: unchanged from history and physical  Resident Since:  01/28/2021  Review of systems: Review of Systems   Constitutional: Positive for activity change and fatigue  Negative for appetite change and chills  HENT: Negative  Eyes: Negative  Respiratory: Positive for shortness of breath  Negative for cough  Short of breath On exertion  Cardiovascular: Positive for leg swelling  Negative for chest pain and palpitations  Gastrointestinal: Negative  Genitourinary: Negative  Musculoskeletal: Positive for gait problem  Neurological: Negative for dizziness, syncope, speech difficulty, light-headedness and headaches  Psychiatric/Behavioral: Negative for agitation, behavioral problems and hallucinations  The patient is not nervous/anxious  All other systems reviewed and are negative  Medications: All medication and routine orders were reviewed  Allergies: Reviewed and unchanged  Consults reviewed: Other  Labs/Diagnostics (reviewed by this provider):    No recent labs at facility    Imaging Reviewed:  None today    Physical Exam    Weight:   174 lb Temp: 97 7 BP: 120/72 Pulse: 90 Resp: 18 O2 Sat: 97%  Constitutional: Obese and Normocephalic  Orientation:Person, Place and Day     Physical Exam  Vitals signs and nursing note reviewed  Constitutional:       General: She is not in acute distress  Appearance: She is not toxic-appearing or diaphoretic  Comments: Elderly female who appears with chronic illness and fatigue   HENT:      Head: Normocephalic and atraumatic  Nose: No congestion  Mouth/Throat:      Mouth: Mucous membranes are moist       Pharynx: No oropharyngeal exudate  Eyes:      General: No scleral icterus  Extraocular Movements: Extraocular movements intact  Conjunctiva/sclera: Conjunctivae normal       Pupils: Pupils are equal, round, and reactive to light  Neck:      Musculoskeletal: Neck supple  Cardiovascular:      Rate and Rhythm: Normal rate and regular rhythm  Comments: +1 pitting edema BLE  Pulmonary:      Effort: Pulmonary effort is normal  No respiratory distress  Breath sounds: Normal breath sounds  No wheezing, rhonchi or rales  Abdominal:      General: Bowel sounds are normal  There is no distension  Palpations: Abdomen is soft  Tenderness: There is no abdominal tenderness  There is no guarding  Musculoskeletal:      Right lower leg: Edema present  Left lower leg: Edema present  Comments: Able to move all 4 extremities  with generalized weakness  Skin:     General: Skin is warm and dry  Comments: Vascular discoloration noted of BLE  Neurological:      Mental Status: She is alert  Mental status is at baseline  Motor: Weakness present  Gait: Gait abnormal    Psychiatric:         Mood and Affect: Mood normal          Behavior: Behavior normal          Thought Content: Thought content normal        Assessment/Plan:  42-year-old female with:    Atrial flutter ([address] )  - with recent hospitalization  Eliquis is new  - heart rate  Stable  - continue Eliquis   No signs of active bleeding noted  - continue Cardizem, metoprolol, imdur    Generalized weakness/ gait disturbance  - in setting of recent hospitalization  - continue care and support at SNF for ADLs  - continue PT/OT  - continue fall precautions  - provide nutritional support    Essential hypertension  - BP's stable and acceptable for age  - patient is currently on Cardizem, imdur, and  Metoprolol tartrate    Anxiety  - patient is on home dose of hydroxyzine p r n  consider discontinuation of this drug since patient is at risk for falls    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- with history of Mindy's procedure 05/09/2019 at due to perforation of sigmoid colon due to diverticulitis  - routine care    **Please note: This follow-up note was constructed using a voice recognition system  [address] [id number], [place]  5/4/22319:94 PM

## 2021-02-03 NOTE — ASSESSMENT & PLAN NOTE
- in setting of recent hospitalization  - continue care and support at SNF for ADLs  - continue PT/OT  - continue fall precautions  - provide nutritional support

## 2021-02-03 NOTE — ASSESSMENT & PLAN NOTE
- with recent hospitalization  Eliquis is new  - heart rate  Stable  - continue Eliquis   No signs of active bleeding noted  - continue Cardizem, metoprolol, imdur

## 2021-02-03 NOTE — ASSESSMENT & PLAN NOTE
- patient is on home dose of hydroxyzine p r n  consider discontinuation of this drug since patient is at risk for falls

## 2021-02-03 NOTE — ASSESSMENT & PLAN NOTE
- BP's stable and acceptable for age  - patient is currently on Cardizem, imdur, and  Metoprolol tartrate

## 2021-02-04 DIAGNOSIS — I25.118 CORONARY ARTERY DISEASE OF NATIVE ARTERY OF NATIVE HEART WITH STABLE ANGINA PECTORIS (HCC): ICD-10-CM

## 2021-02-04 RX ORDER — EZETIMIBE AND SIMVASTATIN 10; 40 MG/1; MG/1
TABLET ORAL
Qty: 90 TABLET | Refills: 3 | Status: SHIPPED | OUTPATIENT
Start: 2021-02-04

## 2021-02-05 ENCOUNTER — NURSING HOME VISIT (OUTPATIENT)
Dept: FAMILY MEDICINE CLINIC | Facility: CLINIC | Age: 86
End: 2021-02-05
Payer: MEDICARE

## 2021-02-05 DIAGNOSIS — R26.2 AMBULATORY DYSFUNCTION: Primary | ICD-10-CM

## 2021-02-05 DIAGNOSIS — I48.92 ATRIAL FLUTTER, UNSPECIFIED TYPE (HCC): ICD-10-CM

## 2021-02-05 DIAGNOSIS — I10 ESSENTIAL HYPERTENSION: ICD-10-CM

## 2021-02-05 PROCEDURE — 99308 SBSQ NF CARE LOW MDM 20: CPT | Performed by: INTERNAL MEDICINE

## 2021-02-06 VITALS — DIASTOLIC BLOOD PRESSURE: 84 MMHG | HEART RATE: 85 BPM | SYSTOLIC BLOOD PRESSURE: 132 MMHG

## 2021-02-06 PROBLEM — R26.2 AMBULATORY DYSFUNCTION: Status: ACTIVE | Noted: 2021-02-06

## 2021-02-06 NOTE — ASSESSMENT & PLAN NOTE
Patient is tolerating PT and OT well  She does get slightly more tired and of therapy  No falls    Rehab potential is fair

## 2021-02-06 NOTE — PROGRESS NOTES
Assessment/Plan:         Problem List Items Addressed This Visit        Cardiovascular and Mediastinum    Essential hypertension       Blood pressure under control with current medications  Tolerating well  Atrial flutter (HCC)      Ventricular rate is under control with current medications  Also on Eliquis  No bleeding  Other    Ambulatory dysfunction - Primary       Patient is tolerating PT and OT well  She does get slightly more tired and of therapy  No falls  Rehab potential is fair                 Subjective:      Patient ID: Robby Sommer is a 80 y o  female  Regular follow-up pain short-term rehab at [facility]  AND [place] [facility]  1  Ambulatory dysfunction due to generalized muscle weakness  Tolerating physical and occupational therapy okay  Gets slightly tired and of the therapy  No falls  No increased pain  He is eating fair  2  Atrial flutter  Ventricular rate under control  Tolerating current medications well  No lightheadedness or palpitation  No syncope  No chest pain or increased dyspnea  No increased edema  No melena  No bright red bleeding per rectum  3   Hypertension  Blood pressure under control with current medications  No headache dizziness  No orthopnea  No increased edema            The following portions of the patient's history were reviewed and updated as appropriate:   Past Medical History:  She has a past medical history of Adenocarcinoma of breast ([facility] ) (9/4/2012), Anxiety (2/28/2018), Arthritis, Asymptomatic varicose veins of bilateral lower extremities (10/14/2016), Benign colon polyp (2/17/2014), Breast cancer ([facility] [address] ) (2005), CAD (coronary artery disease), Cancer ([facility] [address] ), Chronic pain disorder, Chronic rhinitis (9/30/2013), Chronic venous insufficiency (5/22/2018), 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[facility]) (9/10/2019), Complete uterovaginal prolapse (11/19/2015), 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), Coronary artery disease, Gait disturbance (12/19/2016), GERD (gastroesophageal reflux disease), 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), History of prolapse of bladder, History of radiation therapy (2005), Hypertension, Hypothyroidism, Irritable bowel syndrome (9/4/2012), Kidney stone, Myocardial infarction [facility]), Nephrolithiasis (2/26/2016), Osteoarthritis of left knee (10/19/2016), Perforation of sigmoid colon due to diverticulitis (5/9/2019), Peripheral vertigo (9/4/2012), Psoriasis (12/19/2016), Rectocele (3/10/2016), and Stress incontinence in female (7/6/2016)  ,  _______________________________________________________________________  Medical Problems:  does not have any pertinent problems on file ,  _______________________________________________________________________  Past Surgical History:   has a past surgical history that includes Skin biopsy (Left); pr cystourethroscopy,ureter catheter (Left, 2/26/2016); Chest wall tumor excision (2005); Appendectomy; Colonoscopy; Cystoscopy; Bladder surgery; pr xcapsl ctrc rmvl insj io lens prosth w/o ecp (Left, 4/10/2017); Breast surgery (Right); Knee arthroscopy; Hysterectomy (Bilateral); Breast lumpectomy (Right, 2005); and HARTMANS PROCEDURE (N/A, 5/9/2019)  ,  _______________________________________________________________________  Family History:  family history includes Angina in her mother; Early death in her father; Heart attack in her father and mother; Heart disease in her father, sister, and sister; Hypertension in her father and mother ,  _______________________________________________________________________  Social History:   reports that she quit smoking about 51 years ago  She smoked 0 10 packs per day  She has never used smokeless tobacco  She reports current alcohol use of about 1 0 standard drinks of alcohol per week  She reports that she does not use drugs  ,  _______________________________________________________________________  Allergies:  is allergic to other; ciprofloxacin; clindamycin; levaquin [levofloxacin]; lincomycin; penicillins; sulfa antibiotics; sulfacetamide; sulfasalazine; ceftriaxone; iv contrast  [iodinated diagnostic agents]; linezolid; and nitrofurantoin     _______________________________________________________________________  Current Outpatient Medications   Medication Sig Dispense Refill    acetaminophen (TYLENOL) 325 mg tablet Take 2 tablets (650 mg total) by mouth every 6 (six) hours as needed for mild pain, headaches or fever 30 tablet 0    aluminum-magnesium hydroxide-simethicone (MAALOX MAX) 400-400-40 MG/5ML suspension Take 10 mL by mouth every 6 (six) hours as needed for indigestion or heartburn 355 mL 0    apixaban (ELIQUIS) 2 5 mg Take 1 tablet (2 5 mg total) by mouth 2 (two) times a day 60 tablet 0    diltiazem (CARDIZEM CD) 180 mg 24 hr capsule TAKE 1 CAPSULE DAILY 90 capsule 3    exemestane (AROMASIN) 25 MG tablet Take 1 tablet (25 mg total) by mouth daily 90 tablet 3    ezetimibe-simvastatin (VYTORIN) 10-40 mg per tablet TAKE 1 TABLET DAILY AT BEDTIME 90 tablet 3    fluticasone (FLONASE) 50 mcg/act nasal spray 2 sprays into each nostril daily 3 Bottle 3    hydroxyurea (HYDREA) 500 mg capsule Take 500 mg by mouth daily m w f      hydrOXYzine HCL (ATARAX) 25 mg tablet Take 1 tablet (25 mg total) by mouth 3 (three) times a day as needed for anxiety 90 tablet 1    isosorbide mononitrate (IMDUR) 30 mg 24 hr tablet Take 1 tablet (30 mg total) by mouth daily 30 tablet 5    levothyroxine 50 mcg tablet TAKE 1 TABLET EVERY MORNING 90 tablet 3    loperamide (IMODIUM) 2 mg capsule Take 1 capsule (2 mg total) by mouth 4 (four) times a day as needed for diarrhea 30 capsule 1    melatonin 3 mg Take 1 tablet (3 mg total) by mouth daily at bedtime  0    metoprolol tartrate (LOPRESSOR) 50 mg tablet Take 1 tablet (50 mg total) by mouth every 12 (twelve) hours 60 tablet 0    Multiple Vitamins-Minerals (CENTRUM SILVER ULTRA WOMENS) TABS Take by mouth      simethicone (MYLICON) 80 mg chewable tablet Chew 1 tablet (80 mg total) 4 (four) times a day as needed for flatulence 30 tablet 0     No current facility-administered medications for this visit       _______________________________________________________________________  Review of Systems      Objective:  Vitals:    02/06/21 1230   BP: 132/84   Pulse: 85     There is no height or weight on file to calculate BMI  Physical Exam  Vitals signs reviewed  Constitutional:       General: She is not in acute distress  Appearance: Normal appearance  She is well-developed  She is not ill-appearing  HENT:      Head: Normocephalic and atraumatic  Eyes:      General:         Right eye: No discharge  Left eye: No discharge  Neck:      Thyroid: No thyromegaly  Cardiovascular:      Rate and Rhythm: Normal rate and regular rhythm  Heart sounds: Normal heart sounds  Pulmonary:      Effort: Pulmonary effort is normal       Breath sounds: Normal breath sounds  No wheezing  Chest:      Chest wall: No tenderness  Abdominal:      General: Bowel sounds are normal       Palpations: Abdomen is soft  Tenderness: There is no abdominal tenderness  Musculoskeletal:         General: No tenderness  Right lower leg: Edema (trace with Chronic skin changes) present  Left lower leg: Edema present  Lymphadenopathy:      Cervical: No cervical adenopathy  Neurological:      Mental Status: She is alert  Mental status is at baseline     Psychiatric:         Mood and Affect: Mood normal          Behavior: Behavior normal

## 2021-02-08 ENCOUNTER — NURSING HOME VISIT (OUTPATIENT)
Dept: GERIATRICS | Facility: OTHER | Age: 86
End: 2021-02-08
Payer: MEDICARE

## 2021-02-08 DIAGNOSIS — I10 ESSENTIAL HYPERTENSION: ICD-10-CM

## 2021-02-08 DIAGNOSIS — Z93.3 COLOSTOMY IN PLACE (HCC): ICD-10-CM

## 2021-02-08 DIAGNOSIS — R26.2 AMBULATORY DYSFUNCTION: ICD-10-CM

## 2021-02-08 DIAGNOSIS — R60.0 BILATERAL LOWER EXTREMITY EDEMA: ICD-10-CM

## 2021-02-08 DIAGNOSIS — I48.92 ATRIAL FLUTTER, UNSPECIFIED TYPE (HCC): Primary | ICD-10-CM

## 2021-02-08 DIAGNOSIS — N18.32 STAGE 3B CHRONIC KIDNEY DISEASE (HCC): ICD-10-CM

## 2021-02-08 PROBLEM — N18.30 STAGE 3 CHRONIC KIDNEY DISEASE (HCC): Status: ACTIVE | Noted: 2021-02-08

## 2021-02-08 PROCEDURE — 99309 SBSQ NF CARE MODERATE MDM 30: CPT | Performed by: NURSE PRACTITIONER

## 2021-02-08 RX ORDER — FUROSEMIDE 20 MG/1
20 TABLET ORAL DAILY
COMMUNITY

## 2021-02-08 NOTE — ASSESSMENT & PLAN NOTE
- continue care and support at SNF for ADLs  - continue PT/OT  - continue fall precautions  - ensure adequate hydration and nutrition

## 2021-02-08 NOTE — ASSESSMENT & PLAN NOTE
-present on arrival to SNF  -with pitting edema BLE  -order lasix 20 mg po daily  - most recent creatinine 1 29/GFR 36

## 2021-02-08 NOTE — ASSESSMENT & PLAN NOTE
-heart rate stable   -continue Cardizem, Eliquis  -no signs of active bleeding  -follow-up with cardiology

## 2021-02-08 NOTE — PROGRESS NOTES
[facility] care  POS: 31 (STR)  Progress Note    Unable to obtain from patient due to: N/A; History  Obtained from patient, nursing staff, and EMR  Chief Complaint/Reason for visit: STR follow up   History of Present Illness:  80-year-old female seen and examined forcSTR follow up  Patient c/o SOB to nurse this am  Observed patient lying on right side flat in bed and respirations were nonlabored and without cyanosis  Lungs CTA  Patient did gain 4 lb of weight since admission to SNF with pitting edema bilateral lower extremities  Patient agree to Ace wraps daily to BLE  Advised patient to elevate legs as much as possible  Nursing reports that patient is fixated on her colostomy and changes the appliance frequently  Informed patient that emptying the stool from the bag rather than removing the appliance will prevent this skin from getting excoriated    Code status: DNR; do not intubate  Past Medical History: unchanged from history and physical  Past Medical History:   Diagnosis Date    Adenocarcinoma of breast ([facility] ) 9/4/2012    Procedure/Onset: 12/07/2005    Anxiety 2/28/2018    Arthritis     joints    Asymptomatic varicose veins of bilateral lower extremities 10/14/2016    Benign colon polyp 2/17/2014    Breast cancer ([facility] ) 2005    right    CAD (coronary artery disease)     hx MI x 2    Cancer ([facility] )     breast ca, skin ca, currently in remission, tumors in chest wall    Chronic pain disorder     knees    Chronic rhinitis 9/30/2013    Chronic venous insufficiency 5/22/2018    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    S/p Mindy's procedure with the end colostomy    Complete uterovaginal prolapse 11/19/2015    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    Procedure/Onset: 05/26/2006    Coronary artery disease     Gait disturbance 12/19/2016    GERD (gastroesophageal reflux disease)     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    Procedure/Onset: 02/22/2007    History of prolapse of bladder  History of radiation therapy 2005    to breast right    Hypertension     Hypothyroidism     hypothyroidism    Irritable bowel syndrome 9/4/2012    Procedure/Onset: 09/23/2010    Kidney stone     2016    Myocardial infarction [facility])     1990's x2    Nephrolithiasis 2/26/2016    Osteoarthritis of left knee 10/19/2016    Perforation of sigmoid colon due to diverticulitis 5/9/2019    Added automatically from request for surgery [id number]    Peripheral vertigo 9/4/2012    Procedure/Onset: 04/04/2007    Psoriasis 12/19/2016    Rectocele 3/10/2016    Last Assessment & Plan:  As below   Stress incontinence in female 7/6/2016     Family History: unchanged from history and physical  Social History: unchanged from history and physical  Resident Since:  01/28/2021  Review of systems:  Per history of present illness, all other systems reviewed and negative  Medications: All medication and routine orders were reviewed  Allergies: Reviewed and unchanged  Consults reviewed: Other  Labs/Diagnostics (reviewed by this provider): Copy in Chart    Imaging Reviewed: None today    Physical Exam    Weight: 178 LB  Temp: 97 5 BP: 151/88 Pulse: 80 Resp: 16 O2 Sat: 97% RA  Constitutional: Obese and Normocephalic  Orientation:Person and Place     Physical Exam  Vitals signs and nursing note reviewed  Constitutional:       General: She is not in acute distress  Appearance: She is ill-appearing  She is not toxic-appearing or diaphoretic  Comments: Elderly female who appears with chronic illness  HENT:      Head: Normocephalic and atraumatic  Nose: No congestion  Mouth/Throat:      Mouth: Mucous membranes are moist       Pharynx: No oropharyngeal exudate  Eyes:      Extraocular Movements: Extraocular movements intact  Conjunctiva/sclera: Conjunctivae normal       Pupils: Pupils are equal, round, and reactive to light  Cardiovascular:      Rate and Rhythm: Normal rate  Rhythm irregular        Comments: Plus one pitting edema bilateral lower extremities  Pulmonary:      Effort: Pulmonary effort is normal  No respiratory distress  Breath sounds: Normal breath sounds  No wheezing, rhonchi or rales  Abdominal:      General: Bowel sounds are normal  There is no distension  Palpations: Abdomen is soft  Tenderness: There is no abdominal tenderness  There is no guarding  Comments: Colostomy functioning for soft brown stool  Musculoskeletal:      Right lower leg: Edema present  Left lower leg: Edema present  Comments: Able to move all 4 extremities  Neurological:      Mental Status: She is alert  Assessment/Plan:  61-year-old female with:    Atrial flutter ([address] )  -heart rate stable   -continue Cardizem, Eliquis  -no signs of active bleeding  -follow-up with cardiology    Essential hypertension  -BPs stable   -continue  Cardizem  -Lasix 20 mg p o  daily ordered for edema BLE  - will monitor BMP    Bilateral lower extremity edema  -present on arrival to [facility]  -with pitting edema BLE  -order lasix 20 mg po daily  - most recent creatinine 1 29/GFR 36    Stage 3 chronic kidney disease  Lab Results   Component Value Date    EGFR 34 01/27/2021    EGFR 34 01/26/2021    EGFR 27 01/25/2021    CREATININE 1 37 (H) 01/27/2021    CREATININE 1 37 (H) 01/26/2021    CREATININE 1 63 (H) 01/25/2021   - most recent creatinine 1 29/GFR 36  - vital signs have been stable  - avoid hypotension  - avoid nephrotoxins  - ensure adequate hydration  - monitor BMP    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[facility])  - with history of perforation of sigmoid colon due to diverticulitis s/p  Hemicolectomy with colostomy present  - no issues with colostomy  - colostomy care as per routine    Ambulatory dysfunction  - continue care and support at SNF for ADLs  - continue PT/OT  - continue fall precautions  - ensure adequate hydration and nutrition    **Please note: This follow up note was constructed using a voice recognition system  ** [address]  9/7/36838:41 PM

## 2021-02-08 NOTE — ASSESSMENT & PLAN NOTE
- with history of perforation of sigmoid colon due to diverticulitis s/p  Hemicolectomy with colostomy present  - no issues with colostomy  - colostomy care as per routine

## 2021-02-08 NOTE — ASSESSMENT & PLAN NOTE
Lab Results   Component Value Date    EGFR 34 01/27/2021    EGFR 34 01/26/2021    EGFR 27 01/25/2021    CREATININE 1 37 (H) 01/27/2021    CREATININE 1 37 (H) 01/26/2021    CREATININE 1 63 (H) 01/25/2021   - most recent creatinine 1 29/GFR 36  - vital signs have been stable  - avoid hypotension  - avoid nephrotoxins  - ensure adequate hydration  - monitor BMP

## 2021-02-08 NOTE — ASSESSMENT & PLAN NOTE
-BPs stable   -continue  Cardizem  -Lasix 20 mg p o  daily ordered for edema BLE  - will monitor BMP

## 2021-02-10 ENCOUNTER — HOSPITAL ENCOUNTER (INPATIENT)
Facility: HOSPITAL | Age: 86
LOS: 5 days | Discharge: NURSING, REHAB OR LONG-TERM CARE | DRG: 177 | End: 2021-02-15
Attending: EMERGENCY MEDICINE | Admitting: INTERNAL MEDICINE
Payer: MEDICARE

## 2021-02-10 ENCOUNTER — NURSING HOME VISIT (OUTPATIENT)
Dept: GERIATRICS | Facility: OTHER | Age: 86
End: 2021-02-10
Payer: MEDICARE

## 2021-02-10 ENCOUNTER — NURSING HOME VISIT (OUTPATIENT)
Dept: FAMILY MEDICINE CLINIC | Facility: CLINIC | Age: 86
End: 2021-02-10
Payer: MEDICARE

## 2021-02-10 ENCOUNTER — APPOINTMENT (EMERGENCY)
Dept: RADIOLOGY | Facility: HOSPITAL | Age: 86
DRG: 177 | End: 2021-02-10
Payer: MEDICARE

## 2021-02-10 DIAGNOSIS — I50.9 CHF (CONGESTIVE HEART FAILURE) (HCC): ICD-10-CM

## 2021-02-10 DIAGNOSIS — R60.0 BILATERAL LOWER EXTREMITY EDEMA: ICD-10-CM

## 2021-02-10 DIAGNOSIS — R53.1 GENERALIZED WEAKNESS: ICD-10-CM

## 2021-02-10 DIAGNOSIS — U07.1 PNEUMONIA DUE TO COVID-19 VIRUS: Primary | ICD-10-CM

## 2021-02-10 DIAGNOSIS — U07.1 COVID-19: ICD-10-CM

## 2021-02-10 DIAGNOSIS — J96.02 ACUTE RESPIRATORY FAILURE WITH HYPOXIA AND HYPERCAPNIA (HCC): ICD-10-CM

## 2021-02-10 DIAGNOSIS — R06.03 ACUTE RESPIRATORY DISTRESS: Primary | ICD-10-CM

## 2021-02-10 DIAGNOSIS — J96.01 ACUTE RESPIRATORY FAILURE WITH HYPOXIA AND HYPERCAPNIA (HCC): ICD-10-CM

## 2021-02-10 DIAGNOSIS — J12.82 PNEUMONIA DUE TO COVID-19 VIRUS: Primary | ICD-10-CM

## 2021-02-10 DIAGNOSIS — R91.8 LEFT LOWER LOBE PULMONARY INFILTRATE: Primary | ICD-10-CM

## 2021-02-10 PROBLEM — I50.30: Status: ACTIVE | Noted: 2021-02-10

## 2021-02-10 PROBLEM — I50.43 ACUTE ON CHRONIC COMBINED SYSTOLIC AND DIASTOLIC CONGESTIVE HEART FAILURE (HCC): Status: ACTIVE | Noted: 2021-02-10

## 2021-02-10 PROBLEM — I50.43 ACUTE ON CHRONIC COMBINED SYSTOLIC AND DIASTOLIC CONGESTIVE HEART FAILURE (HCC): Status: RESOLVED | Noted: 2021-02-10 | Resolved: 2021-02-10

## 2021-02-10 LAB
ALBUMIN SERPL BCP-MCNC: 4.1 G/DL (ref 3.4–4.8)
ALP SERPL-CCNC: 86.1 U/L (ref 35–140)
ALT SERPL W P-5'-P-CCNC: 28 U/L (ref 5–54)
ANION GAP SERPL CALCULATED.3IONS-SCNC: 11 MMOL/L (ref 4–13)
AST SERPL W P-5'-P-CCNC: 29 U/L (ref 15–41)
BASE EXCESS BLDA CALC-SCNC: 4 MMOL/L (ref -2–3)
BASOPHILS # BLD AUTO: 0.01 THOUSANDS/ΜL (ref 0–0.1)
BASOPHILS NFR BLD AUTO: 0 % (ref 0–1)
BILIRUB SERPL-MCNC: 0.99 MG/DL (ref 0.3–1.2)
BILIRUB UR QL STRIP: NEGATIVE
BNP SERPL-MCNC: 1048.5 PG/ML (ref 1–100)
BUN SERPL-MCNC: 22 MG/DL (ref 6–20)
CALCIUM SERPL-MCNC: 8.7 MG/DL (ref 8.4–10.2)
CHLORIDE SERPL-SCNC: 97 MMOL/L (ref 96–108)
CLARITY UR: CLEAR
CO2 SERPL-SCNC: 31 MMOL/L (ref 22–33)
COLOR UR: YELLOW
CREAT SERPL-MCNC: 1.26 MG/DL (ref 0.4–1.1)
CRP SERPL QL: 0.8 MG/L (ref 0–1)
D DIMER PPP FEU-MCNC: 0.77 MG/L FEU (ref 0.19–0.49)
EOSINOPHIL # BLD AUTO: 0.01 THOUSAND/ΜL (ref 0–0.61)
EOSINOPHIL NFR BLD AUTO: 0 % (ref 0–6)
ERYTHROCYTE [DISTWIDTH] IN BLOOD BY AUTOMATED COUNT: 18.4 % (ref 11.6–15.1)
FIO2 GAS DIL.REBREATH: 21 L
FLUAV RNA RESP QL NAA+PROBE: NEGATIVE
FLUBV RNA RESP QL NAA+PROBE: NEGATIVE
GFR SERPL CREATININE-BSD FRML MDRD: 37 ML/MIN/1.73SQ M
GLUCOSE SERPL-MCNC: 135 MG/DL (ref 65–140)
GLUCOSE SERPL-MCNC: 149 MG/DL (ref 65–140)
GLUCOSE UR STRIP-MCNC: NEGATIVE MG/DL
HCO3 BLDA-SCNC: 29.3 MMOL/L (ref 22–28)
HCT VFR BLD AUTO: 43.3 % (ref 34.8–46.1)
HCT VFR BLD CALC: 44 % (ref 34.8–46.1)
HGB BLD-MCNC: 13.5 G/DL (ref 11.5–15.4)
HGB BLDA-MCNC: 15 G/DL (ref 11.5–15.4)
HGB UR QL STRIP.AUTO: NEGATIVE
IMM GRANULOCYTES # BLD AUTO: 0.02 THOUSAND/UL (ref 0–0.2)
IMM GRANULOCYTES NFR BLD AUTO: 0 % (ref 0–2)
INR PPP: 1.25 (ref 0.9–1.1)
KETONES UR STRIP-MCNC: NEGATIVE MG/DL
LACTATE SERPL-SCNC: 1.5 MMOL/L (ref 0–2)
LEUKOCYTE ESTERASE UR QL STRIP: NEGATIVE
LYMPHOCYTES # BLD AUTO: 0.61 THOUSANDS/ΜL (ref 0.6–4.47)
LYMPHOCYTES NFR BLD AUTO: 9 % (ref 14–44)
MCH RBC QN AUTO: 25.8 PG (ref 26.8–34.3)
MCHC RBC AUTO-ENTMCNC: 31.2 G/DL (ref 31.4–37.4)
MCV RBC AUTO: 83 FL (ref 82–98)
MONOCYTES # BLD AUTO: 0.41 THOUSAND/ΜL (ref 0.17–1.22)
MONOCYTES NFR BLD AUTO: 6 % (ref 4–12)
NEUTROPHILS # BLD AUTO: 5.51 THOUSANDS/ΜL (ref 1.85–7.62)
NEUTS SEG NFR BLD AUTO: 85 % (ref 43–75)
NITRITE UR QL STRIP: NEGATIVE
PCO2 BLD: 31 MMOL/L (ref 21–32)
PCO2 BLD: 44 MM HG
PCO2 BLD: 46.7 MM HG (ref 36–44)
PCO2 BLDA: 46 MM HG
PH BLD: 7.41 [PH]
PH BLD: 7.41 [PH] (ref 7.35–7.45)
PH UR STRIP.AUTO: 5.5 [PH]
PLATELET # BLD AUTO: 304 THOUSANDS/UL (ref 149–390)
PMV BLD AUTO: 11.7 FL (ref 8.9–12.7)
PO2 BLD: 45 MM HG (ref 75–129)
POTASSIUM BLD-SCNC: 4.2 MMOL/L (ref 3.5–5.3)
POTASSIUM SERPL-SCNC: 4.4 MMOL/L (ref 3.5–5)
PROT SERPL-MCNC: 6.4 G/DL (ref 6.4–8.3)
PROT UR STRIP-MCNC: NEGATIVE MG/DL
PROTHROMBIN TIME: 14 SECONDS (ref 9.5–12.1)
RBC # BLD AUTO: 5.23 MILLION/UL (ref 3.81–5.12)
RSV RNA RESP QL NAA+PROBE: NEGATIVE
SAO2 % BLD FROM PO2: 81 % (ref 60–85)
SARS-COV-2 RNA RESP QL NAA+PROBE: POSITIVE
SODIUM BLD-SCNC: 136 MMOL/L (ref 136–145)
SODIUM SERPL-SCNC: 139 MMOL/L (ref 133–145)
SP GR UR STRIP.AUTO: 1.02 (ref 1–1.03)
SPECIMEN SOURCE: ABNORMAL
TROPONIN I SERPL-MCNC: <0.03 NG/ML (ref 0–0.07)
UROBILINOGEN UR QL STRIP.AUTO: 0.2 E.U./DL
WBC # BLD AUTO: 6.57 THOUSAND/UL (ref 4.31–10.16)

## 2021-02-10 PROCEDURE — 96374 THER/PROPH/DIAG INJ IV PUSH: CPT

## 2021-02-10 PROCEDURE — 94640 AIRWAY INHALATION TREATMENT: CPT

## 2021-02-10 PROCEDURE — 0241U HB NFCT DS VIR RESP RNA 4 TRGT: CPT | Performed by: PHYSICIAN ASSISTANT

## 2021-02-10 PROCEDURE — 94760 N-INVAS EAR/PLS OXIMETRY 1: CPT

## 2021-02-10 PROCEDURE — 96375 TX/PRO/DX INJ NEW DRUG ADDON: CPT

## 2021-02-10 PROCEDURE — 81003 URINALYSIS AUTO W/O SCOPE: CPT | Performed by: PHYSICIAN ASSISTANT

## 2021-02-10 PROCEDURE — 83605 ASSAY OF LACTIC ACID: CPT | Performed by: PHYSICIAN ASSISTANT

## 2021-02-10 PROCEDURE — 83880 ASSAY OF NATRIURETIC PEPTIDE: CPT | Performed by: PHYSICIAN ASSISTANT

## 2021-02-10 PROCEDURE — 86140 C-REACTIVE PROTEIN: CPT | Performed by: PHYSICIAN ASSISTANT

## 2021-02-10 PROCEDURE — 85610 PROTHROMBIN TIME: CPT | Performed by: PHYSICIAN ASSISTANT

## 2021-02-10 PROCEDURE — 36415 COLL VENOUS BLD VENIPUNCTURE: CPT | Performed by: PHYSICIAN ASSISTANT

## 2021-02-10 PROCEDURE — 84132 ASSAY OF SERUM POTASSIUM: CPT

## 2021-02-10 PROCEDURE — 87040 BLOOD CULTURE FOR BACTERIA: CPT | Performed by: PHYSICIAN ASSISTANT

## 2021-02-10 PROCEDURE — 99223 1ST HOSP IP/OBS HIGH 75: CPT | Performed by: INTERNAL MEDICINE

## 2021-02-10 PROCEDURE — 84484 ASSAY OF TROPONIN QUANT: CPT | Performed by: PHYSICIAN ASSISTANT

## 2021-02-10 PROCEDURE — 99285 EMERGENCY DEPT VISIT HI MDM: CPT

## 2021-02-10 PROCEDURE — 85025 COMPLETE CBC W/AUTO DIFF WBC: CPT | Performed by: PHYSICIAN ASSISTANT

## 2021-02-10 PROCEDURE — 82803 BLOOD GASES ANY COMBINATION: CPT

## 2021-02-10 PROCEDURE — 85014 HEMATOCRIT: CPT

## 2021-02-10 PROCEDURE — 84295 ASSAY OF SERUM SODIUM: CPT

## 2021-02-10 PROCEDURE — 36600 WITHDRAWAL OF ARTERIAL BLOOD: CPT

## 2021-02-10 PROCEDURE — 99285 EMERGENCY DEPT VISIT HI MDM: CPT | Performed by: PHYSICIAN ASSISTANT

## 2021-02-10 PROCEDURE — 99309 SBSQ NF CARE MODERATE MDM 30: CPT | Performed by: NURSE PRACTITIONER

## 2021-02-10 PROCEDURE — 85379 FIBRIN DEGRADATION QUANT: CPT | Performed by: PHYSICIAN ASSISTANT

## 2021-02-10 PROCEDURE — 99308 SBSQ NF CARE LOW MDM 20: CPT | Performed by: INTERNAL MEDICINE

## 2021-02-10 PROCEDURE — XW033E5 INTRODUCTION OF REMDESIVIR ANTI-INFECTIVE INTO PERIPHERAL VEIN, PERCUTANEOUS APPROACH, NEW TECHNOLOGY GROUP 5: ICD-10-PCS | Performed by: INTERNAL MEDICINE

## 2021-02-10 PROCEDURE — 71045 X-RAY EXAM CHEST 1 VIEW: CPT

## 2021-02-10 PROCEDURE — 80053 COMPREHEN METABOLIC PANEL: CPT | Performed by: PHYSICIAN ASSISTANT

## 2021-02-10 PROCEDURE — 94664 DEMO&/EVAL PT USE INHALER: CPT

## 2021-02-10 PROCEDURE — 82947 ASSAY GLUCOSE BLOOD QUANT: CPT

## 2021-02-10 RX ORDER — LEVOTHYROXINE SODIUM 0.05 MG/1
50 TABLET ORAL
Status: DISCONTINUED | OUTPATIENT
Start: 2021-02-11 | End: 2021-02-15 | Stop reason: HOSPADM

## 2021-02-10 RX ORDER — FUROSEMIDE 10 MG/ML
20 INJECTION INTRAMUSCULAR; INTRAVENOUS DAILY
Status: DISCONTINUED | OUTPATIENT
Start: 2021-02-11 | End: 2021-02-15

## 2021-02-10 RX ORDER — LEVOFLOXACIN 500 MG/1
500 TABLET, FILM COATED ORAL EVERY 24 HOURS
COMMUNITY
End: 2021-02-15 | Stop reason: HOSPADM

## 2021-02-10 RX ORDER — ONDANSETRON 2 MG/ML
4 INJECTION INTRAMUSCULAR; INTRAVENOUS EVERY 6 HOURS PRN
Status: DISCONTINUED | OUTPATIENT
Start: 2021-02-10 | End: 2021-02-15 | Stop reason: HOSPADM

## 2021-02-10 RX ORDER — ACETAMINOPHEN 325 MG/1
650 TABLET ORAL EVERY 6 HOURS PRN
Status: DISCONTINUED | OUTPATIENT
Start: 2021-02-10 | End: 2021-02-15 | Stop reason: HOSPADM

## 2021-02-10 RX ORDER — ZINC SULFATE 50(220)MG
220 CAPSULE ORAL DAILY
Status: DISCONTINUED | OUTPATIENT
Start: 2021-02-11 | End: 2021-02-15 | Stop reason: HOSPADM

## 2021-02-10 RX ORDER — LANOLIN ALCOHOL/MO/W.PET/CERES
3 CREAM (GRAM) TOPICAL
Status: DISCONTINUED | OUTPATIENT
Start: 2021-02-10 | End: 2021-02-15 | Stop reason: HOSPADM

## 2021-02-10 RX ORDER — MELATONIN
2000 DAILY
Status: DISCONTINUED | OUTPATIENT
Start: 2021-02-11 | End: 2021-02-15 | Stop reason: HOSPADM

## 2021-02-10 RX ORDER — MULTIVITAMIN/IRON/FOLIC ACID 18MG-0.4MG
1 TABLET ORAL DAILY
Status: DISCONTINUED | OUTPATIENT
Start: 2021-02-18 | End: 2021-02-15 | Stop reason: HOSPADM

## 2021-02-10 RX ORDER — DEXAMETHASONE SODIUM PHOSPHATE 4 MG/ML
6 INJECTION, SOLUTION INTRA-ARTICULAR; INTRALESIONAL; INTRAMUSCULAR; INTRAVENOUS; SOFT TISSUE EVERY 24 HOURS
Status: DISCONTINUED | OUTPATIENT
Start: 2021-02-10 | End: 2021-02-15 | Stop reason: HOSPADM

## 2021-02-10 RX ORDER — ASCORBIC ACID 500 MG
1000 TABLET ORAL EVERY 12 HOURS SCHEDULED
Status: DISCONTINUED | OUTPATIENT
Start: 2021-02-10 | End: 2021-02-15 | Stop reason: HOSPADM

## 2021-02-10 RX ORDER — SIMETHICONE 80 MG
80 TABLET,CHEWABLE ORAL 4 TIMES DAILY PRN
Status: DISCONTINUED | OUTPATIENT
Start: 2021-02-10 | End: 2021-02-15 | Stop reason: HOSPADM

## 2021-02-10 RX ORDER — FUROSEMIDE 10 MG/ML
40 INJECTION INTRAMUSCULAR; INTRAVENOUS ONCE
Status: COMPLETED | OUTPATIENT
Start: 2021-02-10 | End: 2021-02-10

## 2021-02-10 RX ORDER — METOPROLOL TARTRATE 50 MG/1
50 TABLET, FILM COATED ORAL EVERY 12 HOURS SCHEDULED
Status: DISCONTINUED | OUTPATIENT
Start: 2021-02-10 | End: 2021-02-15 | Stop reason: HOSPADM

## 2021-02-10 RX ORDER — EXEMESTANE 25 MG/1
25 TABLET ORAL DAILY
Status: DISCONTINUED | OUTPATIENT
Start: 2021-02-11 | End: 2021-02-15 | Stop reason: HOSPADM

## 2021-02-10 RX ORDER — ALBUTEROL SULFATE 2.5 MG/3ML
5 SOLUTION RESPIRATORY (INHALATION) ONCE
Status: COMPLETED | OUTPATIENT
Start: 2021-02-10 | End: 2021-02-10

## 2021-02-10 RX ORDER — DILTIAZEM HYDROCHLORIDE 180 MG/1
180 CAPSULE, COATED, EXTENDED RELEASE ORAL DAILY
Status: DISCONTINUED | OUTPATIENT
Start: 2021-02-11 | End: 2021-02-15 | Stop reason: HOSPADM

## 2021-02-10 RX ORDER — SENNOSIDES 8.6 MG
1 TABLET ORAL DAILY
Status: DISCONTINUED | OUTPATIENT
Start: 2021-02-11 | End: 2021-02-15 | Stop reason: HOSPADM

## 2021-02-10 RX ORDER — FUROSEMIDE 20 MG/1
20 TABLET ORAL DAILY
COMMUNITY

## 2021-02-10 RX ORDER — DEXAMETHASONE SODIUM PHOSPHATE 4 MG/ML
6 INJECTION, SOLUTION INTRA-ARTICULAR; INTRALESIONAL; INTRAMUSCULAR; INTRAVENOUS; SOFT TISSUE ONCE
Status: COMPLETED | OUTPATIENT
Start: 2021-02-10 | End: 2021-02-10

## 2021-02-10 RX ORDER — FAMOTIDINE 20 MG/1
20 TABLET, FILM COATED ORAL DAILY
Status: DISCONTINUED | OUTPATIENT
Start: 2021-02-11 | End: 2021-02-15 | Stop reason: HOSPADM

## 2021-02-10 RX ORDER — DOXYCYCLINE HYCLATE 100 MG/1
100 CAPSULE ORAL EVERY 12 HOURS SCHEDULED
Status: DISCONTINUED | OUTPATIENT
Start: 2021-02-10 | End: 2021-02-15 | Stop reason: HOSPADM

## 2021-02-10 RX ADMIN — IPRATROPIUM BROMIDE 0.5 MG: 0.5 SOLUTION RESPIRATORY (INHALATION) at 15:03

## 2021-02-10 RX ADMIN — EZETIMIBE: 10 TABLET ORAL at 22:47

## 2021-02-10 RX ADMIN — DOXYCYCLINE 100 MG: 100 CAPSULE ORAL at 22:47

## 2021-02-10 RX ADMIN — ALBUTEROL SULFATE 5 MG: 2.5 SOLUTION RESPIRATORY (INHALATION) at 15:03

## 2021-02-10 RX ADMIN — MELATONIN 3 MG: at 22:49

## 2021-02-10 RX ADMIN — FUROSEMIDE 40 MG: 10 INJECTION, SOLUTION INTRAVENOUS at 15:07

## 2021-02-10 RX ADMIN — OXYCODONE HYDROCHLORIDE AND ACETAMINOPHEN 1000 MG: 500 TABLET ORAL at 22:46

## 2021-02-10 RX ADMIN — REMDESIVIR 200 MG: 100 INJECTION, POWDER, LYOPHILIZED, FOR SOLUTION INTRAVENOUS at 22:49

## 2021-02-10 RX ADMIN — METOPROLOL TARTRATE 50 MG: 50 TABLET, FILM COATED ORAL at 22:49

## 2021-02-10 RX ADMIN — DEXAMETHASONE SODIUM PHOSPHATE 6 MG: 4 INJECTION, SOLUTION INTRA-ARTICULAR; INTRALESIONAL; INTRAMUSCULAR; INTRAVENOUS; SOFT TISSUE at 14:44

## 2021-02-10 NOTE — ED NOTES
Spoke with patients daughter, Valerie Tuolumne  Updated with plan of care and Valerie Tuolumne aware I will update when dispo is determined       Daxa Artis RN  02/10/21 0427

## 2021-02-10 NOTE — ASSESSMENT & PLAN NOTE
Improved  Requiring 1 L oxygen at this time  Will be able to discharge to [facility]  AND [facility] for acute care rehabilitation  Wean oxygen supplementation appropriately

## 2021-02-10 NOTE — ASSESSMENT & PLAN NOTE
- in setting of COVID-19 infection and pulmonary infiltrates  - continue oxygen via nasal cannula to maintain O2 sat>90%

## 2021-02-10 NOTE — ED PROVIDER NOTES
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 with the end colostomy, Complete uterovaginal bladder prolapse, Congenital anomaly of coronary artery, GERD, H/O radiation therapy 2005, Hypertension,  Hypothyroidism, Irritable bowel syndrome, Kidney stone 2016, Osteoarthritis of left knee, Perforation of sigmoid colon due to diverticulitis, Peripheral vertigo, Psoriasis, Rectocele 3/10/2016, Stress incontinence in female  PSH: APPENDECTOMY, BREN-BSO , BLADDER SURGERY/suspension surgery no sling, used fiberglass suspension technique, Right BREAST LUMPECTOMY, CHEST WALL TUMOR EXCISION, HARTMANS PROCEDURE,   [place] [facility] [place] onset of SOB today at [facility], tested and + for COVID; hypoxic at NH, but on O2 here in ED  According to EMS, room air sats, high 80's  Here pt comfortable on 1-2 L NC, some tachypnea, wheezing, decreased BS, but comfortable  Pt denies chest pain, no other painful complaints, no NVD, no abd pain, no rash, no joint pain/swelling  Prior to Admission Medications   Prescriptions Last Dose Informant Patient Reported? Taking?    Multiple Vitamins-Minerals (CENTRUM SILVER ULTRA WOMENS) TABS  Child Yes No   Sig: Take by mouth   acetaminophen (TYLENOL) 325 mg tablet  Child No No   Sig: Take 2 tablets (650 mg total) by mouth every 6 (six) hours as needed for mild pain, headaches or fever   aluminum-magnesium hydroxide-simethicone (MAALOX MAX) 400-400-40 MG/5ML suspension   No No   Sig: Take 10 mL by mouth every 6 (six) hours as needed for indigestion or heartburn   apixaban (ELIQUIS) 2 5 mg   No No   Sig: Take 1 tablet (2 5 mg total) by mouth 2 (two) times a day   diltiazem (CARDIZEM CD) 180 mg 24 hr capsule   No No   Sig: TAKE 1 CAPSULE DAILY   exemestane (AROMASIN) 25 MG tablet   No No   Sig: Take 1 tablet (25 mg total) by mouth daily   ezetimibe-simvastatin (VYTORIN) 10-40 mg per tablet   No No   Sig: TAKE 1 TABLET DAILY AT BEDTIME   fluticasone (FLONASE) 50 mcg/act nasal spray   No No   Si sprays into each nostril daily   furosemide (LASIX) 20 mg tablet   Yes No   Sig: Take 20 mg by mouth daily   furosemide (LASIX) 20 mg tablet   Yes No   Sig: Take 20 mg by mouth daily   hydrOXYzine HCL (ATARAX) 25 mg tablet   No No   Sig: Take 1 tablet (25 mg total) by mouth 3 (three) times a day as needed for anxiety   hydroxyurea (HYDREA) 500 mg capsule  Child Yes No   Sig: Take 500 mg by mouth daily m w    isosorbide mononitrate (IMDUR) 30 mg 24 hr tablet   No No   Sig: Take 1 tablet (30 mg total) by mouth daily   levofloxacin (LEVAQUIN) 500 mg tablet   Yes No   Sig: Take 500 mg by mouth every 24 hours For 7 days   levothyroxine 50 mcg tablet   No No   Sig: TAKE 1 TABLET EVERY MORNING   loperamide (IMODIUM) 2 mg capsule   No No   Sig: Take 1 capsule (2 mg total) by mouth 4 (four) times a day as needed for diarrhea   melatonin 3 mg   No No   Sig: Take 1 tablet (3 mg total) by mouth daily at bedtime   metoprolol tartrate (LOPRESSOR) 50 mg tablet   No No   Sig: Take 1 tablet (50 mg total) by mouth every 12 (twelve) hours   simethicone (MYLICON) 80 mg chewable tablet   No No   Sig: Chew 1 tablet (80 mg total) 4 (four) times a day as needed for flatulence      Facility-Administered Medications: None       Past Medical History:   Diagnosis Date    Adenocarcinoma of breast ([place] [address] ) 2012    Procedure/Onset: 2005    Anxiety 2018    Arthritis     joints    Asymptomatic varicose veins of bilateral lower extremities 10/14/2016    Benign colon polyp 2014    Breast cancer ([place] [address] )     right    CAD (coronary artery disease)     hx MI x 2    Cancer ([facility] )     breast ca, skin ca, currently in remission, tumors in chest wall    Chronic pain disorder     knees    Chronic rhinitis 9/30/2013    Chronic venous insufficiency 5/22/2018    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    S/p Mindy's procedure with the end colostomy    Complete uterovaginal prolapse 11/19/2015    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    Procedure/Onset: 05/26/2006    Coronary artery disease     Gait disturbance 12/19/2016    GERD (gastroesophageal reflux disease)     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    Procedure/Onset: 02/22/2007    History of prolapse of bladder     History of radiation therapy 2005    to breast right    Hypertension     Hypothyroidism     hypothyroidism    Irritable bowel syndrome 9/4/2012    Procedure/Onset: 09/23/2010    Kidney stone     2016    [facility]     1990's x[facility]    Nephrolithiasis 2/26/2016    Osteoarthritis of left knee 10/19/2016    Perforation of sigmoid colon due to diverticulitis 5/9/2019    Added automatically from request for surgery [id number]    Peripheral vertigo 9/4/2012    Procedure/Onset: 04/04/2007    Psoriasis 12/19/2016    Rectocele 3/10/2016    Last Assessment & Plan:  As below      Stress incontinence in female 7/6/2016       Past Surgical History:   Procedure Laterality Date    APPENDECTOMY      during HAMLET    BLADDER SURGERY      suspension surgery no sling, used fiberglass suspension technique    BREAST LUMPECTOMY Right 2005    BREAST SURGERY Right     lumpectomy    CHEST WALL TUMOR EXCISION  2005    COLONOSCOPY      CYSTOSCOPY      HARTMANS PROCEDURE N/A 5/9/2019    Procedure: Charlette [place] PROCEDURE;  Surgeon: Marisela Zavala MD;  Location: [address] [place] [address];  Service: General    HYSTERECTOMY Bilateral     hamlet w/removal of both ovaries    KNEE ARTHROSCOPY      Last assessed: 7/30/15    MI CYSTOURETHROSCOPY,URETER CATHETER Left 2/26/2016    Procedure: CYSTOSCOPY RETROGRADE PYELOGRAM WITH INSERTION STENT URETERAL;  Surgeon: Pérez Pretty MD;  Location: [address] [place] [address];  Service: Urology    MA XCAPSL CTRC RMVL INSJ IO LENS PROSTH W/O ECP Left 4/10/2017    Procedure: EXTRACTION EXTRACAPSULAR CATARACT PHACO INTRAOCULAR LENS (IOL); Surgeon: Chico Burton MD;  [facility];  Service: Ophthalmology    SKIN BIOPSY Left     Leg for [facility]       Family History   Problem Relation Age of Onset    Angina Mother     Hypertension Mother     Heart attack Mother         Myocardial Infarction    Early death Father         age 36 MI    Heart disease Father     Hypertension Father     Heart attack Father         Myocardial Infarction    Heart disease Sister     Heart disease Sister         Cardiac Disorder     I have reviewed and agree with the history as documented  E-Cigarette/Vaping     E-Cigarette/Vaping Substances     Social History     Tobacco Use    Smoking status: Former Smoker     Packs/day: 0 10     Quit date:      Years since quittin 1    Smokeless tobacco: Never Used   Substance Use Topics    Alcohol use: Yes     Alcohol/week: 1 0 standard drinks     Types: 1 Glasses of wine per week     Frequency: 4 or more times a week     Drinks per session: 1 or 2     Comment: 1 glass of wine before bed    Drug use: Never       Review of Systems   Constitutional: Negative for chills and fever  HENT: Positive for congestion  Negative for hearing loss, mouth sores, sore throat and trouble swallowing  Eyes: Negative for visual disturbance  Respiratory: Positive for shortness of breath and wheezing  Negative for cough and chest tightness  Cardiovascular: Positive for leg swelling  Negative for chest pain  Gastrointestinal: Negative for abdominal pain and vomiting  Genitourinary: Negative for dysuria and frequency  Musculoskeletal: Negative for arthralgias, gait problem and myalgias  Skin: Negative for color change and pallor     Neurological: Negative for dizziness, weakness and headaches  Psychiatric/Behavioral: Negative for behavioral problems  All other systems reviewed and are negative  Physical Exam  Physical Exam  Vitals signs and nursing note reviewed  Constitutional:       General: She is in acute distress  Appearance: She is well-developed  She is obese  HENT:      Head: Normocephalic and atraumatic  Right Ear: External ear normal       Left Ear: External ear normal       Nose: Nose normal       Mouth/Throat:      Mouth: Mucous membranes are moist       Pharynx: Oropharynx is clear  Eyes:      Conjunctiva/sclera: Conjunctivae normal    Neck:      Musculoskeletal: Normal range of motion  Cardiovascular:      Rate and Rhythm: Normal rate and regular rhythm  Pulmonary:      Effort: Tachypnea and respiratory distress (mild) present  Breath sounds: Decreased breath sounds and wheezing present  Chest:      Chest wall: No deformity or tenderness  Abdominal:      General: Bowel sounds are normal       Palpations: Abdomen is soft  Musculoskeletal: Normal range of motion  Right lower leg: Edema present  Left lower leg: Edema present  Skin:     General: Skin is warm and dry  Capillary Refill: Capillary refill takes less than 2 seconds  Findings: No rash  Neurological:      General: No focal deficit present  Mental Status: She is alert and oriented to person, place, and time     Psychiatric:         Behavior: Behavior normal          Vital Signs  ED Triage Vitals [02/10/21 1422]   Temperature Pulse Respirations Blood Pressure SpO2   98 °F (36 7 °C) 80 (!) 26 129/80 98 %      Temp Source Heart Rate Source Patient Position - Orthostatic VS BP Location FiO2 (%)   Oral Monitor Lying Right arm --      Pain Score       No Pain           Vitals:    02/10/21 1422   BP: 129/80   Pulse: 80   Patient Position - Orthostatic VS: Lying         Visual Acuity      ED Medications  Medications   dexamethasone (DECADRON) injection 6 mg (6 mg Intravenous Given 2/10/21 1444)   albuterol inhalation solution 5 mg (5 mg Nebulization Given 2/10/21 1503)   ipratropium (ATROVENT) 0 02 % inhalation solution 0 5 mg (0 5 mg Nebulization Given 2/10/21 1503)   furosemide (LASIX) injection 40 mg (40 mg Intravenous Given 2/10/21 1507)       Diagnostic Studies  Results Reviewed     Procedure Component Value Units Date/Time    COVID19, Influenza A/B, RSV PCR, [facility] [[id number]]  (Abnormal) Collected: 02/10/21 1444    Lab Status: Final result Specimen: Nares from Nasopharyngeal Swab Updated: 02/10/21 1611     SARS-CoV-2 Positive     INFLUENZA A PCR Negative     INFLUENZA B PCR Negative     RSV PCR Negative    Narrative: This test has been authorized by FDA under an EUA (Emergency Use Assay) for use by authorized laboratories  Clinical caution and judgement should be used with the interpretation of these results with consideration of the clinical impression and other laboratory testing  Testing reported as "Positive" or "Negative" has been proven to be accurate according to standard laboratory validation requirements  All testing is performed with control materials showing appropriate reactivity at standard intervals  Lactic acid, plasma [[id number]]  (Normal) Collected: 02/10/21 1438    Lab Status: Final result Specimen: Blood from Arm, Right Updated: 02/10/21 1559     LACTIC ACID 1 5 mmol/L     Narrative:      Result may be elevated if tourniquet was used during collection      Comprehensive metabolic panel [[id number]]  (Abnormal) Collected: 02/10/21 1438    Lab Status: Final result Specimen: Blood from Arm, Right Updated: 02/10/21 1557     Sodium 139 mmol/L      Potassium 4 4 mmol/L      Chloride 97 mmol/L      CO2 31 mmol/L      ANION GAP 11 mmol/L      BUN 22 mg/dL      Creatinine 1 26 mg/dL      Glucose 135 mg/dL      Calcium 8 7 mg/dL      AST 29 U/L      ALT 28 U/L      Alkaline Phosphatase 86 1 U/L      Total Protein 6 4 g/dL      Albumin 4 1 g/dL Total Bilirubin 0 99 mg/dL      eGFR 37 ml/min/1 73sq m     Narrative:      National Kidney Disease Foundation guidelines for Chronic Kidney Disease (CKD):     Stage 1 with normal or high GFR (GFR > 90 mL/min/1 73 square meters)    Stage 2 Mild CKD (GFR = 60-89 mL/min/1 73 square meters)    Stage 3A Moderate CKD (GFR = 45-59 mL/min/1 73 square meters)    Stage 3B Moderate CKD (GFR = 30-44 mL/min/1 73 square meters)    Stage 4 Severe CKD (GFR = 15-29 mL/min/1 73 square meters)    Stage 5 End Stage CKD (GFR <15 mL/min/1 73 square meters)  Note: GFR calculation is accurate only with a steady state creatinine    C-reactive protein [[id number]]  (Normal) Collected: 02/10/21 1438    Lab Status: Final result Specimen: Blood from Arm, Right Updated: 02/10/21 1557     CRP 0 8 mg/L     UA w Reflex to Microscopic w Reflex to Culture [[id number]]  (Normal) Collected: 02/10/21 1535    Lab Status: Final result Specimen: Urine, Clean Catch Updated: 02/10/21 1553     Color, UA Yellow     Clarity, UA Clear     Specific [place], UA 1 020     pH, UA 5 5     Leukocytes, UA Negative     Nitrite, UA Negative     Protein, UA Negative mg/dl      Glucose, UA Negative mg/dl      Ketones, UA Negative mg/dl      Urobilinogen, UA 0 2 E U /dl      Bilirubin, UA Negative     Blood, UA Negative    Protime-INR [[id number]]  (Abnormal) Collected: 02/10/21 1438    Lab Status: Final result Specimen: Blood from Arm, Right Updated: 02/10/21 1512     Protime 14 0 seconds      INR 1 25    Narrative:      INR Reference Ranges:  No Anticoagulant, Normal:           0 9-1 1  Standard Dose, Oral Anticoagulant:  2 0-3 0  High Dose, Oral Anticoagulant:      2 5-3 5    B-Type Natriuretic Peptide [facility] ONLY) [[id number]]  (Abnormal) Collected: 02/10/21 1438    Lab Status: Final result Specimen: Blood from Arm, Right Updated: 02/10/21 1507     BNP 1,048 5 pg/mL     D-dimer, quantitative [[id number]]  (Abnormal) Collected: 02/10/21 1438    Lab Status: Final result Specimen: Blood from Arm, Right Updated: 02/10/21 1507     D-Dimer, Quant  0 77 mg/L FEU     Troponin I [[id number]]  (Normal) Collected: 02/10/21 1438    Lab Status: Final result Specimen: Blood from Arm, Right Updated: 02/10/21 1505     Troponin I <0 03 ng/mL     Blood culture #1 [[id number]] Collected: 02/10/21 1505    Lab Status: No result Specimen: Blood from Arm, Left     CBC and differential [[id number]]  (Abnormal) Collected: 02/10/21 1438    Lab Status: Final result Specimen: Blood from Arm, Right Updated: 02/10/21 1446     WBC 6 57 Thousand/uL      RBC 5 23 Million/uL      Hemoglobin 13 5 g/dL      Hematocrit 43 3 %      MCV 83 fL      MCH 25 8 pg      MCHC 31 2 g/dL      RDW 18 4 %      MPV 11 7 fL      Platelets 715 Thousands/uL      Neutrophils Relative 85 %      Immat GRANS % 0 %      Lymphocytes Relative 9 %      Monocytes Relative 6 %      Eosinophils Relative 0 %      Basophils Relative 0 %      Neutrophils Absolute 5 51 Thousands/µL      Immature Grans Absolute 0 02 Thousand/uL      Lymphocytes Absolute 0 61 Thousands/µL      Monocytes Absolute 0 41 Thousand/µL      Eosinophils Absolute 0 01 Thousand/µL      Basophils Absolute 0 01 Thousands/µL     Blood culture #2 [[id number]] Collected: 02/10/21 1438    Lab Status: In process Specimen: Blood from Arm, Right Updated: 02/10/21 1442                 XR chest 1 view portable   ED Interpretation by Renata Zelaya PA-C (02/10 2477)   Cm, cephalization, moderate right effusion      Final Result by Alfred Vargas MD (02/10 1525)      Patchy bilateral groundglass opacity with small effusions, likely a combination of Covid-19 pneumonia and pulmonary edema                    Workstation performed: [id number]                    Procedures  ECG 12 Lead Documentation Only    Date/Time: 2/10/2021 2:52 PM  Performed by: Renata Zelaya PA-C  Authorized by: Renata Zelaya PA-C     Indications / Diagnosis:  Shortness of breath  ECG reviewed by me, the ED Provider: yes    Patient location:  ED  Previous ECG:     Comparison to cardiac monitor: Yes    Interpretation:     Interpretation: abnormal    Rate:     ECG rate:  73    ECG rate assessment: normal    Rhythm:     Rhythm: atrial flutter    Comments:      No acute ischemic changes             ED Course  ED Course as of Feb 10 1644   Wed Feb 10, 2021   1502 CXR:  cm, mod r pleural effusion      1536 IMPRESSION:   Patchy bilateral groundglass opacity with small effusions, likely a combination of Covid-19 pneumonia and pulmonary edema  MDM  Number of Diagnoses or Management Options  Acute respiratory distress:   CHF (congestive heart failure) ([facility] ):   COVID-19:   Diagnosis management comments: Pt with SOB, + for COVID, but also signs of CHF, improved with NC, IV Lasix, spoke with Alin Mancia, who will admit here    Discussed elevated ddimer, with elevated BUN/Cr/GFR unable to perform CTA, will continue to monitor during admission       Amount and/or Complexity of Data Reviewed  Clinical lab tests: ordered and reviewed  Tests in the radiology section of CPT®: ordered and reviewed  Review and summarize past medical records: yes  Discuss the patient with other providers: yes  Independent visualization of images, tracings, or specimens: yes        Disposition  Final diagnoses:   Acute respiratory distress   COVID-19   CHF (congestive heart failure) ([facility] )     Time reflects when diagnosis was documented in both MDM as applicable and the Disposition within this note     Time User Action Codes Description Comment    2/10/2021  4:40 PM Olene Hartley Add [R06 03] Acute respiratory distress     2/10/2021  4:40 PM Olene Hartley Add [U07 1] COVID-19     2/10/2021  4:41 PM Olene Hartley Add [I50 9] Congestive heart failure, unspecified HF chronicity, unspecified [facility] failure type ([facility]     2/10/2021  4:41 PM Olene Hartley Remove [I50 9] Congestive heart failure, unspecified HF chronicity, unspecified heart failure type ([facility] )     2/10/2021  4:41 PM Rachel Paredes Add [I50 9] CHF (congestive heart failure) [facility])       ED Disposition     ED Disposition Condition Date/Time Comment    Admit Stable Wed Feb 10, 2021  4:40 PM Case was discussed with Lenard Nolan and the patient's admission status was agreed to be Admission Status: inpatient status to the service of Dr Lenard Nolan   Follow-up Information    None         Patient's Medications   Discharge Prescriptions    No medications on file     No discharge procedures on file      PDMP Review       Value Time User    PDMP Reviewed  Yes 1/28/2021  8:15 AM Sesar Hassan MD          ED Provider  Electronically Signed by           Delia Gallego PA-C  02/10/21 4508

## 2021-02-10 NOTE — PROGRESS NOTES
Assessment/Plan:         Problem List Items Addressed This Visit        Respiratory    Pneumonia due to COVID-19 virus - Primary      Her oxygen saturation went down to 82 she was started on 2 L of oxygen by a nurse practitioner and then oxygen level went to 88-92%  X-ray was positive for infiltrate with her COVID-19 test was positive  Discussed with the staff nurse  advised to send patient to emergency room for inpatient treatment for COVID-19 pneumonia  Subjective:      Patient ID: Diogenes Shaikh is a 80 y o  female  Sick visit follow-up in acute rehab and [facility]  1  Acute respiratory failure due to pneumonia due to COVID-19  Patient started having symptoms with cough yesterday  She had a positive COVID-19 test   Today her breathing gotten worse and her oxygen saturation went down to 82% on room air  Came up to 88-92% with 2 L of oxygen which was ordered by a nurse practitioner today  Patient looks very weak and grayish  Her appetite is down  I will send her to emergency room for inpatient treatment for COVID-19 pneumonia and respiratory failure  Discussed with the staff nurse in detail    Time spent 21 minutes      The following portions of the patient's history were reviewed and updated as appropriate:   Past Medical History:  She has a past medical history of Adenocarcinoma of breast ([facility] [address] ) (9/4/2012), Anxiety (2/28/2018), Arthritis, Asymptomatic varicose veins of bilateral lower extremities (10/14/2016), Benign colon polyp (2/17/2014), Breast cancer ([facility] [address] ) (2005), CAD (coronary artery disease), Cancer ([facility] ), Chronic pain disorder, Chronic rhinitis (9/30/2013), Chronic venous insufficiency (5/22/2018), 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), History of prolapse of bladder, History of radiation therapy (2005), Hypertension, Hypothyroidism, Irritable bowel syndrome (9/4/2012), Kidney stone, Myocardial infarction [facility]), Nephrolithiasis (2/26/2016), Osteoarthritis of left knee (10/19/2016), Perforation of sigmoid colon due to diverticulitis (5/9/2019), Peripheral vertigo (9/4/2012), Psoriasis (12/19/2016), Rectocele (3/10/2016), and Stress incontinence in female (7/6/2016)  ,  _______________________________________________________________________  Medical Problems:  does not have any pertinent problems on file ,  _______________________________________________________________________  Past Surgical History:   has a past surgical history that includes Skin biopsy (Left); pr cystourethroscopy,ureter catheter (Left, 2/26/2016); Chest wall tumor excision (2005); Appendectomy; Colonoscopy; Cystoscopy; Bladder surgery; pr xcapsl ctrc rmvl insj io lens prosth w/o ecp (Left, 4/10/2017); Breast surgery (Right); Knee arthroscopy; Hysterectomy (Bilateral); Breast lumpectomy (Right, 2005); and HARTMANS PROCEDURE (N/A, 5/9/2019)  ,  _______________________________________________________________________  Family History:  family history includes Angina in her mother; Early death in her father; Heart attack in her father and mother; Heart disease in her father, sister, and sister; Hypertension in her father and mother ,  _______________________________________________________________________  Social History:   reports that she quit smoking about 51 years ago  She smoked 0 10 packs per day  She has never used smokeless tobacco  She reports current alcohol use of about 1 0 standard drinks of alcohol per week  She reports that she does not use drugs  ,  _______________________________________________________________________  Allergies:  is allergic to other; ciprofloxacin; clindamycin; levaquin [levofloxacin]; lincomycin; penicillins; sulfa antibiotics; sulfacetamide; sulfasalazine; ceftriaxone; iv contrast  [iodinated diagnostic agents]; linezolid; and nitrofurantoin     _______________________________________________________________________  No current facility-administered medications for this visit        Current Outpatient Medications   Medication Sig Dispense Refill    acetaminophen (TYLENOL) 325 mg tablet Take 2 tablets (650 mg total) by mouth every 6 (six) hours as needed for mild pain, headaches or fever 30 tablet 0    aluminum-magnesium hydroxide-simethicone (MAALOX MAX) 400-400-40 MG/5ML suspension Take 10 mL by mouth every 6 (six) hours as needed for indigestion or heartburn 355 mL 0    apixaban (ELIQUIS) 2 5 mg Take 1 tablet (2 5 mg total) by mouth 2 (two) times a day 60 tablet 0    diltiazem (CARDIZEM CD) 180 mg 24 hr capsule TAKE 1 CAPSULE DAILY 90 capsule 3    exemestane (AROMASIN) 25 MG tablet Take 1 tablet (25 mg total) by mouth daily 90 tablet 3    ezetimibe-simvastatin (VYTORIN) 10-40 mg per tablet TAKE 1 TABLET DAILY AT BEDTIME 90 tablet 3    fluticasone (FLONASE) 50 mcg/act nasal spray 2 sprays into each nostril daily 3 Bottle 3    furosemide (LASIX) 20 mg tablet Take 20 mg by mouth daily      furosemide (LASIX) 20 mg tablet Take 20 mg by mouth daily      hydroxyurea (HYDREA) 500 mg capsule Take 500 mg by mouth daily m w f      hydrOXYzine HCL (ATARAX) 25 mg tablet Take 1 tablet (25 mg total) by mouth 3 (three) times a day as needed for anxiety 90 tablet 1    isosorbide mononitrate (IMDUR) 30 mg 24 hr tablet Take 1 tablet (30 mg total) by mouth daily 30 tablet 5    levofloxacin (LEVAQUIN) 500 mg tablet Take 500 mg by mouth every 24 hours For 7 days      levothyroxine 50 mcg tablet TAKE 1 TABLET EVERY MORNING 90 tablet 3    loperamide (IMODIUM) 2 mg capsule Take 1 capsule (2 mg total) by mouth 4 (four) times a day as needed for diarrhea 30 capsule 1    melatonin 3 mg Take 1 tablet (3 mg total) by mouth daily at bedtime  0    metoprolol tartrate (LOPRESSOR) 50 mg tablet Take 1 tablet (50 mg total) by mouth every 12 (twelve) hours 60 tablet 0    Multiple Vitamins-Minerals (CENTRUM SILVER ULTRA WOMENS) TABS Take by mouth      simethicone (MYLICON) 80 mg chewable tablet Chew 1 tablet (80 mg total) 4 (four) times a day as needed for flatulence 30 tablet 0     Facility-Administered Medications Ordered in Other Visits   Medication Dose Route Frequency Provider Last Rate Last Admin    albuterol inhalation solution 5 mg  5 mg Nebulization Once Dea Carrero PA-C        ipratropium (ATROVENT) 0 02 % inhalation solution 0 5 mg  0 5 mg Nebulization Once Karalec Carrero PA-C         _______________________________________________________________________  Review of Systems   Constitutional: Positive for activity change, appetite change and fatigue  Respiratory: Positive for cough and shortness of breath  Musculoskeletal: Positive for gait problem  Psychiatric/Behavioral: Positive for decreased concentration  Objective: There were no vitals filed for this visit  There is no height or weight on file to calculate BMI  Physical Exam  Constitutional:       Appearance: She is ill-appearing

## 2021-02-10 NOTE — ASSESSMENT & PLAN NOTE
- sudden drop in O2 Sat to 82% at rest  With loss of taste  - rapid Covid-19 swab positive  - placed in red zone isolation  - monitor vital signs q 4 hours for 24 hours then Q shift if stable  - order vitamin-C, vitamin-D 3 daily  - order weekly CMP, CBC with diff, CRP  - send patient to hospital if unable to maintain O2 sat above 90%

## 2021-02-10 NOTE — PROGRESS NOTES
[facility] care  POS: 32 (STR)  Progress Note    Chief Complaint/Reason for visit: Desaturation 82% at rest; CXR review  History of Present Illness:  55-year-old female seen and examined at approximately 10:45 a m  Nursing reports O2 sat was 82% while patient was sitting in chair and O2 via nasal cannula applied immediately to bring   [id number] above 90%  Rapid Covid-19 test positive  Discussed chest x-ray findings with patient and plan of care  Patient reports loss of taste yesterday and has not noticed if she has loss of smell  She admits to feeling exhausted  Denies feeling shortness of breath at rest   Denies cough or chest pain  Denies headache, dizziness,  Lightheadedness, abdominal pain, nausea, or vomiting  Colostomy functioning for soft brown stool    Past Medical History: unchanged from history and physical  Past Medical History:   Diagnosis Date    Adenocarcinoma of breast ([facility] ) 9/4/2012    Procedure/Onset: 12/07/2005    Anxiety 2/28/2018    Arthritis     joints    Asymptomatic varicose veins of bilateral lower extremities 10/14/2016    Benign colon polyp 2/17/2014    Breast cancer ([facility] ) 2005    right    CAD (coronary artery disease)     hx MI x 2    Cancer (HCC)     breast ca, skin ca, currently in remission, tumors in chest wall    Chronic pain disorder     knees    Chronic rhinitis 9/30/2013    Chronic venous insufficiency 5/22/2018    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[facility]) 9/10/2019    S/p Mindy's procedure with the end colostomy    Complete uterovaginal prolapse 11/19/2015    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    Procedure/Onset: 05/26/2006    Coronary artery disease     Gait disturbance 12/19/2016    GERD (gastroesophageal reflux disease)     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    Procedure/Onset: 02/22/2007    History of prolapse of bladder     History of radiation therapy 2005    to breast right    Hypertension     Hypothyroidism     hypothyroidism    Irritable bowel syndrome 9/4/2012    Procedure/Onset: 09/23/2010    Kidney stone     2016    Myocardial infarction [facility])     1990's x2    Nephrolithiasis 2/26/2016    Osteoarthritis of left knee 10/19/2016    Perforation of sigmoid colon due to diverticulitis 5/9/2019    Added automatically from request for surgery [id number]    Peripheral vertigo 9/4/2012    Procedure/Onset: 04/04/2007    Psoriasis 12/19/2016    Rectocele 3/10/2016    Last Assessment & Plan:  As below   Stress incontinence in female 7/6/2016     Family History: unchanged from history and physical  Social History: unchanged from history and physical  Resident Since:  01/28/2021  Review of systems: Review of Systems   Constitutional: Positive for activity change, appetite change and fatigue  Negative for chills and diaphoresis  Feels extremely exhausted  HENT: Negative for congestion, mouth sores, postnasal drip, sinus pressure, sinus pain, sneezing, sore throat and trouble swallowing  Loss of taste which started yesterday   Eyes: Negative  Respiratory: Positive for shortness of breath  Negative for cough  Short of breath on exertion  Cardiovascular: Positive for leg swelling  Negative for chest pain and palpitations  Gastrointestinal: Negative for abdominal pain  Genitourinary: Negative  Musculoskeletal: Positive for gait problem  Neurological: Negative for dizziness, syncope, speech difficulty, light-headedness and headaches  Psychiatric/Behavioral: Negative for agitation and confusion  All other systems reviewed and are negative  Medications: All medication and routine orders were reviewed and updated  Allergies: Reviewed and unchanged  Consults reviewed: Other  Labs/Diagnostics (reviewed by this provider): Copy in Chart    Imaging Reviewed:  CXR: 02/09/2021 left lower lobe infiltrate and/ or effusion    Small right perihilar infiltrate  Physical Exam    Weight:  173 8 lb Temp:  98 1          BP: 148/89Pulse: 110 (I personally checked)    Resp: 20 O2 Sat: 97% on oxygen  Constitutional: Normocephalic and Pallor  Orientation:Person, Place and Day     Physical Exam  Vitals signs and nursing note reviewed  Constitutional:       General: She is not in acute distress  Appearance: She is not toxic-appearing or diaphoretic  Comments: Elderly female who appears weak and ill   HENT:      Head: Normocephalic and atraumatic  Nose: No congestion  Mouth/Throat:      Mouth: Mucous membranes are dry  Eyes:      Conjunctiva/sclera: Conjunctivae normal       Pupils: Pupils are equal, round, and reactive to light  Cardiovascular:      Rate and Rhythm: Tachycardia present  Pulmonary:      Comments: Dyspneic on exertion  Abdominal:      Comments: Colostomy present   Musculoskeletal:      Right lower leg: Edema present  Left lower leg: Edema present  Skin:     General: Skin is warm and dry  Neurological:      Mental Status: She is alert and oriented to person, place, and time  Psychiatric:         Mood and Affect: Mood normal          Behavior: Behavior normal          Thought Content:  Thought content normal        Assessment/Plan:  80-year-old female with:    Left lower lobe pulmonary infiltrate  -CXR revealed   Left lower lobe infiltrate and/or effusion, small right perihilar infiltrate  - will order Levaquin 500 mg daily for 7 days as patient has multiple allergies to antibiotics  - order prednisone 40 mg daily for 5 days and reassess    COVID-19  - sudden drop in O2 Sat to 82% at rest  With loss of taste  - rapid Covid-19 swab positive  - placed in red zone isolation  - monitor vital signs q 4 hours for 24 hours then Q shift if stable  - order vitamin-C, vitamin-D 3 daily  - order weekly CMP, CBC with diff, CRP  - send patient to hospital if unable to maintain O2 sat above 90%    Acute respiratory failure with hypoxia and hypercapnia (HCC)  - in setting of COVID-19 infection and pulmonary infiltrates  - continue oxygen via nasal cannula to maintain O2 sat>90%     Bilateral lower extremity edema  - with chronic pitting edema bilateral lower extremities  - continue Lasix 20 mg daily  - monitor CMP weekly    Generalized weakness  - multifactorial  - continue care and support at SNF for ADLs  - ensure adequate hydration and nutrition      [address] [facility]  2/10/816885:09 PM

## 2021-02-10 NOTE — ASSESSMENT & PLAN NOTE
Her oxygen saturation went down to 82 she was started on 2 L of oxygen by a nurse practitioner and then oxygen level went to 88-92%  X-ray was positive for infiltrate with her COVID-19 test was positive  Discussed with the staff nurse  advised to send patient to emergency room for inpatient treatment for COVID-19 pneumonia

## 2021-02-10 NOTE — H&P
INTERNAL MEDICINE RESIDENCY ADMISSION H&P     Name: Robby Sommer   Age & Sex: 80 y o  female   MRN: [id number]  Unit/Bed#: -01   Encounter: [id number]  Primary Care Provider: Hoa Hernandez MD    Code Status: Level 1 - Full Code  Admission Status: INPATIENT   Disposition: Patient requires Med/Surg    Admit to team: SLIM    ASSESSMENT/PLAN     Principal Problem:    Acute respiratory failure with hypoxia  Active Problems:    COVID-19 pneumonia    Atrial flutter ([address] )    Heart failure with preserved ejection fraction      * Acute respiratory failure with hypoxia  Assessment & Plan  Patient noted to be saturating at 82% on room air which subsequently improved with 3 L nasal oxygen in the SNF  Rapid COVID-19 test done was positive  Repeat test done in house was positive for COVID-19  BNP of 1k suggestive for congestive heart failure  Acute hypoxic respiratory failure most likely secondary to COVID-19 pneumonia vs CHF  CXR significant for bilateral patchy infiltrates and minimal pleural effusions  Received IV Lasix 40 mg in the ED, with good diuresis  Now on 1 L of oxygen and saturating at 95%  Wean oxygen appropriately  Keep saturations greater than 92%  Respiratory protocol    COVID-19 pneumonia  Assessment & Plan  Positive Rapid COVID-19 test done at [facility]  Repeat COVID-19 test done in house was also positive  Per review of care notes, reported loss of smell and cough  No intentional recent COVID-19 positive contacts  Now presenting with acute hypoxic respiratory failure requiring oxygen supplementation  Chest x-ray significant for bilateral pulmonary infiltrates with trace pleural effusions  Received Decadron 6 mg IV in the ED  CBC unremarkable  D-dimer is slightly elevated at 0 7  Will order the mild covid-19 pathway    - follow inflammatory markers  - Remdesivir 200 mg today 2/10 and 100 mg daily for the next 4 days  - continue vitamins and supplements    - follow plan as per acute hypoxic respiratory failure  - patient is allergic to ceftriaxone  Will hold for now  - will give doxycycline and follow procalcitonin    Heart failure with preserved ejection fraction  Assessment & Plan  Wt Readings from Last 3 Encounters:   02/10/21 77 1 kg (170 lb)   01/28/21 78 1 kg (172 lb 3 2 oz)   02/18/20 70 3 kg (155 lb)     Echo done on 01/29 significant for LVEF of 50%, dyskinesis of the basal inferoseptal and basal inferior walls, LV mild concentric hypertrophy with poor visualization of the diastolic function  BNP on presentation was 1046  Chest x-ray significant for bilateral patchy opacities and small pleural effusions  Takes Lasix 20 mg at home  Received IV Lasix 40 mg with good diuresis  Troponin on presentation was negative  - continue beta-blocker  - strict I's and O's  - fluid restriction to <1 2L  - IV Lasix 20 mg starting tomorrow  - might consider cardiology consult    Atrial flutter ([address] )  Assessment & Plan  Heart rate controlled at 77 beats per minute  Will continue Cardizem and metoprolol  Continue Eliquis  Will do a 12 lead EKG    VTE Pharmacologic Prophylaxis: Reason for no pharmacologic prophylaxis On eliquis  VTE Mechanical Prophylaxis: sequential compression device    CHIEF COMPLAINT     Chief Complaint   Patient presents with    Shortness of Breath     [place] [facility] [place] Pt room air sats in the 80's Pt denies pain at this time       HISTORY OF PRESENT ILLNESS     A 81 yo female with a past medical history significant for adenocarcinoma of the breast, CAD/mi x2, hypertension, hypothyroidism, was recently discharged from this facility due to atrial flutter who was noted to be short of breath and hypoxic, saturating at 82% on room air  Rapid COVID-19 test done at that time was positive  On review of care note, patient was noted to have a chest x-ray that was suggestive of bilateral pulmonary infiltrate  On presentation BNP was elevated at 1000   Chest x-ray significant for bilateral pulmonary infiltrates with trace pleural effusions  She got Lasix 40 mg IV, IV Decadron 6 mg and started on intranasal oxygen at 3 liters/minute  Telemetry marker, D-dimer noted to be elevated elevated at 0 77, troponin negative,    CRP, and lactic acid normal  Now on 1 L NC O2       REVIEW OF SYSTEMS     Review of Systems   Constitutional: Positive for activity change and appetite change  Reduced sense of smell   HENT: Negative  Respiratory: Negative for shortness of breath  Cardiovascular: Negative  Gastrointestinal: Negative  Genitourinary: Negative  Skin: Positive for rash (Rash on bilateral lower extremities)  Neurological: Negative  Psychiatric/Behavioral: Negative  OBJECTIVE     Vitals:    02/10/21 1422 02/10/21 1500   BP: 129/80    BP Location: Right arm    Pulse: 80    Resp: (!) 26    Temp: 98 °F (36 7 °C)    TempSrc: Oral    SpO2: 98% 98%   Weight: 77 1 kg (170 lb)       Temperature:   Temp (24hrs), Av °F (36 7 °C), Min:98 °F (36 7 °C), Max:98 °F (36 7 °C)    Temperature: 98 °F (36 7 °C)  Intake & Output:  I/O        0701 - / 0700 / 07 - 02/10 0700 02/10 07 -  0700    Urine (mL/kg/hr)   700    Total Output   700    Net   -700               Weights:   IBW: -92 5 kg    Body mass index is 31 09 kg/m²  Weight (last 2 days)     Date/Time   Weight    02/10/21 1422   77 1 (170)            Physical Exam  Vitals signs reviewed  Constitutional:       General: She is not in acute distress  Appearance: She is not toxic-appearing  HENT:      Head: Normocephalic  Mouth/Throat:      Mouth: Mucous membranes are moist    Cardiovascular:      Rate and Rhythm: Normal rate  Pulses: Normal pulses  Pulmonary:      Effort: No respiratory distress  Breath sounds: Normal breath sounds  Abdominal:      General: Bowel sounds are normal  There is no distension  Palpations: Abdomen is soft  Tenderness:  There is no abdominal tenderness  Musculoskeletal:      Right lower leg: Edema present  Left lower leg: Edema present  Skin:     General: Skin is dry  Neurological:      General: No focal deficit present  Mental Status: She is alert and oriented to person, place, and time  Mental status is at baseline     Psychiatric:         Mood and Affect: Mood normal          Behavior: Behavior normal        PAST MEDICAL HISTORY     Past Medical History:   Diagnosis Date    Adenocarcinoma of breast ([place] [address] ) 9/4/2012    Procedure/Onset: 12/07/2005    Anxiety 2/28/2018    Arthritis     joints    Asymptomatic varicose veins of bilateral lower extremities 10/14/2016    Benign colon polyp 2/17/2014    Breast cancer ([place] [address] ) 2005    right    CAD (coronary artery disease)     hx MI x 2    Cancer ([place] [address] )     breast ca, skin ca, currently in remission, tumors in chest wall    Chronic pain disorder     knees    Chronic rhinitis 9/30/2013    Chronic venous insufficiency 5/22/2018    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    S/p Mindy's procedure with the end colostomy    Complete uterovaginal prolapse 11/19/2015    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    Procedure/Onset: 05/26/2006    Coronary artery disease     Gait disturbance 12/19/2016    GERD (gastroesophageal reflux disease)     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    Procedure/Onset: 02/22/2007    History of prolapse of bladder     History of radiation therapy 2005    to breast right    Hypertension     Hypothyroidism     hypothyroidism    Irritable bowel syndrome 9/4/2012    Procedure/Onset: 09/23/2010    Kidney stone     2016    Myocardial infarction ([place] [address] )     1990's x2    Nephrolithiasis 2/26/2016    Osteoarthritis of left knee 10/19/2016    Perforation of sigmoid colon due to diverticulitis 5/9/2019    Added automatically from request for surgery [id number]    Peripheral vertigo 9/4/2012    Procedure/Onset: 04/04/2007    Psoriasis 12/19/2016    Rectocele 3/10/2016    Last Assessment & Plan:  As below   Stress incontinence in female 2016     PAST SURGICAL HISTORY     Past Surgical History:   Procedure Laterality Date    APPENDECTOMY      during HAMLET    BLADDER SURGERY      suspension surgery no sling, used fiberglass suspension technique    BREAST LUMPECTOMY Right 2005    BREAST SURGERY Right     lumpectomy    CHEST WALL TUMOR EXCISION      COLONOSCOPY      CYSTOSCOPY      HARTMANS PROCEDURE N/A 2019    Procedure: Velia Wayne;  Surgeon: Traci Downey MD;  Location: [facility] MAIN OR;  Service: General    HYSTERECTOMY Bilateral     hamlet w/removal of both ovaries    KNEE ARTHROSCOPY      Last assessed: 7/30/15    AZ CYSTOURETHROSCOPY,URETER CATHETER Left 2016    Procedure: CYSTOSCOPY RETROGRADE PYELOGRAM WITH INSERTION STENT URETERAL;  Surgeon: Joanne Agarwal MD;  Location: [address] [place] [address];  Service: Urology    [place] XCAPSL CTRC RMVL INSJ IO LENS PROSTH W/O ECP Left 4/10/2017    Procedure: EXTRACTION EXTRACAPSULAR CATARACT PHACO INTRAOCULAR LENS (IOL);   Surgeon: Jean Garvin MD;  Location[facility] MAIN OR;  Service: Ophthalmology    SKIN BIOPSY Left     Leg for [facility]     SOCIAL & FAMILY HISTORY     Social History     Substance and Sexual Activity   Alcohol Use Yes    Alcohol/week: 1 0 standard drinks    Types: 1 Glasses of wine per week    Frequency: 4 or more times a week    Drinks per session: 1 or 2    Comment: 1 glass of wine before bed     Substance and Sexual Activity   Alcohol Use Yes    Alcohol/week: 1 0 standard drinks    Types: 1 Glasses of wine per week    Frequency: 4 or more times a week    Drinks per session: 1 or 2    Comment: 1 glass of wine before bed        Substance and Sexual Activity   Drug Use Never     Social History     Tobacco Use   Smoking Status Former Smoker    Packs/day: 0 10    Quit date: 5    Years since quittin 1   Smokeless Tobacco Never Used     Family History   Problem Relation Age of Onset    Angina Mother     Hypertension Mother     Heart attack Mother         Myocardial Infarction    Early death Father         age 36 MI    Heart disease Father     Hypertension Father     Heart attack Father         Myocardial Infarction    Heart disease Sister     Heart disease Sister         Cardiac Disorder     LABORATORY DATA     Labs: I have personally reviewed pertinent reports  Results from last 7 days   Lab Units 02/10/21  1438   WBC Thousand/uL 6 57   HEMOGLOBIN g/dL 13 5   HEMATOCRIT % 43 3   PLATELETS Thousands/uL 304   NEUTROS PCT % 85*   MONOS PCT % 6      Results from last 7 days   Lab Units 02/10/21  1438   POTASSIUM mmol/L 4 4   CHLORIDE mmol/L 97   CO2 mmol/L 31   BUN mg/dL 22*   CREATININE mg/dL 1 26*   CALCIUM mg/dL 8 7   ALK PHOS U/L 86 1   ALT U/L 28   AST U/L 29              Results from last 7 days   Lab Units 02/10/21  1438   INR  1 25*     Results from last 7 days   Lab Units 02/10/21  1438   LACTIC ACID mmol/L 1 5     Results from last 7 days   Lab Units 02/10/21  1438   TROPONIN I ng/mL <0 03     Micro:  Lab Results   Component Value Date    BLOODCX No Growth After 5 Days  01/27/2020    BLOODCX No Growth After 5 Days  01/27/2020    URINECX >100,000 cfu/ml  11/13/2018    URINECX 10,000-19,000 cfu/ml Mixed Contaminants X4 05/30/2017    URINECX No Growth <1000 cfu/mL 03/22/2016    SPUTUMCULTUR 1+ Growth of  01/29/2020    SPUTUMCULTUR  01/29/2020     Commensal respiratory miles only; No significant growth of Staph aureus/MRSA or Pseudomonas aeruginosa  IMAGING & DIAGNOSTIC TESTS     Imaging: I have personally reviewed pertinent reports  Xr Chest 1 View Portable    Result Date: 2/10/2021  Impression: Patchy bilateral groundglass opacity with small effusions, likely a combination of Covid-19 pneumonia and pulmonary edema  Workstation performed: [id number]     EKG, Pathology, and Other Studies: I have personally reviewed pertinent reports       ALLERGIES     Allergies Allergen Reactions    Other Anaphylaxis     Blue Dye during ultrasound      Ciprofloxacin Hives and Itching     rash and itching    Clindamycin Hives and Itching     Redness      Levaquin [Levofloxacin] Hives and Itching     Rash and itching    Lincomycin     Penicillins Hives and Itching     Tolerated Pip/tazo 5/10/2019  Tolerated cefepime 1/2020    Sulfa Antibiotics Hives and Itching     Redness      Sulfacetamide     Sulfasalazine     Ceftriaxone Rash     Tolerates Cephalexin     Iv Contrast  [Iodinated Diagnostic Agents] Itching and Rash     Other reaction(s): Anaphylaxis    Linezolid Rash     Rash,flushed face after 3rd day of taking this ABX, started benadryl subsided by the end of the night   Nitrofurantoin Hives, Itching, Nausea Only and Rash     MEDICATIONS PRIOR TO ARRIVAL     Prior to Admission medications    Medication Sig Start Date End Date Taking?  Authorizing Provider   acetaminophen (TYLENOL) 325 mg tablet Take 2 tablets (650 mg total) by mouth every 6 (six) hours as needed for mild pain, headaches or fever 5/15/19   Shasta Williamson MD   aluminum-magnesium hydroxide-simethicone (MAALOX MAX) 320-079-86 MG/5ML suspension Take 10 mL by mouth every 6 (six) hours as needed for indigestion or heartburn 1/18/21   Ara Galvan MD   apixaban (ELIQUIS) 2 5 mg Take 1 tablet (2 5 mg total) by mouth 2 (two) times a day 1/28/21 2/27/21  Sesar Hassan MD   diltiazem (CARDIZEM CD) 180 mg 24 hr capsule TAKE 1 CAPSULE DAILY 6/19/20   Harry Gibson MD   exemestane (AROMASIN) 25 MG tablet Take 1 tablet (25 mg total) by mouth daily 3/16/20   Harry Gibson MD   ezetimibe-simvastatin (VYTORIN) 10-40 mg per tablet TAKE 1 TABLET DAILY AT BEDTIME 2/4/21   Harry Gibson MD   fluticasone [facility]) 50 mcg/act nasal spray 2 sprays into each nostril daily 4/14/20   Harry Gibson MD   furosemide (LASIX) 20 mg tablet Take 20 mg by mouth daily    Historical Provider, MD   furosemide (LASIX) 20 mg tablet Take 20 mg by mouth daily    Historical Provider, MD   hydroxyurea (HYDREA) 500 mg capsule Take 500 mg by mouth daily m w f    Historical Provider, MD   hydrOXYzine HCL (ATARAX) 25 mg tablet Take 1 tablet (25 mg total) by mouth 3 (three) times a day as needed for anxiety 10/5/20   GLORIA Arevalo   isosorbide mononitrate (IMDUR) 30 mg 24 hr tablet Take 1 tablet (30 mg total) by mouth daily 4/15/20 10/12/20  Courtney Hurst MD   levofloxacin (LEVAQUIN) 500 mg tablet Take 500 mg by mouth every 24 hours For 7 days  2/16/21  Historical Provider, MD   levothyroxine 50 mcg tablet TAKE 1 TABLET EVERY MORNING 12/6/20   Courtney Hurst MD   loperamide (IMODIUM) 2 mg capsule Take 1 capsule (2 mg total) by mouth 4 (four) times a day as needed for diarrhea 12/11/20   Fidel Sewell MD   melatonin 3 mg Take 1 tablet (3 mg total) by mouth daily at bedtime 2/1/20   GLORIA Snell   metoprolol tartrate (LOPRESSOR) 50 mg tablet Take 1 tablet (50 mg total) by mouth every 12 (twelve) hours 1/28/21   Lewis White MD   Multiple Vitamins-Minerals (CENTRUM SILVER ULTRA WOMENS) TABS Take by mouth    Historical Provider, MD   simethicone (MYLICON) 80 mg chewable tablet Chew 1 tablet (80 mg total) 4 (four) times a day as needed for flatulence 1/28/21   Nela Meyer MD   levothyroxine 50 mcg tablet Take 1 tablet (50 mcg total) by mouth every morning 12/15/19   Courtney Hurst MD     MEDICATIONS ADMINISTERED IN LAST 24 HOURS     Medication Administration - last 24 hours from 02/09/2021 1857 to 02/10/2021 1857       Date/Time Order Dose Route Action Action by     02/10/2021 1444 dexamethasone (DECADRON) injection 6 mg 6 mg Intravenous Given Shane Palacios RN     02/10/2021 1503 albuterol inhalation solution 5 mg 5 mg Nebulization Given Deb Sharpe RT     02/10/2021 1503 ipratropium (ATROVENT) 0 02 % inhalation solution 0 5 mg 0 5 mg Nebulization Given Deb Sharpe, RT     02/10/2021 1503 furosemide (LASIX) injection 40 mg 40 mg Intravenous Given Antonella De Luna RN        CURRENT MEDICATIONS            Admission Time  I spent 45 minutes admitting the patient  This involved direct patient contact where I performed a full history and physical, reviewing previous records, and reviewing laboratory and other diagnostic studies  Portions of the record may have been created with voice recognition software  Occasional wrong word or "sound a like" substitutions may have occurred due to the inherent limitations of voice recognition software    Read the chart carefully and recognize, using context, where substitutions have occurred     ==  Raisa Schuster MD  [facility]  Internal Medicine Residency PGY-1

## 2021-02-10 NOTE — ASSESSMENT & PLAN NOTE
Wt Readings from Last 3 Encounters:   02/10/21 78 kg (171 lb 15 3 oz)   01/28/21 78 1 kg (172 lb 3 2 oz)   02/18/20 70 3 kg (155 lb)     Notably improved diuresis since admission    Will transition back to home dose of Lasix

## 2021-02-10 NOTE — ASSESSMENT & PLAN NOTE
Currently managed for COVID-19 pneumonia  With marked improvement in clinical status    Minimal oxygen requirement

## 2021-02-10 NOTE — ASSESSMENT & PLAN NOTE
-CXR revealed   Left lower lobe infiltrate and/or effusion, small right perihilar infiltrate  - will order Levaquin 500 mg daily for 7 days as patient has multiple allergies to antibiotics  - order prednisone 40 mg daily for 5 days and reassess

## 2021-02-10 NOTE — ASSESSMENT & PLAN NOTE
- multifactorial  - continue care and support at SNF for ADLs  - ensure adequate hydration and nutrition

## 2021-02-10 NOTE — ASSESSMENT & PLAN NOTE
- with chronic pitting edema bilateral lower extremities  - continue Lasix 20 mg daily  - monitor CMP weekly

## 2021-02-11 PROBLEM — R79.89 ELEVATED D-DIMER: Status: ACTIVE | Noted: 2021-02-11

## 2021-02-11 LAB
ANION GAP SERPL CALCULATED.3IONS-SCNC: 8 MMOL/L (ref 4–13)
BUN SERPL-MCNC: 25 MG/DL (ref 6–20)
CALCIUM SERPL-MCNC: 8.3 MG/DL (ref 8.4–10.2)
CHLORIDE SERPL-SCNC: 97 MMOL/L (ref 96–108)
CK SERPL-CCNC: 40.3 U/L (ref 38–234)
CO2 SERPL-SCNC: 35 MMOL/L (ref 22–33)
CREAT SERPL-MCNC: 1.25 MG/DL (ref 0.4–1.1)
CRP SERPL QL: 0.8 MG/L (ref 0–1)
D DIMER PPP FEU-MCNC: 0.79 MG/L FEU (ref 0.19–0.49)
ERYTHROCYTE [DISTWIDTH] IN BLOOD BY AUTOMATED COUNT: 18.3 % (ref 11.6–15.1)
FERRITIN SERPL-MCNC: 46 NG/ML (ref 8–388)
GFR SERPL CREATININE-BSD FRML MDRD: 37 ML/MIN/1.73SQ M
GLUCOSE SERPL-MCNC: 179 MG/DL (ref 65–140)
HCT VFR BLD AUTO: 42.2 % (ref 34.8–46.1)
HGB BLD-MCNC: 13.2 G/DL (ref 11.5–15.4)
MCH RBC QN AUTO: 26 PG (ref 26.8–34.3)
MCHC RBC AUTO-ENTMCNC: 31.3 G/DL (ref 31.4–37.4)
MCV RBC AUTO: 83 FL (ref 82–98)
PLATELET # BLD AUTO: 308 THOUSANDS/UL (ref 149–390)
PMV BLD AUTO: 11.5 FL (ref 8.9–12.7)
POTASSIUM SERPL-SCNC: 3.9 MMOL/L (ref 3.5–5)
RBC # BLD AUTO: 5.08 MILLION/UL (ref 3.81–5.12)
SODIUM SERPL-SCNC: 140 MMOL/L (ref 133–145)
TSH SERPL DL<=0.05 MIU/L-ACNC: 3.58 UIU/ML (ref 0.34–5.6)
WBC # BLD AUTO: 5.2 THOUSAND/UL (ref 4.31–10.16)

## 2021-02-11 PROCEDURE — 85027 COMPLETE CBC AUTOMATED: CPT | Performed by: INTERNAL MEDICINE

## 2021-02-11 PROCEDURE — 99233 SBSQ HOSP IP/OBS HIGH 50: CPT | Performed by: INTERNAL MEDICINE

## 2021-02-11 PROCEDURE — 85379 FIBRIN DEGRADATION QUANT: CPT | Performed by: INTERNAL MEDICINE

## 2021-02-11 PROCEDURE — 86140 C-REACTIVE PROTEIN: CPT | Performed by: INTERNAL MEDICINE

## 2021-02-11 PROCEDURE — 82728 ASSAY OF FERRITIN: CPT | Performed by: INTERNAL MEDICINE

## 2021-02-11 PROCEDURE — 84443 ASSAY THYROID STIM HORMONE: CPT | Performed by: INTERNAL MEDICINE

## 2021-02-11 PROCEDURE — 80048 BASIC METABOLIC PNL TOTAL CA: CPT | Performed by: INTERNAL MEDICINE

## 2021-02-11 PROCEDURE — 82550 ASSAY OF CK (CPK): CPT | Performed by: INTERNAL MEDICINE

## 2021-02-11 PROCEDURE — 84145 PROCALCITONIN (PCT): CPT | Performed by: INTERNAL MEDICINE

## 2021-02-11 RX ORDER — ATORVASTATIN CALCIUM 40 MG/1
40 TABLET, FILM COATED ORAL
Status: DISCONTINUED | OUTPATIENT
Start: 2021-02-11 | End: 2021-02-15 | Stop reason: HOSPADM

## 2021-02-11 RX ADMIN — DOXYCYCLINE 100 MG: 100 CAPSULE ORAL at 08:48

## 2021-02-11 RX ADMIN — DILTIAZEM HYDROCHLORIDE 180 MG: 180 CAPSULE, COATED, EXTENDED RELEASE ORAL at 08:48

## 2021-02-11 RX ADMIN — Medication 2000 UNITS: at 08:48

## 2021-02-11 RX ADMIN — REMDESIVIR 100 MG: 100 INJECTION, POWDER, LYOPHILIZED, FOR SOLUTION INTRAVENOUS at 19:58

## 2021-02-11 RX ADMIN — APIXABAN 2.5 MG: 2.5 TABLET, FILM COATED ORAL at 08:48

## 2021-02-11 RX ADMIN — ATORVASTATIN CALCIUM 40 MG: 40 TABLET, FILM COATED ORAL at 17:15

## 2021-02-11 RX ADMIN — LEVOTHYROXINE SODIUM 50 MCG: 50 TABLET ORAL at 05:16

## 2021-02-11 RX ADMIN — OXYCODONE HYDROCHLORIDE AND ACETAMINOPHEN 1000 MG: 500 TABLET ORAL at 21:10

## 2021-02-11 RX ADMIN — SENNOSIDES 8.6 MG: 8.6 TABLET, FILM COATED ORAL at 08:48

## 2021-02-11 RX ADMIN — DEXAMETHASONE SODIUM PHOSPHATE 6 MG: 4 INJECTION, SOLUTION INTRA-ARTICULAR; INTRALESIONAL; INTRAMUSCULAR; INTRAVENOUS; SOFT TISSUE at 17:15

## 2021-02-11 RX ADMIN — METOPROLOL TARTRATE 50 MG: 50 TABLET, FILM COATED ORAL at 08:48

## 2021-02-11 RX ADMIN — FUROSEMIDE 20 MG: 10 INJECTION, SOLUTION INTRAMUSCULAR; INTRAVENOUS at 08:49

## 2021-02-11 RX ADMIN — OXYCODONE HYDROCHLORIDE AND ACETAMINOPHEN 1000 MG: 500 TABLET ORAL at 08:48

## 2021-02-11 RX ADMIN — MELATONIN 3 MG: at 21:10

## 2021-02-11 RX ADMIN — FAMOTIDINE 20 MG: 20 TABLET ORAL at 08:48

## 2021-02-11 RX ADMIN — DOXYCYCLINE 100 MG: 100 CAPSULE ORAL at 21:10

## 2021-02-11 RX ADMIN — APIXABAN 2.5 MG: 2.5 TABLET, FILM COATED ORAL at 17:15

## 2021-02-11 RX ADMIN — ZINC SULFATE 220 MG (50 MG) CAPSULE 220 MG: CAPSULE at 08:48

## 2021-02-11 RX ADMIN — METOPROLOL TARTRATE 50 MG: 50 TABLET, FILM COATED ORAL at 21:10

## 2021-02-11 NOTE — PLAN OF CARE
Problem: Potential for Falls  Goal: Patient will remain free of falls  Description: INTERVENTIONS:  - Assess patient frequently for physical needs  -  Identify cognitive and physical deficits and behaviors that affect risk of falls    -  [place] fall precautions as indicated by assessment   - Educate patient/family on patient safety including physical limitations  - Instruct patient to call for assistance with activity based on assessment  - Modify environment to reduce risk of injury  - Consider OT/PT consult to assist with strengthening/mobility  Outcome: Progressing     Problem: PAIN - ADULT  Goal: Verbalizes/displays adequate comfort level or baseline comfort level  Description: Interventions:  - Encourage patient to monitor pain and request assistance  - Assess pain using appropriate pain scale  - Administer analgesics based on type and severity of pain and evaluate response  - Implement non-pharmacological measures as appropriate and evaluate response  - Consider cultural and social influences on pain and pain management  - Notify physician/advanced practitioner if interventions unsuccessful or patient reports new pain  Outcome: Progressing     Problem: INFECTION - ADULT  Goal: Absence or prevention of progression during hospitalization  Description: INTERVENTIONS:  - Assess and monitor for signs and symptoms of infection  - Monitor lab/diagnostic results  - Monitor all insertion sites, i e  indwelling lines, tubes, and drains  - - Administer medications as ordered  - Instruct and encourage patient and family to use good hand hygiene technique  - Identify and instruct in appropriate isolation precautions for identified infection/condition  Outcome: Progressing  Goal: Absence of fever/infection during neutropenic period  Description: INTERVENTIONS:  - Monitor WBC    Outcome: Progressing     Problem: SAFETY ADULT  Goal: Patient will remain free of falls  Description: INTERVENTIONS:  - Assess patient frequently for physical needs  -  Identify cognitive and physical deficits and behaviors that affect risk of falls    -  [place] fall precautions as indicated by assessment   - Educate patient/family on patient safety including physical limitations  - Instruct patient to call for assistance with activity based on assessment  - Modify environment to reduce risk of injury  - Consider OT/PT consult to assist with strengthening/mobility  Outcome: Progressing  Goal: Maintain or return to baseline ADL function  Description: INTERVENTIONS:  -  Assess patient's ability to carry out ADLs; assess patient's baseline for ADL function and identify physical deficits which impact ability to perform ADLs (bathing, care of mouth/teeth, toileting, grooming, dressing, etc )  - Assess/evaluate cause of self-care deficits   - Assess range of motion  - Assess patient's mobility; develop plan if impaired  - Assess patient's need for assistive devices and provide as appropriate  - Encourage maximum independence but intervene and supervise when necessary  - Involve family in performance of ADLs  - Assess for home care needs following discharge   - Consider OT consult to assist with ADL evaluation and planning for discharge  - Provide patient education as appropriate  Outcome: Progressing  Goal: Maintain or return mobility status to optimal level  Description: INTERVENTIONS:  - Assess patient's baseline mobility status (ambulation, transfers, stairs, etc )    - Identify cognitive and physical deficits and behaviors that affect mobility  - Identify mobility aids required to assist with transfers and/or ambulation (gait belt, sit-to-stand, lift, walker, cane, etc )  - [place] fall precautions as indicated by assessment  - Record patient progress and toleration of activity level on Mobility SBAR; progress patient to next Phase/Stage  - Instruct patient to call for assistance with activity based on assessment  - Consider rehabilitation consult to assist with strengthening/weightbearing, etc   Outcome: Progressing     Problem: DISCHARGE PLANNING  Goal: Discharge to home or other facility with appropriate resources  Description: INTERVENTIONS:  - Identify barriers to discharge w/patient and caregiver  - Arrange for needed discharge resources and transportation as appropriate  - Identify discharge learning needs (meds, wound care, etc )  - Arrange for interpretive services to assist at discharge as needed  - Refer to Case Management Department for coordinating discharge planning if the patient needs post-hospital services based on physician/advanced practitioner order or complex needs related to functional status, cognitive ability, or social support system  Outcome: Progressing     Problem: Knowledge Deficit  Goal: Patient/family/caregiver demonstrates understanding of disease process, treatment plan, medications, and discharge instructions  Description: Complete learning assessment and assess knowledge base    Interventions:  - Provide teaching at level of understanding  - Provide teaching via preferred learning methods  Outcome: Progressing

## 2021-02-11 NOTE — ASSESSMENT & PLAN NOTE
D-dimer elevated at presentation  Low suspicions for PE/DVT at this time  Will hold off lower extremity Doppler/CTA PE study  Patient is on Eliquis

## 2021-02-11 NOTE — QUICK NOTE
Notified by pharmacist about non-formulary medication- Aromastin ordered as it was unavailable in-house  Further, spoke with her primary oncologist Dr Wilner Noel - [phone] who advised it was ok to hold Aromastin for now

## 2021-02-11 NOTE — PROGRESS NOTES
INTERNAL MEDICINE RESIDENCY PROGRESS NOTE     Name: Katelyn Nuñez   Age & Sex: 80 y o  female   MRN: [id number]  Unit/Bed#: -01   Encounter: [id number]  Team: SLIM    PATIENT INFORMATION     Name: Katelyn Nuñez   Age & Sex: 80 y o  female   MRN: [id number]  Hospital Stay Days: 1    ASSESSMENT/PLAN     Principal Problem:    Acute respiratory failure with hypoxia  Active Problems:    COVID-19 pneumonia    Atrial flutter ([address] )    Heart failure with preserved ejection fraction    Elevated d-dimer      * Acute respiratory failure with hypoxia  Assessment & Plan  Still appears dyspneic, with saturations at 94  On examination, noted to be utilizing accessory respiratory muscles  - increase oxygen flow rate to 5 liter/minute  - continue oxygen supplementation to keep saturations greater than 92%  - with oxygen appropriately      COVID-19 pneumonia  Assessment & Plan  Currently managed for COVID-19 pneumonia  Required oxygen  On moderate COVID-19 pathway  Will follow and trend inflammatory markers  Continue remdesivir, multi vitamins, atorvastatin and supplements  Maintain contact/ airborne precautions      Elevated d-dimer  Assessment & Plan  D-dimer elevated at 0 78 on presentation  However, D-dimer value appears to have diminished from previous value  Dyspneic on presentation however denies chest pain  Low Wells score for PE  - will hold off CTA PE study, and lower extremity Doppler for now    Heart failure with preserved ejection fraction  Assessment & Plan  Wt Readings from Last 3 Encounters:   02/10/21 77 1 kg (170 lb)   01/28/21 78 1 kg (172 lb 3 2 oz)   02/18/20 70 3 kg (155 lb)     I/O with  -700 cc  Will diurese appropriately    Monitor BMP    Atrial flutter (HCC)  Assessment & Plan  Heart rate controlled at 77 beats per minute  Twelve lead EKG significant for atrial flutter  Will continue Cardizem and metoprolol  Continue Eliquis      Disposition:  Inpatient    SUBJECTIVE     Patient seen and examined  No acute events overnight  Still requiring oxygen  Appears dyspneic on conversation  Denies chest pain, cough, nausea and vomiting  OBJECTIVE     Vitals:    02/10/21 1500 02/10/21 1900 02/10/21 2100 21 0733   BP:   135/76 139/95   BP Location:   Right arm Left arm   Pulse:   102 89   Resp:   20 20   Temp:   98 7 °F (37 1 °C) (!) 97 3 °F (36 3 °C)   TempSrc:   Tympanic Tympanic   SpO2: 98%  90% 98%   Weight:  78 kg (171 lb 15 3 oz)     Height:  5' 2" (1 575 m)        Temperature:   Temp (24hrs), Av °F (36 7 °C), Min:97 3 °F (36 3 °C), Max:98 7 °F (37 1 °C)    Temperature: (!) 97 3 °F (36 3 °C)  Intake & Output:  I/O        07 - 02/10 0700 02/10 07 -  0700  07 -  0700    Urine (mL/kg/hr)  700     Total Output  700     Net  -700                Weights:   IBW: 50 1 kg    Body mass index is 31 45 kg/m²  Weight (last 2 days)     Date/Time   Weight    02/10/21 1900   78 (171 96)    02/10/21 1422   77 1 (170)            Physical Exam  Vitals signs reviewed  Constitutional:       General: She is in acute distress (mild respiratory)  Appearance: She is not toxic-appearing  HENT:      Head: Normocephalic  Mouth/Throat:      Mouth: Mucous membranes are moist    Cardiovascular:      Rate and Rhythm: Normal rate and regular rhythm  Pulmonary:      Effort: Respiratory distress (mild) present  Breath sounds: No stridor  Rales present  Chest:      Chest wall: No tenderness  Abdominal:      General: Bowel sounds are normal  There is no distension  Palpations: Abdomen is soft  Tenderness: There is no abdominal tenderness  Musculoskeletal:      Right lower leg: Edema present  Left lower leg: Edema present  Neurological:      General: No focal deficit present  Mental Status: She is alert and oriented to person, place, and time  Mental status is at baseline     Psychiatric:         Mood and Affect: Mood normal        LABORATORY DATA Labs: I have personally reviewed pertinent reports  Results from last 7 days   Lab Units 02/11/21  0446 02/10/21  1949 02/10/21  1438   WBC Thousand/uL 5 20  --  6 57   HEMOGLOBIN g/dL 13 2  --  13 5   I STAT HEMOGLOBIN g/dl  --  15 0  --    HEMATOCRIT % 42 2  --  43 3   HEMATOCRIT, ISTAT %  --  44  --    PLATELETS Thousands/uL 308  --  304   NEUTROS PCT %  --   --  85*   MONOS PCT %  --   --  6      Results from last 7 days   Lab Units 02/11/21  0446 02/10/21  1949 02/10/21  1438   POTASSIUM mmol/L 3 9  --  4 4   CHLORIDE mmol/L 97  --  97   CO2 mmol/L 35*  --  31   CO2, I-STAT mmol/L  --  31  --    BUN mg/dL 25*  --  22*   CREATININE mg/dL 1 25*  --  1 26*   CALCIUM mg/dL 8 3*  --  8 7   ALK PHOS U/L  --   --  86 1   ALT U/L  --   --  28   AST U/L  --   --  29   GLUCOSE, ISTAT mg/dl  --  149*  --               Results from last 7 days   Lab Units 02/10/21  1438   INR  1 25*     Results from last 7 days   Lab Units 02/10/21  1438   LACTIC ACID mmol/L 1 5     Results from last 7 days   Lab Units 02/10/21  1438   TROPONIN I ng/mL <0 03       IMAGING & DIAGNOSTIC TESTING     Radiology Results: I have personally reviewed pertinent reports  Xr Chest 1 View Portable    Result Date: 2/10/2021  Impression: Patchy bilateral groundglass opacity with small effusions, likely a combination of Covid-19 pneumonia and pulmonary edema  Workstation performed: [id number]     Other Diagnostic Testing: I have personally reviewed pertinent reports      ACTIVE MEDICATIONS     Current Facility-Administered Medications   Medication Dose Route Frequency    acetaminophen (TYLENOL) tablet 650 mg  650 mg Oral Q6H PRN    apixaban (ELIQUIS) tablet 2 5 mg  2 5 mg Oral BID    ascorbic acid (VITAMIN C) tablet 1,000 mg  1,000 mg Oral Q12H [facility]    atorvastatin (LIPITOR) tablet 40 mg  40 mg Oral Daily With Dinner    cholecalciferol (VITAMIN D3) tablet 2,000 Units  2,000 Units Oral Daily    dexamethasone (DECADRON) injection 6 mg  6 mg Intravenous Q24H    diltiazem (CARDIZEM CD) 24 hr capsule 180 mg  180 mg Oral Daily    doxycycline hyclate (VIBRAMYCIN) capsule 100 mg  100 mg Oral Q12H [address]    exemestane (AROMASIN) tablet 25 mg  25 mg Oral Daily    famotidine (PEPCID) tablet 20 mg  20 mg Oral Daily    furosemide (LASIX) injection 20 mg  20 mg Intravenous Daily    levothyroxine tablet 50 mcg  50 mcg Oral Early Morning    melatonin tablet 3 mg  3 mg Oral HS    metoprolol tartrate (LOPRESSOR) tablet 50 mg  50 mg Oral Q12H [address]    zinc sulfate (ZINCATE) capsule 220 mg  220 mg Oral Daily    Followed by   Maria Isabel Arroyo ON 2/18/2021] multivitamin-minerals (CENTRUM ADULTS) tablet 1 tablet  1 tablet Oral Daily    ondansetron (ZOFRAN) injection 4 mg  4 mg Intravenous Q6H PRN    remdesivir (Veklury) 100 mg in sodium chloride 0 9 % 250 mL IVPB  100 mg Intravenous Q24H    senna (SENOKOT) tablet 8 6 mg  1 tablet Oral Daily    simethicone (MYLICON) chewable tablet 80 mg  80 mg Oral 4x Daily PRN       VTE Pharmacologic Prophylaxis: Reason for no pharmacologic prophylaxis On home Eliquis  VTE Mechanical Prophylaxis: sequential compression device    Portions of the record may have been created with voice recognition software  Occasional wrong word or "sound a like" substitutions may have occurred due to the inherent limitations of voice recognition software    Read the chart carefully and recognize, using context, where substitutions have occurred   ==  Myrna Woo MD  [address]  Internal Medicine Residency PGY-1

## 2021-02-12 LAB
ANION GAP SERPL CALCULATED.3IONS-SCNC: 7 MMOL/L (ref 4–13)
BASOPHILS # BLD AUTO: 0 THOUSANDS/ΜL (ref 0–0.1)
BASOPHILS NFR BLD AUTO: 0 % (ref 0–1)
BUN SERPL-MCNC: 27 MG/DL (ref 6–20)
CALCIUM SERPL-MCNC: 8.3 MG/DL (ref 8.4–10.2)
CHLORIDE SERPL-SCNC: 98 MMOL/L (ref 96–108)
CO2 SERPL-SCNC: 36 MMOL/L (ref 22–33)
CREAT SERPL-MCNC: 1.13 MG/DL (ref 0.4–1.1)
EOSINOPHIL # BLD AUTO: 0 THOUSAND/ΜL (ref 0–0.61)
EOSINOPHIL NFR BLD AUTO: 0 % (ref 0–6)
ERYTHROCYTE [DISTWIDTH] IN BLOOD BY AUTOMATED COUNT: 18.7 % (ref 11.6–15.1)
GFR SERPL CREATININE-BSD FRML MDRD: 42 ML/MIN/1.73SQ M
GLUCOSE SERPL-MCNC: 144 MG/DL (ref 65–140)
GLUCOSE SERPL-MCNC: 151 MG/DL (ref 65–140)
GLUCOSE SERPL-MCNC: 194 MG/DL (ref 65–140)
HCT VFR BLD AUTO: 44.4 % (ref 34.8–46.1)
HGB BLD-MCNC: 13.5 G/DL (ref 11.5–15.4)
IMM GRANULOCYTES # BLD AUTO: 0.02 THOUSAND/UL (ref 0–0.2)
IMM GRANULOCYTES NFR BLD AUTO: 0 % (ref 0–2)
LYMPHOCYTES # BLD AUTO: 0.57 THOUSANDS/ΜL (ref 0.6–4.47)
LYMPHOCYTES NFR BLD AUTO: 9 % (ref 14–44)
MCH RBC QN AUTO: 25.7 PG (ref 26.8–34.3)
MCHC RBC AUTO-ENTMCNC: 30.4 G/DL (ref 31.4–37.4)
MCV RBC AUTO: 85 FL (ref 82–98)
MONOCYTES # BLD AUTO: 0.24 THOUSAND/ΜL (ref 0.17–1.22)
MONOCYTES NFR BLD AUTO: 4 % (ref 4–12)
NEUTROPHILS # BLD AUTO: 5.34 THOUSANDS/ΜL (ref 1.85–7.62)
NEUTS SEG NFR BLD AUTO: 87 % (ref 43–75)
PLATELET # BLD AUTO: 295 THOUSANDS/UL (ref 149–390)
PMV BLD AUTO: 11.9 FL (ref 8.9–12.7)
POTASSIUM SERPL-SCNC: 4.1 MMOL/L (ref 3.5–5)
PROCALCITONIN SERPL-MCNC: <0.05 NG/ML
PROCALCITONIN SERPL-MCNC: <0.05 NG/ML
RBC # BLD AUTO: 5.25 MILLION/UL (ref 3.81–5.12)
SODIUM SERPL-SCNC: 141 MMOL/L (ref 133–145)
WBC # BLD AUTO: 6.17 THOUSAND/UL (ref 4.31–10.16)

## 2021-02-12 PROCEDURE — 84145 PROCALCITONIN (PCT): CPT | Performed by: INTERNAL MEDICINE

## 2021-02-12 PROCEDURE — 99233 SBSQ HOSP IP/OBS HIGH 50: CPT | Performed by: INTERNAL MEDICINE

## 2021-02-12 PROCEDURE — 85025 COMPLETE CBC W/AUTO DIFF WBC: CPT | Performed by: INTERNAL MEDICINE

## 2021-02-12 PROCEDURE — 80048 BASIC METABOLIC PNL TOTAL CA: CPT | Performed by: INTERNAL MEDICINE

## 2021-02-12 PROCEDURE — 82948 REAGENT STRIP/BLOOD GLUCOSE: CPT

## 2021-02-12 RX ADMIN — OXYCODONE HYDROCHLORIDE AND ACETAMINOPHEN 1000 MG: 500 TABLET ORAL at 20:39

## 2021-02-12 RX ADMIN — APIXABAN 2.5 MG: 2.5 TABLET, FILM COATED ORAL at 08:40

## 2021-02-12 RX ADMIN — LEVOTHYROXINE SODIUM 50 MCG: 50 TABLET ORAL at 04:58

## 2021-02-12 RX ADMIN — DOXYCYCLINE 100 MG: 100 CAPSULE ORAL at 20:38

## 2021-02-12 RX ADMIN — ZINC SULFATE 220 MG (50 MG) CAPSULE 220 MG: CAPSULE at 08:40

## 2021-02-12 RX ADMIN — OXYCODONE HYDROCHLORIDE AND ACETAMINOPHEN 1000 MG: 500 TABLET ORAL at 08:40

## 2021-02-12 RX ADMIN — FUROSEMIDE 20 MG: 10 INJECTION, SOLUTION INTRAMUSCULAR; INTRAVENOUS at 08:40

## 2021-02-12 RX ADMIN — ATORVASTATIN CALCIUM 40 MG: 40 TABLET, FILM COATED ORAL at 17:36

## 2021-02-12 RX ADMIN — REMDESIVIR 100 MG: 100 INJECTION, POWDER, LYOPHILIZED, FOR SOLUTION INTRAVENOUS at 17:37

## 2021-02-12 RX ADMIN — DEXAMETHASONE SODIUM PHOSPHATE 6 MG: 4 INJECTION, SOLUTION INTRA-ARTICULAR; INTRALESIONAL; INTRAMUSCULAR; INTRAVENOUS; SOFT TISSUE at 17:36

## 2021-02-12 RX ADMIN — APIXABAN 2.5 MG: 2.5 TABLET, FILM COATED ORAL at 17:37

## 2021-02-12 RX ADMIN — FAMOTIDINE 20 MG: 20 TABLET ORAL at 08:40

## 2021-02-12 RX ADMIN — DOXYCYCLINE 100 MG: 100 CAPSULE ORAL at 08:40

## 2021-02-12 RX ADMIN — MELATONIN 3 MG: at 20:38

## 2021-02-12 RX ADMIN — SENNOSIDES 8.6 MG: 8.6 TABLET, FILM COATED ORAL at 08:40

## 2021-02-12 RX ADMIN — DILTIAZEM HYDROCHLORIDE 180 MG: 180 CAPSULE, COATED, EXTENDED RELEASE ORAL at 08:40

## 2021-02-12 RX ADMIN — METOPROLOL TARTRATE 50 MG: 50 TABLET, FILM COATED ORAL at 20:38

## 2021-02-12 RX ADMIN — Medication 2000 UNITS: at 08:40

## 2021-02-12 RX ADMIN — METOPROLOL TARTRATE 50 MG: 50 TABLET, FILM COATED ORAL at 08:40

## 2021-02-12 NOTE — RESPIRATORY THERAPY NOTE
RT Protocol Note  Domingo Gao 80 y o  female MRN: [id number]  Unit/Bed#: -01 Encounter: [id number]    Assessment    Principal Problem:    Acute respiratory failure with hypoxia  Active Problems:    Atrial flutter ([facility] )    COVID-19 pneumonia    Heart failure with preserved ejection fraction    Elevated d-dimer      Home Pulmonary Medications:         Past Medical History:   Diagnosis Date    Adenocarcinoma of breast ([facility] ) 9/4/2012    Procedure/Onset: 12/07/2005    Anxiety 2/28/2018    Arthritis     joints    Asymptomatic varicose veins of bilateral lower extremities 10/14/2016    Benign colon polyp 2/17/2014    Breast cancer ([place] [id number] ) 2005    right    CAD (coronary artery disease)     hx MI x 2    Cancer (HCC)     breast ca, skin ca, currently in remission, tumors in chest wall    Chronic pain disorder     knees    Chronic rhinitis 9/30/2013    Chronic venous insufficiency 5/22/2018    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    S/p Mindy's procedure with the end colostomy    Complete uterovaginal prolapse 11/19/2015    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    Procedure/Onset: 05/26/2006    Coronary artery disease     Gait disturbance 12/19/2016    GERD (gastroesophageal reflux disease)     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    Procedure/Onset: 02/22/2007    History of prolapse of bladder     History of radiation therapy 2005    to breast right    Hypertension     Hypothyroidism     hypothyroidism    Irritable bowel syndrome 9/4/2012    Procedure/Onset: 09/23/2010    Kidney stone     2016    Myocardial infarction ([place] [address] )     1990's x2    Nephrolithiasis 2/26/2016    Osteoarthritis of left knee 10/19/2016    Perforation of sigmoid colon due to diverticulitis 5/9/2019    Added automatically from request for surgery [id number]    Peripheral vertigo 9/4/2012    Procedure/Onset: 04/04/2007    Psoriasis 12/19/2016    Rectocele 3/10/2016    Last Assessment & Plan:  As below      Stress incontinence in female 2016     Social History     Socioeconomic History    Marital status:      Spouse name: None    Number of children: None    Years of education: None    Highest education level: None   Occupational History    Occupation: retired   Social Needs    Financial resource strain: Not hard at all   Jazmin-Jorge insecurity     Worry: Never true     Inability: Never true   iSECUREtrac needs     Medical: No     Non-medical: No   Tobacco Use    Smoking status: Former Smoker     Packs/day: 0 10     Quit date:      Years since quittin 1    Smokeless tobacco: Never Used   Substance and Sexual Activity    Alcohol use:  Yes     Alcohol/week: 1 0 standard drinks     Types: 1 Glasses of wine per week     Frequency: 4 or more times a week     Drinks per session: 1 or 2     Comment: 1 glass of wine before bed    Drug use: Never    Sexual activity: Not Currently   Lifestyle    Physical activity     Days per week: 0 days     Minutes per session: 0 min    Stress: Rather much   Relationships    Social connections     Talks on phone: More than three times a week     Gets together: More than three times a week     Attends Restorationist service: None     Active member of club or organization: None     Attends meetings of clubs or organizations: None     Relationship status: None    Intimate partner violence     Fear of current or ex partner: No     Emotionally abused: No     Physically abused: No     Forced sexual activity: No   Other Topics Concern    None   Social History Narrative    Cultural background: Non-    Primary spoken-language English    Racial background: white    Retired       Subjective         Objective    Physical Exam:   Assessment Type: (P) Assess only  Respiratory Pattern: (P) Symmetrical  Chest Assessment: (P) Chest expansion symmetrical  Bilateral Breath Sounds: (P) Diminished  Cough: (P) Productive, Strong    Vitals:  Blood pressure 120/78, pulse 69, temperature 97 5 °F (36 4 °C), temperature source Tympanic, resp  rate 17, height 5' 2" (1 575 m), weight 78 kg (171 lb 15 3 oz), SpO2 97 %, not currently breastfeeding  Imaging and other studies: I have personally reviewed pertinent reports  Plan    Respiratory Plan: (P) No distress/Pulmonary history        Resp Comments: (P) pt assessed per protocol  diagnostic studies and tests reviewed   pt has no pulmonary history and does not use respiratory mediations at home  respiratory medications not indicated at this time  will continue to monitor pt for any changes

## 2021-02-12 NOTE — CASE MANAGEMENT
LOS: 2 GMLOS: 5 4    Patient is a 30 day readmission  Pt is not a bundle  Patient admitted from [facility] for acute respiratory failure  Testing showed pt is positive for COVID 19  Pt was admitted to [address] [place] [address] for STR  She is a resident of [facility]  SW received a call from Kal Sellers at the [facility]  She spoke with administration and they were hoping pt could admit to [facility] as they are part of the same company and would like to keep pt within network  SW called and spoke with [facility]  Facility does not accept COVID + at all  Pt would not be able to admit at this time  [place] [facility]  Confirmed they are aware of COVID diagnosis and can admit back when medically cleared  SW will continue to follow for discharge needs

## 2021-02-12 NOTE — PLAN OF CARE
Problem: Potential for Falls  Goal: Patient will remain free of falls  Description: INTERVENTIONS:  - Assess patient frequently for physical needs  -  Identify cognitive and physical deficits and behaviors that affect risk of falls    -  [place] fall precautions as indicated by assessment   - Educate patient/family on patient safety including physical limitations  - Instruct patient to call for assistance with activity based on assessment  - Modify environment to reduce risk of injury  - Consider OT/PT consult to assist with strengthening/mobility  Outcome: Progressing     Problem: PAIN - ADULT  Goal: Verbalizes/displays adequate comfort level or baseline comfort level  Description: Interventions:  - Encourage patient to monitor pain and request assistance  - Assess pain using appropriate pain scale  - Administer analgesics based on type and severity of pain and evaluate response  - Implement non-pharmacological measures as appropriate and evaluate response  - Consider cultural and social influences on pain and pain management  - Notify physician/advanced practitioner if interventions unsuccessful or patient reports new pain  Outcome: Progressing     Problem: INFECTION - ADULT  Goal: Absence or prevention of progression during hospitalization  Description: INTERVENTIONS:  - Assess and monitor for signs and symptoms of infection  - Monitor lab/diagnostic results  - Monitor all insertion sites, i e  indwelling lines, tubes, and drains  - Monitor endotracheal if appropriate and nasal secretions for changes in amount and color  - [place] appropriate cooling/warming therapies per order  - Administer medications as ordered  - Instruct and encourage patient and family to use good hand hygiene technique  - Identify and instruct in appropriate isolation precautions for identified infection/condition  Outcome: Progressing     Problem: SAFETY ADULT  Goal: Patient will remain free of falls  Description: INTERVENTIONS:  - Assess patient frequently for physical needs  -  Identify cognitive and physical deficits and behaviors that affect risk of falls    -  [place] fall precautions as indicated by assessment   - Educate patient/family on patient safety including physical limitations  - Instruct patient to call for assistance with activity based on assessment  - Modify environment to reduce risk of injury  - Consider OT/PT consult to assist with strengthening/mobility  Outcome: Progressing  Goal: Maintain or return to baseline ADL function  Description: INTERVENTIONS:  -  Assess patient's ability to carry out ADLs; assess patient's baseline for ADL function and identify physical deficits which impact ability to perform ADLs (bathing, care of mouth/teeth, toileting, grooming, dressing, etc )  - Assess/evaluate cause of self-care deficits   - Assess range of motion  - Assess patient's mobility; develop plan if impaired  - Assess patient's need for assistive devices and provide as appropriate  - Encourage maximum independence but intervene and supervise when necessary  - Involve family in performance of ADLs  - Assess for home care needs following discharge   - Consider OT consult to assist with ADL evaluation and planning for discharge  - Provide patient education as appropriate  Outcome: Progressing  Goal: Maintain or return mobility status to optimal level  Description: INTERVENTIONS:  - Assess patient's baseline mobility status (ambulation, transfers, stairs, etc )    - Identify cognitive and physical deficits and behaviors that affect mobility  - Identify mobility aids required to assist with transfers and/or ambulation (gait belt, sit-to-stand, lift, walker, cane, etc )  - [place] fall precautions as indicated by assessment  - Record patient progress and toleration of activity level on Mobility SBAR; progress patient to next Phase/Stage  - Instruct patient to call for assistance with activity based on assessment  - Consider rehabilitation consult to assist with strengthening/weightbearing, etc   Outcome: Progressing     Problem: DISCHARGE PLANNING  Goal: Discharge to home or other facility with appropriate resources  Description: INTERVENTIONS:  - Identify barriers to discharge w/patient and caregiver  - Arrange for needed discharge resources and transportation as appropriate  - Identify discharge learning needs (meds, wound care, etc )  - Arrange for interpretive services to assist at discharge as needed  - Refer to Case Management Department for coordinating discharge planning if the patient needs post-hospital services based on physician/advanced practitioner order or complex needs related to functional status, cognitive ability, or social support system  Outcome: Progressing     Problem: Knowledge Deficit  Goal: Patient/family/caregiver demonstrates understanding of disease process, treatment plan, medications, and discharge instructions  Description: Complete learning assessment and assess knowledge base    Interventions:  - Provide teaching at level of understanding  - Provide teaching via preferred learning methods  Outcome: Progressing

## 2021-02-12 NOTE — PROGRESS NOTES
INTERNAL MEDICINE RESIDENCY PROGRESS NOTE     Name: Dylan Garrido   Age & Sex: 80 y o  female   MRN: [id number]  Unit/Bed#: -01   Encounter: [id number]  Team: [id number]    PATIENT INFORMATION     Name: Dylan Garrido   Age & Sex: 80 y o  female   MRN: [id number]  Hospital Stay Days: 2    ASSESSMENT/PLAN     Principal Problem:    Acute respiratory failure with hypoxia  Active Problems:    COVID-19 pneumonia    Atrial flutter ([address] )    Heart failure with preserved ejection fraction    Elevated d-dimer      * Acute respiratory failure with hypoxia  Assessment & Plan  Improving  Appears less dyspneic  Initially on 3 L NC O2  Saturating at 97%  Now on 2 L maintaining saturations are 97%  - continue oxygen supplementation to keep saturations greater than 92%  - with oxygen appropriately    COVID-19 pneumonia  Assessment & Plan  Currently managed for COVID-19 pneumonia  Unremarkable inflammatory markers so far  Procalcitonin x1 negative will follow reflex  On doxycycline for empiric atypical antimicrobial coverage  Will continue COVID-19 supportive therapy    Elevated d-dimer  Assessment & Plan  D-dimer elevated at presentation  Low suspicions for PE/DVT at this time  Will hold off lower extremity Doppler/CTA PE study  Heart failure with preserved ejection fraction  Assessment & Plan  Wt Readings from Last 3 Encounters:   02/10/21 78 kg (171 lb 15 3 oz)   01/28/21 78 1 kg (172 lb 3 2 oz)   02/18/20 70 3 kg (155 lb)     Poor I/O documentation  On IV Lasix 20 mg daily  Reports good urinary output  Pedal edema obviously improved and Creatinine remains at baseline  Will transition to home Lasix dose on 02/13/2021  Consider adding lisinopril to attenuate cardiac remodeling  Atrial flutter (HCC)  Assessment & Plan  Continue Cardizem and metoprolol  Continue Eliquis for anticoagulation  Disposition:  Inpatient    SUBJECTIVE     Patient seen and examined  No acute events overnight      Still requiring oxygen  Appears dyspneic on conversation  Denies chest pain, cough, nausea and vomiting  Spoke with daughter Bishop Luna [phone] updated her on the plan of care       OBJECTIVE     Vitals:    21 0733 21 1412 21 2110 21 0708   BP: 139/95 138/59 120/78 (!) 172/86   BP Location: Left arm Right arm Right arm Right arm   Pulse: 89 88 69 88   Resp: 20 16 17 20   Temp: (!) 97 3 °F (36 3 °C) 97 5 °F (36 4 °C) 97 5 °F (36 4 °C) (!) 96 3 °F (35 7 °C)   TempSrc: Tympanic Tympanic Tympanic Tympanic   SpO2: 98% 95% 97% 96%   Weight:       Height:          Temperature:   Temp (24hrs), Av 1 °F (36 2 °C), Min:96 3 °F (35 7 °C), Max:97 5 °F (36 4 °C)    Temperature: (!) 96 3 °F (35 7 °C)  Intake & Output:  I/O        07 - 02/10 0700 02/10 07 -  0700  07 -  0700    Urine (mL/kg/hr)  700     Total Output  700     Net  -700                Weights:   IBW: 50 1 kg    Body mass index is 31 45 kg/m²  Weight (last 2 days)     Date/Time   Weight    02/10/21 1900   78 (171 96)    02/10/21 1422   77 1 (170)            Physical Exam  Vitals signs reviewed  Constitutional:       General: Distressed: mild respiratory  Appearance: She is not toxic-appearing  Comments: Now on 2 L of NC O2  Improving  HENT:      Head: Normocephalic  Mouth/Throat:      Mouth: Mucous membranes are moist    Cardiovascular:      Rate and Rhythm: Normal rate and regular rhythm  Pulses: Normal pulses  Pulmonary:      Effort: No respiratory distress (mild)  Breath sounds: No stridor  No rales  Chest:      Chest wall: No tenderness  Abdominal:      General: Bowel sounds are normal  There is no distension  Palpations: Abdomen is soft  Tenderness: There is no abdominal tenderness  Musculoskeletal:      Right lower leg: No edema  Left lower leg: No edema  Neurological:      General: No focal deficit present        Mental Status: She is alert and oriented to person, place, and time  Mental status is at baseline  Psychiatric:         Mood and Affect: Mood normal        LABORATORY DATA     Labs: I have personally reviewed pertinent reports  Results from last 7 days   Lab Units 02/12/21  0903 02/11/21  0446 02/10/21  1949 02/10/21  1438   WBC Thousand/uL 6 17 5 20  --  6 57   HEMOGLOBIN g/dL 13 5 13 2  --  13 5   I STAT HEMOGLOBIN g/dl  --   --  15 0  --    HEMATOCRIT % 44 4 42 2  --  43 3   HEMATOCRIT, ISTAT %  --   --  44  --    PLATELETS Thousands/uL 295 308  --  304   NEUTROS PCT % 87*  --   --  85*   MONOS PCT % 4  --   --  6      Results from last 7 days   Lab Units 02/12/21  0903 02/11/21  0446 02/10/21  1949 02/10/21  1438   POTASSIUM mmol/L 4 1 3 9  --  4 4   CHLORIDE mmol/L 98 97  --  97   CO2 mmol/L 36* 35*  --  31   CO2, I-STAT mmol/L  --   --  31  --    BUN mg/dL 27* 25*  --  22*   CREATININE mg/dL 1 13* 1 25*  --  1 26*   CALCIUM mg/dL 8 3* 8 3*  --  8 7   ALK PHOS U/L  --   --   --  86 1   ALT U/L  --   --   --  28   AST U/L  --   --   --  29   GLUCOSE, ISTAT mg/dl  --   --  149*  --               Results from last 7 days   Lab Units 02/10/21  1438   INR  1 25*     Results from last 7 days   Lab Units 02/10/21  1438   LACTIC ACID mmol/L 1 5     Results from last 7 days   Lab Units 02/10/21  1438   TROPONIN I ng/mL <0 03       IMAGING & DIAGNOSTIC TESTING     Radiology Results: I have personally reviewed pertinent reports  Xr Chest 1 View Portable    Result Date: 2/10/2021  Impression: Patchy bilateral groundglass opacity with small effusions, likely a combination of Covid-19 pneumonia and pulmonary edema  Workstation performed: [id number]     Other Diagnostic Testing: I have personally reviewed pertinent reports      ACTIVE MEDICATIONS     Current Facility-Administered Medications   Medication Dose Route Frequency    acetaminophen (TYLENOL) tablet 650 mg  650 mg Oral Q6H PRN    apixaban (ELIQUIS) tablet 2 5 mg  2 5 mg Oral BID    ascorbic acid (VITAMIN C) tablet 1,000 mg  1,000 mg Oral Q12H [address]    atorvastatin (LIPITOR) tablet 40 mg  40 mg Oral Daily With Dinner    cholecalciferol (VITAMIN D3) tablet 2,000 Units  2,000 Units Oral Daily    dexamethasone (DECADRON) injection 6 mg  6 mg Intravenous Q24H    diltiazem (CARDIZEM CD) 24 hr capsule 180 mg  180 mg Oral Daily    doxycycline hyclate (VIBRAMYCIN) capsule 100 mg  100 mg Oral Q12H [address]    exemestane (AROMASIN) tablet 25 mg  25 mg Oral Daily    famotidine (PEPCID) tablet 20 mg  20 mg Oral Daily    furosemide (LASIX) injection 20 mg  20 mg Intravenous Daily    levothyroxine tablet 50 mcg  50 mcg Oral Early Morning    melatonin tablet 3 mg  3 mg Oral HS    metoprolol tartrate (LOPRESSOR) tablet 50 mg  50 mg Oral Q12H [facility]    zinc sulfate (ZINCATE) capsule 220 mg  220 mg Oral Daily    Followed by   Warren Verduzco ON 2/18/2021] multivitamin-minerals (CENTRUM ADULTS) tablet 1 tablet  1 tablet Oral Daily    ondansetron (ZOFRAN) injection 4 mg  4 mg Intravenous Q6H PRN    remdesivir (Veklury) 100 mg in sodium chloride 0 9 % 250 mL IVPB  100 mg Intravenous Q24H    senna (SENOKOT) tablet 8 6 mg  1 tablet Oral Daily    simethicone (MYLICON) chewable tablet 80 mg  80 mg Oral 4x Daily PRN       VTE Pharmacologic Prophylaxis: Reason for no pharmacologic prophylaxis On home Eliquis  VTE Mechanical Prophylaxis: sequential compression device    Portions of the record may have been created with voice recognition software  Occasional wrong word or "sound a like" substitutions may have occurred due to the inherent limitations of voice recognition software    Read the chart carefully and recognize, using context, where substitutions have occurred   ==  Cm Tavarez MD  [address] [facility]  Internal Medicine Residency PGY-1

## 2021-02-12 NOTE — PLAN OF CARE
Problem: Potential for Falls  Goal: Patient will remain free of falls  Description: INTERVENTIONS:  - Assess patient frequently for physical needs  -  Identify cognitive and physical deficits and behaviors that affect risk of falls    -  [place] fall precautions as indicated by assessment   - Educate patient/family on patient safety including physical limitations  - Instruct patient to call for assistance with activity based on assessment  - Modify environment to reduce risk of injury  - Consider OT/PT consult to assist with strengthening/mobility  Outcome: Progressing     Problem: PAIN - ADULT  Goal: Verbalizes/displays adequate comfort level or baseline comfort level  Description: Interventions:  - Encourage patient to monitor pain and request assistance  - Assess pain using appropriate pain scale  - Administer analgesics based on type and severity of pain and evaluate response  - Implement non-pharmacological measures as appropriate and evaluate response  - Consider cultural and social influences on pain and pain management  - Notify physician/advanced practitioner if interventions unsuccessful or patient reports new pain  Outcome: Progressing     Problem: INFECTION - ADULT  Goal: Absence or prevention of progression during hospitalization  Description: INTERVENTIONS:  - Assess and monitor for signs and symptoms of infection  - Monitor lab/diagnostic results  - Monitor all insertion sites, i e  indwelling lines, tubes, and drains  - Monitor endotracheal if appropriate and nasal secretions for changes in amount and color  - [place] appropriate cooling/warming therapies per order  - Administer medications as ordered  - Instruct and encourage patient and family to use good hand hygiene technique  - Identify and instruct in appropriate isolation precautions for identified infection/condition  Outcome: Progressing     Problem: SAFETY ADULT  Goal: Patient will remain free of falls  Description: INTERVENTIONS:  - Assess patient frequently for physical needs  -  Identify cognitive and physical deficits and behaviors that affect risk of falls  -  [place] fall precautions as indicated by assessment   - Educate patient/family on patient safety including physical limitations  - Instruct patient to call for assistance with activity based on assessment  - Consider OT/PT consult to assist with strengthening/mobility  Outcome: Progressing     Problem: DISCHARGE PLANNING  Goal: Discharge to home or other facility with appropriate resources  Description: INTERVENTIONS:  - Identify barriers to discharge w/patient and caregiver  - Arrange for needed discharge resources and transportation as appropriate  - Identify discharge learning needs (meds, wound care, etc )  - Arrange for interpretive services to assist at discharge as needed  - Refer to Case Management Department for coordinating discharge planning if the patient needs post-hospital services based on physician/advanced practitioner order or complex needs related to functional status, cognitive ability, or social support system  Outcome: Progressing     Problem: Knowledge Deficit  Goal: Patient/family/caregiver demonstrates understanding of disease process, treatment plan, medications, and discharge instructions  Description: Complete learning assessment and assess knowledge base    Interventions:  - Provide teaching at level of understanding  - Provide teaching via preferred learning methods  Outcome: Progressing

## 2021-02-13 ENCOUNTER — APPOINTMENT (INPATIENT)
Dept: RADIOLOGY | Facility: HOSPITAL | Age: 86
DRG: 177 | End: 2021-02-13
Payer: MEDICARE

## 2021-02-13 LAB
ANION GAP SERPL CALCULATED.3IONS-SCNC: 8 MMOL/L (ref 4–13)
BASOPHILS # BLD AUTO: 0 THOUSANDS/ΜL (ref 0–0.1)
BASOPHILS NFR BLD AUTO: 0 % (ref 0–1)
BUN SERPL-MCNC: 32 MG/DL (ref 6–20)
CALCIUM SERPL-MCNC: 8.3 MG/DL (ref 8.4–10.2)
CHLORIDE SERPL-SCNC: 95 MMOL/L (ref 96–108)
CO2 SERPL-SCNC: 36 MMOL/L (ref 22–33)
CREAT SERPL-MCNC: 1.15 MG/DL (ref 0.4–1.1)
EOSINOPHIL # BLD AUTO: 0 THOUSAND/ΜL (ref 0–0.61)
EOSINOPHIL NFR BLD AUTO: 0 % (ref 0–6)
ERYTHROCYTE [DISTWIDTH] IN BLOOD BY AUTOMATED COUNT: 19.1 % (ref 11.6–15.1)
GFR SERPL CREATININE-BSD FRML MDRD: 41 ML/MIN/1.73SQ M
GLUCOSE SERPL-MCNC: 166 MG/DL (ref 65–140)
HCT VFR BLD AUTO: 46.3 % (ref 34.8–46.1)
HGB BLD-MCNC: 14.1 G/DL (ref 11.5–15.4)
IMM GRANULOCYTES # BLD AUTO: 0.02 THOUSAND/UL (ref 0–0.2)
IMM GRANULOCYTES NFR BLD AUTO: 0 % (ref 0–2)
LYMPHOCYTES # BLD AUTO: 0.74 THOUSANDS/ΜL (ref 0.6–4.47)
LYMPHOCYTES NFR BLD AUTO: 9 % (ref 14–44)
MCH RBC QN AUTO: 25.8 PG (ref 26.8–34.3)
MCHC RBC AUTO-ENTMCNC: 30.5 G/DL (ref 31.4–37.4)
MCV RBC AUTO: 85 FL (ref 82–98)
MONOCYTES # BLD AUTO: 0.3 THOUSAND/ΜL (ref 0.17–1.22)
MONOCYTES NFR BLD AUTO: 4 % (ref 4–12)
NEUTROPHILS # BLD AUTO: 6.95 THOUSANDS/ΜL (ref 1.85–7.62)
NEUTS SEG NFR BLD AUTO: 87 % (ref 43–75)
PLATELET # BLD AUTO: 342 THOUSANDS/UL (ref 149–390)
PMV BLD AUTO: 11.3 FL (ref 8.9–12.7)
POTASSIUM SERPL-SCNC: 4.3 MMOL/L (ref 3.5–5)
RBC # BLD AUTO: 5.46 MILLION/UL (ref 3.81–5.12)
SODIUM SERPL-SCNC: 139 MMOL/L (ref 133–145)
WBC # BLD AUTO: 8.01 THOUSAND/UL (ref 4.31–10.16)

## 2021-02-13 PROCEDURE — 71045 X-RAY EXAM CHEST 1 VIEW: CPT

## 2021-02-13 PROCEDURE — 80048 BASIC METABOLIC PNL TOTAL CA: CPT | Performed by: INTERNAL MEDICINE

## 2021-02-13 PROCEDURE — 99233 SBSQ HOSP IP/OBS HIGH 50: CPT | Performed by: INTERNAL MEDICINE

## 2021-02-13 PROCEDURE — 85025 COMPLETE CBC W/AUTO DIFF WBC: CPT | Performed by: INTERNAL MEDICINE

## 2021-02-13 RX ORDER — FUROSEMIDE 10 MG/ML
20 INJECTION INTRAMUSCULAR; INTRAVENOUS ONCE
Status: COMPLETED | OUTPATIENT
Start: 2021-02-13 | End: 2021-02-13

## 2021-02-13 RX ADMIN — MELATONIN 3 MG: at 21:12

## 2021-02-13 RX ADMIN — APIXABAN 2.5 MG: 2.5 TABLET, FILM COATED ORAL at 17:42

## 2021-02-13 RX ADMIN — ZINC SULFATE 220 MG (50 MG) CAPSULE 220 MG: CAPSULE at 12:12

## 2021-02-13 RX ADMIN — DEXAMETHASONE SODIUM PHOSPHATE 6 MG: 4 INJECTION, SOLUTION INTRA-ARTICULAR; INTRALESIONAL; INTRAMUSCULAR; INTRAVENOUS; SOFT TISSUE at 17:50

## 2021-02-13 RX ADMIN — FUROSEMIDE 20 MG: 10 INJECTION, SOLUTION INTRAVENOUS at 10:51

## 2021-02-13 RX ADMIN — METOPROLOL TARTRATE 50 MG: 50 TABLET, FILM COATED ORAL at 21:12

## 2021-02-13 RX ADMIN — DOXYCYCLINE 100 MG: 100 CAPSULE ORAL at 21:12

## 2021-02-13 RX ADMIN — REMDESIVIR 100 MG: 100 INJECTION, POWDER, LYOPHILIZED, FOR SOLUTION INTRAVENOUS at 18:10

## 2021-02-13 RX ADMIN — APIXABAN 2.5 MG: 2.5 TABLET, FILM COATED ORAL at 10:21

## 2021-02-13 RX ADMIN — LEVOTHYROXINE SODIUM 50 MCG: 50 TABLET ORAL at 06:01

## 2021-02-13 RX ADMIN — OXYCODONE HYDROCHLORIDE AND ACETAMINOPHEN 1000 MG: 500 TABLET ORAL at 21:12

## 2021-02-13 RX ADMIN — ATORVASTATIN CALCIUM 40 MG: 40 TABLET, FILM COATED ORAL at 17:41

## 2021-02-13 RX ADMIN — FAMOTIDINE 20 MG: 20 TABLET ORAL at 12:12

## 2021-02-13 RX ADMIN — FUROSEMIDE 20 MG: 10 INJECTION, SOLUTION INTRAMUSCULAR; INTRAVENOUS at 10:15

## 2021-02-13 RX ADMIN — Medication 2000 UNITS: at 12:12

## 2021-02-13 RX ADMIN — DOXYCYCLINE 100 MG: 100 CAPSULE ORAL at 10:21

## 2021-02-13 RX ADMIN — DILTIAZEM HYDROCHLORIDE 180 MG: 180 CAPSULE, COATED, EXTENDED RELEASE ORAL at 10:21

## 2021-02-13 RX ADMIN — OXYCODONE HYDROCHLORIDE AND ACETAMINOPHEN 1000 MG: 500 TABLET ORAL at 12:11

## 2021-02-13 RX ADMIN — METOPROLOL TARTRATE 50 MG: 50 TABLET, FILM COATED ORAL at 10:21

## 2021-02-13 NOTE — ASSESSMENT & PLAN NOTE
Wt Readings from Last 3 Encounters:   02/10/21 78 kg (171 lb 15 3 oz)   01/28/21 78 1 kg (172 lb 3 2 oz)   02/18/20 70 3 kg (155 lb)

## 2021-02-13 NOTE — PLAN OF CARE
Problem: Potential for Falls  Goal: Patient will remain free of falls  Description: INTERVENTIONS:  - Assess patient frequently for physical needs  -  Identify cognitive and physical deficits and behaviors that affect risk of falls    -  [place] fall precautions as indicated by assessment   - Educate patient/family on patient safety including physical limitations  - Instruct patient to call for assistance with activity based on assessment  - Modify environment to reduce risk of injury  - Consider OT/PT consult to assist with strengthening/mobility  Outcome: Progressing     Problem: PAIN - ADULT  Goal: Verbalizes/displays adequate comfort level or baseline comfort level  Description: Interventions:  - Encourage patient to monitor pain and request assistance  - Assess pain using appropriate pain scale  - Administer analgesics based on type and severity of pain and evaluate response  - Implement non-pharmacological measures as appropriate and evaluate response  - Consider cultural and social influences on pain and pain management  - Notify physician/advanced practitioner if interventions unsuccessful or patient reports new pain  Outcome: Progressing     Problem: INFECTION - ADULT  Goal: Absence or prevention of progression during hospitalization  Description: INTERVENTIONS:  - Assess and monitor for signs and symptoms of infection  - Monitor lab/diagnostic results  - Monitor all insertion sites, i e  indwelling lines, tubes, and drains  - Monitor endotracheal if appropriate and nasal secretions for changes in amount and color  - [place] appropriate cooling/warming therapies per order  - Administer medications as ordered  - Instruct and encourage patient and family to use good hand hygiene technique  - Identify and instruct in appropriate isolation precautions for identified infection/condition  Outcome: Progressing     Problem: SAFETY ADULT  Goal: Patient will remain free of falls  Description: INTERVENTIONS:  - Assess patient frequently for physical needs  -  Identify cognitive and physical deficits and behaviors that affect risk of falls  -  [place] fall precautions as indicated by assessment   - Educate patient/family on patient safety including physical limitations  - Instruct patient to call for assistance with activity based on assessment  - Consider OT/PT consult to assist with strengthening/mobility  Outcome: Progressing     Problem: DISCHARGE PLANNING  Goal: Discharge to home or other facility with appropriate resources  Description: INTERVENTIONS:  - Identify barriers to discharge w/patient and caregiver  - Arrange for needed discharge resources and transportation as appropriate  - Identify discharge learning needs (meds, wound care, etc )  - Arrange for interpretive services to assist at discharge as needed  - Refer to Case Management Department for coordinating discharge planning if the patient needs post-hospital services based on physician/advanced practitioner order or complex needs related to functional status, cognitive ability, or social support system  Outcome: Progressing     Problem: Knowledge Deficit  Goal: Patient/family/caregiver demonstrates understanding of disease process, treatment plan, medications, and discharge instructions  Description: Complete learning assessment and assess knowledge base    Interventions:  - Provide teaching at level of understanding  - Provide teaching via preferred learning methods  Outcome: Progressing

## 2021-02-13 NOTE — PROGRESS NOTES
INTERNAL MEDICINE RESIDENCY PROGRESS NOTE     Name: Domingo Gao   Age & Sex: 80 y o  female   MRN: [id number]  Unit/Bed#: -01   Encounter: [id number]  Team: SLIM    PATIENT INFORMATION     Name: Domingo Gao   Age & Sex: 80 y o  female   MRN: [id number]  Hospital Stay Days: 3    ASSESSMENT/PLAN     Principal Problem:    Acute respiratory failure with hypoxia  Active Problems:    COVID-19 pneumonia    Heart failure with preserved ejection fraction    Atrial flutter (HCC)    Elevated d-dimer      COVID-19 pneumonia  Assessment & Plan  Currently managed for COVID-19 pneumonia  Unremarkable inflammatory markers so far  Procalcitonin x2 negative  Will discontinue doxycycline  Will continue COVID-19 supportive therapy    * Acute respiratory failure with hypoxia  Assessment & Plan  Improving  Appears less dyspneic  Initially on 3 L NC O2  Saturating at 97%  Now on 2 L maintaining saturations are 97%  - continue oxygen supplementation to keep saturations greater than 92%  - wean oxygen appropriately    2/13: Patient with respiratory distress this morning with accessory muscle use, tachypnea  Saturation 87% on r a , improved to 93-95% with 2L NC  On exam mild expiratory wheezing and bibasilar crackles noted R>L  Plan:  · Will get chest x-ray  · Will give 20mg IV Lasix extra now on top of standing dose of 20mg daily  · Will closely monitor clinical response  Heart failure with preserved ejection fraction  Assessment & Plan  Wt Readings from Last 3 Encounters:   02/10/21 78 kg (171 lb 15 3 oz)   01/28/21 78 1 kg (172 lb 3 2 oz)   02/18/20 70 3 kg (155 lb)     Poor I/O documentation  On IV Lasix 20 mg daily  Reports good urinary output  Pedal edema obviously improved and Creatinine remains at baseline  Consider adding lisinopril to attenuate cardiac remodeling  Initial plan was to transition back to p o   Lasix today however patient is breathing worse this morning and has some crackles on exam  Will give total of 40mg Lasix IV today and monitor  Elevated d-dimer  Assessment & Plan  D-dimer elevated at presentation  Low suspicions for PE/DVT at this time  Will hold off lower extremity Doppler/CTA PE study  Patient is on Eliquis  Atrial flutter (HCC)  Assessment & Plan  Continue Cardizem and metoprolol  Continue Eliquis for anticoagulation  Disposition:  Inpatient    SUBJECTIVE     Patient seen and examined  Per RN patient was placed back on oxygen overnight because she desaturated to 86%  Was doing better this morning and was again weaned off oxygen  At time of my assessment patient has labored breathing with use of accessory muscles  Reports that she "can't breath"  Saturation is 87% on the monitor  Sats improved to 95% on 2L NC  Patient reported some improvement but remains labored and tachypneic  Exam reveals bilateral crackles and mild expiratory wheezing  Will order x-ray and give 20 mg IV Lasix extra for total of 40 mg today  OBJECTIVE     Vitals:    21 0930 21 0939 21 1019 21 1029   BP:       BP Location:       Pulse:   (!) 108 (!) 108   Resp:   (!) 32 22   Temp:       TempSrc:       SpO2: (!) 86% 94%     Weight:       Height:          Temperature:   Temp (24hrs), Av 6 °F (36 4 °C), Min:96 9 °F (36 1 °C), Max:97 9 °F (36 6 °C)    Temperature: 97 7 °F (36 5 °C)  Intake & Output:  I/O        07 - 02/10 0700 02/10 07 -  0700  07 -  0700    Urine (mL/kg/hr)  700     Total Output  700     Net  -700                Weights:   IBW: 50 1 kg    Body mass index is 31 45 kg/m²  Weight (last 2 days)     None        Physical Exam  Vitals signs reviewed  Constitutional:       General: She is in acute distress (respiratory)  Appearance: She is ill-appearing  Comments: Now on 2 L of NC O2  Improving  HENT:      Head: Normocephalic        Mouth/Throat:      Mouth: Mucous membranes are moist    Cardiovascular:      Rate and Rhythm: Regular rhythm  Tachycardia present  Pulses: Normal pulses  Pulmonary:      Effort: Tachypnea, accessory muscle usage and respiratory distress (mild) present  Breath sounds: No stridor  Wheezing and rales present  Chest:      Chest wall: No tenderness  Abdominal:      General: Bowel sounds are normal  There is no distension  Palpations: Abdomen is soft  Tenderness: There is no abdominal tenderness  Musculoskeletal:      Right lower leg: No edema  Left lower leg: No edema  Neurological:      General: No focal deficit present  Mental Status: She is alert and oriented to person, place, and time  Mental status is at baseline  Psychiatric:         Mood and Affect: Mood normal        LABORATORY DATA     Labs: I have personally reviewed pertinent reports  Results from last 7 days   Lab Units 02/13/21  0458 02/12/21  0903 02/11/21  0446  02/10/21  1438   WBC Thousand/uL 8 01 6 17 5 20  --  6 57   HEMOGLOBIN g/dL 14 1 13 5 13 2  --  13 5   I STAT HEMOGLOBIN   --   --   --    < >  --    HEMATOCRIT % 46 3* 44 4 42 2  --  43 3   HEMATOCRIT, ISTAT   --   --   --    < >  --    PLATELETS Thousands/uL 342 295 308  --  304   NEUTROS PCT % 87* 87*  --   --  85*   MONOS PCT % 4 4  --   --  6    < > = values in this interval not displayed        Results from last 7 days   Lab Units 02/13/21  0458 02/12/21  0903 02/11/21  0446 02/10/21  1949 02/10/21  1438   POTASSIUM mmol/L 4 3 4 1 3 9  --  4 4   CHLORIDE mmol/L 95* 98 97  --  97   CO2 mmol/L 36* 36* 35*  --  31   CO2, I-STAT mmol/L  --   --   --  31  --    BUN mg/dL 32* 27* 25*  --  22*   CREATININE mg/dL 1 15* 1 13* 1 25*  --  1 26*   CALCIUM mg/dL 8 3* 8 3* 8 3*  --  8 7   ALK PHOS U/L  --   --   --   --  86 1   ALT U/L  --   --   --   --  28   AST U/L  --   --   --   --  29   GLUCOSE, ISTAT mg/dl  --   --   --  149*  --               Results from last 7 days   Lab Units 02/10/21  1438   INR  1 25*     Results from last 7 days   Lab Units 02/10/21  1438   LACTIC ACID mmol/L 1 5     Results from last 7 days   Lab Units 02/10/21  1438   TROPONIN I ng/mL <0 03       IMAGING & DIAGNOSTIC TESTING     Radiology Results: I have personally reviewed pertinent reports  Xr Chest 1 View Portable    Result Date: 2/10/2021  Impression: Patchy bilateral groundglass opacity with small effusions, likely a combination of Covid-19 pneumonia and pulmonary edema  Workstation performed: [id number]     Other Diagnostic Testing: I have personally reviewed pertinent reports      ACTIVE MEDICATIONS     Current Facility-Administered Medications   Medication Dose Route Frequency    acetaminophen (TYLENOL) tablet 650 mg  650 mg Oral Q6H PRN    apixaban (ELIQUIS) tablet 2 5 mg  2 5 mg Oral BID    ascorbic acid (VITAMIN C) tablet 1,000 mg  1,000 mg Oral Q12H [facility]    atorvastatin (LIPITOR) tablet 40 mg  40 mg Oral Daily With Dinner    cholecalciferol (VITAMIN D3) tablet 2,000 Units  2,000 Units Oral Daily    dexamethasone (DECADRON) injection 6 mg  6 mg Intravenous Q24H    diltiazem (CARDIZEM CD) 24 hr capsule 180 mg  180 mg Oral Daily    doxycycline hyclate (VIBRAMYCIN) capsule 100 mg  100 mg Oral Q12H [facility]    exemestane (AROMASIN) tablet 25 mg  25 mg Oral Daily    famotidine (PEPCID) tablet 20 mg  20 mg Oral Daily    furosemide (LASIX) injection 20 mg  20 mg Intravenous Daily    levothyroxine tablet 50 mcg  50 mcg Oral Early Morning    melatonin tablet 3 mg  3 mg Oral HS    metoprolol tartrate (LOPRESSOR) tablet 50 mg  50 mg Oral Q12H [facility]    zinc sulfate (ZINCATE) capsule 220 mg  220 mg Oral Daily    Followed by   Filemon Horne ON 2/18/2021] multivitamin-minerals (CENTRUM ADULTS) tablet 1 tablet  1 tablet Oral Daily    ondansetron (ZOFRAN) injection 4 mg  4 mg Intravenous Q6H PRN    remdesivir (Veklury) 100 mg in sodium chloride 0 9 % 250 mL IVPB  100 mg Intravenous Q24H    senna (SENOKOT) tablet 8 6 mg  1 tablet Oral Daily    simethicone (MYLICON) chewable tablet 80 mg  80 mg Oral 4x Daily PRN       VTE Pharmacologic Prophylaxis: Reason for no pharmacologic prophylaxis On home Eliquis  VTE Mechanical Prophylaxis: sequential compression device    Portions of the record may have been created with voice recognition software  Occasional wrong word or "sound a like" substitutions may have occurred due to the inherent limitations of voice recognition software    Read the chart carefully and recognize, using context, where substitutions have occurred   ==  Cristo Narayanan MD  [address]  Internal Medicine Residency PGY-1

## 2021-02-14 LAB
ANION GAP SERPL CALCULATED.3IONS-SCNC: 7 MMOL/L (ref 4–13)
BASOPHILS # BLD AUTO: 0 THOUSANDS/ΜL (ref 0–0.1)
BASOPHILS NFR BLD AUTO: 0 % (ref 0–1)
BUN SERPL-MCNC: 34 MG/DL (ref 6–20)
CALCIUM SERPL-MCNC: 8.3 MG/DL (ref 8.4–10.2)
CHLORIDE SERPL-SCNC: 96 MMOL/L (ref 96–108)
CO2 SERPL-SCNC: 35 MMOL/L (ref 22–33)
CREAT SERPL-MCNC: 1.03 MG/DL (ref 0.4–1.1)
EOSINOPHIL # BLD AUTO: 0 THOUSAND/ΜL (ref 0–0.61)
EOSINOPHIL NFR BLD AUTO: 0 % (ref 0–6)
ERYTHROCYTE [DISTWIDTH] IN BLOOD BY AUTOMATED COUNT: 18.5 % (ref 11.6–15.1)
GFR SERPL CREATININE-BSD FRML MDRD: 47 ML/MIN/1.73SQ M
GLUCOSE SERPL-MCNC: 172 MG/DL (ref 65–140)
HCT VFR BLD AUTO: 43 % (ref 34.8–46.1)
HGB BLD-MCNC: 13.3 G/DL (ref 11.5–15.4)
IMM GRANULOCYTES # BLD AUTO: 0.02 THOUSAND/UL (ref 0–0.2)
IMM GRANULOCYTES NFR BLD AUTO: 0 % (ref 0–2)
LYMPHOCYTES # BLD AUTO: 0.64 THOUSANDS/ΜL (ref 0.6–4.47)
LYMPHOCYTES NFR BLD AUTO: 9 % (ref 14–44)
MCH RBC QN AUTO: 26.1 PG (ref 26.8–34.3)
MCHC RBC AUTO-ENTMCNC: 30.9 G/DL (ref 31.4–37.4)
MCV RBC AUTO: 84 FL (ref 82–98)
MONOCYTES # BLD AUTO: 0.2 THOUSAND/ΜL (ref 0.17–1.22)
MONOCYTES NFR BLD AUTO: 3 % (ref 4–12)
NEUTROPHILS # BLD AUTO: 5.92 THOUSANDS/ΜL (ref 1.85–7.62)
NEUTS SEG NFR BLD AUTO: 88 % (ref 43–75)
PLATELET # BLD AUTO: 314 THOUSANDS/UL (ref 149–390)
PMV BLD AUTO: 11.7 FL (ref 8.9–12.7)
POTASSIUM SERPL-SCNC: 4.6 MMOL/L (ref 3.5–5)
RBC # BLD AUTO: 5.1 MILLION/UL (ref 3.81–5.12)
SODIUM SERPL-SCNC: 138 MMOL/L (ref 133–145)
WBC # BLD AUTO: 6.78 THOUSAND/UL (ref 4.31–10.16)

## 2021-02-14 PROCEDURE — 80048 BASIC METABOLIC PNL TOTAL CA: CPT | Performed by: INTERNAL MEDICINE

## 2021-02-14 PROCEDURE — 99233 SBSQ HOSP IP/OBS HIGH 50: CPT | Performed by: INTERNAL MEDICINE

## 2021-02-14 PROCEDURE — 85025 COMPLETE CBC W/AUTO DIFF WBC: CPT | Performed by: INTERNAL MEDICINE

## 2021-02-14 RX ADMIN — APIXABAN 2.5 MG: 2.5 TABLET, FILM COATED ORAL at 10:18

## 2021-02-14 RX ADMIN — DOXYCYCLINE 100 MG: 100 CAPSULE ORAL at 21:53

## 2021-02-14 RX ADMIN — METOPROLOL TARTRATE 50 MG: 50 TABLET, FILM COATED ORAL at 10:19

## 2021-02-14 RX ADMIN — OXYCODONE HYDROCHLORIDE AND ACETAMINOPHEN 1000 MG: 500 TABLET ORAL at 21:53

## 2021-02-14 RX ADMIN — ZINC SULFATE 220 MG (50 MG) CAPSULE 220 MG: CAPSULE at 10:19

## 2021-02-14 RX ADMIN — OXYCODONE HYDROCHLORIDE AND ACETAMINOPHEN 1000 MG: 500 TABLET ORAL at 10:18

## 2021-02-14 RX ADMIN — FAMOTIDINE 20 MG: 20 TABLET ORAL at 10:18

## 2021-02-14 RX ADMIN — MELATONIN 3 MG: at 21:53

## 2021-02-14 RX ADMIN — Medication 2000 UNITS: at 10:18

## 2021-02-14 RX ADMIN — METOPROLOL TARTRATE 50 MG: 50 TABLET, FILM COATED ORAL at 21:53

## 2021-02-14 RX ADMIN — FUROSEMIDE 20 MG: 10 INJECTION, SOLUTION INTRAMUSCULAR; INTRAVENOUS at 10:19

## 2021-02-14 RX ADMIN — SENNOSIDES 8.6 MG: 8.6 TABLET, FILM COATED ORAL at 10:19

## 2021-02-14 RX ADMIN — ATORVASTATIN CALCIUM 40 MG: 40 TABLET, FILM COATED ORAL at 16:22

## 2021-02-14 RX ADMIN — DEXAMETHASONE SODIUM PHOSPHATE 6 MG: 4 INJECTION, SOLUTION INTRA-ARTICULAR; INTRALESIONAL; INTRAMUSCULAR; INTRAVENOUS; SOFT TISSUE at 18:07

## 2021-02-14 RX ADMIN — DOXYCYCLINE 100 MG: 100 CAPSULE ORAL at 10:18

## 2021-02-14 RX ADMIN — DILTIAZEM HYDROCHLORIDE 180 MG: 180 CAPSULE, COATED, EXTENDED RELEASE ORAL at 10:18

## 2021-02-14 RX ADMIN — REMDESIVIR 100 MG: 100 INJECTION, POWDER, LYOPHILIZED, FOR SOLUTION INTRAVENOUS at 18:09

## 2021-02-14 RX ADMIN — LEVOTHYROXINE SODIUM 50 MCG: 50 TABLET ORAL at 05:39

## 2021-02-14 RX ADMIN — APIXABAN 2.5 MG: 2.5 TABLET, FILM COATED ORAL at 18:07

## 2021-02-14 NOTE — PLAN OF CARE
Problem: Potential for Falls  Goal: Patient will remain free of falls  Description: INTERVENTIONS:  - Assess patient frequently for physical needs  -  Identify cognitive and physical deficits and behaviors that affect risk of falls    -  [place] fall precautions as indicated by assessment   - Educate patient/family on patient safety including physical limitations  - Instruct patient to call for assistance with activity based on assessment  - Modify environment to reduce risk of injury  - Consider OT/PT consult to assist with strengthening/mobility  Outcome: Progressing     Problem: PAIN - ADULT  Goal: Verbalizes/displays adequate comfort level or baseline comfort level  Description: Interventions:  - Encourage patient to monitor pain and request assistance  - Assess pain using appropriate pain scale  - Administer analgesics based on type and severity of pain and evaluate response  - Implement non-pharmacological measures as appropriate and evaluate response  - Consider cultural and social influences on pain and pain management  - Notify physician/advanced practitioner if interventions unsuccessful or patient reports new pain  Outcome: Progressing     Problem: INFECTION - ADULT  Goal: Absence or prevention of progression during hospitalization  Description: INTERVENTIONS:  - Assess and monitor for signs and symptoms of infection  - Monitor lab/diagnostic results  - Monitor all insertion sites, i e  indwelling lines, tubes, and drains  -- Administer medications as ordered  - Instruct and encourage patient and family to use good hand hygiene technique  - Identify and instruct in appropriate isolation precautions for identified infection/condition  Outcome: Progressing  Goal: Absence of fever/infection during neutropenic period  Description: INTERVENTIONS:  - Monitor WBC    Outcome: Progressing     Problem: SAFETY ADULT  Goal: Patient will remain free of falls  Description: INTERVENTIONS:  - Assess patient frequently for physical needs  -  Identify cognitive and physical deficits and behaviors that affect risk of falls    -  [place] fall precautions as indicated by assessment   - Educate patient/family on patient safety including physical limitations  - Instruct patient to call for assistance with activity based on assessment  - Modify environment to reduce risk of injury  - Consider OT/PT consult to assist with strengthening/mobility  Outcome: Progressing  Goal: Maintain or return to baseline ADL function  Description: INTERVENTIONS:  -  Assess patient's ability to carry out ADLs; assess patient's baseline for ADL function and identify physical deficits which impact ability to perform ADLs (bathing, care of mouth/teeth, toileting, grooming, dressing, etc )  - Assess/evaluate cause of self-care deficits   - Assess range of motion  - Assess patient's mobility; develop plan if impaired  - Assess patient's need for assistive devices and provide as appropriate  - Encourage maximum independence but intervene and supervise when necessary  - Involve family in performance of ADLs  - Assess for home care needs following discharge   - Consider OT consult to assist with ADL evaluation and planning for discharge  - Provide patient education as appropriate  Outcome: Progressing  Goal: Maintain or return mobility status to optimal level  Description: INTERVENTIONS:  - Assess patient's baseline mobility status (ambulation, transfers, stairs, etc )    - Identify cognitive and physical deficits and behaviors that affect mobility  - Identify mobility aids required to assist with transfers and/or ambulation (gait belt, sit-to-stand, lift, walker, cane, etc )  - [place] fall precautions as indicated by assessment  - Record patient progress and toleration of activity level on Mobility SBAR; progress patient to next Phase/Stage  - Instruct patient to call for assistance with activity based on assessment  - Consider rehabilitation consult to assist with strengthening/weightbearing, etc   Outcome: Progressing     Problem: DISCHARGE PLANNING  Goal: Discharge to home or other facility with appropriate resources  Description: INTERVENTIONS:  - Identify barriers to discharge w/patient and caregiver  - Arrange for needed discharge resources and transportation as appropriate  - Identify discharge learning needs (meds, wound care, etc )  - Arrange for interpretive services to assist at discharge as needed  - Refer to Case Management Department for coordinating discharge planning if the patient needs post-hospital services based on physician/advanced practitioner order or complex needs related to functional status, cognitive ability, or social support system  Outcome: Progressing     Problem: Knowledge Deficit  Goal: Patient/family/caregiver demonstrates understanding of disease process, treatment plan, medications, and discharge instructions  Description: Complete learning assessment and assess knowledge base    Interventions:  - Provide teaching at level of understanding  - Provide teaching via preferred learning methods  Outcome: Progressing

## 2021-02-14 NOTE — PROGRESS NOTES
INTERNAL MEDICINE RESIDENCY PROGRESS NOTE     Name: Elver Lucas   Age & Sex: 80 y o  female   MRN: [id number]  Unit/Bed#: -01   Encounter: [id number]  Team: SLIM    PATIENT INFORMATION     Name: Elver Lucas   Age & Sex: 80 y o  female   MRN: [id number]  Hospital Stay Days: 4    ASSESSMENT/PLAN     Principal Problem:    Acute respiratory failure with hypoxia  Active Problems:    COVID-19 pneumonia    Atrial flutter ([address] )    Heart failure with preserved ejection fraction    Elevated d-dimer      * Acute respiratory failure with hypoxia  Assessment & Plan  Still having intermittent respiratory distress requiring oxygen supplementation  Received 40 mg of IV Lasix yesterday due to worsening respiratory distress  Today, patient appears stable  On 2 L NC O2 with sats at 94  Will continue oxygen supplementation repeat saturations greater than 92%  · Will closely monitor clinical response  COVID-19 pneumonia  Assessment & Plan  Currently managed for COVID-19 pneumonia  Unremarkable inflammatory markers so far  Will continue COVID-19 supportive therapy    Elevated d-dimer  Assessment & Plan  D-dimer elevated at presentation  Low suspicions for PE/DVT at this time  Will hold off lower extremity Doppler/CTA PE study  Patient is on Eliquis  Heart failure with preserved ejection fraction  Assessment & Plan  Wt Readings from Last 3 Encounters:   02/10/21 78 kg (171 lb 15 3 oz)   01/28/21 78 1 kg (172 lb 3 2 oz)   02/18/20 70 3 kg (155 lb)     Net -1L yesterday  Pedal edema obviously improved and Creatinine remains at baseline  Consider adding lisinopril to attenuate cardiac remodeling  Will transition to p o  Lasix on 02/15/2021    Atrial flutter (HCC)  Assessment & Plan  Continue Cardizem and metoprolol  Continue Eliquis for anticoagulation  Disposition:  Inpatient    SUBJECTIVE     Patient seen and examined  No acute overnight event    Noted to have desaturated  2/13 requiring transition back to oxygen supplementation  Received 40 mg of IV Lasix yesterday with net negative 1 L  OBJECTIVE     Vitals:    21 2100 21 0715 21 0720 21 0748   BP: 131/76   143/94   BP Location: Right arm   Right arm   Pulse: 69   90   Resp: 22   22   Temp: (!) 97 1 °F (36 2 °C)   (!) 97 3 °F (36 3 °C)   TempSrc: Tympanic   Tympanic   SpO2: 99% (!) 78% 93% 96%   Weight:       Height:          Temperature:   Temp (24hrs), Av 4 °F (36 3 °C), Min:97 1 °F (36 2 °C), Max:97 9 °F (36 6 °C)    Temperature: (!) 97 3 °F (36 3 °C)  Intake & Output:  I/O        07 - 02/10 0700 02/10 07 -  0700  07 -  0700    Urine (mL/kg/hr)  700     Total Output  700     Net  -700                Weights:   IBW: 50 1 kg    Body mass index is 31 45 kg/m²  Weight (last 2 days)     None        Physical Exam  Vitals signs reviewed  Constitutional:       General: She is not in acute distress (respiratory)  Appearance: She is ill-appearing  HENT:      Head: Normocephalic  Mouth/Throat:      Mouth: Mucous membranes are moist    Cardiovascular:      Rate and Rhythm: Regular rhythm  Tachycardia present  Pulses: Normal pulses  Pulmonary:      Effort: Respiratory distress (mild) present  No tachypnea or accessory muscle usage  Breath sounds: No stridor  Rales present  Chest:      Chest wall: No tenderness  Abdominal:      General: Bowel sounds are normal  There is no distension  Palpations: Abdomen is soft  Tenderness: There is no abdominal tenderness  Musculoskeletal:      Right lower leg: No edema  Left lower leg: No edema  Neurological:      General: No focal deficit present  Mental Status: She is alert and oriented to person, place, and time  Mental status is at baseline  Psychiatric:         Mood and Affect: Mood normal        LABORATORY DATA     Labs: I have personally reviewed pertinent reports    Results from last 7 days   Lab Units 02/14/21  0604 02/13/21  0458 02/12/21  0903   WBC Thousand/uL 6 78 8 01 6 17   HEMOGLOBIN g/dL 13 3 14 1 13 5   HEMATOCRIT % 43 0 46 3* 44 4   PLATELETS Thousands/uL 314 342 295   NEUTROS PCT % 88* 87* 87*   MONOS PCT % 3* 4 4      Results from last 7 days   Lab Units 02/14/21  0604 02/13/21  0458 02/12/21  0903  02/10/21  1949 02/10/21  1438   POTASSIUM mmol/L 4 6 4 3 4 1   < >  --  4 4   CHLORIDE mmol/L 96 95* 98   < >  --  97   CO2 mmol/L 35* 36* 36*   < >  --  31   CO2, I-STAT mmol/L  --   --   --   --  31  --    BUN mg/dL 34* 32* 27*   < >  --  22*   CREATININE mg/dL 1 03 1 15* 1 13*   < >  --  1 26*   CALCIUM mg/dL 8 3* 8 3* 8 3*   < >  --  8 7   ALK PHOS U/L  --   --   --   --   --  86 1   ALT U/L  --   --   --   --   --  28   AST U/L  --   --   --   --   --  29   GLUCOSE, ISTAT mg/dl  --   --   --   --  149*  --     < > = values in this interval not displayed  Results from last 7 days   Lab Units 02/10/21  1438   INR  1 25*     Results from last 7 days   Lab Units 02/10/21  1438   LACTIC ACID mmol/L 1 5     Results from last 7 days   Lab Units 02/10/21  1438   TROPONIN I ng/mL <0 03       IMAGING & DIAGNOSTIC TESTING     Radiology Results: I have personally reviewed pertinent reports  Xr Chest 1 View Portable    Result Date: 2/10/2021  Impression: Patchy bilateral groundglass opacity with small effusions, likely a combination of Covid-19 pneumonia and pulmonary edema  Workstation performed: [id number]     Other Diagnostic Testing: I have personally reviewed pertinent reports      ACTIVE MEDICATIONS     Current Facility-Administered Medications   Medication Dose Route Frequency    acetaminophen (TYLENOL) tablet 650 mg  650 mg Oral Q6H PRN    apixaban (ELIQUIS) tablet 2 5 mg  2 5 mg Oral BID    ascorbic acid (VITAMIN C) tablet 1,000 mg  1,000 mg Oral Q12H [facility]    atorvastatin (LIPITOR) tablet 40 mg  40 mg Oral Daily With Dinner    cholecalciferol (VITAMIN D3) tablet 2,000 Units  2,000 Units Oral Daily    dexamethasone (DECADRON) injection 6 mg  6 mg Intravenous Q24H    diltiazem (CARDIZEM CD) 24 hr capsule 180 mg  180 mg Oral Daily    doxycycline hyclate (VIBRAMYCIN) capsule 100 mg  100 mg Oral Q12H [address]    exemestane (AROMASIN) tablet 25 mg  25 mg Oral Daily    famotidine (PEPCID) tablet 20 mg  20 mg Oral Daily    furosemide (LASIX) injection 20 mg  20 mg Intravenous Daily    levothyroxine tablet 50 mcg  50 mcg Oral Early Morning    melatonin tablet 3 mg  3 mg Oral HS    metoprolol tartrate (LOPRESSOR) tablet 50 mg  50 mg Oral Q12H [address]    zinc sulfate (ZINCATE) capsule 220 mg  220 mg Oral Daily    Followed by   Santhosh Reza ON 2/18/2021] multivitamin-minerals (CENTRUM ADULTS) tablet 1 tablet  1 tablet Oral Daily    ondansetron (ZOFRAN) injection 4 mg  4 mg Intravenous Q6H PRN    remdesivir (Veklury) 100 mg in sodium chloride 0 9 % 250 mL IVPB  100 mg Intravenous Q24H    senna (SENOKOT) tablet 8 6 mg  1 tablet Oral Daily    simethicone (MYLICON) chewable tablet 80 mg  80 mg Oral 4x Daily PRN       VTE Pharmacologic Prophylaxis: Reason for no pharmacologic prophylaxis On home Eliquis  VTE Mechanical Prophylaxis: sequential compression device    Portions of the record may have been created with voice recognition software  Occasional wrong word or "sound a like" substitutions may have occurred due to the inherent limitations of voice recognition software    Read the chart carefully and recognize, using context, where substitutions have occurred   ==  Georgi Heck MD  [facility]  Internal Medicine Residency PGY-1

## 2021-02-15 ENCOUNTER — NURSING HOME VISIT (OUTPATIENT)
Dept: FAMILY MEDICINE CLINIC | Facility: CLINIC | Age: 86
End: 2021-02-15
Payer: MEDICARE

## 2021-02-15 VITALS
DIASTOLIC BLOOD PRESSURE: 74 MMHG | HEART RATE: 100 BPM | BODY MASS INDEX: 31.64 KG/M2 | WEIGHT: 171.96 LBS | HEIGHT: 62 IN | TEMPERATURE: 97.6 F | RESPIRATION RATE: 20 BRPM | SYSTOLIC BLOOD PRESSURE: 122 MMHG | OXYGEN SATURATION: 99 %

## 2021-02-15 VITALS
RESPIRATION RATE: 15 BRPM | DIASTOLIC BLOOD PRESSURE: 76 MMHG | HEART RATE: 88 BPM | OXYGEN SATURATION: 94 % | SYSTOLIC BLOOD PRESSURE: 118 MMHG

## 2021-02-15 DIAGNOSIS — U07.1 COVID-19: Primary | ICD-10-CM

## 2021-02-15 LAB
ANION GAP SERPL CALCULATED.3IONS-SCNC: 8 MMOL/L (ref 4–13)
BACTERIA BLD CULT: NORMAL
BACTERIA BLD CULT: NORMAL
BASOPHILS # BLD AUTO: 0 THOUSANDS/ΜL (ref 0–0.1)
BASOPHILS NFR BLD AUTO: 0 % (ref 0–1)
BUN SERPL-MCNC: 35 MG/DL (ref 6–20)
CALCIUM SERPL-MCNC: 8.5 MG/DL (ref 8.4–10.2)
CHLORIDE SERPL-SCNC: 97 MMOL/L (ref 96–108)
CO2 SERPL-SCNC: 33 MMOL/L (ref 22–33)
CREAT SERPL-MCNC: 1.03 MG/DL (ref 0.4–1.1)
EOSINOPHIL # BLD AUTO: 0 THOUSAND/ΜL (ref 0–0.61)
EOSINOPHIL NFR BLD AUTO: 0 % (ref 0–6)
ERYTHROCYTE [DISTWIDTH] IN BLOOD BY AUTOMATED COUNT: 18.2 % (ref 11.6–15.1)
GFR SERPL CREATININE-BSD FRML MDRD: 47 ML/MIN/1.73SQ M
GLUCOSE SERPL-MCNC: 192 MG/DL (ref 65–140)
HCT VFR BLD AUTO: 44.2 % (ref 34.8–46.1)
HGB BLD-MCNC: 13.4 G/DL (ref 11.5–15.4)
IMM GRANULOCYTES # BLD AUTO: 0.01 THOUSAND/UL (ref 0–0.2)
IMM GRANULOCYTES NFR BLD AUTO: 0 % (ref 0–2)
LYMPHOCYTES # BLD AUTO: 0.55 THOUSANDS/ΜL (ref 0.6–4.47)
LYMPHOCYTES NFR BLD AUTO: 8 % (ref 14–44)
MCH RBC QN AUTO: 25.5 PG (ref 26.8–34.3)
MCHC RBC AUTO-ENTMCNC: 30.3 G/DL (ref 31.4–37.4)
MCV RBC AUTO: 84 FL (ref 82–98)
MONOCYTES # BLD AUTO: 0.27 THOUSAND/ΜL (ref 0.17–1.22)
MONOCYTES NFR BLD AUTO: 4 % (ref 4–12)
NEUTROPHILS # BLD AUTO: 6.19 THOUSANDS/ΜL (ref 1.85–7.62)
NEUTS SEG NFR BLD AUTO: 88 % (ref 43–75)
PLATELET # BLD AUTO: 320 THOUSANDS/UL (ref 149–390)
PMV BLD AUTO: 11.7 FL (ref 8.9–12.7)
POTASSIUM SERPL-SCNC: 4.5 MMOL/L (ref 3.5–5)
RBC # BLD AUTO: 5.26 MILLION/UL (ref 3.81–5.12)
SODIUM SERPL-SCNC: 138 MMOL/L (ref 133–145)
WBC # BLD AUTO: 7.02 THOUSAND/UL (ref 4.31–10.16)

## 2021-02-15 PROCEDURE — 97167 OT EVAL HIGH COMPLEX 60 MIN: CPT

## 2021-02-15 PROCEDURE — 85025 COMPLETE CBC W/AUTO DIFF WBC: CPT | Performed by: INTERNAL MEDICINE

## 2021-02-15 PROCEDURE — 99239 HOSP IP/OBS DSCHRG MGMT >30: CPT | Performed by: INTERNAL MEDICINE

## 2021-02-15 PROCEDURE — 97163 PT EVAL HIGH COMPLEX 45 MIN: CPT

## 2021-02-15 PROCEDURE — 99310 SBSQ NF CARE HIGH MDM 45: CPT | Performed by: INTERNAL MEDICINE

## 2021-02-15 PROCEDURE — 80048 BASIC METABOLIC PNL TOTAL CA: CPT | Performed by: INTERNAL MEDICINE

## 2021-02-15 RX ORDER — FUROSEMIDE 20 MG/1
20 TABLET ORAL DAILY
Status: DISCONTINUED | OUTPATIENT
Start: 2021-02-15 | End: 2021-02-15 | Stop reason: HOSPADM

## 2021-02-15 RX ORDER — PREDNISONE 10 MG/1
40 TABLET ORAL DAILY
Qty: 20 TABLET | Refills: 0 | Status: SHIPPED | OUTPATIENT
Start: 2021-02-15 | End: 2021-02-20

## 2021-02-15 RX ORDER — MELATONIN
2000 DAILY
Qty: 8 TABLET | Refills: 0 | Status: SHIPPED | OUTPATIENT
Start: 2021-02-16 | End: 2021-02-20

## 2021-02-15 RX ORDER — ZINC GLUCONATE 50 MG
50 TABLET ORAL DAILY
Qty: 2 TABLET | Refills: 0 | Status: SHIPPED | OUTPATIENT
Start: 2021-02-15 | End: 2021-02-17

## 2021-02-15 RX ORDER — ATORVASTATIN CALCIUM 40 MG/1
40 TABLET, FILM COATED ORAL
Qty: 14 TABLET | Refills: 0 | Status: SHIPPED | OUTPATIENT
Start: 2021-02-15 | End: 2021-03-01

## 2021-02-15 RX ADMIN — LEVOTHYROXINE SODIUM 50 MCG: 50 TABLET ORAL at 06:17

## 2021-02-15 RX ADMIN — SENNOSIDES 8.6 MG: 8.6 TABLET, FILM COATED ORAL at 08:33

## 2021-02-15 RX ADMIN — DILTIAZEM HYDROCHLORIDE 180 MG: 180 CAPSULE, COATED, EXTENDED RELEASE ORAL at 08:34

## 2021-02-15 RX ADMIN — OXYCODONE HYDROCHLORIDE AND ACETAMINOPHEN 1000 MG: 500 TABLET ORAL at 08:33

## 2021-02-15 RX ADMIN — Medication 2000 UNITS: at 08:33

## 2021-02-15 RX ADMIN — APIXABAN 2.5 MG: 2.5 TABLET, FILM COATED ORAL at 08:33

## 2021-02-15 RX ADMIN — DOXYCYCLINE 100 MG: 100 CAPSULE ORAL at 08:33

## 2021-02-15 RX ADMIN — FUROSEMIDE 20 MG: 20 TABLET ORAL at 08:33

## 2021-02-15 RX ADMIN — FAMOTIDINE 20 MG: 20 TABLET ORAL at 08:33

## 2021-02-15 RX ADMIN — ZINC SULFATE 220 MG (50 MG) CAPSULE 220 MG: CAPSULE at 08:33

## 2021-02-15 RX ADMIN — METOPROLOL TARTRATE 50 MG: 50 TABLET, FILM COATED ORAL at 08:34

## 2021-02-15 NOTE — DISCHARGE INSTR - AVS FIRST PAGE
DISCHARGE INSTRUCTIONS FROM HOSPITALIST   1  Follow up with your primary care physician in one week  in regards to recent hospitalization   2  Take medications regularly     Take atorvastatin 40 mg by mouth at bedtime for the next 2 weeks  After that resume your previous statin - ezetimibe-simvastatin (Vytorin) as prescribed  Take prednisone 40 mg daily by mouth for the next 5 days    3  Come back to the ER if symptoms recur or worsen   4  Activity as tolerated  5   Diet : cardiac healthy

## 2021-02-15 NOTE — ASSESSMENT & PLAN NOTE
Patient returned from the hospital today after being treated for COVID-19 pneumonia  Surprisingly she had a remarkable improvement  Now she has weaned off of her oxygen  We will monitor respiratory function  Encourage p o  food

## 2021-02-15 NOTE — OCCUPATIONAL THERAPY NOTE
OT EVALUATION       02/15/21 1214   Note Type   Note type Evaluation   Pain Assessment   Pain Assessment Tool Pain Assessment not indicated - pt denies pain   Home Living   Type of Home Assisted living  (admitted from [address] [place] at [facility] [place] [place])   [address] [facility] [address]   Prior Function   Level of [place] Needs assistance with ADLs and functional mobility; Needs assistance with IADLs   Lives With Facility staff   Receives Help From Personal care attendant   ADL Assistance Needs assistance   IADLs Needs assistance   ADL   Eating Assistance 5  Supervision/Setup   Grooming Assistance 4  Minimal Assistance   Grooming Deficit Standing with assistive device; Wash/dry hands   UB Bathing Assistance 5  Supervision/Setup   LB Bathing Assistance 4  Minimal Assistance   UB Dressing Assistance 5  Supervision/Setup   LB Dressing Assistance 4  Minimal Assistance   Toileting Assistance  4  Minimal Assistance   Transfers   Sit to Stand 5  Supervision   Stand to Sit 5  Supervision   Toilet transfer 4  Minimal assistance   Additional items   (grab bar)   Functional Mobility   Functional Mobility 4  Minimal assistance   Additional Comments 15 feet, unsteady, knee buckling   Balance   Static Sitting Fair +   Dynamic Sitting Fair   Static Standing Fair   Dynamic Standing Fair -   Activity Tolerance   Activity Tolerance Patient tolerated treatment well   RUE Assessment   RUE Assessment WFL   LUE Assessment   LUE Assessment WFL   Cognition   Overall Cognitive Status WFL   Arousal/Participation Cooperative   Attention Within functional limits   Orientation Level Oriented to person;Oriented to place  (knew it was February)   Following Commands Follows one step commands without difficulty   Assessment   Limitation Decreased ADL status; Decreased UE strength;Decreased Safe judgement during ADL;Decreased endurance;Decreased high-level ADLs; Decreased self-care trans  (decreased balance and mobility )   Prognosis Good   Assessment Patient evaluated by Occupational Therapy  Patient admitted with Acute respiratory failure with hypoxia and hypercapnia ([facility] [address] )  The patients occupational profile, medical and therapy history includes a extensive additional review of physical, cognitive, or psychosocial history related to current functional performance  Comorbidities affecting functional mobility and ADLS include: anxiety, arthritis, CAD, chronic pain, hypertension, MI and cancer  Prior to admission, patient was requiring assist for functional mobility with walker, was in STR, requiring assist for ADLS and requiring assist for IADLS  The evaluation identifies the following performance deficits: weakness, impaired balance, decreased endurance, increased fall risk, new onset of impairment of functional mobility, decreased ADLS, decreased IADLS, decreased activity tolerance, decreased safety awareness, impaired judgement and decreased strength, that result in activity limitations and/or participation restrictions  This evaluation requires clinical decision making of high complexity, because the patient presents with comorbidites that affect occupational performance and required significant modification of tasks or assistance with consideration of multiple treatment options  The Barthel Index was used as a functional outcome tool presenting with a score of 50, indicating marked limitations of functional mobility and ADLS  The patient's raw score on the -PAC Daily Activity inpatient short form is 18, standardized score is 38 66, less than 39 4  Patients at this level are likely to benefit from DC to post-acute rehabilitation services  Please refer to the recommendation of the Occupational Therapist for safe DC planning  Patient will benefit from skilled Occupational Therapy services to address above deficits and facilitate a safe return to prior level of function     Goals   Patient Goals go home    STG Time Frame   (1-7 days)   Short Term Goal  Goals established to promote patient goal of going home:  Patient will increase standing tolerance to 3 minutes during ADL task to decrease assistance level and decrease fall risk; Patient will increase bed mobility to independent in preparation for ADLS and transfers; Patient will increase functional mobility to and from bathroom with rolling walker with supervision to increase performance with ADLS and to use a toilet; Patient will tolerate 10 minutes of UE ROM/strengthening to increase general activity tolerance and performance in ADLS/IADLS; Patient will improve functional activity tolerance to 10 minutes of sustained functional tasks to increase participation in basic self-care and decrease assistance level;  Patient will be able to to verbalize understanding and perform energy conservation/proper body mechanics during ADLS and functional mobility at least 75% of the time with minimal cueing to decrease signs of fatigue and increase stamina to return to prior level of function; Patient will increase dynamic sitting balance to fair+ to improve the ability to sit at edge of bed or on a chair for ADLS;  Patient will increase dynamic standing balance to fair to improve postural stability and decrease fall risk during standing ADLS and transfers       LTG Time Frame   (8-14 days)   Long Term Goal Goals established to promote patient goal of going home:  Patient will increase standing tolerance to 6 minutes during ADL task to decrease assistance level and decrease fall risk; Patient will increase functional mobility to and from bathroom with rolling walker independently to increase performance with ADLS and to use a toilet; Patient will tolerate 20 minutes of UE ROM/strengthening to increase general activity tolerance and performance in ADLS/IADLS; Patient will improve functional activity tolerance to 20 minutes of sustained functional tasks to increase participation in basic self-care and decrease assistance level;  Patient will be able to to verbalize understanding and perform energy conservation/proper body mechanics during ADLS and functional mobility at least 90% of the time without cueing to decrease signs of fatigue and increase stamina to return to prior level of function; Patient will increase dynamic sitting balance to good to improve the ability to sit at edge of bed or on a chair for ADLS;  Patient will increase dynamic standing balance to fair+ to improve postural stability and decrease fall risk during standing ADLS and transfers  Functional Transfer Goals   Pt Will Perform All Functional Transfers   (STG supervision LTG Independent )   ADL Goals   Pt Will Perform Eating   (STG independent )   Pt Will Perform Grooming Standing at sink  (STG supervision LTG independent )   Pt Will Perform Bathing   (STG supervision LTG Independent )   Pt Will Perform UE Dressing   (STG supervision LTG Independent )   Pt Will Perform LE Dressing   (STG supervision LTG Independent )   Pt Will Perform Toileting   (STG supervision LTG Independent )   Plan   Treatment Interventions ADL retraining;Functional transfer training;UE strengthening/ROM; Endurance training;Patient/family training;Equipment evaluation/education; Activityengagement; Compensatory technique education   Goal Expiration Date 03/01/21   OT Frequency 1-2x/wk   Recommendation   OT Discharge Recommendation Post-Acute Rehabilitation Services   AM-PAC Daily Activity Inpatient   Lower Body Dressing 3   Bathing 2   Toileting 3   Upper Body Dressing 3   Grooming 3   Eating 4   Daily Activity Raw Score 18   Daily Activity Standardized Score (Calc for Raw Score >=11) 38 66   AM-PAC Applied Cognition Inpatient   Following a Speech/Presentation 4   Understanding Ordinary Conversation 4   Taking Medications 3   Remembering Where Things Are Placed or Put Away 3   Remembering List of 4-5 Errands 2   Taking Care of Complicated Tasks 2   Applied Cognition Raw Score 18   Applied Cognition Standardized Score 38 07   Barthel Index   Feeding 10   Bathing 0   Grooming Score 0   Dressing Score 5   Bladder Score 10   Bowels Score 10   Toilet Use Score 5   Transfers (Bed/Chair) Score 10   Mobility (Level Surface) Score 0   Stairs Score 0   Barthel Index Score 50   Mera Trujillo MS OTR/L

## 2021-02-15 NOTE — PROGRESS NOTES
Virtual Regular Visit      Assessment/Plan:    Problem List Items Addressed This Visit        Other    COVID-19 pneumonia - Primary       Patient returned from the hospital today after being treated for COVID-19 pneumonia  Surprisingly she had a remarkable improvement  Now she has weaned off of her oxygen  We will monitor respiratory function  Encourage p o  food  Reason for visit is   Chief Complaint   Patient presents with    Virtual Regular Visit        Encounter provider Sol Barrientos MD    Provider located at [address] [place] [id number] [phone]      Recent Visits  No visits were found meeting these conditions  Showing recent visits within past 7 days and meeting all other requirements     Future Appointments  No visits were found meeting these conditions  Showing future appointments within next 150 days and meeting all other requirements        The patient was identified by name and date of birth  Leo Mcfarland was informed that this is a telemedicine visit and that the visit is being conducted through [employer] and patient was informed that this is not a secure, HIPAA-compliant platform  She agrees to proceed     My office door was closed  No one else was in the room  She acknowledged consent and understanding of privacy and security of the video platform  The patient has agreed to participate and understands they can discontinue the visit at any time  Patient is aware this is a billable service  Subjective  Leo Mcfarland is a 80 y o  female    Hospital return visit at [facility]  1   Acute respiratory failure due to COVID-19 pneumonia  Patient was treated for COVID pneumonia in the hospital   Her condition improved surprisingly  Now she has weaned off of her oxygen  Breathing has been improving  No chest pain  No fever or chills  Occasional mucus    Her appetite is fair  No fever or chills  No hemoptysis  No lightheadedness  Hospital records reviewed in detail  Past Medical History:   Diagnosis Date    Adenocarcinoma of breast ([facility] ) 9/4/2012    Procedure/Onset: 12/07/2005    Anxiety 2/28/2018    Arthritis     joints    Asymptomatic varicose veins of bilateral lower extremities 10/14/2016    Benign colon polyp 2/17/2014    Breast cancer ([facility] ) 2005    right    CAD (coronary artery disease)     hx MI x 2    Cancer (HCC)     breast ca, skin ca, currently in remission, tumors in chest wall    Chronic pain disorder     knees    Chronic rhinitis 9/30/2013    Chronic venous insufficiency 5/22/2018    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[facility]) 9/10/2019    S/p Mindy's procedure with the end colostomy    Complete uterovaginal prolapse 11/19/2015    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    Procedure/Onset: 05/26/2006    Coronary artery disease     Gait disturbance 12/19/2016    GERD (gastroesophageal reflux disease)     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    Procedure/Onset: 02/22/2007    History of prolapse of bladder     History of radiation therapy 2005    to breast right    Hypertension     Hypothyroidism     hypothyroidism    Irritable bowel syndrome 9/4/2012    Procedure/Onset: 09/23/2010    Kidney stone     2016    Myocardial infarction ([facility] )     1990's x2    Nephrolithiasis 2/26/2016    Osteoarthritis of left knee 10/19/2016    Perforation of sigmoid colon due to diverticulitis 5/9/2019    Added automatically from request for surgery [id number]    Peripheral vertigo 9/4/2012    Procedure/Onset: 04/04/2007    Psoriasis 12/19/2016    Rectocele 3/10/2016    Last Assessment & Plan:  As below      Stress incontinence in female 7/6/2016       Past Surgical History:   Procedure Laterality Date    APPENDECTOMY      during BREN    BLADDER SURGERY      suspension surgery no sling, used fiberglass suspension technique    BREAST LUMPECTOMY Right 2005    BREAST SURGERY Right     lumpectomy    CHEST WALL TUMOR EXCISION  2005    COLONOSCOPY      CYSTOSCOPY      HARTMANS PROCEDURE N/A 5/9/2019    Procedure: Mickeal Pion;  Surgeon: Shirley Alvarez MD;  Location: [facility] MAIN OR;  Service: General    HYSTERECTOMY Bilateral     hamlet w/removal of both ovaries    KNEE ARTHROSCOPY      Last assessed: 7/30/15    DC CYSTOURETHROSCOPY,URETER CATHETER Left 2/26/2016    Procedure: CYSTOSCOPY RETROGRADE PYELOGRAM WITH INSERTION STENT URETERAL;  Surgeon: Coleen Price MD;  Location: [address] [place] [address];  Service: Urology    DC XCAPSL CTRC RMVL INSJ IO LENS PROSTH W/O ECP Left 4/10/2017    Procedure: EXTRACTION EXTRACAPSULAR CATARACT PHACO INTRAOCULAR LENS (IOL); Surgeon: Cielo Cam MD;  Location: [facility] MAIN OR;  Service: Ophthalmology    SKIN BIOPSY Left     Leg for SCC       No current facility-administered medications for this visit        Current Outpatient Medications   Medication Sig Dispense Refill    ascorbic acid (VITAMIN C) 1000 MG tablet Take 1 tablet (1,000 mg total) by mouth every 12 (twelve) hours for 4 doses 4 tablet 0    atorvastatin (LIPITOR) 40 mg tablet Take 1 tablet (40 mg total) by mouth daily with dinner for 14 days 14 tablet 0    [START ON 2/16/2021] cholecalciferol (VITAMIN D3) 1,000 units tablet Take 2 tablets (2,000 Units total) by mouth daily for 4 days 8 tablet 0    predniSONE 10 mg tablet Take 4 tablets (40 mg total) by mouth daily for 5 days 20 tablet 0    zinc gluconate 50 mg tablet Take 1 tablet (50 mg total) by mouth daily for 2 days 2 tablet 0     Facility-Administered Medications Ordered in Other Visits   Medication Dose Route Frequency Provider Last Rate Last Admin    acetaminophen (TYLENOL) tablet 650 mg  650 mg Oral Q6H PRN Niki Corral MD        apixaban (ELIQUIS) tablet 2 5 mg  2 5 mg Oral BID Ajith Davis MD   2 5 mg at 02/15/21 0833    ascorbic acid (VITAMIN C) tablet 1,000 mg  1,000 mg Oral Q12H [facility] Rebecca Kramer MD   1,000 mg at 02/15/21 0833    atorvastatin (LIPITOR) tablet 40 mg  40 mg Oral Daily With Eloina Boyle MD   40 mg at 02/14/21 1622    cholecalciferol (VITAMIN D3) tablet 2,000 Units  2,000 Units Oral Daily Rbeecca Kramer MD   2,000 Units at 02/15/21 0833    dexamethasone (DECADRON) injection 6 mg  6 mg Intravenous Q24H Ajith Davis MD   6 mg at 02/14/21 1807    diltiazem (CARDIZEM CD) 24 hr capsule 180 mg  180 mg Oral Daily Ajith Davis MD   180 mg at 02/15/21 0834    doxycycline hyclate (VIBRAMYCIN) capsule 100 mg  100 mg Oral Q12H [address] Rebecca Kramer MD   100 mg at 02/15/21 0833    exemestane (AROMASIN) tablet 25 mg  25 mg Oral Daily Ajith Davis MD        famotidine (PEPCID) tablet 20 mg  20 mg Oral Daily Ajith Davis MD   20 mg at 02/15/21 0833    furosemide (LASIX) tablet 20 mg  20 mg Oral Daily Ajith Davis MD   20 mg at 02/15/21 0833    levothyroxine tablet 50 mcg  50 mcg Oral Early Morning Ajith Davis MD   50 mcg at 02/15/21 0617    melatonin tablet 3 mg  3 mg Oral HS Rebecca Kramer MD   3 mg at 02/14/21 2153    metoprolol tartrate (LOPRESSOR) tablet 50 mg  50 mg Oral Q12H [address] Rebecca Kramer MD   50 mg at 02/15/21 0834    zinc sulfate (ZINCATE) capsule 220 mg  220 mg Oral Daily Ajith Davis MD   220 mg at 02/15/21 0009    Followed by   Leonides Rivera ON 2/18/2021] multivitamin-minerals (CENTRUM ADULTS) tablet 1 tablet  1 tablet Oral Daily Ajith Davis MD        ondansetron (ZOFRAN) injection 4 mg  4 mg Intravenous Q6H PRN Rebecca Kramer MD        senna (SENOKOT) tablet 8 6 mg  1 tablet Oral Daily Ajith Ugwuegbulem, MD   8 6 mg at 02/15/21 6787    simethicone (MYLICON) chewable tablet 80 mg  80 mg Oral 4x Daily PRN Rebecca Kramer MD            Allergies   Allergen Reactions    Other Anaphylaxis     Blue Dye during ultrasound      Ciprofloxacin Hives and Itching     rash and itching    Clindamycin Hives and Itching     Redness      Levaquin [Levofloxacin] Hives and Itching     Rash and itching    Lincomycin     Penicillins Hives and Itching     Tolerated Pip/tazo 5/10/2019  Tolerated cefepime 1/2020    Sulfa Antibiotics Hives and Itching     Redness      Sulfacetamide     Sulfasalazine     Ceftriaxone Rash     Tolerates Cephalexin     Iv Contrast  [Iodinated Diagnostic Agents] Itching and Rash     Other reaction(s): Anaphylaxis    Linezolid Rash     Rash,flushed face after 3rd day of taking this ABX, started benadryl subsided by the end of the night   Nitrofurantoin Hives, Itching, Nausea Only and Rash       Review of Systems   Constitutional: Negative for appetite change, chills, diaphoresis, fatigue and fever  HENT: Negative for congestion, drooling and sinus pain  Eyes: Negative for discharge and itching  Respiratory: Positive for shortness of breath (now mild  much better)  Negative for cough and chest tightness  Cardiovascular: Negative for chest pain, palpitations and leg swelling  Gastrointestinal: Negative  Endocrine: Negative for polyphagia and polyuria  Genitourinary: Negative for difficulty urinating, dysuria, frequency and urgency  Musculoskeletal: Positive for gait problem  Skin: Negative for pallor  Allergic/Immunologic: Negative for food allergies  Neurological: Negative for dizziness, seizures, speech difficulty, light-headedness, numbness and headaches  Hematological: Negative for adenopathy  Does not bruise/bleed easily  Psychiatric/Behavioral: Negative for agitation, confusion, decreased concentration and suicidal ideas  Video Exam    Vitals:    02/15/21 1532   BP: 118/76   Pulse: 88   Resp: 15   SpO2: 94%       Physical Exam  Vitals signs reviewed  Constitutional:       General: She is not in acute distress  Appearance: Normal appearance  She is not ill-appearing  Cardiovascular:      Rate and Rhythm: Normal rate  Pulmonary:      Effort: Pulmonary effort is normal    Abdominal:      General: Bowel sounds are normal  There is no distension  Palpations: Abdomen is soft  Tenderness: There is no abdominal tenderness  Musculoskeletal:      Right lower leg: No edema  Left lower leg: No edema  Neurological:      Mental Status: She is alert  Mental status is at baseline  Psychiatric:         Mood and Affect: Mood normal          Behavior: Behavior normal          Thought Content: Thought content normal           I spent 39 minutes with patient today in which greater than 50% of the time was spent in counseling/coordination of care regarding 22 reviewe of hospital record      VIRTUAL VISIT [address] acknowledges that she has consented to an online visit or consultation  She understands that the online visit is based solely on information provided by her, and that, in the absence of a face-to-face physical evaluation by the physician, the diagnosis she receives is both limited and provisional in terms of accuracy and completeness  This is not intended to replace a full medical face-to-face evaluation by the physician  Tonio Orozco understands and accepts these terms

## 2021-02-15 NOTE — PLAN OF CARE
Problem: Potential for Falls  Goal: Patient will remain free of falls  Description: INTERVENTIONS:  - Assess patient frequently for physical needs  -  Identify cognitive and physical deficits and behaviors that affect risk of falls    -  [place] fall precautions as indicated by assessment   - Educate patient/family on patient safety including physical limitations  - Instruct patient to call for assistance with activity based on assessment  - Modify environment to reduce risk of injury  - Consider OT/PT consult to assist with strengthening/mobility  Outcome: Progressing     Problem: PAIN - ADULT  Goal: Verbalizes/displays adequate comfort level or baseline comfort level  Description: Interventions:  - Encourage patient to monitor pain and request assistance  - Assess pain using appropriate pain scale  - Administer analgesics based on type and severity of pain and evaluate response  - Implement non-pharmacological measures as appropriate and evaluate response  - Consider cultural and social influences on pain and pain management  - Notify physician/advanced practitioner if interventions unsuccessful or patient reports new pain  Outcome: Progressing     Problem: INFECTION - ADULT  Goal: Absence or prevention of progression during hospitalization  Description: INTERVENTIONS:  - Assess and monitor for signs and symptoms of infection  - Monitor lab/diagnostic results  - Monitor all insertion sites, i e  indwelling lines, tubes, and drains  - Administer medications as ordered  - Instruct and encourage patient and family to use good hand hygiene technique  - Identify and instruct in appropriate isolation precautions for identified infection/condition  Outcome: Progressing  Goal: Absence of fever/infection during neutropenic period  Description: INTERVENTIONS:  - Monitor WBC    Outcome: Progressing     Problem: SAFETY ADULT  Goal: Patient will remain free of falls  Description: INTERVENTIONS:  - Assess patient frequently for physical needs  -  Identify cognitive and physical deficits and behaviors that affect risk of falls    -  [place] fall precautions as indicated by assessment   - Educate patient/family on patient safety including physical limitations  - Instruct patient to call for assistance with activity based on assessment  - Modify environment to reduce risk of injury  - Consider OT/PT consult to assist with strengthening/mobility  Outcome: Progressing  Goal: Maintain or return to baseline ADL function  Description: INTERVENTIONS:  -  Assess patient's ability to carry out ADLs; assess patient's baseline for ADL function and identify physical deficits which impact ability to perform ADLs (bathing, care of mouth/teeth, toileting, grooming, dressing, etc )  - Assess/evaluate cause of self-care deficits   - Assess range of motion  - Assess patient's mobility; develop plan if impaired  - Assess patient's need for assistive devices and provide as appropriate  - Encourage maximum independence but intervene and supervise when necessary  - Involve family in performance of ADLs  - Assess for home care needs following discharge   - Consider OT consult to assist with ADL evaluation and planning for discharge  - Provide patient education as appropriate  Outcome: Progressing  Goal: Maintain or return mobility status to optimal level  Description: INTERVENTIONS:  - Assess patient's baseline mobility status (ambulation, transfers, stairs, etc )    - Identify cognitive and physical deficits and behaviors that affect mobility  - Identify mobility aids required to assist with transfers and/or ambulation (gait belt, sit-to-stand, lift, walker, cane, etc )  - [place] fall precautions as indicated by assessment  - Record patient progress and toleration of activity level on Mobility SBAR; progress patient to next Phase/Stage  - Instruct patient to call for assistance with activity based on assessment  - Consider rehabilitation consult to assist with strengthening/weightbearing, etc   Outcome: Progressing     Problem: DISCHARGE PLANNING  Goal: Discharge to home or other facility with appropriate resources  Description: INTERVENTIONS:  - Identify barriers to discharge w/patient and caregiver  - Arrange for needed discharge resources and transportation as appropriate  - Identify discharge learning needs (meds, wound care, etc )  - Arrange for interpretive services to assist at discharge as needed  - Refer to Case Management Department for coordinating discharge planning if the patient needs post-hospital services based on physician/advanced practitioner order or complex needs related to functional status, cognitive ability, or social support system  Outcome: Progressing     Problem: Knowledge Deficit  Goal: Patient/family/caregiver demonstrates understanding of disease process, treatment plan, medications, and discharge instructions  Description: Complete learning assessment and assess knowledge base    Interventions:  - Provide teaching at level of understanding  - Provide teaching via preferred learning methods  Outcome: Progressing

## 2021-02-15 NOTE — PHYSICAL THERAPY NOTE
PHYSICAL THERAPY EVALUATION  Time: 4788-6410    NAME:  Madeline Montalvo  DATE: 02/15/21    AGE:   80 y o  Mrn:   [id number]  Length Of Stay: 5    ADMIT DX:  Shortness of breath [R06 02]  Acute respiratory distress [R06 03]  CHF (congestive heart failure) ([place] [address] ) [I50 9]  COVID-19 [U07 1]    Past Medical History:   Diagnosis Date    Adenocarcinoma of breast ([place] [address] ) 9/4/2012    Procedure/Onset: 12/07/2005    Anxiety 2/28/2018    Arthritis     joints    Asymptomatic varicose veins of bilateral lower extremities 10/14/2016    Benign colon polyp 2/17/2014    Breast cancer ([place] [address] ) 2005    right    CAD (coronary artery disease)     hx MI x 2    Cancer (HCC)     breast ca, skin ca, currently in remission, tumors in chest wall    Chronic pain disorder     knees    Chronic rhinitis 9/30/2013    Chronic venous insufficiency 5/22/2018    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) 9/10/2019    S/p Mindy's procedure with the end colostomy    Complete uterovaginal prolapse 11/19/2015    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    Procedure/Onset: 05/26/2006    Coronary artery disease     Gait disturbance 12/19/2016    GERD (gastroesophageal reflux disease)     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    Procedure/Onset: 02/22/2007    History of prolapse of bladder     History of radiation therapy 2005    to breast right    Hypertension     Hypothyroidism     hypothyroidism    Irritable bowel syndrome 9/4/2012    Procedure/Onset: 09/23/2010    Kidney stone     2016    Myocardial infarction ([place] [address] )     1990's x2    Nephrolithiasis 2/26/2016    Osteoarthritis of left knee 10/19/2016    Perforation of sigmoid colon due to diverticulitis 5/9/2019    Added automatically from request for surgery [id number]    Peripheral vertigo 9/4/2012    Procedure/Onset: 04/04/2007    Psoriasis 12/19/2016    Rectocele 3/10/2016    Last Assessment & Plan:  As below      Stress incontinence in female 7/6/2016     Past Surgical History:   Procedure Laterality Date    APPENDECTOMY      during HAMLET    BLADDER SURGERY      suspension surgery no sling, used fiberglass suspension technique    BREAST LUMPECTOMY Right 2005    BREAST SURGERY Right     lumpectomy    CHEST WALL TUMOR EXCISION  2005    COLONOSCOPY      CYSTOSCOPY      HARTMANS PROCEDURE N/A 5/9/2019    Procedure: Ludivina Rice;  Surgeon: Juan Diego Gambino MD;  Location: [facility] MAIN OR;  Service: General    HYSTERECTOMY Bilateral     hamlet w/removal of both ovaries    KNEE ARTHROSCOPY      Last assessed: 7/30/15    NE CYSTOURETHROSCOPY,URETER CATHETER Left 2/26/2016    Procedure: CYSTOSCOPY RETROGRADE PYELOGRAM WITH INSERTION STENT URETERAL;  Surgeon: Pérez Pretty MD;  Location: [address] [place] [address];  Service: Urology    NE XCAPSL CTRC RMVL INSJ IO LENS PROSTH W/O ECP Left 4/10/2017    Procedure: EXTRACTION EXTRACAPSULAR CATARACT PHACO INTRAOCULAR LENS (IOL); Surgeon: Chico Burton MD;  Location: [facility] MAIN OR;  Service: Ophthalmology    SKIN BIOPSY Left     Leg for SCC       Performed at least 2 patient identifiers during session: Name, Anita Mary and ID bracelet        02/15/21 1213   PT Last Visit   PT Visit Date 02/15/21   Note Type   Note type Evaluation   Pain Assessment   Pain Assessment Tool 0-10   Pain Score No Pain   Effect of Pain on Daily Activities n/a   Hospital Pain Intervention(s) Ambulation/increased activity;Repositioned   Multiple Pain Sites No   Home Living   Type of Home Assisted living  [facility] )   Additional Comments PATIENT IS INITIALLY A RESIDENT OF [facility] HOWEVER IS CURRENTLY COMING FROM [facility]  Was receiving PT/OT services at [facility], states "I was doing really well, I really like it there " Pt ambulates with RW at baseline  Prior Function   Level of [place] Independent with ADLs and functional mobility; Needs assistance with IADLs   Lives With Facility staff   ADL Assistance Independent   IADLs Needs assistance   Falls in the last 6 months 1 to 4   Restrictions/Precautions   Weight Bearing Precautions Per Order No   Other Precautions Contact/isolation; Airborne/isolation;Droplet precautions;Cognitive; Fall Risk  (+COVID-19)   General   Additional Pertinent History Pt admitted 2/10/2021 from [facility]  AND [facility] with SOB  Dx: [id number]  Family/Caregiver Present No   Cognition   Overall Cognitive Status WFL   Arousal/Participation Alert   Orientation Level Oriented to person;Oriented to place  (pt able to recall Month of Feb, not day or year)   Memory Decreased recall of recent events;Decreased short term memory   Following Commands Follows one step commands without difficulty   RUE Assessment   RUE Assessment WFL   LUE Assessment   LUE Assessment WFL   RLE Assessment   RLE Assessment WFL  (grossly 4/5 MMT )   LLE Assessment   LLE Assessment WFL  (grossly 4/5 MMT )   Coordination   Movements are Fluid and Coordinated 1   Bed Mobility   Additional Comments Bed mobility not assessed as pt presents and was left seated OOB in chair  Transfers   Sit to Stand 5  Supervision   Additional items Assist x 1; Impulsive;Verbal cues   Stand to Sit 5  Supervision   Additional items Assist x 1; Impulsive;Verbal cues   Stand pivot 4  Minimal assistance   Additional items Assist x 1   Ambulation/Elevation   Gait pattern Decreased foot clearance;L Knee Clifford; Short stride   Gait Assistance 4  Minimal assist   Additional items Assist x 1;Verbal cues; Tactile cues   Assistive Device None  (patient refused use of RW )   Distance 30ft x1 including change in direction    Stair Management Assistance Not tested   Balance   Static Sitting Good   Dynamic Sitting Fair   Static Standing Fair -   Dynamic Standing Fair -   Ambulatory Poor +   Endurance Deficit   Endurance Deficit Yes   Endurance Deficit Description Post ambulation evaluation, SPO2 92%RA, HR 101bpm     Activity Tolerance   Activity Tolerance Patient tolerated treatment well   Medical Staff Made Aware Spoke with SLIM and CHIDI   Nurse Made Aware Spoke with RUDOLPH Paul   Assessment   Prognosis Good   Problem List Decreased strength;Decreased endurance; Impaired balance;Decreased mobility; Decreased safety awareness; Impaired judgement   Assessment Pt seen for PT evaluation, cleared by RUDOLPH Aparicio, activity orders of "activity as tolerated"  Pt admitted 2/10/2021 with SOB, dx Acute respiratory failure with hypoxia and hypercapnia ([facility] [address] ) & COVID-19  Comorbidities affecting pt's fnxl performance include: anxiety, arthritis, CAD, chronic pain, falls, GERD, hypertension, hypothyroid, MI, vertigo and cancer  Personal factors affecting pt at time of PT IE include: ambulating with assistive device, inability to ambulate household distances, inability to navigate community distances, inability to navigate level surfaces without external assistance, limited home support, positive fall history, limited insight into impairments, inability to perform ADLS, inability to perform IADLS  and inability to live alone  PTA, pt was independent with functional mobility with RW, independent with ADLs, requiring assist for IADLs, an assisted living resident however was at [address] after last hospitalization  Pt demonstrates fnxl impairments identified in objective findings from Elderly Mobility Scale assessment, scoring 10/20, indicating borderline in terms of safety with mobility/requires some assist with ADLs & mobility  Pt scores 17/24 on AM-PAC objective tool, indicating need for further rehab services upon d/c from the acute care setting  The Barthel Index outcome tool was used & pt scores 55 indicating severe limitations of fnxl mobility/ADLS  Pt is at risk of falls d/t multiple comorbidities, history of previous falls, impaired balance, impaired insight/safety awareness, use of assistive device, advanced age, acuity of medical illness and ongoing medical treatment of primary diagnosis   Pt's clinical presentation is currently unstable/unpredictable seen in pt's presentation of vital sign response, varying levels of cognitive performance, increased fall risk, new onset of impairment of functional mobility, decreased endurance and new onset of weakness  This PT IE requires high complexity clinical decision making, based on multiple medical/physiological/fnxl/social factors which affect pt's performance w/ evaluation & therapist judgement for disposition recommendations  Pt will benefit from continued PT treatment in order to address impairments, decrease risk of falls, maximize independence w/ fnxl mobility, & ensure safety w/ mobility for transition to next level of care  Based on pt presentation & impairments, pt would most appropriately benefit from post acute STR upon d/c  Goals   Patient Goals "to go back [place] [facility]   STG Expiration Date 02/25/21   Short Term Goal #1 Patient PT goals established in order to address patient self reported goal of going back to [facility] [place] STR  1  Pt will complete all bed mobility independently in flat bed, in order to promote increased OOB functional mobility to improve overall activity tolerance  2  Pt will complete all transfers with RW at [facility] level, in order to increase safety with functional mobility  3  Pt will ambulate >150ft with RW at [facility] level in order to increase safety with in facility and community distance functional mobility  4  Pt will negotiate standard height curb step with RW and S in order to demonstrate safety with elevations  5  Pt will improve B LE strength to >/= 4+/5 MMT throughout, in order to increase safety with functional mobility and decrease risk of falls  6  Pt will demonstrate understanding and independence with LE strengthening HEP  7  Pt will improve AM-PAC score to >/= 22/24 in order to increase independence with mobility and decrease burden of care   8  Pt will improve Barthel Index score to >/= 65/100 in order to increase independence and decrease risk of falls  9  Pt will improve Elderly Mobility Scale score to >/= 14/20/20 in order to decrease burden of care and increase independence with functional mobility and ADLs  10  Pt will improve ambulatory balance by >/= 1/2 grade in order to promote safety and increased independence with mobility  11  Pt will tolerate >3hrs OOB in upright position, in order to improve muscular endurance and respiratory status  PT Treatment Day 0   Plan   Treatment/Interventions ADL retraining;Functional transfer training;LE strengthening/ROM; Therapeutic exercise; Endurance training;Patient/family training;Bed mobility;Gait training;Cognitive reorientation;Spoke to MD;Spoke to nursing;Spoke to case management;OT   PT Frequency Other (Comment)  (3-5x/wk )   Recommendation   PT Discharge Recommendation Post-Acute Rehabilitation Services   Equipment Recommended Walker   PT - OK to Discharge Yes   AM-PAC Basic Mobility Inpatient   Turning in Bed Without Bedrails 3   Lying on Back to Sitting on Edge of Flat Bed 3   Moving Bed to Chair 3   Standing Up From Chair 3   Walk in Room 3   Climb 3-5 Stairs 2   Basic Mobility Inpatient Raw Score 17   Basic Mobility Standardized Score 39 67   Modified [place] Scale   Modified [place] Scale 4   Barthel Index   Feeding 10   Bathing 0   Grooming Score 0   Dressing Score 5   Bladder Score 10   Bowels Score 10   Toilet Use Score 5   Transfers (Bed/Chair) Score 10   Mobility (Level Surface) Score 0   Stairs Score 5   Barthel Index Score 55       The patient's AM-PAC Basic Mobility Inpatient Short Form Raw Score is 17, Standardized Score is 39 67  A standardized score less than 42 9 suggests the patient may benefit from discharge to post-acute rehabilitation services  Please also refer to the recommendation of the physical therapist for safe discharge planning        ELDERLY MOBILITY SCALE OUTCOME MEASURE:   [facility]: ([address] [facility]; n=36; age= 70-93)  Level of independence and EMS scores:   Score > 14 = independent in basic ADLs and safe for independent mobility maneuvers (with or without device)   Score 10-13 = borderline in terms of safe mobility and independence in ADLs (require some help with ADLs and mobility maneuvers)    Score < 10 = dependent or high degree of dependency for basic ADLs and limited mobility maneuvers (such as transfers, toileting, dressing)  Discharge recommendations and EMS scores:   Score 0-6 = post acute STR / SNF / IRF   Score 7-13 = home with high levels of care - ie: skilled caretaker, community resources, family asssistance   Score 14-20 = home   Please refer to therapist full assessment & documentation for most appropriate recommendation and details for discharge         Herb Gandhi PT, DPT   Available via [employer]  [facility] # [id number]  [place] License - [id number]  [place] License - [id number]  2/15/2021

## 2021-02-15 NOTE — CASE MANAGEMENT
SHEKHAR was able to arrange BLS transport with [facility] at 1400  Cm notified nursing , MCE liaison and pt's daughter of DC arrangements  IMM reviewed with daughter  daughter agree with discharge determination  LMN has been completed and placed on chart

## 2021-02-15 NOTE — PLAN OF CARE
Problem: Potential for Falls  Goal: Patient will remain free of falls  Description: INTERVENTIONS:  - Assess patient frequently for physical needs  -  Identify cognitive and physical deficits and behaviors that affect risk of falls    -  [place] fall precautions as indicated by assessment   - Educate patient/family on patient safety including physical limitations  - Instruct patient to call for assistance with activity based on assessment  - Modify environment to reduce risk of injury  - Consider OT/PT consult to assist with strengthening/mobility  Outcome: Progressing     Problem: PAIN - ADULT  Goal: Verbalizes/displays adequate comfort level or baseline comfort level  Description: Interventions:  - Encourage patient to monitor pain and request assistance  - Assess pain using appropriate pain scale  - Administer analgesics based on type and severity of pain and evaluate response  - Implement non-pharmacological measures as appropriate and evaluate response  - Consider cultural and social influences on pain and pain management  - Notify physician/advanced practitioner if interventions unsuccessful or patient reports new pain  Outcome: Progressing     Problem: INFECTION - ADULT  Goal: Absence or prevention of progression during hospitalization  Description: INTERVENTIONS:  - Assess and monitor for signs and symptoms of infection  - Monitor lab/diagnostic results  - Monitor all insertion sites, i e  indwelling lines, tubes, and drains  - Monitor endotracheal if appropriate and nasal secretions for changes in amount and color  - [place] appropriate cooling/warming therapies per order  - Administer medications as ordered  - Instruct and encourage patient and family to use good hand hygiene technique  - Identify and instruct in appropriate isolation precautions for identified infection/condition  Outcome: Progressing     Problem: SAFETY ADULT  Goal: Patient will remain free of falls  Description: INTERVENTIONS:  - Assess patient frequently for physical needs  -  Identify cognitive and physical deficits and behaviors that affect risk of falls    -  [place] fall precautions as indicated by assessment   - Educate patient/family on patient safety including physical limitations  - Instruct patient to call for assistance with activity based on assessment  - Modify environment to reduce risk of injury  - Consider OT/PT consult to assist with strengthening/mobility  Outcome: Progressing  Goal: Maintain or return to baseline ADL function  Description: INTERVENTIONS:  -  Assess patient's ability to carry out ADLs; assess patient's baseline for ADL function and identify physical deficits which impact ability to perform ADLs (bathing, care of mouth/teeth, toileting, grooming, dressing, etc )  - Assess/evaluate cause of self-care deficits   - Assess range of motion  - Assess patient's mobility; develop plan if impaired  - Assess patient's need for assistive devices and provide as appropriate  - Encourage maximum independence but intervene and supervise when necessary  - Involve family in performance of ADLs  - Assess for home care needs following discharge   - Consider OT consult to assist with ADL evaluation and planning for discharge  - Provide patient education as appropriate  Outcome: Progressing  Goal: Maintain or return mobility status to optimal level  Description: INTERVENTIONS:  - Assess patient's baseline mobility status (ambulation, transfers, stairs, etc )    - Identify cognitive and physical deficits and behaviors that affect mobility  - Identify mobility aids required to assist with transfers and/or ambulation (gait belt, sit-to-stand, lift, walker, cane, etc )  - [place] fall precautions as indicated by assessment  - Record patient progress and toleration of activity level on Mobility SBAR; progress patient to next Phase/Stage  - Instruct patient to call for assistance with activity based on assessment  - Consider rehabilitation consult to assist with strengthening/weightbearing, etc   Outcome: Progressing     Problem: DISCHARGE PLANNING  Goal: Discharge to home or other facility with appropriate resources  Description: INTERVENTIONS:  - Identify barriers to discharge w/patient and caregiver  - Arrange for needed discharge resources and transportation as appropriate  - Identify discharge learning needs (meds, wound care, etc )  - Arrange for interpretive services to assist at discharge as needed  - Refer to Case Management Department for coordinating discharge planning if the patient needs post-hospital services based on physician/advanced practitioner order or complex needs related to functional status, cognitive ability, or social support system  Outcome: Progressing     Problem: Knowledge Deficit  Goal: Patient/family/caregiver demonstrates understanding of disease process, treatment plan, medications, and discharge instructions  Description: Complete learning assessment and assess knowledge base    Interventions:  - Provide teaching at level of understanding  - Provide teaching via preferred learning methods  Outcome: Progressing

## 2021-02-15 NOTE — NURSING NOTE
[id number] Patient restless and impulsive throughout the night  Made two attempts to get out of her room  Re-direct patient and explained why she has to stay in her room  Patient states that she understands but forgets      Patient states that she wants to go home and asking when is the doctors coming around today

## 2021-02-15 NOTE — DISCHARGE SUMMARY
Discharge- Iliana Smiley 3/7/1928, 80 y o  female MRN: [id number]    Unit/Bed#: -01 Encounter: [id number]    Primary Care Provider: Severa Comes, MD   Date and time admitted to hospital: 2/10/2021  2:15 PM        * Acute respiratory failure with hypoxia  Assessment & Plan  Improved  Requiring 1 L oxygen at this time  Will be able to discharge to [facility]  [facility] for acute care rehabilitation  Wean oxygen supplementation appropriately  COVID-19 pneumonia  Assessment & Plan  Currently managed for COVID-19 pneumonia  With marked improvement in clinical status  Minimal oxygen requirement    Elevated d-dimer  Assessment & Plan  D-dimer elevated at presentation  Low suspicions for PE/DVT at this time  Will hold off lower extremity Doppler/CTA PE study  Patient is on Eliquis  Heart failure with preserved ejection fraction  Assessment & Plan  Wt Readings from Last 3 Encounters:   02/10/21 78 kg (171 lb 15 3 oz)   01/28/21 78 1 kg (172 lb 3 2 oz)   02/18/20 70 3 kg (155 lb)     Notably improved diuresis since admission  Will transition back to home dose of Lasix    Atrial flutter (HCC)  Assessment & Plan  Continue Cardizem and metoprolol  Continue Eliquis for anticoagulation          Discharging Resident Physician: Pankaj Doty MD  Attending: Delphine Moreno MD  PCP: Severa Comes, MD  Admission Date: 2/10/2021  Admission Date:   Admission Orders (From admission, onward)     Ordered        02/10/21 1644  Inpatient Admission  Once                   Discharge Date: 02/15/21    Disposition:     Other: [facility]  [facility]    Reason for Admission:  COVID-19 pneumonia    Consultations During Hospital Stay:  · None    Procedures Performed:     Orders Placed This Encounter   Procedures    ED ECG Documentation Only       Diagnosis:  Acute hypoxic respiratory failure secondary to COVID-19 pneumonia    Resolved Problems  Date Reviewed: 2/15/2021    None          Principal Problem:    Acute respiratory failure with hypoxia  Active Problems:    COVID-19 pneumonia    Atrial flutter (HCC)    Heart failure with preserved ejection fraction    Elevated d-dimer      Significant Findings / Test Results:     · COVID-19 positive      Outpatient Tests Requested:  · None    Complications:  None    Hospital Course:     Kostas Hernández is a 80 y o  female patient past medical history significant atrial flutter who originally presented to the hospital on 2/10/2021 due to significant shortness of breath  Noted be be fulid overloaded on presentation  Initial blood work was significant for COVID-19 positive, elevated BNP  Managed as a case of acute hypoxic respiratory failure secondary to COVID-19 pneumonia vs acute congestive heart failure  Receive therapy as per to moderate COVID-19 pathway and diuresed with IV Lasix  Hospitalization course was marked by significant hypoxia and SOBs which responded with diuresis and oxygenation supplementation  At the time of discharge, patient appears hemodynamically and clinically stable, requiring 1 L of NC O2  She is to be discharged to [facility] for short-term rehab  Anticipate transition to room air while in rehab  Condition at Discharge: stable     Discharge Day Visit / Exam:     Subjective:     Vitals: Blood Pressure: 122/74 (02/15/21 0830)  Pulse: 100 (02/15/21 0830)  Temperature: 97 6 °F (36 4 °C) (02/15/21 0830)  Temp Source: Tympanic (02/15/21 0830)  Respirations: 20 (02/15/21 0830)  Height: 5' 2" (157 5 cm) (02/10/21 1900)  Weight - Scale: 78 kg (171 lb 15 3 oz) (02/10/21 1900)  SpO2: 99 % (02/15/21 0830)  Exam:   Physical Exam  Vitals signs reviewed  Constitutional:       General: She is not in acute distress  Appearance: She is not toxic-appearing  HENT:      Head: Normocephalic  Mouth/Throat:      Mouth: Mucous membranes are moist    Pulmonary:      Breath sounds: Normal breath sounds  No wheezing     Abdominal:      General: Bowel sounds are normal  There is no distension  Palpations: Abdomen is soft  Tenderness: There is no abdominal tenderness  Musculoskeletal:      Right lower leg: No edema  Left lower leg: No edema  Skin:     General: Skin is dry  Coloration: Skin is not jaundiced  Findings: No bruising  Neurological:      General: No focal deficit present  Mental Status: She is alert  Comments: Occassional confusion   Psychiatric:         Mood and Affect: Mood normal        Discussion with Family:  Talked to Taylor Burt  All questions/concerns were answered  She agrees with the plan  Discharge instructions/Information to patient and family:   See after visit summary for information provided to patient and family  Provisions for Follow-Up Care:  See after visit summary for information related to follow-up care and any pertinent home health orders  Planned Readmission: No     Discharge Medications:  See after visit summary for reconciled discharge medications provided to patient and family  Discharge Statement :  I spent 25 minutes discharging the patient  This time was spent on the day of discharge  I had direct contact with the patient on the day of discharge  Additional documentation is required if more than 30 minutes were spent on discharge           ** Please Note: This note has been constructed using a voice recognition system **

## 2021-02-15 NOTE — CASE MANAGEMENT
Pt is stable for DC and is able to return to [facility] for rehab  Pt is in need of transportation  CM made referral to [facility] via [facility] requesting a BLS as pt needs O2, is COIVD+ and confused  Cm requested a 1400  time  CM will continue to follow to obtain transport time and inform those of DC arrangements

## 2021-02-17 ENCOUNTER — NURSING HOME VISIT (OUTPATIENT)
Dept: FAMILY MEDICINE CLINIC | Facility: CLINIC | Age: 86
End: 2021-02-17
Payer: MEDICARE

## 2021-02-17 DIAGNOSIS — R26.2 AMBULATORY DYSFUNCTION: ICD-10-CM

## 2021-02-17 DIAGNOSIS — U07.1 COVID-19: Primary | ICD-10-CM

## 2021-02-17 PROCEDURE — 99307 SBSQ NF CARE SF MDM 10: CPT | Performed by: INTERNAL MEDICINE

## 2021-02-17 NOTE — PROGRESS NOTES
Follow-up visit in acute rehab at [facility]  [facility]  COVID-19 pneumonia  Patient has been recovering well  Breathing is back to normal   Oxygen saturation at room air is also normal   She is no longer requiring oxygen  No cough  No fever or chills  No malaise  No increased edema  Tolerating medicine well  She is eating better  No headache  No lightheadedness  Physical exam:   patient is alert and awake and comfortable  She is not in any distress

## 2021-02-17 NOTE — ASSESSMENT & PLAN NOTE
Patient has been recovering really well  Breathing is now back to normal   No fever or chills  Her oxygen saturation is normal at room air  He is no longer needing any oxygen  [facility] will [facility]

## 2021-02-22 ENCOUNTER — DOCUMENTATION (OUTPATIENT)
Dept: FAMILY MEDICINE CLINIC | Facility: CLINIC | Age: 86
End: 2021-02-22

## 2021-02-25 ENCOUNTER — TELEPHONE (OUTPATIENT)
Dept: FAMILY MEDICINE CLINIC | Facility: CLINIC | Age: 86
End: 2021-02-25

## 2021-02-25 NOTE — TELEPHONE ENCOUNTER
Jacinda Wolff came in for appt and wanted to let Dr Dawood Clarke know that patient passed away on Monday 2/22   Soheila Espino was [employer] [place] Mother  in [place]

## 2023-07-19 NOTE — ASSESSMENT & PLAN NOTE
I addressed this 7/13, sent in plavix as alternative to [facility] Rule out ACS   Patient with CP/SOB with exertion (was taking out the garbage) resolved with rest  CINTHYA score 4, with personal hx of CAD  · Admit to observation  · Tele monitor  · Serial troponins  · ASA/statin  · NTG PRN CP  · Cardiology consulted  · Will order nuclear stress test for tomorrow AM
